# Patient Record
Sex: MALE | Race: WHITE | NOT HISPANIC OR LATINO | Employment: FULL TIME | URBAN - METROPOLITAN AREA
[De-identification: names, ages, dates, MRNs, and addresses within clinical notes are randomized per-mention and may not be internally consistent; named-entity substitution may affect disease eponyms.]

---

## 2017-02-02 ENCOUNTER — LAB CONVERSION - ENCOUNTER (OUTPATIENT)
Dept: OTHER | Facility: OTHER | Age: 62
End: 2017-02-02

## 2017-02-02 ENCOUNTER — GENERIC CONVERSION - ENCOUNTER (OUTPATIENT)
Dept: OTHER | Facility: OTHER | Age: 62
End: 2017-02-02

## 2017-02-02 LAB
A/G RATIO (HISTORICAL): 1.6 (CALC) (ref 1–2.5)
ALBUMIN SERPL BCP-MCNC: 4.5 G/DL (ref 3.6–5.1)
ALP SERPL-CCNC: 60 U/L (ref 40–115)
ALT SERPL W P-5'-P-CCNC: 19 U/L (ref 9–46)
AST SERPL W P-5'-P-CCNC: 15 U/L (ref 10–35)
BILIRUB SERPL-MCNC: 0.5 MG/DL (ref 0.2–1.2)
BUN SERPL-MCNC: 21 MG/DL (ref 7–25)
BUN/CREA RATIO (HISTORICAL): NORMAL (CALC) (ref 6–22)
CALCIUM SERPL-MCNC: 9.8 MG/DL (ref 8.6–10.3)
CHLORIDE SERPL-SCNC: 105 MMOL/L (ref 98–110)
CHOLEST SERPL-MCNC: 180 MG/DL (ref 125–200)
CHOLEST/HDLC SERPL: 4.6 (CALC)
CO2 SERPL-SCNC: 23 MMOL/L (ref 20–31)
CREAT SERPL-MCNC: 1.22 MG/DL (ref 0.7–1.25)
EGFR AFRICAN AMERICAN (HISTORICAL): 73 ML/MIN/1.73M2
EGFR-AMERICAN CALC (HISTORICAL): 63 ML/MIN/1.73M2
GAMMA GLOBULIN (HISTORICAL): 2.8 G/DL (CALC) (ref 1.9–3.7)
GLUCOSE (HISTORICAL): 97 MG/DL (ref 65–99)
HDLC SERPL-MCNC: 39 MG/DL
LDL CHOLESTEROL (HISTORICAL): 108 MG/DL (CALC)
NON-HDL-CHOL (CHOL-HDL) (HISTORICAL): 141 MG/DL (CALC)
POTASSIUM SERPL-SCNC: 4.5 MMOL/L (ref 3.5–5.3)
PROSTATE SPECIFIC ANTIGEN TOTAL (HISTORICAL): 1 NG/ML
SODIUM SERPL-SCNC: 139 MMOL/L (ref 135–146)
TOTAL PROTEIN (HISTORICAL): 7.3 G/DL (ref 6.1–8.1)
TRIGL SERPL-MCNC: 167 MG/DL
TSH SERPL DL<=0.05 MIU/L-ACNC: 1.87 MIU/L (ref 0.4–4.5)

## 2017-05-16 ENCOUNTER — ALLSCRIPTS OFFICE VISIT (OUTPATIENT)
Dept: OTHER | Facility: OTHER | Age: 62
End: 2017-05-16

## 2017-06-17 ENCOUNTER — GENERIC CONVERSION - ENCOUNTER (OUTPATIENT)
Dept: OTHER | Facility: OTHER | Age: 62
End: 2017-06-17

## 2017-07-24 ENCOUNTER — HOSPITAL ENCOUNTER (EMERGENCY)
Facility: HOSPITAL | Age: 62
Discharge: HOME | End: 2017-07-24
Attending: EMERGENCY MEDICINE | Admitting: EMERGENCY MEDICINE
Payer: COMMERCIAL

## 2017-07-24 VITALS
WEIGHT: 207 LBS | SYSTOLIC BLOOD PRESSURE: 143 MMHG | TEMPERATURE: 98 F | OXYGEN SATURATION: 96 % | HEART RATE: 64 BPM | RESPIRATION RATE: 18 BRPM | DIASTOLIC BLOOD PRESSURE: 92 MMHG | HEIGHT: 69 IN | BODY MASS INDEX: 30.66 KG/M2

## 2017-07-24 DIAGNOSIS — T14.8XXA AVULSION OF SKIN: ICD-10-CM

## 2017-07-24 DIAGNOSIS — S00.81XA FOREHEAD ABRASION, INITIAL ENCOUNTER: ICD-10-CM
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
## 2017-07-24 PROCEDURE — 99283 EMERGENCY DEPT VISIT LOW MDM: CPT

## 2017-07-24 RX ORDER — BACITRACIN, NEOMYCIN, POLYMYXIN B 400; 3.5; 5 [USP'U]/G; MG/G; [USP'U]/G
1 OINTMENT TOPICAL ONCE
Status: COMPLETED | OUTPATIENT
Start: 2017-07-24 | End: 2017-07-24

## 2017-07-24 RX ADMIN — BACITRACIN, NEOMYCIN, POLYMYXIN B 1 SMALL APPLICATION: 400; 3.5; 5 OINTMENT TOPICAL at 02:49

## 2017-08-01 DIAGNOSIS — E78.5 HYPERLIPIDEMIA: ICD-10-CM

## 2017-08-24 ENCOUNTER — LAB CONVERSION - ENCOUNTER (OUTPATIENT)
Dept: OTHER | Facility: OTHER | Age: 62
End: 2017-08-24

## 2017-08-24 ENCOUNTER — GENERIC CONVERSION - ENCOUNTER (OUTPATIENT)
Dept: OTHER | Facility: OTHER | Age: 62
End: 2017-08-24

## 2017-08-24 LAB
A/G RATIO (HISTORICAL): 1.6 (CALC) (ref 1–2.5)
ALBUMIN SERPL BCP-MCNC: 4.2 G/DL (ref 3.6–5.1)
ALP SERPL-CCNC: 58 U/L (ref 40–115)
ALT SERPL W P-5'-P-CCNC: 15 U/L (ref 9–46)
AST SERPL W P-5'-P-CCNC: 13 U/L (ref 10–35)
BILIRUB SERPL-MCNC: 0.5 MG/DL (ref 0.2–1.2)
BUN SERPL-MCNC: 18 MG/DL (ref 7–25)
BUN/CREA RATIO (HISTORICAL): NORMAL (CALC) (ref 6–22)
CALCIUM SERPL-MCNC: 9.4 MG/DL (ref 8.6–10.3)
CHLORIDE SERPL-SCNC: 108 MMOL/L (ref 98–110)
CHOLEST SERPL-MCNC: 149 MG/DL
CHOLEST/HDLC SERPL: 3.7 (CALC)
CO2 SERPL-SCNC: 27 MMOL/L (ref 20–31)
CREAT SERPL-MCNC: 1.15 MG/DL (ref 0.7–1.25)
EGFR AFRICAN AMERICAN (HISTORICAL): 78 ML/MIN/1.73M2
EGFR-AMERICAN CALC (HISTORICAL): 67 ML/MIN/1.73M2
GAMMA GLOBULIN (HISTORICAL): 2.7 G/DL (CALC) (ref 1.9–3.7)
GLUCOSE (HISTORICAL): 91 MG/DL (ref 65–99)
HDLC SERPL-MCNC: 40 MG/DL
LDL CHOLESTEROL (HISTORICAL): 86 MG/DL (CALC)
NON-HDL-CHOL (CHOL-HDL) (HISTORICAL): 109 MG/DL (CALC)
POTASSIUM SERPL-SCNC: 4.5 MMOL/L (ref 3.5–5.3)
SODIUM SERPL-SCNC: 142 MMOL/L (ref 135–146)
T4 FREE SERPL-MCNC: 1.3 NG/DL (ref 0.8–1.8)
TOTAL PROTEIN (HISTORICAL): 6.9 G/DL (ref 6.1–8.1)
TRIGL SERPL-MCNC: 136 MG/DL
TSH SERPL DL<=0.05 MIU/L-ACNC: 1.84 MIU/L (ref 0.4–4.5)

## 2017-11-14 ENCOUNTER — ALLSCRIPTS OFFICE VISIT (OUTPATIENT)
Dept: OTHER | Facility: OTHER | Age: 62
End: 2017-11-14

## 2017-11-15 NOTE — PROGRESS NOTES
Assessment    1  Hyperlipidemia (272 4) (E78 5)   2  GERD without esophagitis (530 81) (K21 9)   3  Obesity (BMI 30-39 9) (278 00) (E66 9)   4  Erectile dysfunction of non-organic origin (302 72) (F52 21)   5  Need for vaccination (V05 9) (Z23)    Plan  Encounter for prostate cancer screening, Erectile dysfunction of non-organic origin    · (1) PSA (SCREEN) (Dx V76 44 Screen for Prostate Cancer); Status:Active; Requestedfor:15Fyv7832;   Erectile dysfunction of non-organic origin    · Viagra 50 MG Oral Tablet; TAKE 1 TABLET DAILY 1 HOUR BEFORE NEEDED  Erectile dysfunction of non-organic origin, GERD without esophagitis, Hyperlipidemia,Obesity (BMI 30-39  9)    · Follow-up visit in 6 months Evaluation and Treatment  Follow-up  Status: Hold For -Scheduling  Requested for: 83Khh5751  GERD without esophagitis    · Avoid foods and beverages that contain caffeine ; Status:Complete;   Done: 13XHL6892   · Do not eat anything for at least 2 hours before going to bed ; Status:Complete;   Done:14Nov2017   · Eat small frequent meals ; Status:Complete;   Done: 01DLS9898   · Raise the head of your bed to keep stomach acid from coming back up  ;Status:Complete;   Done: 72AKD4603   · Regular aerobic exercise can help reduce stress ; Status:Complete;   Done: 45QUL0111   · Several things can be done to help treat and prevent your gastric reflux  ;Status:Complete;   Done: 29RKL5515   · Call [phone] if: You have difficulty swallowing ; Status:Complete;   Done:14Nov2017   · Call [phone] if: You lose weight without trying to ; Status:Complete;   Done:14Nov2017   · Call [phone] if: Your indigestion is worse ; Status:Complete;   Done: 13EFL9305   · Call 911 if: You experience a new kind of chest pain (angina) or pressure  ;Status:Complete;   Done: 35GSD9803   · Seek Immediate Medical Attention if: You are vomiting any blood or material that looksblack, or like coffee grounds ; Status:Complete;   Done: 55IGS0553   · Seek Immediate Medical Attention if: You see any blood in the stool ; Status:Complete;  Done: 79ICV9623  GERD without esophagitis, Hyperlipidemia    · Call [phone] if: You have pain in the stomach area ; Status:Complete;   Done:16Fda5264   · Call [phone] if: You start vomiting ; Status:Complete;   Done: 86RFH3131  GERD without esophagitis, Hyperlipidemia, Obesity (BMI 30-39  9)    · Call 911 if: You experience a new kind of chest pain (angina) or pressure  ;Status:Complete;   Done: 67WCO6340  Health Maintenance, Hyperlipidemia    · *VB-Depression Screening; Status:Resulted - Requires Verification;   Done: 14Nov201710:18AM  Hyperlipidemia    · (1) LIPID PANEL, FASTING; Status:Active; Requested for:52Drv3663;    · (1) T4, FREE; Status:Active; Requested for:34Suw7152;    · (1) TSH; Status:Active; Requested for:81Fvv3973;    · Eat no more than 30 grams of fat per day ; Status:Complete;   Done: 42ZGQ5129   · Call [phone] if: You have muscle cramps ; Status:Complete;   Done: 19QQF8245  Hyperlipidemia, Obesity (BMI 30-39  9)    · A diet low in sugar may help your condition ; Status:Complete;   Done: 45BIW4347   · Avoid alcoholic beverages ; Status:Complete;   Done: 07PXP4086   · Begin a limited exercise program ; Status:Complete;   Done: 65CGJ5218   · Call 911 if: You have any symptoms of a stroke ; Status:Complete;   Done: 65NGS3147   · (1) COMPREHENSIVE METABOLIC PANEL; Status:Active; Requested for:11Qrh6057;   Need for vaccination    · Fluzone Quadrivalent Intramuscular Suspension; INJECT 0 5  MLIntramuscular; To Be Done: 94PAH1055   · Pneumo (Pneumovax); INJECT 0 5  ML Intramuscular; To Be Done:14Nov2017  Obesity (BMI 30-39  9)    · Call [phone] if: You are considering suicide ; Status:Complete;   Done:14Nov2017   · Call [phone] if: You are having difficulty sleeping (insomnia) ; Status:Complete;  Done: 81PNO2174   · Call [phone] if:  You are urinating too frequently ; Status:Complete; Done:14Nov2017   · Call [phone] if: You feel thirsty most of the time ; Status:Complete;   Done:14Nov2017   · Call [phone] if: You feel your heart is beating very fast or skipping beats  ;Status:Complete;   Done: 06GUM0918   · Call [phone] if: You have feelings of extreme sadness and feelings ofhopelessness ; Status:Complete;   Done: 11HNP6050   · Call [phone] if: You have pain in your abdomen ; Status:Complete;   Done:14Nov2017   · Call [phone] if: You have symptoms of sleep apnea ; Status:Complete;   Done:14Nov2017   · Call [phone] if: You have sudden or severe chest pain with shortness of breath, rapidbreathing, or cough ; Status:Complete;   Done: 61YZS9902   · Diets that are low in carbohydrates and high in protein are very popular for weight loss  ;Status:Complete;   Done: 52WUG1097    Discussion/Summary    Patient labs from August show LDL 84 with HDL of 40 Patient will continue with the same cholesterol medications His GERD has resolved and so he only takes the TUMS as needed Patient will have repeat labs in 2/2018 which will include PSA His last colonoscopy was stable  Chief Complaint  follow up gerd,lipid      History of Present Illness  Patient is here for followp of GERD, lipids, obesity and also his erectile dysfunction Patient is doing really well with reflux and in fact stopped the omeprazole He is taking simvastatin as directed and last labs in 8/2017 were stable His last PSA was 2/2017 He is stable with the ED as the viagra works fine He has had his colon cancer screening and follows with dermatology as he has history of skin cancer on his nose   The patient is being seen for follow-up of gastroesophageal reflux disease  The patient reports doing well  He has had no significant interval events  The patient is currently asymptomatic  No associated symptoms are reported    Medications:  the patient is adherent to his medication regimen, but-- he denies medication side effects  Disease management:  the patient is doing well with his goals  His hyperlipidemia has been stable  His LDL goal is <100 mg/dL-- and-- last LDL was 86 mg/dL  the patient is adherent with his medication regimen  -- He denies medication side effects  Symptoms: The patient denies any symptoms currently  no vomiting Associated symptoms include recent 4 pounds weight loss, but-- no orthopnea,-- no polyuria,-- no focal neurologic deficits,-- no palpitations,-- no syncope,-- no headache,-- no PND,-- no memory loss,-- no polydipsia-- and-- no heartburn  Lifestyle and Disease Management: Diet: He consumes a diverse and healthy diet  Weight Issues: He has weight concerns  Exercise: He exercises regularly  Smoking: He does not use tobacco  Alcohol: He denies alcohol use  Drug Use: He denies drug use  Goals: the patient is doing well with his goals  Review of Systems   Constitutional: recent 4 lb weight loss, but-- no fever,-- not feeling poorly,-- no chills-- and-- not feeling tired  Eyes: no eye pain-- and-- no eyesight problems  ENT: no complaints of earache, no hearing loss, no nosebleeds, no nasal discharge, no sore throat, no hoarseness  Cardiovascular: as noted in HPI  Respiratory: as noted in HPI  Gastrointestinal: as noted in HPI  Genitourinary: no dysuria-- and-- no incontinence  Musculoskeletal: no arthralgias-- and-- no myalgias  Integumentary: no rashes  Neurological: No compliants of headache, no confusion, no convulsions, no numbness or tingling, no dizziness or fainting, no limb weakness, no difficulty walking  Psychiatric: no anxiety,-- no sleep disturbances-- and-- no depression  Active Problems  1  Basal cell carcinoma of nose (173 31) (C44 311)   2  Colon polyps (211 3) (K63 5)   3  Erectile dysfunction of non-organic origin (302 72) (F52 21)   4  Generalized osteoarthritis (715 00) (M15 9)   5  GERD without esophagitis (530 81) (K21 9)   6   Hyperlipidemia (272 4) (E78 5)   7  Lipoma (214 9) (D17 9)   8  Obesity (BMI 30-39 9) (278 00) (E66 9)    Past Medical History  1  History of Encounter for prostate cancer screening (V76 44) (Z12 5)   2  History of Essential hypertriglyceridemia (272 1) (E78 1)   3  History of hypogonadism (V12 29) (Z86 39)   4  History of Obstructive sleep apnea (327 23) (G47 33)   5  History of Right wrist pain (719 43) (M25 531)   6  History of Snoring (786 09) (R06 83)   7  History of Strain of right wrist, subsequent encounter (V58 89,842 00) (G52 754Q)   8  History of Tinnitus, unspecified laterality (388 30) (H93 19)    The active problems and past medical history were reviewed and updated today  Surgical History  1  History of Colonoscopy (Fiberoptic) Screening   2  History of Diagnostic Esophagogastroduodenoscopy   3  History of Mohs Micrographic Surgery Nose    The surgical history was reviewed and updated today  Family History  Mother    1  Family history of Diabetes Mellitus (V18 0)    The family history was reviewed and updated today  Social History     · Being A Social Drinker   · Daily Coffee Consumption - Eight Or More Cups A Day   · Nonsmoker (V49 89) (Z78 9)  The social history was reviewed and is unchanged  Current Meds   1  Naproxen 500 MG Oral Tablet; TAKE 1 TABLET EVERY 12     HOURS AS NEEDED; Therapy: 61QFR5576 to (Evaluate:81Jda2072)  Requested for: 99WHU7294; Last Rx:29Jun2017 Ordered   2  Simvastatin 20 MG Oral Tablet; Take 1 tablet daily; Therapy: 29PYJ6006 to )  Requested for: 98Kmd4981; Last Rx:31Oct2017 Ordered   3  Viagra 50 MG Oral Tablet; TAKE 1 TABLET DAILY 1 HOUR BEFORE NEEDED  Requested for: 17UHM4088; Last Rx:35Kdi3822 Ordered    The medication list was reviewed and updated today  Allergies  1   No Known Drug Allergies    Vitals  Vital Signs    Recorded: 48AYX8907 03:04HQ   Systolic 196   Diastolic 82   Height 5 ft 9 in   Weight 205 lb 8 oz   BMI Calculated 30 35   BSA Calculated 2 09       Physical Exam   Constitutional  General appearance: No acute distress, well appearing and well nourished  Eyes  Conjunctiva and lids: No swelling, erythema, or discharge  Pupils and irises: Equal, round and reactive to light  Ears, Nose, Mouth, and Throat  External inspection of ears and nose: Normal    Otoscopic examination: Tympanic membrance translucent with normal light reflex  Canals patent without erythema  Nasal mucosa, septum, and turbinates: Normal without edema or erythema  Oropharynx: Normal with no erythema, edema, exudate or lesions  Pulmonary  Respiratory effort: No increased work of breathing or signs of respiratory distress  Auscultation of lungs: Clear to auscultation, equal breath sounds bilaterally, no wheezes, no rales, no rhonci  Cardiovascular  Auscultation of heart: Normal rate and rhythm, normal S1 and S2, without murmurs  Examination of extremities for edema and/or varicosities: Normal    Carotid pulses: Normal    Abdomen  Abdomen: Non-tender, no masses  Liver and spleen: No hepatomegaly or splenomegaly  Lymphatic  Palpation of lymph nodes in neck: No lymphadenopathy  Musculoskeletal  Gait and station: Normal    Skin  Skin and subcutaneous tissue: Normal without rashes or lesions  Neurologic  Cranial nerves: Cranial nerves 2-12 intact     Psychiatric  Orientation to person, place and time: Normal    Mood and affect: Normal          Signatures   Electronically signed by : Bety Downing DO; Nov 14 2017 10:22AM EST                       (Author)

## 2018-01-12 NOTE — RESULT NOTES
Verified Results  (1) LIPID PANEL, FASTING [id number] 07:54AM Jose Raul Mad     Test Name Result Flag Reference   CHOLESTEROL, TOTAL 180 mg/dL  125-200   HDL CHOLESTEROL 39 mg/dL L > OR = 40   TRIGLICERIDES 551 mg/dL H <150   LDL-CHOLESTEROL 108 mg/dL (calc)  <130   Desirable range <100 mg/dL for patients with CHD or  diabetes and <70 mg/dL for diabetic patients with  known heart disease  CHOL/HDLC RATIO 4 6 (calc)  < OR = 5 0   NON HDL CHOLESTEROL 141 mg/dL (calc)     Target for non-HDL cholesterol is 30 mg/dL higher than   LDL cholesterol target  (1) COMPREHENSIVE METABOLIC PANEL [id number] 80:46JJ Jose Raul Mad     Test Name Result Flag Reference   GLUCOSE 97 mg/dL  65-99   Fasting reference interval   UREA NITROGEN (BUN) 21 mg/dL  7-25   CREATININE 1 22 mg/dL  0 70-1 25   For patients >52years of age, the reference limit  for Creatinine is approximately 13% higher for people  identified as -American  eGFR NON-AFR  AMERICAN 63 mL/min/1 73m2  > OR = 60   eGFR AFRICAN AMERICAN 73 mL/min/1 73m2  > OR = 60   BUN/CREATININE RATIO   3-39   NOT APPLICABLE (calc)   SODIUM 139 mmol/L  135-146   POTASSIUM 4 5 mmol/L  3 5-5 3   CHLORIDE 105 mmol/L     CARBON DIOXIDE 23 mmol/L  20-31   CALCIUM 9 8 mg/dL  8 6-10 3   PROTEIN, TOTAL 7 3 g/dL  6 1-8 1   ALBUMIN 4 5 g/dL  3 6-5 1   GLOBULIN 2 8 g/dL (calc)  1 9-3 7   ALBUMIN/GLOBULIN RATIO 1 6 (calc)  1 0-2 5   BILIRUBIN, TOTAL 0 5 mg/dL  0 2-1 2   ALKALINE PHOSPHATASE 60 U/L     AST 15 U/L  10-35   ALT 19 U/L  9-46     (Q) TSH, 3RD GENERATION [id number] 07:54AM Jose Raul Mad     Test Name Result Flag Reference   TSH 1 87 mIU/L  0 40-4 50     (1) PSA (SCREEN) (Dx V76 44 Screen for Prostate Cancer) [id number] 07:54AM Jose Raul Mad   REPORT COMMENT:  FASTING:YES     Test Name Result Flag Reference   PSA, TOTAL 1 0 ng/mL  < OR = 4 0   This test was performed using the Siemens  chemiluminescent method   Values obtained from  different assay methods cannot be used  interchangeably  PSA levels, regardless of  value, should not be interpreted as absolute  evidence of the presence or absence of disease

## 2018-01-14 VITALS
HEIGHT: 69 IN | DIASTOLIC BLOOD PRESSURE: 82 MMHG | SYSTOLIC BLOOD PRESSURE: 120 MMHG | BODY MASS INDEX: 30.44 KG/M2 | WEIGHT: 205.5 LBS

## 2018-01-14 VITALS
WEIGHT: 209 LBS | DIASTOLIC BLOOD PRESSURE: 80 MMHG | BODY MASS INDEX: 30.96 KG/M2 | HEIGHT: 69 IN | SYSTOLIC BLOOD PRESSURE: 122 MMHG

## 2018-01-14 NOTE — RESULT NOTES
Verified Results  (1) LIPID PANEL, FASTING [id number] 07:44AM Juan David Adams     Test Name Result Flag Reference   CHOLESTEROL, TOTAL 184 mg/dL  125-200   HDL CHOLESTEROL 37 mg/dL L > OR = 40   TRIGLICERIDES 120 mg/dL H <150   LDL-CHOLESTEROL 104 mg/dL (calc)  <130   Desirable range <100 mg/dL for patients with CHD or  diabetes and <70 mg/dL for diabetic patients with  known heart disease  CHOL/HDLC RATIO 5 0 (calc)  < OR = 5 0   NON HDL CHOLESTEROL 147 mg/dL (calc)     Target for non-HDL cholesterol is 30 mg/dL higher than   LDL cholesterol target  See Below     We received your handwritten test order and  performed the AMA defined lipid panel  If  this is not what you intended to order, please  contact your local   immediately so that we may adjust our billing  appropriately  You may also inquire about  alternative or additional testing      (1) COMPREHENSIVE METABOLIC PANEL [id number] 14:87TQ Juan David Adams     Test Name Result Flag Reference   GLUCOSE 96 mg/dL  65-99   Fasting reference interval   UREA NITROGEN (BUN) 16 mg/dL  7-25   CREATININE 1 18 mg/dL  0 70-1 25   For patients >52years of age, the reference limit  for Creatinine is approximately 13% higher for people  identified as -American  eGFR NON-AFR   AMERICAN 66 mL/min/1 73m2  > OR = 60   eGFR AFRICAN AMERICAN 76 mL/min/1 73m2  > OR = 60   BUN/CREATININE RATIO   2-76   NOT APPLICABLE (calc)   SODIUM 140 mmol/L  135-146   POTASSIUM 4 4 mmol/L  3 5-5 3   CHLORIDE 105 mmol/L     CARBON DIOXIDE 27 mmol/L  19-30   CALCIUM 9 7 mg/dL  8 6-10 3   PROTEIN, TOTAL 7 2 g/dL  6 1-8 1   ALBUMIN 4 3 g/dL  3 6-5 1   GLOBULIN 2 9 g/dL (calc)  1 9-3 7   ALBUMIN/GLOBULIN RATIO 1 5 (calc)  1 0-2 5   BILIRUBIN, TOTAL 0 5 mg/dL  0 2-1 2   ALKALINE PHOSPHATASE 53 U/L     AST 16 U/L  10-35   ALT 25 U/L  9-46     (Q) TSH, 3RD GENERATION [id number] 07:44AM Juan David Adams   REPORT COMMENT:  FASTING:YES     Test Name Result Flag Reference   TSH 2 08 mIU/L  0 40-4 50

## 2018-01-17 NOTE — RESULT NOTES
Verified Results  (1) LIPID PANEL, FASTING [id number] 07:48AM Mendel Montenegro     Test Name Result Flag Reference   CHOLESTEROL, TOTAL 149 mg/dL  <200   HDL CHOLESTEROL 40 mg/dL L >50   TRIGLICERIDES 664 mg/dL  <150   LDL-CHOLESTEROL 86 mg/dL (calc)     Reference range: <100     Desirable range <100 mg/dL for patients with CHD or  diabetes and <70 mg/dL for diabetic patients with  known heart disease  The [place] calculation is a validated novel   method that provides better accuracy than the   Friedewald equation in the estimation of LDL-C,   particularly when TG levels are 150-400 mg/dL and   LDL-C levels are lower than 70 mg/dL  Reference:  Shara Whatley et al  Comparison of a Novel   Method vs the Friedewald Equation for Estimating   Low-Density Lipoprotein Cholesterol Levels From the   Standard Lipid Profile  [facility]  0416;053(12): 3007-2844  For additional information, please refer to  http://[employer]/faq/MPQ126  (This link is being provided for informational/  educational purposes only )   CHOL/HDLC RATIO 3 7 (calc)  <5 0    See Below     We received your handwritten test order and  performed the AMA defined lipid panel  If  this is not what you intended to order, please  contact your local   immediately so that we may adjust our billing  appropriately  You may also inquire about  alternative or additional testing  NON HDL CHOLESTEROL 109 mg/dL (calc)  <130   For patients with diabetes plus 1 major ASCVD risk   factor, treating to a non-HDL-C goal of <100 mg/dL   (LDL-C of <70 mg/dL) is considered a therapeutic   option       (1) COMPREHENSIVE METABOLIC PANEL [id number] 55:22SV Mendel Montenegro     Test Name Result Flag Reference   GLUCOSE 91 mg/dL  65-99   Fasting reference interval   UREA NITROGEN (BUN) 18 mg/dL  7-25   CREATININE 1 15 mg/dL  0 70-1 25   For patients >52years of age, the reference limit  for Creatinine is approximately 13% higher for people  identified as -American  eGFR NON-AFR   AMERICAN 67 mL/min/1 73m2  > OR = 60   eGFR AFRICAN AMERICAN 78 mL/min/1 73m2  > OR = 60   BUN/CREATININE RATIO   8-66   NOT APPLICABLE (calc)   SODIUM 142 mmol/L  135-146   POTASSIUM 4 5 mmol/L  3 5-5 3   CHLORIDE 108 mmol/L     CARBON DIOXIDE 27 mmol/L  20-31   CALCIUM 9 4 mg/dL  8 6-10 3   PROTEIN, TOTAL 6 9 g/dL  6 1-8 1   ALBUMIN 4 2 g/dL  3 6-5 1   GLOBULIN 2 7 g/dL (calc)  1 9-3 7   ALBUMIN/GLOBULIN RATIO 1 6 (calc)  1 0-2 5   BILIRUBIN, TOTAL 0 5 mg/dL  0 2-1 2   ALKALINE PHOSPHATASE 58 U/L     AST 13 U/L  10-35   ALT 15 U/L  9-46     (1) T4, FREE [id number] 07:48AM Betty Levo     Test Name Result Flag Reference   T4, FREE 1 3 ng/dL  0 8-1 8     (Q) TSH, 3RD GENERATION [id number] 07:48AM Betty Levo   REPORT COMMENT:  FASTING:YES     Test Name Result Flag Reference   TSH 1 84 mIU/L  0 40-4 50

## 2018-01-17 NOTE — RESULT NOTES
Verified Results  (1) LIPID PANEL, FASTING [id number] 08:35AM [facility]en Guides     Test Name Result Flag Reference   CHOLESTEROL, TOTAL 170 mg/dL  125-200   HDL CHOLESTEROL 43 mg/dL  > OR = 40   TRIGLICERIDES 909 mg/dL H <150   LDL-CHOLESTEROL 95 mg/dL (calc)  <130   Desirable range <100 mg/dL for patients with CHD or  diabetes and <70 mg/dL for diabetic patients with  known heart disease  CHOL/HDLC RATIO 4 0 (calc)  < OR = 5 0   NON HDL CHOLESTEROL 127 mg/dL (calc)     Target for non-HDL cholesterol is 30 mg/dL higher than   LDL cholesterol target  See Below     We received your handwritten test order and  performed the AMA defined lipid panel  If  this is not what you intended to order, please  contact your local   immediately so that we may adjust our billing  appropriately  You may also inquire about  alternative or additional testing      (1) COMPREHENSIVE METABOLIC PANEL [id number] 44:69UH [facility]en Guides     Test Name Result Flag Reference   GLUCOSE 97 mg/dL  65-99   Fasting reference interval   UREA NITROGEN (BUN) 17 mg/dL  7-25   CREATININE 1 11 mg/dL  0 70-1 25   For patients >52years of age, the reference limit  for Creatinine is approximately 13% higher for people  identified as -American  eGFR NON-AFR   AMERICAN 71 mL/min/1 73m2  > OR = 60   eGFR AFRICAN AMERICAN 83 mL/min/1 73m2  > OR = 60   BUN/CREATININE RATIO   5-53   NOT APPLICABLE (calc)   SODIUM 139 mmol/L  135-146   POTASSIUM 4 7 mmol/L  3 5-5 3   CHLORIDE 106 mmol/L     CARBON DIOXIDE 26 mmol/L  19-30   CALCIUM 9 4 mg/dL  8 6-10 3   PROTEIN, TOTAL 7 4 g/dL  6 1-8 1   ALBUMIN 4 4 g/dL  3 6-5 1   GLOBULIN 3 0 g/dL (calc)  1 9-3 7   ALBUMIN/GLOBULIN RATIO 1 5 (calc)  1 0-2 5   BILIRUBIN, TOTAL 0 2 mg/dL  0 2-1 2   ALKALINE PHOSPHATASE 52 U/L     AST 20 U/L  10-35   ALT 20 U/L  9-46     (Q) TSH, 3RD GENERATION [id number] 08:35AM [facility]     Test Name Result Flag Reference   TSH 2  33 mIU/L  0 40-4 50     (1) PSA, DIAGNOSTIC (FOLLOW-UP) [id number] 08:35AM Jan Davis   REPORT COMMENT:  FASTING:YES     Test Name Result Flag Reference   PSA, TOTAL 0 8 ng/mL  < OR = 4 0   This test was performed using the Siemens  chemiluminescent method  Values obtained from  different assay methods cannot be used  interchangeably  PSA levels, regardless of  value, should not be interpreted as absolute  evidence of the presence or absence of disease

## 2018-02-23 DIAGNOSIS — Z12.5 ENCOUNTER FOR SCREENING FOR MALIGNANT NEOPLASM OF PROSTATE: ICD-10-CM

## 2018-02-23 DIAGNOSIS — E66.9 OBESITY: ICD-10-CM

## 2018-02-23 DIAGNOSIS — F52.21 MALE ERECTILE DISORDER (CODE): ICD-10-CM

## 2018-02-23 DIAGNOSIS — E78.5 HYPERLIPIDEMIA: ICD-10-CM

## 2018-03-02 LAB
ALBUMIN SERPL-MCNC: 4.1 G/DL (ref 3.6–5.1)
ALBUMIN/GLOB SERPL: 1.5 (CALC) (ref 1–2.5)
ALP SERPL-CCNC: 60 U/L (ref 40–115)
ALT SERPL-CCNC: 14 U/L (ref 9–46)
AST SERPL-CCNC: 11 U/L (ref 10–35)
BILIRUB SERPL-MCNC: 0.5 MG/DL (ref 0.2–1.2)
BUN SERPL-MCNC: 17 MG/DL (ref 7–25)
BUN/CREAT SERPL: 13 (CALC) (ref 6–22)
CALCIUM SERPL-MCNC: 9.5 MG/DL (ref 8.6–10.3)
CHLORIDE SERPL-SCNC: 110 MMOL/L (ref 98–110)
CHOLEST SERPL-MCNC: 139 MG/DL
CHOLEST/HDLC SERPL: 3.4 (CALC)
CO2 SERPL-SCNC: 30 MMOL/L (ref 20–31)
CREAT SERPL-MCNC: 1.31 MG/DL (ref 0.7–1.25)
GLOBULIN SER CALC-MCNC: 2.8 G/DL (CALC) (ref 1.9–3.7)
GLUCOSE SERPL-MCNC: 98 MG/DL (ref 65–99)
HDLC SERPL-MCNC: 41 MG/DL
LDLC SERPL CALC-MCNC: 79 MG/DL (CALC)
NONHDLC SERPL-MCNC: 98 MG/DL (CALC)
POTASSIUM SERPL-SCNC: 4.9 MMOL/L (ref 3.5–5.3)
PROT SERPL-MCNC: 6.9 G/DL (ref 6.1–8.1)
PSA SERPL-MCNC: 1 NG/ML
SL AMB EGFR AFRICAN AMERICAN: 67 ML/MIN/1.73M2
SL AMB EGFR NON AFRICAN AMERICAN: 58 ML/MIN/1.73M2
SODIUM SERPL-SCNC: 144 MMOL/L (ref 135–146)
T4 FREE SERPL-MCNC: 1.2 NG/DL (ref 0.8–1.8)
TRIGL SERPL-MCNC: 100 MG/DL
TSH SERPL-ACNC: 1.89 MIU/L (ref 0.4–4.5)

## 2018-04-02 DIAGNOSIS — E78.2 MIXED HYPERLIPIDEMIA: Primary | ICD-10-CM

## 2018-04-03 RX ORDER — SIMVASTATIN 20 MG
TABLET ORAL
Qty: 90 TABLET | Refills: 1 | Status: SHIPPED | OUTPATIENT
Start: 2018-04-03 | End: 2018-11-14 | Stop reason: SDUPTHER

## 2018-05-22 ENCOUNTER — OFFICE VISIT (OUTPATIENT)
Dept: FAMILY MEDICINE CLINIC | Facility: CLINIC | Age: 63
End: 2018-05-22
Payer: COMMERCIAL

## 2018-05-22 VITALS
BODY MASS INDEX: 30.54 KG/M2 | SYSTOLIC BLOOD PRESSURE: 128 MMHG | WEIGHT: 206.2 LBS | HEIGHT: 69 IN | DIASTOLIC BLOOD PRESSURE: 82 MMHG

## 2018-05-22 DIAGNOSIS — C44.311 BASAL CELL CARCINOMA OF NOSE: ICD-10-CM

## 2018-05-22 DIAGNOSIS — E78.2 MIXED HYPERLIPIDEMIA: Primary | ICD-10-CM

## 2018-05-22 DIAGNOSIS — E66.9 OBESITY (BMI 30-39.9): ICD-10-CM

## 2018-05-22 DIAGNOSIS — F52.21 ERECTILE DYSFUNCTION OF NON-ORGANIC ORIGIN: ICD-10-CM

## 2018-05-22 PROCEDURE — 3008F BODY MASS INDEX DOCD: CPT | Performed by: FAMILY MEDICINE

## 2018-05-22 PROCEDURE — 99214 OFFICE O/P EST MOD 30 MIN: CPT | Performed by: FAMILY MEDICINE

## 2018-05-22 RX ORDER — SILDENAFIL 50 MG/1
1 TABLET, FILM COATED ORAL AS NEEDED
COMMUNITY
End: 2019-11-21 | Stop reason: SDUPTHER

## 2018-05-22 NOTE — ASSESSMENT & PLAN NOTE
Patient LDL is at goal Patient to continue with the same medication and will have repeat labs in 9/2018

## 2018-05-22 NOTE — PROGRESS NOTES
Assessment/Plan:    Basal cell carcinoma of nose  Patient to follwoup with dermatology as directed    Mixed hyperlipidemia  Patient LDL is at goal Patient to continue with the same medication and will have repeat labs in 9/2018    Erectile dysfunction of non-organic origin  viagra as needed    Obesity (BMI 30-39  9)  Weight is stable continue same diet       Diagnoses and all orders for this visit:    Mixed hyperlipidemia    Obesity (BMI 30-39  9)    Basal cell carcinoma of nose    Erectile dysfunction of non-organic origin    Other orders  -     sildenafil (VIAGRA) 50 MG tablet; Take 1 tablet by mouth          Subjective:   Chief Complaint   Patient presents with    Hyperlipidemia     no refills needed           Patient ID: Radha Guajardo is a 60 y o  male  Patient is here for follwoup of hyperlipidemia, obesity basal cell cancer of the nose and erectile dysfunction patient is tolerating medications without any issues Patient weight is stable last lipids were in March showed LDL 78, HDL 40 glucose was 98 Patient is walking for exercise His weight is stable however he states he is eating much better due to his wife having had gastric sleeve patient colonsocopy was in 2012 and normal Repeat due in 2022 Patient uses viagra as needed patient ahs no complaints today        The following portions of the patient's history were reviewed and updated as appropriate: allergies, current medications, past social history and problem list     Review of Systems   Constitutional: Negative for fatigue, fever and unexpected weight change  HENT: Negative for congestion, sinus pain and trouble swallowing  Eyes: Negative for discharge and visual disturbance  Respiratory: Negative for cough, chest tightness, shortness of breath and wheezing  Cardiovascular: Negative for chest pain, palpitations and leg swelling  Gastrointestinal: Negative for abdominal pain, blood in stool, constipation, diarrhea, nausea and vomiting  Genitourinary: Negative for difficulty urinating, dysuria, frequency and hematuria  Musculoskeletal: Negative for arthralgias, gait problem and joint swelling  Skin: Negative for rash and wound  Allergic/Immunologic: Negative for environmental allergies and food allergies  Neurological: Negative for dizziness, syncope, weakness, numbness and headaches  Hematological: Negative for adenopathy  Does not bruise/bleed easily  Psychiatric/Behavioral: Negative for confusion, decreased concentration and sleep disturbance  The patient is not nervous/anxious  Objective:      /82   Ht 5' 9" (1 753 m)   Wt 93 5 kg (206 lb 3 2 oz)   BMI 30 45 kg/m²          Physical Exam   Constitutional: He is oriented to person, place, and time  He appears well-developed and well-nourished  HENT:   Head: Normocephalic and atraumatic  Right Ear: Hearing, tympanic membrane and external ear normal    Left Ear: Hearing, tympanic membrane and external ear normal    Eyes: Conjunctivae and EOM are normal  Pupils are equal, round, and reactive to light  Neck: Neck supple  No thyromegaly present  Cardiovascular: Normal rate and normal heart sounds  Pulmonary/Chest: Effort normal and breath sounds normal  He has no wheezes  He has no rales  Abdominal: Soft  Bowel sounds are normal  He exhibits no distension  There is no tenderness  Musculoskeletal: He exhibits no edema or tenderness  Lymphadenopathy:     He has no cervical adenopathy  Neurological: He is alert and oriented to person, place, and time  No cranial nerve deficit  Coordination normal    Skin: Skin is warm and dry  No rash noted  Psychiatric: He has a normal mood and affect   His behavior is normal  Judgment and thought content normal

## 2018-05-22 NOTE — PATIENT INSTRUCTIONS
Calorie Counting Diet   WHAT YOU NEED TO KNOW:   What is a calorie counting diet? It is a meal plan based on counting calories each day to reach a healthy body weight  You will need to eat fewer calories if you are trying to lose weight  Weight loss may decrease your risk for certain health problems or improve your health if you have health problems  Some of these health problems include heart disease, high blood pressure, and diabetes  What foods should I avoid? Your dietitian will tell you if you need to avoid certain foods based on your body weight and health condition  You may need to avoid high-fat foods if you are at risk for or have heart disease  You may need to eat fewer foods from the breads and starches food group if you have diabetes  How many calories are in foods? The following is a list of foods and drinks with the approximate number of calories in each  Check the food label to find the exact number of calories  A dietitian can tell you how many calories you should have from each food group each day    · Carbohydrate:      ¨ ½ of a 3-inch bagel, 1 slice of bread, or ½ of a hamburger bun or hot dog bun (80)    ¨ 1 (8-inch) flour tortilla or ½ cup of cooked rice (100)    ¨ 1 (6-inch) corn tortilla (80)    ¨ 1 (6-inch) pancake or 1 cup of bran flakes cereal (110)    ¨ ½ cup of cooked cereal (80)    ¨ ½ cup of cooked pasta (85)    ¨ 1 ounce of pretzels (100)    ¨ 3 cups of air-popped popcorn without butter or oil (80)    · Dairy:      ¨ 1 cup of skim or 1% milk (90)    ¨ 1 cup of 2% milk (120)    ¨ 1 cup of whole milk (160)    ¨ 1 cup of 2% chocolate milk (220)    ¨ 1 ounce of low-fat cheese with 3 grams of fat per ounce (70)    ¨ 1 ounce of cheddar cheese (114)    ¨ ½ cup of 1% fat cottage cheese (80)    ¨ 1 cup of plain or sugar-free, fat-free yogurt (90)    · Protein foods:      ¨ 3 ounces of fish (not breaded or fried) (95)    ¨ 3 ounces of breaded, fried fish (195)    ¨ ¾ cup of tuna canned in water (105)    ¨ 3 ounces of chicken breast without skin (105)    ¨ 1 fried chicken breast with skin (350)    ¨ ¼ cup of fat free egg substitute (40)    ¨ 1 large egg (75)    ¨ 3 ounces of lean beef or pork (165)    ¨ 3 ounces of fried pork chop or ham (185)    ¨ ½ cup of cooked dried beans, such as kidney, grady, lentils, or navy (115)    ¨ 3 ounces of bologna or lunch meat (225)    ¨ 2 links of breakfast sausage (140)    · Vegetables:      ¨ ½ cup of sliced mushrooms (10)    ¨ 1 cup of salad greens, such as lettuce, spinach, or sudhakar (15)    ¨ ½ cup of steamed asparagus (20)    ¨ ½ cup of cooked summer squash, zucchini squash, or green or wax beans (25)    ¨ 1 cup of broccoli or cauliflower florets, or 1 medium tomato (25)    ¨ 1 large raw carrot or ½ cup of cooked carrots (40)    ¨ ? of a medium cucumber or 1 stalk of celery (5)    ¨ 1 small baked potato (160)    ¨ 1 cup of breaded, fried vegetables (230)    · Fruit:      ¨ 1 (6-inch) banana (55)     ¨ ½ of a 4-inch grapefruit (55)    ¨ 15 grapes (60)    ¨ 1 medium orange or apple (70)    ¨ 1 large peach (65)    ¨ 1 cup of fresh pineapple chunks (75)    ¨ 1 cup of melon cubes (50)    ¨ 1¼ cups of whole strawberries (45)    ¨ ½ cup of fruit canned in juice (55)    ¨ ½ cup of fruit canned in heavy syrup (110)    ¨ ?  cup of raisins (130)    ¨ ½ cup of unsweetened fruit juice (60)    ¨ ½ cup of grape, cranberry, or prune juice (90)    · Fat:      ¨ 10 peanuts or 2 teaspoons of peanut butter (55)    ¨ 2 tablespoons of avocado or 1 tablespoon of regular salad dressing (45)    ¨ 2 slices of abarca (90)    ¨ 1 teaspoon of oil, such as safflower, canola, corn, or olive oil (45)    ¨ 2 teaspoons of low-fat margarine, or 1 tablespoon of low-fat mayonnaise (50)    ¨ 1 teaspoon of regular margarine (40)    ¨ 1 tablespoon of regular mayonnaise (135)    ¨ 1 tablespoon of cream cheese or 2 tablespoons of low-fat cream cheese (45)    ¨ 2 tablespoons of vegetable shortening (215)    · Dessert and sweets:      ¨ 8 animal crackers or 5 vanilla wafers (80)    ¨ 1 frozen fruit juice bar (80)    ¨ ½ cup of ice milk or low-fat frozen yogurt (90)    ¨ ½ cup of sherbet or sorbet (125)    ¨ ½ cup of sugar-free pudding or custard (60)    ¨ ½ cup of ice cream (140)    ¨ ½ cup of pudding or custard (175)    ¨ 1 (2-inch) square chocolate brownie (185)    · Combination foods:      ¨ Bean burrito made with an 8-inch tortilla, without cheese (275)    ¨ Chicken breast sandwich with lettuce and tomato (325)    ¨ 1 cup of chicken noodle soup (60)    ¨ 1 beef taco (175)    ¨ Regular hamburger with lettuce and tomato (310)    ¨ Regular cheeseburger with lettuce and tomato (410)     ¨ ¼ of a 12-inch cheese pizza (280)    ¨ Fried fish sandwich with lettuce and tomato (425)    ¨ Hot dog and bun (275)    ¨ 1½ cups of macaroni and cheese (310)    ¨ Taco salad with a fried tortilla shell (870)    · Low-calorie foods:      ¨ 1 tablespoon of ketchup or 1 tablespoon of fat free sour cream (15)    ¨ 1 teaspoon of mustard (5)    ¨ ¼ cup of salsa (20)    ¨ 1 large dill pickle (15)    ¨ 1 tablespoon of fat free salad dressing (10)    ¨ 2 teaspoons of low-sugar, light jam or jelly, or 1 tablespoon of sugar-free syrup (15)    ¨ 1 sugar-free popsicle (15)    ¨ 1 cup of club soda, seltzer water, or diet soda (0)  CARE AGREEMENT:   You have the right to help plan your care  Discuss treatment options with your caregivers to decide what care you want to receive  You always have the right to refuse treatment  The above information is an  only  It is not intended as medical advice for individual conditions or treatments  Talk to your doctor, nurse or pharmacist before following any medical regimen to see if it is safe and effective for you  © 2017 [employer] Information is for End User's use only and may not be sold, redistributed or otherwise used for commercial purposes   All illustrations and images included in CareNotes® are the copyrighted property of A [employer] M [employer]  or Braden Bhardwaj

## 2018-09-17 LAB
ALBUMIN SERPL-MCNC: 4 G/DL (ref 3.6–5.1)
ALBUMIN/GLOB SERPL: 1.4 (CALC) (ref 1–2.5)
ALP SERPL-CCNC: 65 U/L (ref 40–115)
ALT SERPL-CCNC: 18 U/L (ref 9–46)
AST SERPL-CCNC: 16 U/L (ref 10–35)
BILIRUB SERPL-MCNC: 0.7 MG/DL (ref 0.2–1.2)
BUN SERPL-MCNC: 17 MG/DL (ref 7–25)
BUN/CREAT SERPL: 13 (CALC) (ref 6–22)
CALCIUM SERPL-MCNC: 9.4 MG/DL (ref 8.6–10.3)
CHLORIDE SERPL-SCNC: 109 MMOL/L (ref 98–110)
CHOLEST SERPL-MCNC: 135 MG/DL
CHOLEST/HDLC SERPL: 3.4 (CALC)
CO2 SERPL-SCNC: 27 MMOL/L (ref 20–32)
CREAT SERPL-MCNC: 1.31 MG/DL (ref 0.7–1.25)
GLOBULIN SER CALC-MCNC: 2.8 G/DL (CALC) (ref 1.9–3.7)
GLUCOSE SERPL-MCNC: 98 MG/DL (ref 65–99)
HDLC SERPL-MCNC: 40 MG/DL
LDLC SERPL CALC-MCNC: 73 MG/DL (CALC)
NONHDLC SERPL-MCNC: 95 MG/DL (CALC)
POTASSIUM SERPL-SCNC: 4.7 MMOL/L (ref 3.5–5.3)
PROT SERPL-MCNC: 6.8 G/DL (ref 6.1–8.1)
SL AMB EGFR AFRICAN AMERICAN: 66 ML/MIN/1.73M2
SL AMB EGFR NON AFRICAN AMERICAN: 57 ML/MIN/1.73M2
SODIUM SERPL-SCNC: 142 MMOL/L (ref 135–146)
TRIGL SERPL-MCNC: 140 MG/DL

## 2018-10-11 DIAGNOSIS — M19.90 OSTEOARTHRITIS, UNSPECIFIED OSTEOARTHRITIS TYPE, UNSPECIFIED SITE: Primary | ICD-10-CM

## 2018-10-11 RX ORDER — NAPROXEN 500 MG/1
TABLET ORAL
Qty: 180 TABLET | Refills: 1 | Status: SHIPPED | OUTPATIENT
Start: 2018-10-11 | End: 2018-12-07 | Stop reason: HOSPADM

## 2018-11-14 DIAGNOSIS — E78.2 MIXED HYPERLIPIDEMIA: ICD-10-CM

## 2018-11-14 RX ORDER — SIMVASTATIN 20 MG
TABLET ORAL
Qty: 90 TABLET | Refills: 1 | Status: SHIPPED | OUTPATIENT
Start: 2018-11-14 | End: 2019-05-21 | Stop reason: SDUPTHER

## 2018-11-20 ENCOUNTER — OFFICE VISIT (OUTPATIENT)
Dept: FAMILY MEDICINE CLINIC | Facility: CLINIC | Age: 63
End: 2018-11-20
Payer: COMMERCIAL

## 2018-11-20 VITALS
HEIGHT: 69 IN | SYSTOLIC BLOOD PRESSURE: 118 MMHG | WEIGHT: 203 LBS | BODY MASS INDEX: 30.07 KG/M2 | DIASTOLIC BLOOD PRESSURE: 82 MMHG

## 2018-11-20 DIAGNOSIS — Z01.818 PREOP EXAMINATION: ICD-10-CM

## 2018-11-20 DIAGNOSIS — E78.2 MIXED HYPERLIPIDEMIA: ICD-10-CM

## 2018-11-20 DIAGNOSIS — C44.41 BASAL CELL CARCINOMA (BCC) OF RIGHT SIDE OF NECK: Primary | ICD-10-CM

## 2018-11-20 DIAGNOSIS — Z23 NEED FOR VACCINATION: ICD-10-CM

## 2018-11-20 DIAGNOSIS — E66.3 OVERWEIGHT (BMI 25.0-29.9): ICD-10-CM

## 2018-11-20 PROCEDURE — 90471 IMMUNIZATION ADMIN: CPT

## 2018-11-20 PROCEDURE — 99243 OFF/OP CNSLTJ NEW/EST LOW 30: CPT | Performed by: FAMILY MEDICINE

## 2018-11-20 PROCEDURE — 93000 ELECTROCARDIOGRAM COMPLETE: CPT | Performed by: FAMILY MEDICINE

## 2018-11-20 PROCEDURE — 90682 RIV4 VACC RECOMBINANT DNA IM: CPT

## 2018-11-20 NOTE — PATIENT INSTRUCTIONS

## 2018-11-20 NOTE — ASSESSMENT & PLAN NOTE
Discussed continued weight loss Patient is to continue with the diet and increase his exercise I will see him in 6 months

## 2018-11-20 NOTE — PROGRESS NOTES
Assessment/Plan:    Mixed hyperlipidemia  Last lipid profile was stable continue with current meds    Basal cell carcinoma (BCC) of right side of neck  Patient for removal of the lesion as planned    Preop examination  patien ECG reviewed Patient is clear for surgery    Overweight (BMI 25 0-29  9)  Discussed continued weight loss Patient is to continue with the diet and increase his exercise I will see him in 6 months       Diagnoses and all orders for this visit:    Basal cell carcinoma (BCC) of right side of neck    Preop examination  -     POCT ECG    Mixed hyperlipidemia  -     POCT ECG    Overweight (BMI 25 0-29  9)          Subjective:   Chief Complaint   Patient presents with    Pre-op Exam     12/07/2018 lesion on neck Dr Colin Lentz           Patient ID: Cathy Luu is a 58 y o  male  Patient is having surgery for removal of basal cell carcinoma of the right side of neck Patient hd a biopsy showing skin cancer done by Dr Colin Lentz Patient is having surgery 12/7/2018 to remove this lesion with sedation Patient has not had any complaints of chest pain, shortness of breath or any other cardiac complaints He exerises regularly with out any concerns        The following portions of the patient's history were reviewed and updated as appropriate: allergies, current medications, past social history and problem list     Review of Systems   Constitutional: Negative for activity change, appetite change, chills, fatigue and fever  HENT: Negative for congestion, dental problem, ear pain, hearing loss, postnasal drip, sinus pain, sore throat and voice change  Eyes: Negative for pain, discharge, itching and visual disturbance  Respiratory: Negative for cough, shortness of breath and wheezing  Cardiovascular: Negative for chest pain, palpitations and leg swelling  Gastrointestinal: Negative for abdominal distention, abdominal pain, blood in stool, constipation, diarrhea, nausea and vomiting     Endocrine: Negative for cold intolerance, polydipsia, polyphagia and polyuria  Genitourinary: Negative for decreased urine volume, difficulty urinating, discharge, dysuria, hematuria, penile pain, scrotal swelling and testicular pain  Musculoskeletal: Negative for arthralgias, back pain, gait problem and myalgias  Skin: Negative for rash  Allergic/Immunologic: Negative for environmental allergies, food allergies and immunocompromised state  Neurological: Negative for dizziness, seizures and headaches  Hematological: Negative for adenopathy  Psychiatric/Behavioral: Negative for agitation, confusion and sleep disturbance  The patient is not nervous/anxious  Objective:      /82   Ht 5' 9" (1 753 m)   Wt 92 1 kg (203 lb)   BMI 29 98 kg/m²          Physical Exam   Constitutional: He is oriented to person, place, and time  He appears well-developed and well-nourished  HENT:   Head: Normocephalic and atraumatic  Right Ear: Hearing, tympanic membrane, external ear and ear canal normal    Left Ear: Hearing, tympanic membrane, external ear and ear canal normal    Eyes: Pupils are equal, round, and reactive to light  Conjunctivae and EOM are normal  Right eye exhibits no discharge  Left eye exhibits no discharge  No scleral icterus  Neck: Normal range of motion  Neck supple  No tracheal deviation present  No thyromegaly present  Cardiovascular: Normal rate, regular rhythm, normal heart sounds and intact distal pulses  Pulmonary/Chest: Effort normal and breath sounds normal    Abdominal: Soft  Bowel sounds are normal  He exhibits no distension and no mass  There is no tenderness  There is no rebound and no guarding  Hernia confirmed negative in the right inguinal area and confirmed negative in the left inguinal area  Genitourinary: Testes normal    Musculoskeletal: Normal range of motion  Lymphadenopathy:     He has no cervical adenopathy     Neurological: He is alert and oriented to person, place, and time  He has normal reflexes  No cranial nerve deficit  Skin: Skin is warm  No rash noted  Psychiatric: He has a normal mood and affect  Judgment and thought content normal      BMI Counseling: Body mass index is 29 98 kg/m²  Discussed with patient's BMI with him  The BMI is above average  BMI counseling and education was provided to the patient  Nutrition recommendations include reducing portion sizes, 3-5 servings of fruits/vegetables daily and reducing intake of saturated fat and trans fat  Exercise recommendations include exercising 3-5 times per week

## 2018-12-05 RX ORDER — OMEPRAZOLE 40 MG/1
40 CAPSULE, DELAYED RELEASE ORAL DAILY
COMMUNITY
End: 2018-12-05

## 2018-12-05 NOTE — PRE-PROCEDURE INSTRUCTIONS
Pre-Surgery Instructions:   Medication Instructions    simvastatin (ZOCOR) 20 mg tablet Instructed patient per Anesthesia Guidelines   [DISCONTINUED] omeprazole (PriLOSEC) 40 MG capsule Instructed patient per Anesthesia Guidelines

## 2018-12-06 ENCOUNTER — ANESTHESIA EVENT (OUTPATIENT)
Dept: PERIOP | Facility: HOSPITAL | Age: 63
End: 2018-12-06
Payer: COMMERCIAL

## 2018-12-06 NOTE — ANESTHESIA PREPROCEDURE EVALUATION
Review of Systems/Medical History          Cardiovascular  Negative cardio ROS Exercise tolerance (METS): >4,  Hyperlipidemia,    Pulmonary  Negative pulmonary ROS Sleep apnea ,        GI/Hepatic  Negative GI/hepatic ROS   GERD well controlled,        Negative  ROS Kidney stones,        Endo/Other     GYN       Hematology  Negative hematology ROS      Musculoskeletal  Negative musculoskeletal ROS        Neurology  Negative neurology ROS      Psychology           Physical Exam    Airway    Mallampati score: II  TM Distance: >3 FB  Neck ROM: full     Dental       Cardiovascular  Comment: Negative ROS, Cardiovascular exam normal    Pulmonary  Pulmonary exam normal     Other Findings        Anesthesia Plan  ASA Score- 2     Anesthesia Type- IV sedation with anesthesia with ASA Monitors  Additional Monitors:   Airway Plan:         Plan Factors-Patient not instructed to abstain from smoking on day of procedure  Patient did not smoke on day of surgery  Induction- intravenous  Postoperative Plan- Plan for postoperative opioid use  Informed Consent- Anesthetic plan and risks discussed with patient  I personally reviewed this patient with the CRNA  Discussed and agreed on the Anesthesia Plan with the CRNA  Sunita Davies           No results found for: HGBA1C    Lab Results   Component Value Date     08/23/2017    K 4 7 09/17/2018     09/17/2018    CO2 27 09/17/2018    BUN 17 09/17/2018    CREATININE 1 15 08/23/2017    CALCIUM 9 4 09/17/2018    AST 13 08/23/2017    ALT 15 08/23/2017    ALKPHOS 65 09/17/2018    PROT 6 9 08/23/2017    BILITOT 0 5 08/23/2017    EGFR >60 0 05/06/2016       Lab Results   Component Value Date    WBC 6 73 05/06/2016    HGB 14 6 05/06/2016    HCT 41 7 05/06/2016    MCV 86 05/06/2016     05/06/2016       No results found for: HGBA1C    Lab Results   Component Value Date     08/23/2017    K 4 7 09/17/2018     09/17/2018    CO2 27 09/17/2018    BUN 17 09/17/2018 CREATININE 1 15 08/23/2017    CALCIUM 9 4 09/17/2018    AST 13 08/23/2017    ALT 15 08/23/2017    ALKPHOS 65 09/17/2018    PROT 6 9 08/23/2017    BILITOT 0 5 08/23/2017    EGFR >60 0 05/06/2016       Lab Results   Component Value Date    WBC 6 73 05/06/2016    HGB 14 6 05/06/2016    HCT 41 7 05/06/2016    MCV 86 05/06/2016     05/06/2016   ekg NSR

## 2018-12-07 ENCOUNTER — ANESTHESIA (OUTPATIENT)
Dept: PERIOP | Facility: HOSPITAL | Age: 63
End: 2018-12-07
Payer: COMMERCIAL

## 2018-12-07 ENCOUNTER — TELEPHONE (OUTPATIENT)
Dept: FAMILY MEDICINE CLINIC | Facility: CLINIC | Age: 63
End: 2018-12-07

## 2018-12-07 ENCOUNTER — HOSPITAL ENCOUNTER (OUTPATIENT)
Facility: HOSPITAL | Age: 63
Setting detail: OUTPATIENT SURGERY
Discharge: HOME | End: 2018-12-07
Attending: PLASTIC SURGERY | Admitting: PLASTIC SURGERY
Payer: COMMERCIAL

## 2018-12-07 VITALS
RESPIRATION RATE: 18 BRPM | OXYGEN SATURATION: 99 % | WEIGHT: 205 LBS | TEMPERATURE: 97.8 F | HEART RATE: 56 BPM | SYSTOLIC BLOOD PRESSURE: 122 MMHG | HEIGHT: 69 IN | DIASTOLIC BLOOD PRESSURE: 89 MMHG | BODY MASS INDEX: 30.36 KG/M2

## 2018-12-07 DIAGNOSIS — C44.41 BASAL CELL CARCINOMA OF SKIN OF SCALP AND NECK: ICD-10-CM

## 2018-12-07 PROCEDURE — 88305 TISSUE EXAM BY PATHOLOGIST: CPT | Performed by: PATHOLOGY

## 2018-12-07 PROCEDURE — 88331 PATH CONSLTJ SURG 1 BLK 1SPC: CPT | Performed by: PATHOLOGY

## 2018-12-07 RX ORDER — PROPOFOL 10 MG/ML
INJECTION, EMULSION INTRAVENOUS CONTINUOUS PRN
Status: DISCONTINUED | OUTPATIENT
Start: 2018-12-07 | End: 2018-12-07 | Stop reason: SURG

## 2018-12-07 RX ORDER — CEFAZOLIN SODIUM 1 G/50ML
1000 SOLUTION INTRAVENOUS ONCE
Status: DISCONTINUED | OUTPATIENT
Start: 2018-12-07 | End: 2018-12-07 | Stop reason: HOSPADM

## 2018-12-07 RX ORDER — MAGNESIUM HYDROXIDE 1200 MG/15ML
LIQUID ORAL AS NEEDED
Status: DISCONTINUED | OUTPATIENT
Start: 2018-12-07 | End: 2018-12-07 | Stop reason: HOSPADM

## 2018-12-07 RX ORDER — FENTANYL CITRATE 50 UG/ML
INJECTION, SOLUTION INTRAMUSCULAR; INTRAVENOUS AS NEEDED
Status: DISCONTINUED | OUTPATIENT
Start: 2018-12-07 | End: 2018-12-07 | Stop reason: SURG

## 2018-12-07 RX ORDER — HYDROCODONE BITARTRATE AND ACETAMINOPHEN 5; 325 MG/1; MG/1
2 TABLET ORAL EVERY 4 HOURS PRN
Status: DISCONTINUED | OUTPATIENT
Start: 2018-12-07 | End: 2018-12-07 | Stop reason: HOSPADM

## 2018-12-07 RX ORDER — CEFAZOLIN SODIUM 2 G/50ML
SOLUTION INTRAVENOUS AS NEEDED
Status: DISCONTINUED | OUTPATIENT
Start: 2018-12-07 | End: 2018-12-07 | Stop reason: SURG

## 2018-12-07 RX ORDER — LIDOCAINE HYDROCHLORIDE AND EPINEPHRINE 10; 10 MG/ML; UG/ML
INJECTION, SOLUTION INFILTRATION; PERINEURAL AS NEEDED
Status: DISCONTINUED | OUTPATIENT
Start: 2018-12-07 | End: 2018-12-07 | Stop reason: HOSPADM

## 2018-12-07 RX ORDER — MIDAZOLAM HYDROCHLORIDE 1 MG/ML
INJECTION INTRAMUSCULAR; INTRAVENOUS AS NEEDED
Status: DISCONTINUED | OUTPATIENT
Start: 2018-12-07 | End: 2018-12-07 | Stop reason: SURG

## 2018-12-07 RX ORDER — LIDOCAINE HYDROCHLORIDE 10 MG/ML
INJECTION, SOLUTION INFILTRATION; PERINEURAL AS NEEDED
Status: DISCONTINUED | OUTPATIENT
Start: 2018-12-07 | End: 2018-12-07 | Stop reason: SURG

## 2018-12-07 RX ORDER — SODIUM CHLORIDE, SODIUM LACTATE, POTASSIUM CHLORIDE, CALCIUM CHLORIDE 600; 310; 30; 20 MG/100ML; MG/100ML; MG/100ML; MG/100ML
50 INJECTION, SOLUTION INTRAVENOUS CONTINUOUS
Status: DISCONTINUED | OUTPATIENT
Start: 2018-12-08 | End: 2018-12-07 | Stop reason: HOSPADM

## 2018-12-07 RX ADMIN — LIDOCAINE HYDROCHLORIDE 50 MG: 10 INJECTION, SOLUTION INFILTRATION; PERINEURAL at 08:20

## 2018-12-07 RX ADMIN — PROPOFOL 100 MCG/KG/MIN: 10 INJECTION, EMULSION INTRAVENOUS at 08:20

## 2018-12-07 RX ADMIN — MIDAZOLAM HYDROCHLORIDE 2 MG: 1 INJECTION, SOLUTION INTRAMUSCULAR; INTRAVENOUS at 08:20

## 2018-12-07 RX ADMIN — CEFAZOLIN SODIUM 2000 MG: 2 SOLUTION INTRAVENOUS at 08:16

## 2018-12-07 RX ADMIN — FENTANYL CITRATE 50 MCG: 50 INJECTION INTRAMUSCULAR; INTRAVENOUS at 08:20

## 2018-12-07 RX ADMIN — SODIUM CHLORIDE, POTASSIUM CHLORIDE, SODIUM LACTATE AND CALCIUM CHLORIDE 50 ML/HR: 600; 310; 30; 20 INJECTION, SOLUTION INTRAVENOUS at 08:09

## 2018-12-07 NOTE — NURSING NOTE
Received patient from PACU awake and alert  VSS  Denies pain  Neck incision is clean, dry and intact

## 2018-12-07 NOTE — NURSING NOTE
No distress  Tolerated toast and drink  HOB elevated  Incision clean and dry with surgical glue  No drainage  IV removed  Discharge instructions given to patient and wife

## 2018-12-07 NOTE — DISCHARGE INSTRUCTIONS
[address], [address] [facility], [address] [facility], [facility], [address] [facility] /N / [employer]       Avoid direct sunlight    No ice or heating pack    Ok to shower, avoid direct water pressure on incision    Keep your head elevated as much as possible    No strenuous activity    Skin glue was applied     Call [phone] for an appointment in 7-10 days

## 2018-12-07 NOTE — OP NOTE
OPERATIVE REPORT  PATIENT NAME: Blake Will    :  1955  MRN: [id number]  Pt Location:  OR ROOM 11    SURGERY DATE: 2018    Surgeon(s) and Role:     Jose Manuel Villegas MD - Primary    Preop Diagnosis:  Basal cell carcinoma of skin of scalp and neck [C44 41]    Post-Op Diagnosis Codes: * Basal cell carcinoma of skin of scalp and neck [C44 41]    Procedure(s) (LRB):  EXCISION BCC OF NECK W/LOCAL FLAP, FROZEN SECTION (N/A)    Specimen(s):  ID Type Source Tests Collected by Time Destination   1 : Right Neck Lesion  Short Superior, Long Lateral, Double Inferior Tissue Lesion TISSUE Pan Corrales MD 2018 [id number]        Estimated Blood Loss:   Minimal    Drains:       Anesthesia Type:   IV Sedation with Anesthesia    Operative Indications:  Basal cell carcinoma of skin of scalp and neck [C44 41]      Operative Findings:      Complications:   None    Procedure and Technique:  The patient brought to the operating room and placed supine on the operating room table  Time-out procedure was performed SCDs were applied and IV antibiotics were given  After adequate anesthesia was obtained the neck and face were prepped and draped using standard surgical technique  Attention was turned to the right neck  Local field block for% lidocaine with epinephrine was injected  4 mm margins were designed around the basal cell skin cancer  This pattern was amputated subcutaneous plane marked for rotation sent for frozen section  Frozen section revealed clean margins  The lesion plus margins was 2 3 cm  A local flap was designed in order to avoid distortion of the earlobe  Incision was carried laterally and inferiorly  The inferior skin was elevated subcutaneous plane was advanced superiorly and medially for flap plus defect of 7 centimeters squared  The flap was inset using 5 0 PDS suture in interrupted deep dermal technique followed by 5 0 Monocryl suture running subcuticular technique  Skin glue was applied  I was present for the entire procedure and A qualified resident physician was not available    Patient Disposition:  hemodynamically stable    SIGNATURE: Itz Perry MD  DATE: December 7, 2018  TIME: 9:11 AM

## 2018-12-07 NOTE — DISCHARGE SUMMARY
Discharge Summary - Medical Oden Nicolette 58 y o  male MRN: [id number]    [facility] PACU Room / Bed: OR POOL/OR POOL Encounter: [id number]    BRIEF OVERVIEW  Admitting Provider: Itzel Nelson MD  Discharge Provider: Itzel Nelson MD  Primary Care Physician at Discharge: Melita Taylor [address] [place] [address]    Discharge To: Home      Admission Date: 12/7/2018     Discharge Date: No discharge date for patient encounter  Code Status: No Order  Advance Directive and Living Will: <no information>  Power of :        Primary Discharge Diagnosis  Active Problems:    * No active hospital problems  *  Resolved Problems:    * No resolved hospital problems   *        Discharge Disposition: [address] [place] [address]    Presenting Problem/History of Present Illness  <principal problem not specified>      Discharge Condition: stable    Patient tolerated the procedure well, recovered in PACU and was discharged home in stable condition    Itzel Nelson MD  12/7/2018  9:16 AM

## 2018-12-07 NOTE — ANESTHESIA POSTPROCEDURE EVALUATION
Post-Op Assessment Note      CV Status:  Stable    Mental Status:  Alert    Hydration Status:  Stable    PONV Controlled:  Controlled    Airway Patency:  Patent        Staff: CRNA           BP   105/63   Temp 97 4   Pulse 70   Resp 20   SpO2 94

## 2019-03-29 LAB
ALBUMIN SERPL-MCNC: 4.4 G/DL (ref 3.6–5.1)
ALBUMIN/GLOB SERPL: 1.6 (CALC) (ref 1–2.5)
ALP SERPL-CCNC: 63 U/L (ref 40–115)
ALT SERPL-CCNC: 24 U/L (ref 9–46)
AST SERPL-CCNC: 15 U/L (ref 10–35)
BILIRUB SERPL-MCNC: 0.6 MG/DL (ref 0.2–1.2)
BUN SERPL-MCNC: 24 MG/DL (ref 7–25)
BUN/CREAT SERPL: 16 (CALC) (ref 6–22)
CALCIUM SERPL-MCNC: 9.8 MG/DL (ref 8.6–10.3)
CHLORIDE SERPL-SCNC: 107 MMOL/L (ref 98–110)
CHOLEST SERPL-MCNC: 163 MG/DL
CHOLEST/HDLC SERPL: 4.2 (CALC)
CO2 SERPL-SCNC: 27 MMOL/L (ref 20–32)
CREAT SERPL-MCNC: 1.46 MG/DL (ref 0.7–1.25)
GLOBULIN SER CALC-MCNC: 2.7 G/DL (CALC) (ref 1.9–3.7)
GLUCOSE SERPL-MCNC: 103 MG/DL (ref 65–99)
HDLC SERPL-MCNC: 39 MG/DL
LDLC SERPL CALC-MCNC: 97 MG/DL (CALC)
NONHDLC SERPL-MCNC: 124 MG/DL (CALC)
POTASSIUM SERPL-SCNC: 5 MMOL/L (ref 3.5–5.3)
PROT SERPL-MCNC: 7.1 G/DL (ref 6.1–8.1)
SL AMB EGFR AFRICAN AMERICAN: 58 ML/MIN/1.73M2
SL AMB EGFR NON AFRICAN AMERICAN: 50 ML/MIN/1.73M2
SODIUM SERPL-SCNC: 141 MMOL/L (ref 135–146)
TRIGL SERPL-MCNC: 177 MG/DL

## 2019-05-21 ENCOUNTER — OFFICE VISIT (OUTPATIENT)
Dept: FAMILY MEDICINE CLINIC | Facility: CLINIC | Age: 64
End: 2019-05-21
Payer: COMMERCIAL

## 2019-05-21 VITALS
BODY MASS INDEX: 30.27 KG/M2 | HEIGHT: 69 IN | DIASTOLIC BLOOD PRESSURE: 80 MMHG | WEIGHT: 204.38 LBS | SYSTOLIC BLOOD PRESSURE: 118 MMHG

## 2019-05-21 DIAGNOSIS — Z12.5 SCREENING FOR PROSTATE CANCER: ICD-10-CM

## 2019-05-21 DIAGNOSIS — Z11.59 ENCOUNTER FOR HEPATITIS C SCREENING TEST FOR LOW RISK PATIENT: ICD-10-CM

## 2019-05-21 DIAGNOSIS — E66.09 CLASS 1 OBESITY DUE TO EXCESS CALORIES WITHOUT SERIOUS COMORBIDITY WITH BODY MASS INDEX (BMI) OF 30.0 TO 30.9 IN ADULT: ICD-10-CM

## 2019-05-21 DIAGNOSIS — F52.21 ERECTILE DYSFUNCTION OF NON-ORGANIC ORIGIN: ICD-10-CM

## 2019-05-21 DIAGNOSIS — E78.2 MIXED HYPERLIPIDEMIA: Primary | ICD-10-CM

## 2019-05-21 PROBLEM — E66.811 CLASS 1 OBESITY DUE TO EXCESS CALORIES WITHOUT SERIOUS COMORBIDITY WITH BODY MASS INDEX (BMI) OF 30.0 TO 30.9 IN ADULT: Status: ACTIVE | Noted: 2018-11-20

## 2019-05-21 PROBLEM — Z01.818 PREOP EXAMINATION: Status: RESOLVED | Noted: 2018-11-20 | Resolved: 2019-05-21

## 2019-05-21 PROCEDURE — 99214 OFFICE O/P EST MOD 30 MIN: CPT | Performed by: FAMILY MEDICINE

## 2019-05-21 RX ORDER — SIMVASTATIN 20 MG
20 TABLET ORAL DAILY
Qty: 90 TABLET | Refills: 1 | Status: SHIPPED | OUTPATIENT
Start: 2019-05-21 | End: 2019-10-30 | Stop reason: SDUPTHER

## 2019-05-21 RX ORDER — NAPROXEN 500 MG/1
TABLET ORAL
COMMUNITY
Start: 2019-03-17 | End: 2019-08-23 | Stop reason: ALTCHOICE

## 2019-06-04 DIAGNOSIS — M19.90 OSTEOARTHRITIS, UNSPECIFIED OSTEOARTHRITIS TYPE, UNSPECIFIED SITE: ICD-10-CM

## 2019-06-04 RX ORDER — NAPROXEN 500 MG/1
TABLET ORAL
Qty: 180 TABLET | Refills: 1 | Status: SHIPPED | OUTPATIENT
Start: 2019-06-04 | End: 2019-12-02 | Stop reason: SDUPTHER

## 2019-06-23 ENCOUNTER — TELEPHONE (OUTPATIENT)
Dept: OTHER | Facility: OTHER | Age: 64
End: 2019-06-23

## 2019-06-25 ENCOUNTER — OFFICE VISIT (OUTPATIENT)
Dept: FAMILY MEDICINE CLINIC | Facility: CLINIC | Age: 64
End: 2019-06-25
Payer: COMMERCIAL

## 2019-06-25 VITALS
WEIGHT: 204 LBS | BODY MASS INDEX: 30.21 KG/M2 | DIASTOLIC BLOOD PRESSURE: 78 MMHG | HEIGHT: 69 IN | SYSTOLIC BLOOD PRESSURE: 124 MMHG

## 2019-06-25 DIAGNOSIS — R39.89 ABNORMAL URINE COLOR: Primary | ICD-10-CM

## 2019-06-25 LAB
SL AMB  POCT GLUCOSE, UA: NEGATIVE
SL AMB LEUKOCYTE ESTERASE,UA: NEGATIVE
SL AMB POCT BILIRUBIN,UA: NEGATIVE
SL AMB POCT BLOOD,UA: NEGATIVE
SL AMB POCT CLARITY,UA: CLEAR
SL AMB POCT COLOR,UA: YELLOW
SL AMB POCT KETONES,UA: NEGATIVE
SL AMB POCT NITRITE,UA: NEGATIVE
SL AMB POCT PH,UA: 5
SL AMB POCT SPECIFIC GRAVITY,UA: 1.01
SL AMB POCT URINE PROTEIN: NEGATIVE
SL AMB POCT UROBILINOGEN: 0.2

## 2019-06-25 PROCEDURE — 3008F BODY MASS INDEX DOCD: CPT | Performed by: FAMILY MEDICINE

## 2019-06-25 PROCEDURE — 1036F TOBACCO NON-USER: CPT | Performed by: FAMILY MEDICINE

## 2019-06-25 PROCEDURE — 81002 URINALYSIS NONAUTO W/O SCOPE: CPT | Performed by: FAMILY MEDICINE

## 2019-06-25 PROCEDURE — 99213 OFFICE O/P EST LOW 20 MIN: CPT | Performed by: FAMILY MEDICINE

## 2019-08-23 ENCOUNTER — OFFICE VISIT (OUTPATIENT)
Dept: URGENT CARE | Facility: CLINIC | Age: 64
End: 2019-08-23
Payer: COMMERCIAL

## 2019-08-23 ENCOUNTER — APPOINTMENT (OUTPATIENT)
Dept: RADIOLOGY | Facility: CLINIC | Age: 64
End: 2019-08-23
Payer: COMMERCIAL

## 2019-08-23 VITALS
SYSTOLIC BLOOD PRESSURE: 134 MMHG | TEMPERATURE: 98.4 F | HEART RATE: 72 BPM | DIASTOLIC BLOOD PRESSURE: 86 MMHG | BODY MASS INDEX: 31.1 KG/M2 | HEIGHT: 69 IN | WEIGHT: 210 LBS | OXYGEN SATURATION: 98 % | RESPIRATION RATE: 18 BRPM

## 2019-08-23 DIAGNOSIS — M25.572 ACUTE LEFT ANKLE PAIN: ICD-10-CM

## 2019-08-23 DIAGNOSIS — S93.402A SPRAIN OF LEFT ANKLE, UNSPECIFIED LIGAMENT, INITIAL ENCOUNTER: Primary | ICD-10-CM

## 2019-08-23 PROCEDURE — 73610 X-RAY EXAM OF ANKLE: CPT

## 2019-08-23 PROCEDURE — G0382 LEV 3 HOSP TYPE B ED VISIT: HCPCS | Performed by: PHYSICIAN ASSISTANT

## 2019-08-23 NOTE — PROGRESS NOTES
[address] [facility]  (office) [phone]  (fax) [phone]        NAME: Baldemar Leventhal is a 71 y o  male  : 1955    MRN: [id number]  DATE: 2019  TIME: 1:52 PM    Assessment and Plan   Sprain of left ankle, unspecified ligament, initial encounter [S93 [id number]]  1  Sprain of left ankle, unspecified ligament, initial encounter     2  Acute left ankle pain  XR ankle 3+ vw left       Patient Instructions   Xray appears negative for any fracture  Will follow up with radiologist report when available  Recommend elevating body part, icing the area every 2 hours for 20-30 minutes, take Ibuprofen every 6-8 hours to reduce inflammation  To use gel ankle brace for compression relief as needed  If not improving over the next week, follow up with PCP or orthopedics  To present to the ER if symptoms worsen  Chief Complaint     Chief Complaint   Patient presents with    Ankle Pain     L ankle and foot pain after kicking a soccer ball 3 days ago         History of Present Illness   Baldemar Leventhal presents to the clinic c/o    Ankle Injury    The incident occurred 3 to 5 days ago  Injury mechanism: after kicking a soccer ball  The pain is present in the left ankle and left foot  The quality of the pain is described as aching  The pain is mild  The pain has been constant since onset  Pertinent negatives include no inability to bear weight, loss of motion, loss of sensation, muscle weakness, numbness or tingling  The symptoms are aggravated by movement and weight bearing  He has tried ice and NSAIDs for the symptoms  The treatment provided mild relief  Review of Systems   Review of Systems   Constitutional: Negative for activity change, appetite change, chills, diaphoresis, fatigue and fever  HENT: Negative for congestion, ear discharge, ear pain, facial swelling, rhinorrhea, sinus pressure, sinus pain, sneezing and sore throat      Eyes: Negative for photophobia, pain, discharge, redness, itching and visual disturbance  Respiratory: Negative for apnea, cough, chest tightness, shortness of breath and wheezing  Cardiovascular: Negative for chest pain  Gastrointestinal: Negative for abdominal distention, abdominal pain, constipation, diarrhea, nausea and vomiting  Genitourinary: Negative for dysuria, flank pain, frequency, hematuria and urgency  Musculoskeletal: Positive for arthralgias  Negative for back pain, gait problem, joint swelling, myalgias, neck pain and neck stiffness  Skin: Negative for color change, rash and wound  Allergic/Immunologic: Negative for immunocompromised state  Neurological: Negative for dizziness, tingling, numbness and headaches  Hematological: Negative for adenopathy  Psychiatric/Behavioral: Negative for confusion           Current Medications     Long-Term Medications   Medication Sig Dispense Refill    naproxen (NAPROSYN) 500 mg tablet TAKE 1 TABLET EVERY 12     HOURS AS NEEDED 180 tablet 1    sildenafil (VIAGRA) 50 MG tablet Take 1 tablet by mouth as needed        simvastatin (ZOCOR) 20 mg tablet Take 1 tablet (20 mg total) by mouth daily 90 tablet 1    [DISCONTINUED] naproxen (NAPROSYN) 500 mg tablet          Current Allergies     Allergies as of 08/23/2019    (No Known Allergies)            The following portions of the patient's history were reviewed and updated as appropriate: allergies, current medications, past family history, past medical history, past social history, past surgical history and problem list   Past Medical History:   Diagnosis Date    Acid reflux     Essential hypertriglyceridemia     last assessed: 5/23/2013    H/O renal calculi     History of hypogonadism     last assessed: 12/15/2014    Hyperlipidemia     Kidney stone     Nasal lesion     left side    Neck mass     Obstructive sleep apnea     last assessed: 11/16/2015    Wears contact lenses      Past Surgical History: Procedure Laterality Date    COLONOSCOPY      polyp repeat in 5 yrs    ESOPHAGOGASTRODUODENOSCOPY      gastritis and hiatal hernia    MASS EXCISION Left 5/9/2016    Procedure: EXCISION BASAL CELL SKIN CANCER LEFT NOSE, FROZEN SECTION, LOCAL FLAP;  Surgeon: Enoc Harris MD;  Location: [facility];  Service:     MOHS SURGERY      mohs mirographic surgery nose    CA ADJ TISS XFER HEAD,FAC,HAND <10 SQCM N/A 12/7/2018    Procedure: EXCISION BCC OF NECK W/LOCAL FLAP, FROZEN SECTION;  Surgeon: Enoc Harris MD;  Location: [address] [place] [address] MAIN OR;  Service: Plastics    CA FULL THICK 2011 [facility] NOS,EAR,LID <20 SQCM N/A 5/9/2016    Procedure:  LOCAL FLAP SKIN GRAFT ;  Surgeon: Enoc Harris MD;  Location: [facility];  Service: Plastics    VASECTOMY      WISDOM TOOTH EXTRACTION       Social History     Socioeconomic History    Marital status: /Civil Union     Spouse name: Not on file    Number of children: Not on file    Years of education: Not on file    Highest education level: Not on file   Occupational History    Not on file   Social Needs    Financial resource strain: Not on file    Food insecurity:     Worry: Not on file     Inability: Not on file    Transportation needs:     Medical: Not on file     Non-medical: Not on file   Tobacco Use    Smoking status: Never Smoker    Smokeless tobacco: Never Used    Tobacco comment: nonsmoker   Substance and Sexual Activity    Alcohol use: Yes     Comment: few x yea    Drug use: No    Sexual activity: Yes     Partners: Female   Lifestyle    Physical activity:     Days per week: Not on file     Minutes per session: Not on file    Stress: Not on file   Relationships    Social connections:     Talks on phone: Not on file     Gets together: Not on file     Attends [employer] service: Not on file     Active member of club or organization: Not on file     Attends meetings of clubs or organizations: Not on file     Relationship status: Not on file    Intimate partner violence: Fear of current or ex partner: Not on file     Emotionally abused: Not on file     Physically abused: Not on file     Forced sexual activity: Not on file   Other Topics Concern    Not on file   Social History Narrative    Daily coffee consumption-eight or more cups a day       Objective   /86 (BP Location: Right arm, Patient Position: Sitting, Cuff Size: Large)   Pulse 72   Temp 98 4 °F (36 9 °C) (Tympanic)   Resp 18   Ht 5' 9" (1 753 m)   Wt 95 3 kg (210 lb)   SpO2 98%   BMI 31 01 kg/m²      Physical Exam     Physical Exam   Constitutional: He is oriented to person, place, and time  He appears well-developed and well-nourished  No distress  HENT:   Head: Normocephalic and atraumatic  Right Ear: Tympanic membrane normal    Left Ear: Tympanic membrane normal    Eyes: Conjunctivae are normal  Right eye exhibits no discharge  Left eye exhibits no discharge  No scleral icterus  Neck: Normal range of motion  Neck supple  No JVD present  No tracheal deviation present  No thyromegaly present  Cardiovascular: Normal rate, regular rhythm and normal heart sounds  Exam reveals no gallop and no friction rub  No murmur heard  Pulmonary/Chest: Effort normal and breath sounds normal  No stridor  No respiratory distress  He has no wheezes  He has no rales  He exhibits no tenderness  Musculoskeletal: He exhibits tenderness  He exhibits no deformity  Left ankle: He exhibits decreased range of motion and swelling  He exhibits no ecchymosis, no laceration and normal pulse  No tenderness  Achilles tendon exhibits no pain, no defect and normal Arce's test results  Lymphadenopathy:     He has no cervical adenopathy  Neurological: He is alert and oriented to person, place, and time  Coordination normal    Skin: Skin is warm and dry  No rash noted  He is not diaphoretic  No erythema  No pallor  Psychiatric: He has a normal mood and affect   His behavior is normal  Judgment and thought content normal    Nursing note and vitals reviewed        Justa Crain PA-C

## 2019-10-04 LAB
ALBUMIN SERPL-MCNC: 4.1 G/DL (ref 3.6–5.1)
ALBUMIN/GLOB SERPL: 1.4 (CALC) (ref 1–2.5)
ALP SERPL-CCNC: 54 U/L (ref 40–115)
ALT SERPL-CCNC: 20 U/L (ref 9–46)
AST SERPL-CCNC: 15 U/L (ref 10–35)
BILIRUB SERPL-MCNC: 0.4 MG/DL (ref 0.2–1.2)
BUN SERPL-MCNC: 26 MG/DL (ref 7–25)
BUN/CREAT SERPL: 17 (CALC) (ref 6–22)
CALCIUM SERPL-MCNC: 9.7 MG/DL (ref 8.6–10.3)
CHLORIDE SERPL-SCNC: 106 MMOL/L (ref 98–110)
CHOLEST SERPL-MCNC: 145 MG/DL
CHOLEST/HDLC SERPL: 3.7 (CALC)
CO2 SERPL-SCNC: 26 MMOL/L (ref 20–32)
CREAT SERPL-MCNC: 1.55 MG/DL (ref 0.7–1.25)
GLOBULIN SER CALC-MCNC: 2.9 G/DL (CALC) (ref 1.9–3.7)
GLUCOSE SERPL-MCNC: 95 MG/DL (ref 65–99)
HCV AB S/CO SERPL IA: 0.01
HCV AB SERPL QL IA: NORMAL
HDLC SERPL-MCNC: 39 MG/DL
LDLC SERPL CALC-MCNC: 83 MG/DL (CALC)
NONHDLC SERPL-MCNC: 106 MG/DL (CALC)
POTASSIUM SERPL-SCNC: 5 MMOL/L (ref 3.5–5.3)
PROT SERPL-MCNC: 7 G/DL (ref 6.1–8.1)
PSA SERPL-MCNC: 1.1 NG/ML
SL AMB EGFR AFRICAN AMERICAN: 54 ML/MIN/1.73M2
SL AMB EGFR NON AFRICAN AMERICAN: 46 ML/MIN/1.73M2
SODIUM SERPL-SCNC: 140 MMOL/L (ref 135–146)
TRIGL SERPL-MCNC: 124 MG/DL

## 2019-10-30 DIAGNOSIS — E78.2 MIXED HYPERLIPIDEMIA: ICD-10-CM

## 2019-10-30 RX ORDER — SIMVASTATIN 20 MG
TABLET ORAL
Qty: 90 TABLET | Refills: 1 | Status: SHIPPED | OUTPATIENT
Start: 2019-10-30 | End: 2020-03-24 | Stop reason: SDUPTHER

## 2019-11-21 ENCOUNTER — OFFICE VISIT (OUTPATIENT)
Dept: FAMILY MEDICINE CLINIC | Facility: CLINIC | Age: 64
End: 2019-11-21
Payer: COMMERCIAL

## 2019-11-21 VITALS
SYSTOLIC BLOOD PRESSURE: 124 MMHG | HEIGHT: 69 IN | BODY MASS INDEX: 30.78 KG/M2 | WEIGHT: 207.8 LBS | DIASTOLIC BLOOD PRESSURE: 82 MMHG

## 2019-11-21 DIAGNOSIS — C44.41 BASAL CELL CARCINOMA (BCC) OF RIGHT SIDE OF NECK: ICD-10-CM

## 2019-11-21 DIAGNOSIS — E66.09 CLASS 1 OBESITY DUE TO EXCESS CALORIES WITHOUT SERIOUS COMORBIDITY WITH BODY MASS INDEX (BMI) OF 30.0 TO 30.9 IN ADULT: ICD-10-CM

## 2019-11-21 DIAGNOSIS — E78.2 MIXED HYPERLIPIDEMIA: Primary | ICD-10-CM

## 2019-11-21 DIAGNOSIS — Z23 NEED FOR VACCINATION: ICD-10-CM

## 2019-11-21 DIAGNOSIS — C44.311 BASAL CELL CARCINOMA OF NOSE: ICD-10-CM

## 2019-11-21 DIAGNOSIS — F52.21 ERECTILE DYSFUNCTION OF NON-ORGANIC ORIGIN: Primary | ICD-10-CM

## 2019-11-21 PROBLEM — R39.89 ABNORMAL URINE COLOR: Status: RESOLVED | Noted: 2019-06-25 | Resolved: 2019-11-21

## 2019-11-21 PROCEDURE — 90471 IMMUNIZATION ADMIN: CPT | Performed by: FAMILY MEDICINE

## 2019-11-21 PROCEDURE — 99214 OFFICE O/P EST MOD 30 MIN: CPT | Performed by: FAMILY MEDICINE

## 2019-11-21 PROCEDURE — 90662 IIV NO PRSV INCREASED AG IM: CPT | Performed by: FAMILY MEDICINE

## 2019-11-21 PROCEDURE — 1101F PT FALLS ASSESS-DOCD LE1/YR: CPT | Performed by: FAMILY MEDICINE

## 2019-11-21 PROCEDURE — 1036F TOBACCO NON-USER: CPT | Performed by: FAMILY MEDICINE

## 2019-11-21 RX ORDER — SILDENAFIL 50 MG/1
50 TABLET, FILM COATED ORAL AS NEEDED
Qty: 10 TABLET | Refills: 1 | Status: SHIPPED | OUTPATIENT
Start: 2019-11-21 | End: 2020-03-24 | Stop reason: SDUPTHER

## 2019-11-21 NOTE — PROGRESS NOTES
Assessment/Plan:    Class 1 obesity due to excess calories without serious comorbidity with body mass index (BMI) of 30 0 to 30 9 in adult  Discussed weight and the need for diet and exercise Patient will work on that and will see me in 6 months    Basal cell carcinoma of nose  Discussed need for sunscreeen and continued surveillance for skin cancer    Mixed hyperlipidemia  Lipids are at goal continue currene medication       Diagnoses and all orders for this visit:    Mixed hyperlipidemia  -     Comprehensive metabolic panel; Future  -     Lipid panel; Future    Class 1 obesity due to excess calories without serious comorbidity with body mass index (BMI) of 30 0 to 30 9 in adult  -     Comprehensive metabolic panel; Future    Basal cell carcinoma of nose    Basal cell carcinoma (BCC) of right side of neck    Need for vaccination  -     influenza vaccine, [id number], high-dose, PF 0 5 mL (FLUZONE HIGH-DOSE)          Subjective:   Chief Complaint   Patient presents with    Hyperlipidemia     no refills needed           Patient ID: Marcela Gaucher is a 71 y o  male  Patient is here for followup of his hyperlipidemia obesity and also his skin cancer history patient is doing well with his lipids His last labs show the LDL is at goal He is trying to be active and does walk 30 minutes daily patient has seen the urologist He is seeing the dermatologist every 6 months for surveillance patient weight is unchanged Patient is due for flu shot He has no complaitns todayt      The following portions of the patient's history were reviewed and updated as appropriate: allergies, current medications, past medical history, past social history and problem list     Review of Systems   Constitutional: Negative for fatigue, fever and unexpected weight change  HENT: Negative for congestion, sinus pain and trouble swallowing  Eyes: Negative for discharge and visual disturbance     Respiratory: Negative for cough, chest tightness, shortness of breath and wheezing  Cardiovascular: Negative for chest pain, palpitations and leg swelling  Gastrointestinal: Negative for abdominal pain, blood in stool, constipation, diarrhea, nausea and vomiting  Genitourinary: Negative for difficulty urinating, dysuria, frequency and hematuria  Musculoskeletal: Negative for arthralgias, gait problem and joint swelling  Skin: Negative for rash and wound  Allergic/Immunologic: Negative for environmental allergies and food allergies  Neurological: Negative for dizziness, syncope, weakness, numbness and headaches  Hematological: Negative for adenopathy  Does not bruise/bleed easily  Psychiatric/Behavioral: Negative for confusion, decreased concentration and sleep disturbance  The patient is not nervous/anxious  Objective:      /82   Ht 5' 9" (1 753 m)   Wt 94 3 kg (207 lb 12 8 oz)   BMI 30 69 kg/m²          Physical Exam   Constitutional: He is oriented to person, place, and time  He appears well-developed and well-nourished  HENT:   Head: Normocephalic and atraumatic  Right Ear: Hearing, tympanic membrane and external ear normal    Left Ear: Hearing, tympanic membrane and external ear normal    Eyes: Pupils are equal, round, and reactive to light  Conjunctivae and EOM are normal    Neck: Neck supple  No thyromegaly present  Cardiovascular: Normal rate and normal heart sounds  Pulmonary/Chest: Effort normal and breath sounds normal  He has no wheezes  He has no rales  Abdominal: Soft  Bowel sounds are normal  He exhibits no distension  There is no tenderness  Musculoskeletal: He exhibits no edema or tenderness  Lymphadenopathy:     He has no cervical adenopathy  Neurological: He is alert and oriented to person, place, and time  No cranial nerve deficit  Coordination normal    Skin: Skin is warm and dry  No rash noted  Psychiatric: He has a normal mood and affect   His behavior is normal  Judgment and thought content normal    Nursing note and vitals reviewed  BMI Counseling: Body mass index is 30 69 kg/m²  The BMI is above normal  Nutrition recommendations include reducing portion sizes, 3-5 servings of fruits/vegetables daily and increasing intake of lean protein

## 2019-11-21 NOTE — ASSESSMENT & PLAN NOTE
Discussed weight and the need for diet and exercise Patient will work on that and will see me in 6 months

## 2019-11-21 NOTE — PATIENT INSTRUCTIONS
Cholesterol and Your Health   WHAT YOU NEED TO KNOW:   What is cholesterol? Cholesterol is a waxy, fat-like substance  Cholesterol is made by your body, but also comes from certain foods you eat  Your body uses cholesterol to make hormones and new cells  Your body also uses cholesterol to protect nerves  Cholesterol comes from foods such as meat and dairy products  Your total cholesterol level is made up by LDL cholesterol, HDL cholesterol, and triglycerides:  · LDL cholesterol  is called bad cholesterol  because it forms plaque in your arteries  As plaque builds up, your arteries become narrow, and less blood flows through  When plaque decreases blood flow to your heart, you may have chest pain  If plaque completely blocks an artery that bring blood to your heart, you may have a heart attack  Plaque can break off and form blood clots  Blood clots may block arteries in your brain and cause a stroke  · HDL cholesterol  is called good cholesterol  because it helps remove LDL cholesterol from your arteries  It does this by attaching to LDL cholesterol and carrying it to your liver  Your liver breaks down LDL cholesterol so your body can get rid of it  High levels of HDL cholesterol can help prevent a heart attack and stroke  Low levels of HDL cholesterol can increase your risk for heart disease, heart attack, and stroke  · Triglycerides  are a type of fat that store energy from foods you eat  High levels of triglycerides also cause plaque buildup  This can increase your risk for a heart attack or stroke  If your triglyceride level is high, your LDL cholesterol level may also be high  How does food affect my cholesterol levels? · Unhealthy fats  increase LDL cholesterol and triglyceride levels in your blood  They are found in foods high in cholesterol, saturated fat, and trans fat:     ¨ Cholesterol  is found in eggs, dairy, and meat       ¨ Saturated fat  is found in butter, cheese, ice cream, whole milk, and coconut oil  Saturated fat is also found in meat, such as sausage, hot dogs, and bologna  ¨ Trans fat  is found in liquid oils and is used in fried and baked foods  Foods that contain trans fats include chips, crackers, muffins, sweet rolls, microwave popcorn, and cookies  · Healthy fats,  also called unsaturated fats, help lower LDL cholesterol and triglyceride levels  Healthy fats include the following:     ¨ Monounsaturated fats  are found in foods such as olive oil, canola oil, avocado, nuts, and olives  ¨ Polyunsaturated fats,  such as omega 3 fats, are found in fish, such as salmon, trout, and tuna  They can also be found in plant foods such as flaxseed, walnuts, and soybeans  What other things affect my cholesterol levels? · Smoking cigarettes    · Being overweight or obese     · Drinking large amounts of alcohol    · Not enough exercise or no exercise    · Certain genes passed from your parents to you  What do I need to know about having my cholesterol checked? Adults 21to 39years of age should have their cholesterol levels checked every 4 to 6 years  Adults 45 years and older should have their cholesterol checked every 1 to 2 years  You may need your cholesterol checked more often, or at a younger age, if you have risk factors for heart disease  You may also need to have your cholesterol checked more often if you have other health conditions, such as diabetes  Blood tests are used to check cholesterol levels  Blood tests measure your levels of triglycerides, LDL cholesterol, and HDL cholesterol  What should my cholesterol level be? Your cholesterol level goal may depend on your risk for heart disease  It may also depend on your age and other health conditions  Ask your healthcare provider if the following goals are right for you:  · Your total cholesterol level  should be less than 200 mg/dL  This number may also depend on your HDL and LDL cholesterol goals       · Your LDL cholesterol level  should be less than 130 mg/dL  · Your HDL cholesterol level  should be 60 mg/dL or higher  · Your triglyceride level  should be less than 150 mg/dL  How is high cholesterol treated? Treatment for high cholesterol will also decrease your risk of heart disease, heart attack, and stroke  Treatment may include any of the following:  · Medicines  may be given to lower your LDL cholesterol, triglyceride levels, or total cholesterol level  You may need medicines to lower your cholesterol if any of the following is true:     ¨ You have a history of stroke, TIA, unstable angina, or a heart attack    ¨ Your LDL cholesterol level is 190 mg/dL or higher    ¨ You are age 36to 76years of age, have diabetes, and your LDL cholesterol is 70 mg/dL or higher    ¨ You are age 36to 76years of age, have risk factors for heart disease, and your LDL cholesterol is 70 mg/dL or higher    · Lifestyle changes  include changes to your diet, exercise, weight loss, and quitting smoking  It also includes decreasing the amount of alcohol you drink  · Supplements  include fish oil, red yeast rice, and garlic  Fish oil may help lower your triglyceride and LDL cholesterol levels  It may also increase your HDL cholesterol level  Red yeast rice may help decrease your total cholesterol level and LDL cholesterol level  Garlic may help lower your total cholesterol level  Do not take these supplements without talking to your healthcare provider  What changes can I make to the foods I eat to lower my cholesterol levels? A registered dietitian can help you create a healthy eating plan  Read food labels and choose foods low in saturated fat, trans fats, and cholesterol  · Decrease the total amount of fat you eat  Choose lean meats, fat-free or 1% fat milk, and low-fat dairy products, such as yogurt and cheese  Try to limit or avoid red meats  Limit or do not eat fried foods or baked goods such as cookies       · Replace unhealthy fats with healthy fats  Cook foods in olive oil or canola oil  Choose soft margarines that are low in saturated fat and trans fat  Seeds, nuts, and avocados are other examples of healthy fats  · Eat foods with omega-3 fats  Examples include salmon, tuna, mackerel, walnuts, and flaxseed  Eat fish 2 times per week  Children and pregnant women should not eat fish that have high levels of mercury, such as shark, swordfish, and breezy mackerel  · Increase the amount of plant-based foods you eat  Plant-based foods are low in cholesterol and fat  Eating more of these foods may help lower your cholesterol and help you lose weight  Examples of plant-based foods includes fruits, vegetables, legumes, and whole grains  Replace milk that contains dairy with almond, soy, or coconut milk  Eat beans and foods with soy for protein instead of meat  Ask your healthcare provider or dietitian for more information on plant-based foods  · Increase the amount of fiber you eat  High-fiber foods can help lower your LDL cholesterol  You should eat between 20 and 30 grams of fiber each day  Eat at least 5 servings of fruits and vegetables each day  Other examples of high-fiber foods include whole-grain or whole-wheat breads, pastas, or cereals, and brown rice  Eat 3 ounces of whole-grain foods each day  Increase fiber slowly  You may have abdominal discomfort, bloating, and gas if you add fiber to your diet too quickly  What lifestyle changes can I make to lower my cholesterol levels? · Maintain a healthy weight  Ask your healthcare provider how much you should weigh  Ask him or her to help you create a weight loss plan if you are overweight  Weight loss can decrease your total cholesterol and triglyceride levels  · Exercise regularly  Exercise can help lower your total cholesterol level and maintain a healthy weight  Exercise can also help increase your HDL cholesterol level   Work with your healthcare provider to create an exercise program that is right for you  Get at least 30 minutes of moderate exercise most days of the week  Examples of exercise include brisk walking, swimming, or biking  · Do not smoke  Nicotine and other chemicals in cigarettes and cigars can damage your lungs, heart, and blood vessels  They can also raise your triglyceride levels  Ask your healthcare provider for information if you currently smoke and need help to quit  E-cigarettes or smokeless tobacco still contain nicotine  Talk to your healthcare provider before you use these products  · Limit or do not drink alcohol  Alcohol can increase your triglyceride levels  Ask your healthcare provider if it is safe for you to drink alcohol  Also ask how much is safe for you to drink each day  CARE AGREEMENT:   You have the right to help plan your care  Discuss treatment options with your caregivers to decide what care you want to receive  You always have the right to refuse treatment  The above information is an  only  It is not intended as medical advice for individual conditions or treatments  Talk to your doctor, nurse or pharmacist before following any medical regimen to see if it is safe and effective for you  © 2017 [address] [facility] Information is for End User's use only and may not be sold, redistributed or otherwise used for commercial purposes  All illustrations and images included in CareNotes® are the copyrighted property of [employer]  or Braden Bhardwaj  Obesity   AMBULATORY CARE:   Obesity  is when your body mass index (BMI) is greater than 30  Your healthcare provider will use your height and weight to measure your BMI  The risks of obesity include  many health problems, such as injuries or physical disability   You may need tests to check for the following:  · Diabetes     · High blood pressure or high cholesterol     · Heart disease     · Gallbladder or liver disease     · Cancer of the colon, breast, prostate, liver, or kidney     · Sleep apnea     · Arthritis or gout  Seek care immediately if:   · You have a severe headache, confusion, or difficulty speaking  · You have weakness on one side of your body  · You have chest pain, sweating, or shortness of breath  Contact your healthcare provider if:   · You have symptoms of gallbladder or liver disease, such as pain in your upper abdomen  · You have knee or hip pain and discomfort while walking  · You have symptoms of diabetes, such as intense hunger and thirst, and frequent urination  · You have symptoms of sleep apnea, such as snoring or daytime sleepiness  · You have questions or concerns about your condition or care  Treatment for obesity  focuses on helping you lose weight to improve your health  Even a small decrease in BMI can reduce the risk for many health problems  Your healthcare provider will help you set a weight-loss goal   · Lifestyle changes  are the first step in treating obesity  These include making healthy food choices and getting regular physical activity  Your healthcare provider may suggest a weight-loss program that involves coaching, education, and therapy  · Medicine  may help you lose weight when it is used with a healthy diet and physical activity  · Surgery  can help you lose weight if you are very obese and have other health problems  There are several types of weight-loss surgery  Ask your healthcare provider for more information  Be successful losing weight:   · Set small, realistic goals  An example of a small goal is to walk for 20 minutes 5 days a week  Isauro goal is to lose 5% of your body weight  · Tell friends, family members, and coworkers about your goals  and ask for their support  Ask a friend to lose weight with you, or join a weight-loss support group  · Identify foods or triggers that may cause you to overeat , and find ways to avoid them   Remove tempting high-calorie foods from your home and workplace  Place a bowl of fresh fruit on your kitchen counter  If stress causes you to eat, then find other ways to cope with stress  · Keep a diary to track what you eat and drink  Also write down how many minutes of physical activity you do each day  Weigh yourself once a week and record it in your diary  Eating changes: You will need to eat 500 to 1,000 fewer calories each day than you currently eat to lose 1 to 2 pounds a week  The following changes will help you cut calories:  · Eat smaller portions  Use small plates, no larger than 9 inches in diameter  Fill your plate half full of fruits and vegetables  Measure your food using measuring cups until you know what a serving size looks like  · Eat 3 meals and 1 or 2 snacks each day  Plan your meals in advance  [place] and eat at home most of the time  Eat slowly  · Eat fruits and vegetables at every meal   They are low in calories and high in fiber, which makes you feel full  Do not add butter, margarine, or cream sauce to vegetables  Use herbs to season steamed vegetables  · Eat less fat and fewer fried foods  Eat more baked or grilled chicken and fish  These protein sources are lower in calories and fat than red meat  Limit fast food  Dress your salads with olive oil and vinegar instead of bottled dressing  · Limit the amount of sugar you eat  Do not drink sugary beverages  Limit alcohol  Activity changes:  Physical activity is good for your body in many ways  It helps you burn calories and build strong muscles  It decreases stress and depression, and improves your mood  It can also help you sleep better  Talk to your healthcare provider before you begin an exercise program   · Exercise for at least 30 minutes 5 days a week  Start slowly  Set aside time each day for physical activity that you enjoy and that is convenient for you   It is best to do both weight training and an activity that increases your heart rate, such as walking, bicycling, or swimming  · Find ways to be more active  Do yard work and housecleaning  Walk up the stairs instead of using elevators  Spend your leisure time going to events that require walking, such as outdoor festivals or fairs  This extra physical activity can help you lose weight and keep it off  Follow up with your healthcare provider as directed: You may need to meet with a dietitian  Write down your questions so you remember to ask them during your visits  © 2017 [facility] [facility] Information is for End User's use only and may not be sold, redistributed or otherwise used for commercial purposes  All illustrations and images included in CareNotes® are the copyrighted property of [employer]  or Braden Bhardwaj  The above information is an  only  It is not intended as medical advice for individual conditions or treatments  Talk to your doctor, nurse or pharmacist before following any medical regimen to see if it is safe and effective for you

## 2019-12-02 DIAGNOSIS — M19.90 OSTEOARTHRITIS, UNSPECIFIED OSTEOARTHRITIS TYPE, UNSPECIFIED SITE: ICD-10-CM

## 2019-12-02 RX ORDER — NAPROXEN 500 MG/1
TABLET ORAL
Qty: 180 TABLET | Refills: 1 | Status: SHIPPED | OUTPATIENT
Start: 2019-12-02 | End: 2020-03-24 | Stop reason: SDUPTHER

## 2020-03-24 DIAGNOSIS — M19.90 OSTEOARTHRITIS, UNSPECIFIED OSTEOARTHRITIS TYPE, UNSPECIFIED SITE: ICD-10-CM

## 2020-03-24 DIAGNOSIS — F52.21 ERECTILE DYSFUNCTION OF NON-ORGANIC ORIGIN: ICD-10-CM

## 2020-03-24 DIAGNOSIS — E78.2 MIXED HYPERLIPIDEMIA: ICD-10-CM

## 2020-03-24 RX ORDER — NAPROXEN 500 MG/1
500 TABLET ORAL 2 TIMES DAILY WITH MEALS
Qty: 180 TABLET | Refills: 1 | Status: SHIPPED | OUTPATIENT
Start: 2020-03-24 | End: 2020-08-07 | Stop reason: HOSPADM

## 2020-03-24 RX ORDER — SIMVASTATIN 20 MG
20 TABLET ORAL DAILY
Qty: 90 TABLET | Refills: 1 | Status: SHIPPED | OUTPATIENT
Start: 2020-03-24 | End: 2020-06-23 | Stop reason: SDUPTHER

## 2020-03-24 RX ORDER — SILDENAFIL 50 MG/1
50 TABLET, FILM COATED ORAL AS NEEDED
Qty: 10 TABLET | Refills: 1 | Status: SHIPPED | OUTPATIENT
Start: 2020-03-24 | End: 2020-10-28 | Stop reason: SDUPTHER

## 2020-05-07 ENCOUNTER — APPOINTMENT (OUTPATIENT)
Dept: LAB | Facility: MEDICAL CENTER | Age: 65
End: 2020-05-07
Payer: COMMERCIAL

## 2020-05-07 DIAGNOSIS — Z11.59 ENCOUNTER FOR HEPATITIS C SCREENING TEST FOR LOW RISK PATIENT: ICD-10-CM

## 2020-05-07 DIAGNOSIS — Z12.5 SCREENING FOR PROSTATE CANCER: ICD-10-CM

## 2020-05-07 DIAGNOSIS — E78.2 MIXED HYPERLIPIDEMIA: ICD-10-CM

## 2020-05-07 DIAGNOSIS — E66.09 CLASS 1 OBESITY DUE TO EXCESS CALORIES WITHOUT SERIOUS COMORBIDITY WITH BODY MASS INDEX (BMI) OF 30.0 TO 30.9 IN ADULT: ICD-10-CM

## 2020-05-07 LAB
ALBUMIN SERPL BCP-MCNC: 3.4 G/DL (ref 3.5–5)
ALP SERPL-CCNC: 66 U/L (ref 46–116)
ALT SERPL W P-5'-P-CCNC: 29 U/L (ref 12–78)
ANION GAP SERPL CALCULATED.3IONS-SCNC: 7 MMOL/L (ref 4–13)
AST SERPL W P-5'-P-CCNC: 15 U/L (ref 5–45)
BILIRUB SERPL-MCNC: 0.36 MG/DL (ref 0.2–1)
BUN SERPL-MCNC: 23 MG/DL (ref 5–25)
CALCIUM SERPL-MCNC: 9.4 MG/DL (ref 8.3–10.1)
CHLORIDE SERPL-SCNC: 112 MMOL/L (ref 100–108)
CHOLEST SERPL-MCNC: 150 MG/DL (ref 50–200)
CO2 SERPL-SCNC: 22 MMOL/L (ref 21–32)
CREAT SERPL-MCNC: 1.37 MG/DL (ref 0.6–1.3)
GFR SERPL CREATININE-BSD FRML MDRD: 54 ML/MIN/1.73SQ M
GLUCOSE P FAST SERPL-MCNC: 107 MG/DL (ref 65–99)
HCV AB SER QL: NORMAL
HDLC SERPL-MCNC: 41 MG/DL
LDLC SERPL CALC-MCNC: 86 MG/DL (ref 0–100)
NONHDLC SERPL-MCNC: 109 MG/DL
POTASSIUM SERPL-SCNC: 4.8 MMOL/L (ref 3.5–5.3)
PROT SERPL-MCNC: 7.7 G/DL (ref 6.4–8.2)
PSA SERPL-MCNC: 1.3 NG/ML (ref 0–4)
SODIUM SERPL-SCNC: 141 MMOL/L (ref 136–145)
TRIGL SERPL-MCNC: 116 MG/DL

## 2020-05-07 PROCEDURE — 80061 LIPID PANEL: CPT

## 2020-05-07 PROCEDURE — G0103 PSA SCREENING: HCPCS

## 2020-05-07 PROCEDURE — 86803 HEPATITIS C AB TEST: CPT

## 2020-05-07 PROCEDURE — 36415 COLL VENOUS BLD VENIPUNCTURE: CPT

## 2020-05-07 PROCEDURE — 80053 COMPREHEN METABOLIC PANEL: CPT

## 2020-05-22 ENCOUNTER — OFFICE VISIT (OUTPATIENT)
Dept: FAMILY MEDICINE CLINIC | Facility: CLINIC | Age: 65
End: 2020-05-22
Payer: COMMERCIAL

## 2020-05-22 VITALS
WEIGHT: 206.6 LBS | BODY MASS INDEX: 30.6 KG/M2 | HEIGHT: 69 IN | DIASTOLIC BLOOD PRESSURE: 82 MMHG | SYSTOLIC BLOOD PRESSURE: 124 MMHG | TEMPERATURE: 98 F

## 2020-05-22 DIAGNOSIS — E66.09 CLASS 1 OBESITY DUE TO EXCESS CALORIES WITHOUT SERIOUS COMORBIDITY WITH BODY MASS INDEX (BMI) OF 30.0 TO 30.9 IN ADULT: ICD-10-CM

## 2020-05-22 DIAGNOSIS — E78.2 MIXED HYPERLIPIDEMIA: Primary | ICD-10-CM

## 2020-05-22 DIAGNOSIS — F52.21 ERECTILE DYSFUNCTION OF NON-ORGANIC ORIGIN: ICD-10-CM

## 2020-05-22 DIAGNOSIS — Z12.5 SCREENING FOR PROSTATE CANCER: ICD-10-CM

## 2020-05-22 DIAGNOSIS — C44.41 BASAL CELL CARCINOMA (BCC) OF RIGHT SIDE OF NECK: ICD-10-CM

## 2020-05-22 PROCEDURE — 4040F PNEUMOC VAC/ADMIN/RCVD: CPT | Performed by: FAMILY MEDICINE

## 2020-05-22 PROCEDURE — 3008F BODY MASS INDEX DOCD: CPT | Performed by: FAMILY MEDICINE

## 2020-05-22 PROCEDURE — 99213 OFFICE O/P EST LOW 20 MIN: CPT | Performed by: FAMILY MEDICINE

## 2020-05-22 PROCEDURE — 1036F TOBACCO NON-USER: CPT | Performed by: FAMILY MEDICINE

## 2020-06-01 ENCOUNTER — OFFICE VISIT (OUTPATIENT)
Dept: UROLOGY | Facility: MEDICAL CENTER | Age: 65
End: 2020-06-01
Payer: COMMERCIAL

## 2020-06-01 VITALS
HEIGHT: 69 IN | WEIGHT: 213 LBS | DIASTOLIC BLOOD PRESSURE: 84 MMHG | BODY MASS INDEX: 31.55 KG/M2 | SYSTOLIC BLOOD PRESSURE: 140 MMHG | TEMPERATURE: 97.1 F | HEART RATE: 64 BPM

## 2020-06-01 DIAGNOSIS — Z12.5 SCREENING FOR PROSTATE CANCER: ICD-10-CM

## 2020-06-01 DIAGNOSIS — R31.29 OTHER MICROSCOPIC HEMATURIA: Primary | ICD-10-CM

## 2020-06-01 DIAGNOSIS — Z87.442 PERSONAL HISTORY OF KIDNEY STONES: ICD-10-CM

## 2020-06-01 LAB
BACTERIA UR QL AUTO: NORMAL /HPF
BILIRUB UR QL STRIP: NEGATIVE
CLARITY UR: CLEAR
COLOR UR: YELLOW
GLUCOSE UR STRIP-MCNC: NEGATIVE MG/DL
HGB UR QL STRIP.AUTO: NEGATIVE
HYALINE CASTS #/AREA URNS LPF: NORMAL /LPF
KETONES UR STRIP-MCNC: NEGATIVE MG/DL
LEUKOCYTE ESTERASE UR QL STRIP: NEGATIVE
NITRITE UR QL STRIP: NEGATIVE
NON-SQ EPI CELLS URNS QL MICRO: NORMAL /HPF
PH UR STRIP.AUTO: 6 [PH]
PROT UR STRIP-MCNC: NEGATIVE MG/DL
RBC #/AREA URNS AUTO: NORMAL /HPF
SL AMB  POCT GLUCOSE, UA: ABNORMAL
SL AMB LEUKOCYTE ESTERASE,UA: ABNORMAL
SL AMB POCT BILIRUBIN,UA: ABNORMAL
SL AMB POCT BLOOD,UA: ABNORMAL
SL AMB POCT CLARITY,UA: CLEAR
SL AMB POCT COLOR,UA: YELLOW
SL AMB POCT KETONES,UA: ABNORMAL
SL AMB POCT NITRITE,UA: ABNORMAL
SL AMB POCT PH,UA: 6
SL AMB POCT SPECIFIC GRAVITY,UA: 1.02
SL AMB POCT URINE PROTEIN: ABNORMAL
SL AMB POCT UROBILINOGEN: 0.2
SP GR UR STRIP.AUTO: 1.02 (ref 1–1.03)
UROBILINOGEN UR QL STRIP.AUTO: 0.2 E.U./DL
WBC #/AREA URNS AUTO: NORMAL /HPF

## 2020-06-01 PROCEDURE — 99203 OFFICE O/P NEW LOW 30 MIN: CPT | Performed by: UROLOGY

## 2020-06-01 PROCEDURE — 81001 URINALYSIS AUTO W/SCOPE: CPT | Performed by: UROLOGY

## 2020-06-01 PROCEDURE — 1160F RVW MEDS BY RX/DR IN RCRD: CPT | Performed by: UROLOGY

## 2020-06-01 PROCEDURE — 3008F BODY MASS INDEX DOCD: CPT | Performed by: UROLOGY

## 2020-06-01 PROCEDURE — 81003 URINALYSIS AUTO W/O SCOPE: CPT | Performed by: UROLOGY

## 2020-06-02 ENCOUNTER — TELEPHONE (OUTPATIENT)
Dept: UROLOGY | Facility: MEDICAL CENTER | Age: 65
End: 2020-06-02

## 2020-06-17 ENCOUNTER — TELEPHONE (OUTPATIENT)
Dept: FAMILY MEDICINE CLINIC | Facility: CLINIC | Age: 65
End: 2020-06-17

## 2020-06-23 DIAGNOSIS — E78.2 MIXED HYPERLIPIDEMIA: ICD-10-CM

## 2020-06-23 RX ORDER — SIMVASTATIN 20 MG
20 TABLET ORAL DAILY
Qty: 90 TABLET | Refills: 1 | Status: SHIPPED | OUTPATIENT
Start: 2020-06-23 | End: 2020-11-25

## 2020-07-27 ENCOUNTER — HOSPITAL ENCOUNTER (OUTPATIENT)
Dept: NON INVASIVE DIAGNOSTICS | Facility: HOSPITAL | Age: 65
Discharge: HOME | End: 2020-07-27
Payer: COMMERCIAL

## 2020-07-27 ENCOUNTER — APPOINTMENT (OUTPATIENT)
Dept: LAB | Facility: HOSPITAL | Age: 65
End: 2020-07-27
Payer: COMMERCIAL

## 2020-07-27 ENCOUNTER — TRANSCRIBE ORDERS (OUTPATIENT)
Dept: ADMINISTRATIVE | Facility: HOSPITAL | Age: 65
End: 2020-07-27

## 2020-07-27 DIAGNOSIS — Z01.818 PREOP EXAMINATION: ICD-10-CM

## 2020-07-27 DIAGNOSIS — Z01.818 PREOP EXAMINATION: Primary | ICD-10-CM

## 2020-07-27 LAB
ANION GAP SERPL CALCULATED.3IONS-SCNC: 9 MMOL/L (ref 4–13)
ATRIAL RATE: 68 BPM
BUN SERPL-MCNC: 25 MG/DL (ref 5–25)
CALCIUM SERPL-MCNC: 9.6 MG/DL (ref 8.3–10.1)
CHLORIDE SERPL-SCNC: 105 MMOL/L (ref 100–108)
CO2 SERPL-SCNC: 25 MMOL/L (ref 21–32)
CREAT SERPL-MCNC: 1.79 MG/DL (ref 0.6–1.3)
ERYTHROCYTE [DISTWIDTH] IN BLOOD BY AUTOMATED COUNT: 13.3 % (ref 11.6–15.1)
GFR SERPL CREATININE-BSD FRML MDRD: 39 ML/MIN/1.73SQ M
GLUCOSE P FAST SERPL-MCNC: 113 MG/DL (ref 65–99)
HCT VFR BLD AUTO: 43.7 % (ref 36.5–49.3)
HGB BLD-MCNC: 14.5 G/DL (ref 12–17)
MCH RBC QN AUTO: 30.1 PG (ref 26.8–34.3)
MCHC RBC AUTO-ENTMCNC: 33.2 G/DL (ref 31.4–37.4)
MCV RBC AUTO: 91 FL (ref 82–98)
P AXIS: 24 DEGREES
PLATELET # BLD AUTO: 229 THOUSANDS/UL (ref 149–390)
PMV BLD AUTO: 9.2 FL (ref 8.9–12.7)
POTASSIUM SERPL-SCNC: 4.3 MMOL/L (ref 3.5–5.3)
PR INTERVAL: 160 MS
QRS AXIS: 10 DEGREES
QRSD INTERVAL: 82 MS
QT INTERVAL: 388 MS
QTC INTERVAL: 412 MS
RBC # BLD AUTO: 4.81 MILLION/UL (ref 3.88–5.62)
SODIUM SERPL-SCNC: 139 MMOL/L (ref 136–145)
T WAVE AXIS: 49 DEGREES
VENTRICULAR RATE: 68 BPM
WBC # BLD AUTO: 6.45 THOUSAND/UL (ref 4.31–10.16)

## 2020-07-27 PROCEDURE — 93010 ELECTROCARDIOGRAM REPORT: CPT | Performed by: INTERNAL MEDICINE

## 2020-07-27 PROCEDURE — 80048 BASIC METABOLIC PNL TOTAL CA: CPT

## 2020-07-27 PROCEDURE — 85027 COMPLETE CBC AUTOMATED: CPT

## 2020-07-27 PROCEDURE — 93005 ELECTROCARDIOGRAM TRACING: CPT

## 2020-07-27 PROCEDURE — 36415 COLL VENOUS BLD VENIPUNCTURE: CPT

## 2020-08-03 NOTE — PRE-PROCEDURE INSTRUCTIONS
Pre-Surgery Instructions:   Medication Instructions    simvastatin (ZOCOR) 20 mg tablet Instructed patient per Anesthesia Guidelines  Pre-op Showering Instructions for Surgery Patients    Before your operation, you play an important role in decreasing your risk for infection by washing with special antiseptic soap  This is an effective way to reduce bacteria on the skin which may help to prevent infections at the surgical site  Please read the following directions in advance  1  In the week before your operation, purchase a 4 ounce bottle of antiseptic soap containing chlorhexidine gluconate (CHG)  4%  Some brand names include: Aplicare®, Endure, and Hibiclens®  The cost is usually less than $5 00   For your convenience, the [place] carries the soap   It may also be available at your doctors office or pre-admission testing center, and at most retail pharmacies   If you are allergic or sensitive to soaps containing CHG, please let your doctor know so another antiseptic can be suggested   CHG antiseptic soap is for external use only  2  The day before your operation, follow these instructions carefully to get ready   Please clean linens (sheets) on your bed; you should sleep on clean sheets after your evening shower   Get clean towels and washcloth ready - you need enough for 2 showers   Set aside clean underwear, pajamas, and clothing to wear after the showers     Reminders:   DO NOT use any other soap or body rinse on your skin during or after the antiseptic showers   DO NOT use lotion, powder, deodorant, or perfume/aftershave of any kind on your skin after your antiseptic shower   DO NOT shave any body parts in the 24 hours/day before your operation   DO NOT get the antiseptic soap in your eyes, ears, nose, mouth, or vaginal area    3   You will need to shower the night before AND the morning of your surgery  Shower 1:   The first evening before the operation, take the first shower   First, shampoo your hair with regular shampoo and rinse it completely before you use the antiseptic soap  After washing and rinsing your hair, rinse your body   Next, use a clean washcloth to apply the antiseptic soap and wash your body from the neck down to your toes using ½ bottle of the antiseptic soap   You will use the other ½ bottle for the second shower   Clean the area where your incision will be; lather this area well for about 2 minutes   If you are having head or neck surgery, wash areas with the antiseptic soap   Rinse yourself completely with running water   Use a clean towel to dry off   Wear clean underwear and clothing/pajamas  Shower 2   The morning of your operation, take the second shower following the same steps as Shower 1 using the second ½ of the bottle of antiseptic soap   Use clean cloths and towels to wash and dry yourself   Wear clean underwear and clothing

## 2020-08-06 ENCOUNTER — ANESTHESIA EVENT (OUTPATIENT)
Dept: PERIOP | Facility: HOSPITAL | Age: 65
End: 2020-08-06
Payer: COMMERCIAL

## 2020-08-06 PROBLEM — K21.9 GASTROESOPHAGEAL REFLUX DISEASE: Chronic | Status: ACTIVE | Noted: 2020-08-06

## 2020-08-06 NOTE — ANESTHESIA PREPROCEDURE EVALUATION
Procedure:  EXCISION OF NECK SCC W/LOCAL FLAP (Left Neck)    Relevant Problems   ANESTHESIA (within normal limits)      CARDIO   (+) Mixed hyperlipidemia   (-) Angina at rest [facility])   (-) Chest pain   (-) Dysrhythmias   (-) HTN (hypertension)   (-) History of PTCA   (-) MI (myocardial infarction) (HCC)      GI/HEPATIC   (+) Gastroesophageal reflux disease      MUSCULOSKELETAL  basal cell CQa of nose and neck       NEURO/PSYCH   (+) Personal history of kidney stones      PULMONARY  snoring positive  denies sleep apnea         Physical Exam    Airway    Mallampati score: II  TM Distance: >3 FB  Neck ROM: full     Dental       Cardiovascular  Cardiovascular exam normal    Pulmonary  Pulmonary exam normal     Other Findings        Anesthesia Plan  ASA Score- 2     Anesthesia Type- IV sedation with anesthesia with ASA Monitors  Additional Monitors:   Airway Plan:           Plan Factors-Exercise tolerance (METS): >4 METS  EKG reviewed  Existing labs reviewed  Patient is not a current smoker  Patient not instructed to abstain from smoking on day of procedure  Patient did not smoke on day of surgery  Induction-     Postoperative Plan- Plan for postoperative opioid use  Informed Consent- Anesthetic plan and risks discussed with patient and spouse  I personally reviewed this patient with the CRNA  Discussed and agreed on the Anesthesia Plan with the CRNA  Tyrone Carty           No results found for: HGBA1C    Lab Results   Component Value Date     08/23/2017    K 4 3 07/27/2020     07/27/2020    CO2 25 07/27/2020    BUN 25 07/27/2020    CREATININE 1 79 (H) 07/27/2020    GLUF 113 (H) 07/27/2020    CALCIUM 9 6 07/27/2020    AST 15 05/07/2020    ALT 29 05/07/2020    ALKPHOS 66 05/07/2020    PROT 6 9 08/23/2017    BILITOT 0 5 08/23/2017    EGFR 39 07/27/2020       Lab Results   Component Value Date    WBC 6 45 07/27/2020    HGB 14 5 07/27/2020    HCT 43 7 07/27/2020    MCV 91 07/27/2020     07/27/2020    Normal sinus rhythm  When compared with ECG of 06-MAY-2016 10:06,  No significant change was found  Confirmed by Charle Hamman ([id number]) on 7/27/2020 11:25:07 AM

## 2020-08-07 ENCOUNTER — HOSPITAL ENCOUNTER (OUTPATIENT)
Facility: HOSPITAL | Age: 65
Setting detail: OUTPATIENT SURGERY
Discharge: HOME | End: 2020-08-07
Attending: PLASTIC SURGERY | Admitting: PLASTIC SURGERY
Payer: COMMERCIAL

## 2020-08-07 ENCOUNTER — ANESTHESIA (OUTPATIENT)
Dept: PERIOP | Facility: HOSPITAL | Age: 65
End: 2020-08-07
Payer: COMMERCIAL

## 2020-08-07 VITALS
HEIGHT: 69 IN | TEMPERATURE: 96 F | WEIGHT: 213 LBS | DIASTOLIC BLOOD PRESSURE: 81 MMHG | RESPIRATION RATE: 16 BRPM | HEART RATE: 60 BPM | BODY MASS INDEX: 31.55 KG/M2 | OXYGEN SATURATION: 98 % | SYSTOLIC BLOOD PRESSURE: 122 MMHG

## 2020-08-07 DIAGNOSIS — C44.42 SQUAMOUS CELL CARCINOMA OF SKIN OF SCALP AND NECK: ICD-10-CM

## 2020-08-07 PROCEDURE — 88305 TISSUE EXAM BY PATHOLOGIST: CPT | Performed by: PATHOLOGY

## 2020-08-07 RX ORDER — LIDOCAINE HYDROCHLORIDE AND EPINEPHRINE 10; 10 MG/ML; UG/ML
INJECTION, SOLUTION INFILTRATION; PERINEURAL AS NEEDED
Status: DISCONTINUED | OUTPATIENT
Start: 2020-08-07 | End: 2020-08-07 | Stop reason: HOSPADM

## 2020-08-07 RX ORDER — HYDROCODONE BITARTRATE AND ACETAMINOPHEN 5; 325 MG/1; MG/1
2 TABLET ORAL EVERY 4 HOURS PRN
Status: DISCONTINUED | OUTPATIENT
Start: 2020-08-07 | End: 2020-08-07 | Stop reason: HOSPADM

## 2020-08-07 RX ORDER — SODIUM CHLORIDE, SODIUM LACTATE, POTASSIUM CHLORIDE, CALCIUM CHLORIDE 600; 310; 30; 20 MG/100ML; MG/100ML; MG/100ML; MG/100ML
50 INJECTION, SOLUTION INTRAVENOUS CONTINUOUS
Status: DISCONTINUED | OUTPATIENT
Start: 2020-08-07 | End: 2020-08-07 | Stop reason: HOSPADM

## 2020-08-07 RX ORDER — MIDAZOLAM HYDROCHLORIDE 2 MG/2ML
INJECTION, SOLUTION INTRAMUSCULAR; INTRAVENOUS AS NEEDED
Status: DISCONTINUED | OUTPATIENT
Start: 2020-08-07 | End: 2020-08-07

## 2020-08-07 RX ORDER — CEFAZOLIN SODIUM 2 G/50ML
SOLUTION INTRAVENOUS AS NEEDED
Status: DISCONTINUED | OUTPATIENT
Start: 2020-08-07 | End: 2020-08-07

## 2020-08-07 RX ORDER — FENTANYL CITRATE 50 UG/ML
INJECTION, SOLUTION INTRAMUSCULAR; INTRAVENOUS AS NEEDED
Status: DISCONTINUED | OUTPATIENT
Start: 2020-08-07 | End: 2020-08-07

## 2020-08-07 RX ORDER — CEFAZOLIN SODIUM 2 G/50ML
2000 SOLUTION INTRAVENOUS ONCE
Status: DISCONTINUED | OUTPATIENT
Start: 2020-08-07 | End: 2020-08-07 | Stop reason: HOSPADM

## 2020-08-07 RX ORDER — MAGNESIUM HYDROXIDE 1200 MG/15ML
LIQUID ORAL AS NEEDED
Status: DISCONTINUED | OUTPATIENT
Start: 2020-08-07 | End: 2020-08-07 | Stop reason: HOSPADM

## 2020-08-07 RX ADMIN — MIDAZOLAM HYDROCHLORIDE 2 MG: 1 INJECTION, SOLUTION INTRAMUSCULAR; INTRAVENOUS at 10:04

## 2020-08-07 RX ADMIN — FENTANYL CITRATE 100 MCG: 50 INJECTION INTRAMUSCULAR; INTRAVENOUS at 10:05

## 2020-08-07 RX ADMIN — CEFAZOLIN SODIUM 2000 MG: 2 SOLUTION INTRAVENOUS at 10:00

## 2020-08-07 RX ADMIN — SODIUM CHLORIDE, SODIUM LACTATE, POTASSIUM CHLORIDE, AND CALCIUM CHLORIDE: .6; .31; .03; .02 INJECTION, SOLUTION INTRAVENOUS at 09:58

## 2020-08-07 NOTE — DISCHARGE SUMMARY
Discharge Summary - Medical Joycelyn Jensen 76 y o  male MRN: [id number]    [facility] Room / Bed: OR POOL/OR POOL Encounter: [id number]    BRIEF OVERVIEW  Admitting Provider: Jocy Sol MD  Discharge Provider: Jocy Sol MD  Primary Care Physician at Discharge: Ignacio Hawley [address] [place] [address]    Discharge To: Home      Admission Date: 8/7/2020     Discharge Date: No discharge date for patient encounter  Code Status: No Order  Advance Directive and Living Will: <no information>  Power of :        Primary Discharge Diagnosis  Active Problems:    Gastroesophageal reflux disease  Resolved Problems:    * No resolved hospital problems   *        Discharge Disposition: [address] [place] [address]    Presenting Problem/History of Present Illness  <principal problem not specified>      Discharge Condition: stable    Patient tolerated the procedure well, recovered and was discharged home in stable condition    Jocy Sol MD  8/7/2020  10:00 AM

## 2020-08-07 NOTE — NURSING NOTE
Patient tolerating po food and fluids  Patient and family verbalized understanding of discharge instructions  Incision with skin glue remains clean/dry/intact

## 2020-08-07 NOTE — NURSING NOTE
Patient returned to [facility] via litter  Awake/alert  Skin warm/dry  Respirations easy/unlabored  Denies pain  Incision to left side of neck clean/dry/intact

## 2020-08-07 NOTE — OP NOTE
OPERATIVE REPORT  PATIENT NAME: Pratik Hernadez    :  1955  MRN: [id number]  Pt Location:  OR ROOM 10    SURGERY DATE: 2020    Surgeon(s) and Role:     Roberto Coughlin MD - Primary    Preop Diagnosis:  Squamous cell carcinoma of skin of scalp and neck [C44 42]    Post-Op Diagnosis Codes:     * Squamous cell carcinoma of skin of scalp and neck [C44 42]    Procedure(s) (LRB):  EXCISION OF NECK SCC W/LOCAL FLAP (Left)    Specimen(s):  ID Type Source Tests Collected by Time Destination   1 : LEFT NECK SQUAMOUS CELL CARCINOMA LESION SEE COMMENTS Tissue Lesion TISSUE [facility] MD Yung 2020 1019        Estimated Blood Loss:   Minimal    Drains:  * No LDAs found *    Anesthesia Type:   IV Sedation with Anesthesia    Operative Indications:  Squamous cell carcinoma of skin of scalp and neck [C44 42]      Operative Findings:      Complications:   None    Procedure and Technique:  The patient was brought to the operating room and placed supine on the operating table  Time-out procedure performed SCDs were applied and IV antibiotics were given  After adequate anesthesia was obtained the left infra-auricular area was prepped and draped using standard surgical technique  Attention was turned to the lesion  5 mm margins were designed around the lesion  This pattern was amputated in a subcutaneous plane marked for orientation sent for final pathology  The lesion plus margins was 1 8 cm  A local flap was designed in order to avoid distortion of the ear  An incision was carried superiorly and posteriorly  The posterior skin was elevated subcutaneous plane and rotated advanced superiorly and anteriorly for flap plus defect of 6 centimeters squared  The flap was inset using 4-0 PDS suture in interrupted deep dermal technique followed by 3-0 strata fix suture in running subcuticular technique  Skin glue was applied       I was present for the entire procedure and A qualified resident physician was not available    Patient Disposition:  hemodynamically stable    SIGNATURE: May Khan MD  DATE: August 7, 2020  TIME: 10:42 AM

## 2020-08-07 NOTE — DISCHARGE INSTRUCTIONS
[address], [facility], [address] [facility], [facility], [address] [facility] Sep /S / Simplist       Avoid direct sunlight    No ice or heating pack    Ok to shower, avoid direct water pressure on incision    No exercise or outdoor activities    No strenuous activity    Skin glue was applied     Call [phone] for an appointment in 7-14 days

## 2020-08-07 NOTE — ANESTHESIA POSTPROCEDURE EVALUATION
Post-Op Assessment Note    CV Status:  Stable  Pain Score: 0    Pain management: adequate     Mental Status:  Alert and awake   Hydration Status:  Euvolemic   PONV Controlled:  Controlled   Airway Patency:  Patent      Post Op Vitals Reviewed: Yes      Staff: CRNA         No complications documented      BP      Temp      Pulse     Resp      SpO2

## 2020-08-07 NOTE — INTERVAL H&P NOTE
H&P reviewed  After examining the patient I find no changes in the patients condition since the H&P had been written      Vitals:    08/07/20 0827   BP: 128/82   Pulse: 60   Resp: 16   Temp: 97 8 °F (36 6 °C)   SpO2: 96%

## 2020-10-28 DIAGNOSIS — F52.21 ERECTILE DYSFUNCTION OF NON-ORGANIC ORIGIN: ICD-10-CM

## 2020-10-28 RX ORDER — SILDENAFIL 50 MG/1
50 TABLET, FILM COATED ORAL AS NEEDED
Qty: 10 TABLET | Refills: 1 | Status: SHIPPED | OUTPATIENT
Start: 2020-10-28

## 2020-11-24 ENCOUNTER — OFFICE VISIT (OUTPATIENT)
Dept: FAMILY MEDICINE CLINIC | Facility: CLINIC | Age: 65
End: 2020-11-24
Payer: COMMERCIAL

## 2020-11-24 VITALS
SYSTOLIC BLOOD PRESSURE: 122 MMHG | WEIGHT: 203.2 LBS | DIASTOLIC BLOOD PRESSURE: 80 MMHG | TEMPERATURE: 97.6 F | BODY MASS INDEX: 30.1 KG/M2 | HEIGHT: 69 IN

## 2020-11-24 DIAGNOSIS — S30.861A TICK BITE OF ABDOMINAL WALL, INITIAL ENCOUNTER: ICD-10-CM

## 2020-11-24 DIAGNOSIS — C44.311 BASAL CELL CARCINOMA OF NOSE: ICD-10-CM

## 2020-11-24 DIAGNOSIS — E78.2 MIXED HYPERLIPIDEMIA: Primary | ICD-10-CM
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
## 2020-11-24 DIAGNOSIS — C44.42 SQUAMOUS CELL CARCINOMA OF NECK: ICD-10-CM

## 2020-11-24 DIAGNOSIS — E66.09 CLASS 1 OBESITY DUE TO EXCESS CALORIES WITHOUT SERIOUS COMORBIDITY WITH BODY MASS INDEX (BMI) OF 30.0 TO 30.9 IN ADULT: ICD-10-CM

## 2020-11-24 PROBLEM — Z87.442 PERSONAL HISTORY OF KIDNEY STONES: Status: RESOLVED | Noted: 2020-06-01 | Resolved: 2020-11-24

## 2020-11-24 PROBLEM — K21.9 GASTROESOPHAGEAL REFLUX DISEASE: Chronic | Status: RESOLVED | Noted: 2020-08-06 | Resolved: 2020-11-24

## 2020-11-24 PROCEDURE — 36415 COLL VENOUS BLD VENIPUNCTURE: CPT | Performed by: FAMILY MEDICINE

## 2020-11-24 PROCEDURE — 1101F PT FALLS ASSESS-DOCD LE1/YR: CPT | Performed by: FAMILY MEDICINE

## 2020-11-24 PROCEDURE — 3725F SCREEN DEPRESSION PERFORMED: CPT | Performed by: FAMILY MEDICINE

## 2020-11-24 PROCEDURE — 3008F BODY MASS INDEX DOCD: CPT | Performed by: FAMILY MEDICINE

## 2020-11-24 PROCEDURE — 1160F RVW MEDS BY RX/DR IN RCRD: CPT | Performed by: FAMILY MEDICINE

## 2020-11-24 PROCEDURE — 99214 OFFICE O/P EST MOD 30 MIN: CPT | Performed by: FAMILY MEDICINE

## 2020-11-24 PROCEDURE — 1036F TOBACCO NON-USER: CPT | Performed by: FAMILY MEDICINE

## 2020-11-24 PROCEDURE — 3288F FALL RISK ASSESSMENT DOCD: CPT | Performed by: FAMILY MEDICINE

## 2020-11-25 DIAGNOSIS — E78.2 MIXED HYPERLIPIDEMIA: ICD-10-CM

## 2020-11-25 LAB
ALBUMIN SERPL-MCNC: 4.4 G/DL (ref 3.6–5.1)
ALBUMIN/GLOB SERPL: 1.4 (CALC) (ref 1–2.5)
ALP SERPL-CCNC: 66 U/L (ref 35–144)
ALT SERPL-CCNC: 15 U/L (ref 9–46)
AST SERPL-CCNC: 12 U/L (ref 10–35)
BILIRUB SERPL-MCNC: 0.5 MG/DL (ref 0.2–1.2)
BUN SERPL-MCNC: 21 MG/DL (ref 7–25)
BUN/CREAT SERPL: 16 (CALC) (ref 6–22)
CALCIUM SERPL-MCNC: 9.9 MG/DL (ref 8.6–10.3)
CHLORIDE SERPL-SCNC: 106 MMOL/L (ref 98–110)
CHOLEST SERPL-MCNC: 144 MG/DL
CHOLEST/HDLC SERPL: 3.9 (CALC)
CO2 SERPL-SCNC: 25 MMOL/L (ref 20–32)
CREAT SERPL-MCNC: 1.32 MG/DL (ref 0.7–1.25)
GLOBULIN SER CALC-MCNC: 3.2 G/DL (CALC) (ref 1.9–3.7)
GLUCOSE SERPL-MCNC: 97 MG/DL (ref 65–99)
HDLC SERPL-MCNC: 37 MG/DL
LDLC SERPL CALC-MCNC: 87 MG/DL (CALC)
NONHDLC SERPL-MCNC: 107 MG/DL (CALC)
POTASSIUM SERPL-SCNC: 4.7 MMOL/L (ref 3.5–5.3)
PROT SERPL-MCNC: 7.6 G/DL (ref 6.1–8.1)
SL AMB EGFR AFRICAN AMERICAN: 65 ML/MIN/1.73M2
SL AMB EGFR NON AFRICAN AMERICAN: 56 ML/MIN/1.73M2
SODIUM SERPL-SCNC: 139 MMOL/L (ref 135–146)
TRIGL SERPL-MCNC: 107 MG/DL

## 2020-11-25 RX ORDER — SIMVASTATIN 20 MG
TABLET ORAL
Qty: 90 TABLET | Refills: 1 | Status: SHIPPED | OUTPATIENT
Start: 2020-11-25 | End: 2021-04-28

## 2021-01-08 ENCOUNTER — TRANSCRIBE ORDERS (OUTPATIENT)
Dept: FAMILY MEDICINE CLINIC | Facility: CLINIC | Age: 66
End: 2021-01-08

## 2021-01-08 ENCOUNTER — TELEPHONE (OUTPATIENT)
Dept: FAMILY MEDICINE CLINIC | Facility: CLINIC | Age: 66
End: 2021-01-08

## 2021-01-08 DIAGNOSIS — Z20.1 EXPOSURE TO TB: Primary | ICD-10-CM

## 2021-01-11 ENCOUNTER — CLINICAL SUPPORT (OUTPATIENT)
Dept: FAMILY MEDICINE CLINIC | Facility: CLINIC | Age: 66
End: 2021-01-11
Payer: COMMERCIAL

## 2021-01-11 DIAGNOSIS — Z23 NEED FOR VACCINATION: Primary | ICD-10-CM

## 2021-01-11 PROCEDURE — 86580 TB INTRADERMAL TEST: CPT | Performed by: FAMILY MEDICINE

## 2021-03-30 DIAGNOSIS — Z23 ENCOUNTER FOR IMMUNIZATION: ICD-10-CM

## 2021-04-27 DIAGNOSIS — E78.2 MIXED HYPERLIPIDEMIA: ICD-10-CM

## 2021-04-28 RX ORDER — SIMVASTATIN 20 MG
TABLET ORAL
Qty: 90 TABLET | Refills: 1 | Status: SHIPPED | OUTPATIENT
Start: 2021-04-28 | End: 2022-03-15

## 2021-05-03 DIAGNOSIS — M19.91 PRIMARY OSTEOARTHRITIS, UNSPECIFIED SITE: ICD-10-CM

## 2021-05-03 DIAGNOSIS — M19.91 PRIMARY OSTEOARTHRITIS, UNSPECIFIED SITE: Primary | ICD-10-CM

## 2021-05-03 RX ORDER — NAPROXEN 500 MG/1
TABLET ORAL
Qty: 90 TABLET | Refills: 0 | Status: SHIPPED | OUTPATIENT
Start: 2021-05-03 | End: 2021-05-03 | Stop reason: SDUPTHER

## 2021-05-03 RX ORDER — NAPROXEN 500 MG/1
TABLET ORAL
Qty: 90 TABLET | Refills: 0 | Status: ON HOLD | OUTPATIENT
Start: 2021-05-03 | End: 2021-05-25

## 2021-05-18 ENCOUNTER — OFFICE VISIT (OUTPATIENT)
Dept: FAMILY MEDICINE CLINIC | Facility: CLINIC | Age: 66
End: 2021-05-18
Payer: COMMERCIAL

## 2021-05-18 VITALS
WEIGHT: 203.2 LBS | SYSTOLIC BLOOD PRESSURE: 118 MMHG | TEMPERATURE: 97.8 F | DIASTOLIC BLOOD PRESSURE: 80 MMHG | HEIGHT: 69 IN | BODY MASS INDEX: 30.1 KG/M2

## 2021-05-18 DIAGNOSIS — M15.9 PRIMARY OSTEOARTHRITIS INVOLVING MULTIPLE JOINTS: ICD-10-CM

## 2021-05-18 DIAGNOSIS — E78.2 MIXED HYPERLIPIDEMIA: Primary | ICD-10-CM

## 2021-05-18 DIAGNOSIS — E66.09 CLASS 1 OBESITY DUE TO EXCESS CALORIES WITHOUT SERIOUS COMORBIDITY WITH BODY MASS INDEX (BMI) OF 30.0 TO 30.9 IN ADULT: ICD-10-CM

## 2021-05-18 DIAGNOSIS — Z85.828 HISTORY OF BASAL CELL CARCINOMA (BCC): ICD-10-CM

## 2021-05-18 DIAGNOSIS — Z85.89 HISTORY OF SQUAMOUS CELL CARCINOMA: ICD-10-CM

## 2021-05-18 DIAGNOSIS — R31.29 OTHER MICROSCOPIC HEMATURIA: ICD-10-CM

## 2021-05-18 PROBLEM — C44.41 BASAL CELL CARCINOMA (BCC) OF RIGHT SIDE OF NECK: Status: RESOLVED | Noted: 2018-11-20 | Resolved: 2021-05-18

## 2021-05-18 PROBLEM — C44.42 SQUAMOUS CELL CARCINOMA OF NECK: Status: RESOLVED | Noted: 2020-11-24 | Resolved: 2021-05-18
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
## 2021-05-18 PROBLEM — M15.0 PRIMARY OSTEOARTHRITIS INVOLVING MULTIPLE JOINTS: Status: ACTIVE | Noted: 2021-05-18

## 2021-05-18 PROBLEM — S30.861A TICK BITE OF ABDOMINAL WALL: Status: RESOLVED | Noted: 2020-11-24 | Resolved: 2021-05-18

## 2021-05-18 PROCEDURE — 99214 OFFICE O/P EST MOD 30 MIN: CPT | Performed by: FAMILY MEDICINE

## 2021-05-18 NOTE — ASSESSMENT & PLAN NOTE
Continue naprosyn and daily exercise and stretching Patient is asking about PT for flexibility I have suggested he contact his insurance company and or consider working with a sports medicine trainer

## 2021-05-18 NOTE — ASSESSMENT & PLAN NOTE
Last lipis panel was in 11/2020 with HDL a bit low at 37 Rest of labs were stable continue current care and followup labs this month Tolerating the simvastatin with no isseus

## 2021-05-18 NOTE — PROGRESS NOTES
Assessment/Plan:    Primary osteoarthritis involving multiple joints  Continue naprosyn and daily exercise and stretching Patient is asking about PT for flexibility I have suggested he contact his insurance company and or consider working with a sports medicine trainer    Other microscopic hematuria  Has his yearly followup with urology scheduled     Mixed hyperlipidemia  Last lipis panel was in 11/2020 with HDL a bit low at 37 Rest of labs were stable continue current care and followup labs this month Tolerating the simvastatin with no isseus     History of squamous cell carcinoma  Had surgery in 8//2020 has followup with dermatology first week of July    History of basal cell carcinoma ([address] [place]  [employer] [facility])  Has followup with dermatology in July discussed need for sunscreen    Class 1 obesity due to excess calories without serious comorbidity with body mass index (BMI) of 30 0 to 30 9 in adult  Weight is stable discussed diet and exercise with patient followup in 6 months       Diagnoses and all orders for this visit:    Mixed hyperlipidemia  -     Comprehensive metabolic panel; Future  -     Lipid panel; Future    Class 1 obesity due to excess calories without serious comorbidity with body mass index (BMI) of 30 0 to 30 9 in adult  -     Comprehensive metabolic panel; Future  -     Lipid panel; Future    Other microscopic hematuria    History of basal cell carcinoma (BCC)    Primary osteoarthritis involving multiple joints    History of squamous cell carcinoma          Subjective:   Chief Complaint   Patient presents with    Hyperlipidemia          Patient ID: Nehemias Reason is a 76 y o  male      Patient is here for followup of hyperlipidemia obesity primary osteoarthritis microscopic hematuria and his history of skin cancer Patient is overall doing well He walks 1 mile daily He is doing intermittent fasting as his meal plan Patient has some ache and pains in joints mainly hands and also knees Patient tried yoga but had some difficulty with getting up from certain positions Alexus is asking about PT for flexibilty Patient has appt with urology for  Screening prostate ca microscopic hematuria and also ED Patient has no bladder complaints Patient is using sunscreen He has appt with dermatology for followup of skin cancer       The following portions of the patient's history were reviewed and updated as appropriate: allergies, current medications, past family history, past medical history, past social history, past surgical history and problem list     Review of Systems   Constitutional: Negative for fatigue, fever and unexpected weight change  HENT: Negative for congestion, sinus pain and trouble swallowing  Eyes: Negative for discharge and visual disturbance  Respiratory: Negative for cough, chest tightness, shortness of breath and wheezing  Cardiovascular: Negative for chest pain, palpitations and leg swelling  Gastrointestinal: Negative for abdominal pain, blood in stool, constipation, diarrhea, nausea and vomiting  Genitourinary: Negative for difficulty urinating, dysuria, frequency and hematuria  Musculoskeletal: Positive for arthralgias  Negative for gait problem and joint swelling  Migratory joint pains fingers, hands and knees    Skin: Negative for rash and wound  Allergic/Immunologic: Negative for environmental allergies and food allergies  Neurological: Negative for dizziness, syncope, weakness, numbness and headaches  Hematological: Negative for adenopathy  Does not bruise/bleed easily  Psychiatric/Behavioral: Negative for confusion, decreased concentration and sleep disturbance  The patient is not nervous/anxious  Objective:      /80   Temp 97 8 °F (36 6 °C)   Ht 5' 9" (1 753 m)   Wt 92 2 kg (203 lb 3 2 oz)   BMI 30 01 kg/m²          Physical Exam  Vitals signs and nursing note reviewed  Constitutional:       Appearance: He is well-developed  He is obese     HENT:      Head: Normocephalic and atraumatic  Right Ear: Hearing, tympanic membrane and external ear normal       Left Ear: Hearing, tympanic membrane and external ear normal    Eyes:      Extraocular Movements: Extraocular movements intact  Conjunctiva/sclera: Conjunctivae normal       Pupils: Pupils are equal, round, and reactive to light  Neck:      Musculoskeletal: Neck supple  Thyroid: No thyromegaly  Cardiovascular:      Rate and Rhythm: Normal rate  Pulses: Normal pulses  Heart sounds: Normal heart sounds  Pulmonary:      Effort: Pulmonary effort is normal       Breath sounds: Normal breath sounds  No wheezing or rales  Abdominal:      General: Bowel sounds are normal  There is no distension  Palpations: Abdomen is soft  Tenderness: There is no abdominal tenderness  Musculoskeletal:         General: No tenderness  Comments: Arthritic changes in hands DIP and PIP joints   Lymphadenopathy:      Cervical: No cervical adenopathy  Skin:     General: Skin is warm and dry  Findings: No rash  Neurological:      General: No focal deficit present  Mental Status: He is alert and oriented to person, place, and time  Cranial Nerves: No cranial nerve deficit  Coordination: Coordination normal    Psychiatric:         Mood and Affect: Mood normal          Behavior: Behavior normal          Thought Content:  Thought content normal          Judgment: Judgment normal

## 2021-05-18 NOTE — PATIENT INSTRUCTIONS
Cholesterol and Your Health   WHAT YOU NEED TO KNOW:   What is cholesterol? Cholesterol is a waxy, fat-like substance  Your body uses cholesterol to make hormones and new cells, and to protect nerves  Cholesterol is made by your body  It also comes from certain foods you eat, such as meat and dairy products  Your healthcare provider can help you set goals for your cholesterol levels  He or she can help you create a plan to meet your goals  What are cholesterol level goals? Your cholesterol level goals depend on your risk for heart disease, your age, and your other health conditions  The following are general guidelines:  · Total cholesterol  includes low-density lipoprotein (LDL), high-density lipoprotein (HDL), and triglyceride levels  The total cholesterol level should be lower than 200 mg/dL and is best at about 150 mg/dL  · LDL cholesterol  is called bad cholesterol  because it forms plaque in your arteries  As plaque builds up, your arteries become narrow, and less blood flows through  When plaque decreases blood flow to your heart, you may have chest pain  If plaque completely blocks an artery that brings blood to your heart, you may have a heart attack  Plaque can break off and form blood clots  Blood clots may block arteries in your brain and cause a stroke  The level should be less than 130 mg/dL and is best at about 100 mg/dL  · HDL cholesterol  is called good cholesterol  because it helps remove LDL cholesterol from your arteries  It does this by attaching to LDL cholesterol and carrying it to your liver  Your liver breaks down LDL cholesterol so your body can get rid of it  High levels of HDL cholesterol can help prevent a heart attack and stroke  Low levels of HDL cholesterol can increase your risk for heart disease, heart attack, and stroke  The level should be 60 mg/dL or higher  · Triglycerides  are a type of fat that store energy from foods you eat   High levels of triglycerides also cause plaque buildup  This can increase your risk for a heart attack or stroke  If your triglyceride level is high, your LDL cholesterol level may also be high  The level should be less than 150 mg/dL  What increases my risk for high cholesterol? · Smoking cigarettes    · Being overweight or obese, or not getting enough exercise    · Drinking large amounts of alcohol    · A medical condition such as hypertension (high blood pressure) or diabetes    · Certain genes passed from your parents to you    · Age older than 72 years    What do I need to know about having my cholesterol levels checked? Adults 21to 39years of age should have their cholesterol levels checked every 4 to 6 years  Adults 45 years or older should have their cholesterol checked every 1 to 2 years  You may need your cholesterol checked more often, or at a younger age, if you have risk factors for heart disease  You may also need to have your cholesterol checked more often if you have other health conditions, such as diabetes  Blood tests are used to check cholesterol levels  Blood tests measure your levels of triglycerides, LDL cholesterol, and HDL cholesterol  How do healthy fats affect my cholesterol levels? Healthy fats, also called unsaturated fats, help lower LDL cholesterol and triglyceride levels  Healthy fats include the following:  · Monounsaturated fats  are found in foods such as olive oil, canola oil, avocado, nuts, and olives  · Polyunsaturated fats,  such as omega 3 fats, are found in fish, such as salmon, trout, and tuna  They can also be found in plant foods such as flaxseed, walnuts, and soybeans  How do unhealthy fats affect my cholesterol levels? Unhealthy fats increase LDL cholesterol and triglyceride levels  They are found in foods high in cholesterol, saturated fat, and trans fat:  · Cholesterol  is found in eggs, dairy, and meat      · Saturated fat  is found in butter, cheese, ice cream, whole milk, and coconut oil  Saturated fat is also found in meat, such as sausage, hot dogs, and bologna  · Trans fat  is found in liquid oils and is used in fried and baked foods  Foods that contain trans fats include chips, crackers, muffins, sweet rolls, microwave popcorn, and cookies  How is high cholesterol treated? Treatment for high cholesterol will also decrease your risk of heart disease, heart attack, and stroke  Treatment may include any of the following:  · Lifestyle changes  may include food, exercise, weight loss, and quitting smoking  You may also need to decrease the amount of alcohol you drink  Your healthcare provider will want you to start with lifestyle changes  Other treatment may be added if lifestyle changes are not enough  · Medicines  may be given to lower your LDL cholesterol, triglyceride levels, or total cholesterol level  You may need medicines to lower your cholesterol if any of the following is true:     ? You have a history of stroke, TIA, unstable angina, or a heart attack  ? Your LDL cholesterol level is 190 mg/dL or higher  ? You are age 36 to 76 years, have diabetes or heart disease risk factors, and your LDL cholesterol is 70 mg/dL or higher  · Supplements  include fish oil, red yeast rice, and garlic  Fish oil may help lower your triglyceride and LDL cholesterol levels  It may also increase your HDL cholesterol level  Red yeast rice may help decrease your total cholesterol level and LDL cholesterol level  Garlic may help lower your total cholesterol level  Do not take these supplements without talking to your healthcare provider  What food changes can I make to lower my cholesterol levels? A dietitian can help you create a healthy eating plan  He or she can show you how to read food labels and choose foods low in saturated fat, trans fats, and cholesterol  · Decrease the total amount of fat you eat    Choose lean meats, fat-free or 1% fat milk, and low-fat dairy products, such as yogurt and cheese  Try to limit or avoid red meats  Limit or do not eat fried foods or baked goods, such as cookies  · Replace unhealthy fats with healthy fats  Cook foods in olive oil or canola oil  Choose soft margarines that are low in saturated fat and trans fat  Seeds, nuts, and avocados are other examples of healthy fats  · Eat foods with omega-3 fats  Examples include salmon, tuna, mackerel, walnuts, and flaxseed  Eat fish 2 times per week  Pregnant women should not eat fish that have high levels of mercury, such as shark, swordfish, and breezy mackerel  · Increase the amount of high-fiber foods you eat  High-fiber foods can help lower your LDL cholesterol  Aim to get between 20 and 30 grams of fiber each day  Fruits and vegetables are high in fiber  Eat at least 5 servings each day  Other high-fiber foods are whole-grain or whole-wheat breads, pastas, or cereals, and brown rice  Eat 3 ounces of whole-grain foods each day  Increase fiber slowly  You may have abdominal discomfort, bloating, and gas if you add fiber to your diet too quickly  · Eat healthy protein foods  Examples include low-fat dairy products, skinless chicken and turkey, fish, and nuts  · Limit foods and drinks that are high in sugar  Your dietitian or healthcare provider can help you create daily limits for high-sugar foods and drinks  The limit may be lower if you have diabetes or another health condition  Limits can also help you lose weight if needed  What lifestyle changes can I make to lower my cholesterol levels? · Maintain a healthy weight  Ask your healthcare provider what a healthy weight is for you  Ask him or her to help you create a weight loss plan if needed  Weight loss can decrease your total cholesterol and triglyceride levels  Weight loss may also help keep your blood pressure at a healthy level  · Exercise regularly    Exercise can help lower your total cholesterol level and maintain a healthy weight  Exercise can also help increase your HDL cholesterol level  Work with your healthcare provider to create an exercise program that is right for you  Get at least 30 to 40 minutes of moderate exercise most days of the week  Examples of exercise include brisk walking, swimming, or biking  · Do not smoke  Nicotine and other chemicals in cigarettes and cigars can raise your cholesterol levels  Ask your healthcare provider for information if you currently smoke and need help to quit  E-cigarettes or smokeless tobacco still contain nicotine  Talk to your healthcare provider before you use these products  · Limit or do not drink alcohol  Alcohol can increase your triglyceride levels  Ask your healthcare provider before you drink alcohol  Ask how much is okay for you to drink in 1 day or 1 week  CARE AGREEMENT:   You have the right to help plan your care  Discuss treatment options with your healthcare provider to decide what care you want to receive  You always have the right to refuse treatment  The above information is an  only  It is not intended as medical advice for individual conditions or treatments  Talk to your doctor, nurse or pharmacist before following any medical regimen to see if it is safe and effective for you  © Copyright [address] Information is for End User's use only and may not be sold, redistributed or otherwise used for commercial purposes   All illustrations and images included in CareNotes® are the copyrighted property of [employer]  or [address] [place] [address]

## 2021-05-24 ENCOUNTER — APPOINTMENT (OUTPATIENT)
Dept: LAB | Facility: MEDICAL CENTER | Age: 66
End: 2021-05-24
Payer: COMMERCIAL

## 2021-05-24 DIAGNOSIS — Z12.5 SCREENING FOR PROSTATE CANCER: ICD-10-CM

## 2021-05-24 DIAGNOSIS — E78.2 MIXED HYPERLIPIDEMIA: ICD-10-CM

## 2021-05-24 DIAGNOSIS — E66.09 CLASS 1 OBESITY DUE TO EXCESS CALORIES WITHOUT SERIOUS COMORBIDITY WITH BODY MASS INDEX (BMI) OF 30.0 TO 30.9 IN ADULT: ICD-10-CM

## 2021-05-24 DIAGNOSIS — R31.29 OTHER MICROSCOPIC HEMATURIA: ICD-10-CM

## 2021-05-24 LAB
ALBUMIN SERPL BCP-MCNC: 3.4 G/DL (ref 3.5–5)
ALP SERPL-CCNC: 74 U/L (ref 46–116)
ALT SERPL W P-5'-P-CCNC: 29 U/L (ref 12–78)
ANION GAP SERPL CALCULATED.3IONS-SCNC: 6 MMOL/L (ref 4–13)
AST SERPL W P-5'-P-CCNC: 13 U/L (ref 5–45)
BACTERIA UR QL AUTO: ABNORMAL /HPF
BILIRUB SERPL-MCNC: 0.45 MG/DL (ref 0.2–1)
BILIRUB UR QL STRIP: NEGATIVE
BUN SERPL-MCNC: 48 MG/DL (ref 5–25)
CALCIUM ALBUM COR SERPL-MCNC: 10.1 MG/DL (ref 8.3–10.1)
CALCIUM SERPL-MCNC: 9.6 MG/DL (ref 8.3–10.1)
CHLORIDE SERPL-SCNC: 114 MMOL/L (ref 100–108)
CHOLEST SERPL-MCNC: 122 MG/DL (ref 50–200)
CLARITY UR: CLEAR
CO2 SERPL-SCNC: 21 MMOL/L (ref 21–32)
COLOR UR: YELLOW
CREAT SERPL-MCNC: 2.79 MG/DL (ref 0.6–1.3)
GFR SERPL CREATININE-BSD FRML MDRD: 23 ML/MIN/1.73SQ M
GLUCOSE P FAST SERPL-MCNC: 96 MG/DL (ref 65–99)
GLUCOSE UR STRIP-MCNC: NEGATIVE MG/DL
HDLC SERPL-MCNC: 34 MG/DL
HGB UR QL STRIP.AUTO: ABNORMAL
HYALINE CASTS #/AREA URNS LPF: ABNORMAL /LPF
KETONES UR STRIP-MCNC: NEGATIVE MG/DL
LDLC SERPL CALC-MCNC: 74 MG/DL (ref 0–100)
LEUKOCYTE ESTERASE UR QL STRIP: NEGATIVE
NITRITE UR QL STRIP: NEGATIVE
NON-SQ EPI CELLS URNS QL MICRO: ABNORMAL /HPF
NONHDLC SERPL-MCNC: 88 MG/DL
PH UR STRIP.AUTO: 5.5 [PH]
POTASSIUM SERPL-SCNC: 4.9 MMOL/L (ref 3.5–5.3)
PROT SERPL-MCNC: 7.7 G/DL (ref 6.4–8.2)
PROT UR STRIP-MCNC: NEGATIVE MG/DL
PSA SERPL-MCNC: 1.1 NG/ML (ref 0–4)
RBC #/AREA URNS AUTO: ABNORMAL /HPF
SODIUM SERPL-SCNC: 141 MMOL/L (ref 136–145)
SP GR UR STRIP.AUTO: 1.02 (ref 1–1.03)
TRIGL SERPL-MCNC: 70 MG/DL
UROBILINOGEN UR QL STRIP.AUTO: 0.2 E.U./DL
WBC #/AREA URNS AUTO: ABNORMAL /HPF

## 2021-05-24 PROCEDURE — 36415 COLL VENOUS BLD VENIPUNCTURE: CPT

## 2021-05-24 PROCEDURE — 80061 LIPID PANEL: CPT

## 2021-05-24 PROCEDURE — 81001 URINALYSIS AUTO W/SCOPE: CPT

## 2021-05-24 PROCEDURE — 80053 COMPREHEN METABOLIC PANEL: CPT

## 2021-05-24 PROCEDURE — 84153 ASSAY OF PSA TOTAL: CPT

## 2021-05-25 ENCOUNTER — APPOINTMENT (EMERGENCY)
Dept: CT IMAGING | Facility: HOSPITAL | Age: 66
End: 2021-05-25
Payer: COMMERCIAL

## 2021-05-25 ENCOUNTER — HOSPITAL ENCOUNTER (EMERGENCY)
Facility: HOSPITAL | Age: 66
End: 2021-05-25
Attending: EMERGENCY MEDICINE | Admitting: EMERGENCY MEDICINE
Payer: COMMERCIAL

## 2021-05-25 ENCOUNTER — HOSPITAL ENCOUNTER (OUTPATIENT)
Facility: HOSPITAL | Age: 66
Setting detail: OBSERVATION
LOS: 1 days | Discharge: HOME | End: 2021-05-27
Attending: INTERNAL MEDICINE | Admitting: FAMILY MEDICINE
Payer: COMMERCIAL

## 2021-05-25 VITALS
RESPIRATION RATE: 16 BRPM | HEART RATE: 67 BPM | DIASTOLIC BLOOD PRESSURE: 97 MMHG | WEIGHT: 203.26 LBS | OXYGEN SATURATION: 99 % | SYSTOLIC BLOOD PRESSURE: 153 MMHG | BODY MASS INDEX: 30.11 KG/M2 | HEIGHT: 69 IN | TEMPERATURE: 97.6 F

## 2021-05-25 DIAGNOSIS — N13.2 HYDRONEPHROSIS WITH RENAL AND URETERAL CALCULUS OBSTRUCTION: Primary | ICD-10-CM

## 2021-05-25 DIAGNOSIS — N20.1 URETEROLITHIASIS: ICD-10-CM

## 2021-05-25 DIAGNOSIS — N13.9 OBSTRUCTIVE UROPATHY: ICD-10-CM

## 2021-05-25 DIAGNOSIS — N20.1 URETEROLITHIASIS: Primary | ICD-10-CM

## 2021-05-25 DIAGNOSIS — N17.9 AKI (ACUTE KIDNEY INJURY) (HCC): ICD-10-CM

## 2021-05-25 DIAGNOSIS — N20.1 RIGHT URETERAL CALCULUS: ICD-10-CM

## 2021-05-25 LAB
ANION GAP SERPL CALCULATED.3IONS-SCNC: 14 MMOL/L (ref 4–13)
BACTERIA UR QL AUTO: ABNORMAL /HPF
BASOPHILS # BLD AUTO: 0.05 THOUSANDS/ΜL (ref 0–0.1)
BASOPHILS NFR BLD AUTO: 1 % (ref 0–1)
BILIRUB UR QL STRIP: NEGATIVE
BUN SERPL-MCNC: 42 MG/DL (ref 5–25)
CALCIUM SERPL-MCNC: 10 MG/DL (ref 8.3–10.1)
CHLORIDE SERPL-SCNC: 104 MMOL/L (ref 100–108)
CLARITY UR: CLEAR
CO2 SERPL-SCNC: 22 MMOL/L (ref 21–32)
COLOR UR: YELLOW
CREAT SERPL-MCNC: 2.77 MG/DL (ref 0.6–1.3)
EOSINOPHIL # BLD AUTO: 0.11 THOUSAND/ΜL (ref 0–0.61)
EOSINOPHIL NFR BLD AUTO: 1 % (ref 0–6)
ERYTHROCYTE [DISTWIDTH] IN BLOOD BY AUTOMATED COUNT: 12.8 % (ref 11.6–15.1)
GFR SERPL CREATININE-BSD FRML MDRD: 23 ML/MIN/1.73SQ M
GLUCOSE SERPL-MCNC: 106 MG/DL (ref 65–140)
GLUCOSE UR STRIP-MCNC: NEGATIVE MG/DL
HCT VFR BLD AUTO: 43.6 % (ref 36.5–49.3)
HGB BLD-MCNC: 14.3 G/DL (ref 12–17)
HGB UR QL STRIP.AUTO: NEGATIVE
IMM GRANULOCYTES # BLD AUTO: 0.05 THOUSAND/UL (ref 0–0.2)
IMM GRANULOCYTES NFR BLD AUTO: 1 % (ref 0–2)
KETONES UR STRIP-MCNC: NEGATIVE MG/DL
LEUKOCYTE ESTERASE UR QL STRIP: NEGATIVE
LYMPHOCYTES # BLD AUTO: 1.18 THOUSANDS/ΜL (ref 0.6–4.47)
LYMPHOCYTES NFR BLD AUTO: 12 % (ref 14–44)
MCH RBC QN AUTO: 29.2 PG (ref 26.8–34.3)
MCHC RBC AUTO-ENTMCNC: 32.8 G/DL (ref 31.4–37.4)
MCV RBC AUTO: 89 FL (ref 82–98)
MONOCYTES # BLD AUTO: 0.43 THOUSAND/ΜL (ref 0.17–1.22)
MONOCYTES NFR BLD AUTO: 5 % (ref 4–12)
MUCOUS THREADS UR QL AUTO: ABNORMAL
NEUTROPHILS # BLD AUTO: 7.76 THOUSANDS/ΜL (ref 1.85–7.62)
NEUTS SEG NFR BLD AUTO: 80 % (ref 43–75)
NITRITE UR QL STRIP: NEGATIVE
NON-SQ EPI CELLS URNS QL MICRO: ABNORMAL /HPF
NRBC BLD AUTO-RTO: 0 /100 WBCS
PH UR STRIP.AUTO: 5.5 [PH]
PLATELET # BLD AUTO: 315 THOUSANDS/UL (ref 149–390)
PMV BLD AUTO: 8.8 FL (ref 8.9–12.7)
POTASSIUM SERPL-SCNC: 4.4 MMOL/L (ref 3.5–5.3)
PROT UR STRIP-MCNC: ABNORMAL MG/DL
RBC # BLD AUTO: 4.9 MILLION/UL (ref 3.88–5.62)
RBC #/AREA URNS AUTO: ABNORMAL /HPF
SODIUM SERPL-SCNC: 140 MMOL/L (ref 136–145)
SP GR UR STRIP.AUTO: 1.02 (ref 1–1.03)
UROBILINOGEN UR QL STRIP.AUTO: 0.2 E.U./DL
WBC # BLD AUTO: 9.58 THOUSAND/UL (ref 4.31–10.16)
WBC #/AREA URNS AUTO: ABNORMAL /HPF

## 2021-05-25 PROCEDURE — 99220 PR INITIAL OBSERVATION CARE/DAY 70 MINUTES: CPT | Performed by: FAMILY MEDICINE

## 2021-05-25 PROCEDURE — 99285 EMERGENCY DEPT VISIT HI MDM: CPT

## 2021-05-25 PROCEDURE — 81001 URINALYSIS AUTO W/SCOPE: CPT | Performed by: PHYSICIAN ASSISTANT

## 2021-05-25 PROCEDURE — 99291 CRITICAL CARE FIRST HOUR: CPT | Performed by: PHYSICIAN ASSISTANT

## 2021-05-25 PROCEDURE — 74176 CT ABD & PELVIS W/O CONTRAST: CPT

## 2021-05-25 PROCEDURE — 36415 COLL VENOUS BLD VENIPUNCTURE: CPT | Performed by: PHYSICIAN ASSISTANT

## 2021-05-25 PROCEDURE — 80048 BASIC METABOLIC PNL TOTAL CA: CPT | Performed by: PHYSICIAN ASSISTANT

## 2021-05-25 PROCEDURE — 85025 COMPLETE CBC W/AUTO DIFF WBC: CPT | Performed by: PHYSICIAN ASSISTANT

## 2021-05-25 PROCEDURE — 96360 HYDRATION IV INFUSION INIT: CPT

## 2021-05-25 PROCEDURE — G0379 DIRECT REFER HOSPITAL OBSERV: HCPCS

## 2021-05-25 PROCEDURE — G1004 CDSM NDSC: HCPCS

## 2021-05-25 RX ORDER — HYDROMORPHONE HCL/PF 1 MG/ML
0.5 SYRINGE (ML) INJECTION EVERY 6 HOURS PRN
Status: DISCONTINUED | OUTPATIENT
Start: 2021-05-25 | End: 2021-05-26

## 2021-05-25 RX ORDER — SODIUM CHLORIDE 9 MG/ML
75 INJECTION, SOLUTION INTRAVENOUS CONTINUOUS
Status: DISCONTINUED | OUTPATIENT
Start: 2021-05-26 | End: 2021-05-27 | Stop reason: HOSPADM

## 2021-05-25 RX ORDER — ONDANSETRON 2 MG/ML
4 INJECTION INTRAMUSCULAR; INTRAVENOUS EVERY 6 HOURS PRN
Status: DISCONTINUED | OUTPATIENT
Start: 2021-05-25 | End: 2021-05-27 | Stop reason: HOSPADM

## 2021-05-25 RX ORDER — ACETAMINOPHEN 325 MG/1
650 TABLET ORAL EVERY 6 HOURS PRN
Status: DISCONTINUED | OUTPATIENT
Start: 2021-05-25 | End: 2021-05-27 | Stop reason: HOSPADM

## 2021-05-25 RX ADMIN — SODIUM CHLORIDE 1000 ML: 0.9 INJECTION, SOLUTION INTRAVENOUS at 11:46

## 2021-05-25 RX ADMIN — SODIUM CHLORIDE 75 ML/HR: 0.9 INJECTION, SOLUTION INTRAVENOUS at 23:40

## 2021-05-25 RX ADMIN — ACETAMINOPHEN 650 MG: 325 TABLET, FILM COATED ORAL at 23:38

## 2021-05-25 NOTE — PLAN OF CARE
Problem: Potential for Falls  Goal: Patient will remain free of falls  Description: INTERVENTIONS:  - Assess patient frequently for physical needs  -  Identify cognitive and physical deficits and behaviors that affect risk of falls  -  [place] fall precautions as indicated by assessment   - Educate patient/family on patient safety including physical limitations  - Instruct patient to call for assistance with activity based on assessment  - Modify environment to reduce risk of injury  - Consider OT/PT consult to assist with strengthening/mobility  Outcome: Progressing     Problem: PAIN - ADULT  Goal: Verbalizes/displays adequate comfort level or baseline comfort level  Description: Interventions:  - Encourage patient to monitor pain and request assistance  - Assess pain using appropriate pain scale  - Administer analgesics based on type and severity of pain and evaluate response  - Implement non-pharmacological measures as appropriate and evaluate response  - Consider cultural and social influences on pain and pain management  - Notify physician/advanced practitioner if interventions unsuccessful or patient reports new pain  Outcome: Progressing     Problem: INFECTION - ADULT  Goal: Absence or prevention of progression during hospitalization  Description: INTERVENTIONS:  - Assess and monitor for signs and symptoms of infection  - Monitor lab/diagnostic results  - Monitor all insertion sites, i e  indwelling lines  Problem: SAFETY ADULT  Goal: Patient will remain free of falls  Description: INTERVENTIONS:  - Assess patient frequently for physical needs  -  Identify cognitive and physical deficits and behaviors that affect risk of falls    -  [place] fall precautions as indicated by assessment   - Educate patient/family on patient safety including physical limitations  - Instruct patient to call for assistance with activity based on assessment  - Modify environment to reduce risk of injury  - Consider OT/PT consult to assist with strengthening/mobility  Outcome: Progressing  Goal: Maintain or return to baseline ADL function  Description: INTERVENTIONS:  -  Assess patient's ability to carry out ADLs; assess patient's baseline for ADL function and identify physical deficits which impact ability to perform ADLs (bathing, care of mouth/teeth, toileting, grooming, dressing, etc )  - Assess/evaluate cause of self-care deficits   - Assess range of motion  - Assess patient's mobility; develop plan if impaired  - Assess patient's need for assistive devices and provide as appropriate  - Encourage maximum independence but intervene and supervise when necessary  - Involve family in performance of ADLs  - Assess for home care needs following discharge   - Consider OT consult to assist with ADL evaluation and planning for discharge  - Provide patient education as appropriate  Outcome: Progressing  Goal: Maintain or return mobility status to optimal level  Description: INTERVENTIONS:  - Assess patient's baseline mobility status (ambulation, transfers, stairs, etc )    - Identify cognitive and physical deficits and behaviors that affect mobility  - Identify mobility aids required to assist with transfers and/or ambulation (gait belt, sit-to-stand, lift, walker, cane, etc )  - [place] fall precautions as indicated by assessment  - Record patient progress and toleration of activity level on Mobility SBAR; progress patient to next Phase/Stage  - Instruct patient to call for assistance with activity based on assessment  - Consider rehabilitation consult to assist with strengthening/weightbearing, etc   Outcome: Progressing     Problem: DISCHARGE PLANNING  Goal: Discharge to home or other facility with appropriate resources  Description: INTERVENTIONS:  - Identify barriers to discharge w/patient and caregiver  - Arrange for needed discharge resources and transportation as appropriate  - Identify discharge learning needs (meds, wound care, etc )  Problem: Knowledge Deficit  Goal: Patient/family/caregiver demonstrates understanding of disease process, treatment plan, medications, and discharge instructions  Description: Complete learning assessment and assess knowledge base    Interventions:  - Provide teaching at level of understanding  - Provide teaching via preferred learning methods  Outcome: Progressing     - Refer to Case Management Department for coordinating discharge planning if the patient needs post-hospital services based on physician/advanced practitioner order or complex needs related to functional status, cognitive ability, or social support system  Outcome: Progressing     - Monitor endotracheal if appropriate and nasal secretions for changes in amount and color  - [place] appropriate cooling/warming therapies per order  - Administer medications as ordered  - Instruct and encourage patient and family to use good hand hygiene technique  - Identify and instruct in appropriate isolation precautions for identified infection/condition  Outcome: Progressing  Goal: Absence of fever/infection during neutropenic period  Description: INTERVENTIONS:  - Monitor WBC    Outcome: Progressing

## 2021-05-25 NOTE — EMTALA/ACUTE CARE TRANSFER
[facility] EMERGENCY DEPARTMENT  [address] [facility] [address]  Dept: [phone]      EMTALA TRANSFER CONSENT    NAME Daniel Orellana                                         1955                              MRN [id number]    I have been informed of my rights regarding examination, treatment, and transfer   by Dr Ashley Santiago DO    Benefits: Specialized equipment and/or services available at the receiving facility (Include comment)________________________(urology)    Risks: Potential for delay in receiving treatment, Potential deterioration of medical condition, Loss of IV, Increased discomfort during transfer, Possible worsening of condition or death during transfer      Consent for Transfer:  I acknowledge that my medical condition has been evaluated and explained to me by the emergency department physician or other qualified medical person and/or my attending physician, who has recommended that I be transferred to the service of  Accepting Physician: Dr Poly Lackey at [address] Name, [address] : [facility]  The above potential benefits of such transfer, the potential risks associated with such transfer, and the probable risks of not being transferred have been explained to me, and I fully understand them  The doctor has explained that, in my case, the benefits of transfer outweigh the risks  I agree to be transferred  I authorize the performance of emergency medical procedures and treatments upon me in both transit and upon arrival at the receiving facility  Additionally, I authorize the release of any and all medical records to the receiving facility and request they be transported with me, if possible  I understand that the safest mode of transportation during a medical emergency is an ambulance and that the Hospital advocates the use of this mode of transport   Risks of traveling to the receiving facility by car, including absence of medical control, life sustaining equipment, such as oxygen, and medical personnel has been explained to me and I fully understand them  (KATHERINE CORRECT BOX BELOW)  [ x ]  I consent to the stated transfer and to be transported by ambulance/helicopter  [  ]  I consent to the stated transfer, but refuse transportation by ambulance and accept full responsibility for my transportation by car  I understand the risks of non-ambulance transfers and I exonerate the Hospital and its staff from any deterioration in my condition that results from this refusal     X___________________________________________    DATE  21  TIME________  Signature of patient or legally responsible individual signing on patient behalf           RELATIONSHIP TO PATIENT_________________________          Provider Certification    NAME Flower MUNGUIA 1955                              MRN [id number]    A medical screening exam was performed on the above named patient  Based on the examination:    Condition Necessitating Transfer The primary encounter diagnosis was Ureterolithiasis  Diagnoses of Right ureteral calculus, Obstructive uropathy, and MAKENZIE (acute kidney injury) ([facility] [address] ) were also pertinent to this visit      Patient Condition: The patient has been stabilized such that within reasonable medical probability, no material deterioration of the patient condition or the condition of the unborn child(lucy) is likely to result from the transfer    Reason for Transfer: Level of Care needed not available at this facility    Transfer Requirements: [facility]   · Space available and qualified personnel available for treatment as acknowledged by PACS  · Agreed to accept transfer and to provide appropriate medical treatment as acknowledged by       Dr Khurram Ellsworth  · Appropriate medical records of the examination and treatment of the patient are provided at the time of transfer   [address] [facility] COMPLETED _______  · Transfer will be performed by qualified personnel from    and appropriate transfer equipment as required, including the use of necessary and appropriate life support measures  Provider Certification: I have examined the patient and explained the following risks and benefits of being transferred/refusing transfer to the patient/family:  General risk, such as traffic hazards, adverse weather conditions, rough terrain or turbulence, possible failure of equipment (including vehicle or aircraft), or consequences of actions of persons outside the control of the transport personnel, Unanticipated needs of medical equipment and personnel during transport, Risk of worsening condition, The possibility of a transport vehicle being unavailable      Based on these reasonable risks and benefits to the patient and/or the unborn child(lucy), and based upon the information available at the time of the patients examination, I certify that the medical benefits reasonably to be expected from the provision of appropriate medical treatments at another medical facility outweigh the increasing risks, if any, to the individuals medical condition, and in the case of labor to the unborn child, from effecting the transfer      X____________________________________________ DATE 05/25/21        TIME_______      ORIGINAL - SEND TO MEDICAL RECORDS   COPY - SEND WITH PATIENT DURING TRANSFER

## 2021-05-25 NOTE — ED PROVIDER NOTES
History  Chief Complaint   Patient presents with    Abnormal Lab     patient reports that his urinalysis from last week came back yesterday that he had elevated kidney function  patient has no complaints     76year old male with PMH GERD, arthritis, kidney stones, HLD presents at recommends of PCP for evaluation due to abnormal outpatient labs  He reports he was told his kidney function numbers were up  He indicates that he really hasn't any complaints and hasn't felt bad  He reports last week he had some bilateral flank pain which resolved  He also notes one morning he had dark urine but that has also improved  Denies any pain at present  Denies N/V/D/C, abdominal pain  Denies fever, chills, cough, congestion or recent illness  Denies chest pain, SOB  He reports labs were performed as fasting outpatient follow up labs  He denies prior kidney problems but does not h/o kidney stones  He feels he has been urinating normally  He is up at night to pee more frequently  He does not feel urine has been reduced  Denies dysuria, hematuria, urgency  Current meds include zocor and has been taking naproxen for joint pain  History provided by:  Patient and medical records   used: No        Prior to Admission Medications   Prescriptions Last Dose Informant Patient Reported?  Taking?   naproxen (NAPROSYN) 500 mg tablet   No No   Si twice daily as needed for pain   sildenafil (Viagra) 50 MG tablet   No No   Sig: Take 1 tablet (50 mg total) by mouth as needed for erectile dysfunction   simvastatin (ZOCOR) 20 mg tablet   No No   Sig: TAKE 1 TABLET DAILY      Facility-Administered Medications: None       Past Medical History:   Diagnosis Date    Acid reflux     Arthritis     Cancer ([facility] [address] )     skin    Colon polyp     Essential hypertriglyceridemia     last assessed: 2013    H/O renal calculi     History of hypogonadism     last assessed: 12/15/2014    Hyperlipidemia     Kidney stone     Nasal lesion     left side    Neck mass     Tinnitus     Wears contact lenses        Past Surgical History:   Procedure Laterality Date    COLONOSCOPY      polyp repeat in 5 yrs    ESOPHAGOGASTRODUODENOSCOPY      gastritis and hiatal hernia    FLAP LOCAL HEAD / NECK Left 8/7/2020    Procedure: EXCISION OF NECK SCC W/LOCAL FLAP;  Surgeon: Emmy Yip MD;  Location: [facility] MAIN OR;  Service: Plastics    MASS EXCISION Left 5/9/2016    Procedure: EXCISION BASAL CELL SKIN CANCER LEFT NOSE, FROZEN SECTION, LOCAL FLAP;  Surgeon: Emmy Yip MD;  Location: [facility] Main OR;  Service:     MOHS SURGERY      mohs mirographic surgery nose    SD ADJ TISS XFER HEAD,FAC,HAND <10 SQCM N/A 12/7/2018    Procedure: EXCISION BCC OF NECK W/LOCAL FLAP, FROZEN SECTION;  Surgeon: Emmy Yip MD;  Location: [facility] MAIN OR;  Service: Plastics    SD FULL THICK 2011 [facility] NOS,EAR,LID <20 SQCM N/A 5/9/2016    Procedure:  LOCAL FLAP SKIN GRAFT ;  Surgeon: Emmy Yip MD;  Location: [facility];  Service: Plastics    VASECTOMY      WISDOM TOOTH EXTRACTION         Family History   Problem Relation Age of Onset    Diabetes Mother         mellitus    Dementia Mother     Other Father     No Known Problems Half-Sister      I have reviewed and agree with the history as documented  E-Cigarette/Vaping    E-Cigarette Use Never User      E-Cigarette/Vaping Substances    Nicotine No     THC No     CBD No     Flavoring No      Social History     Tobacco Use    Smoking status: Never Smoker    Smokeless tobacco: Never Used    Tobacco comment: nonsmoker   Substance Use Topics    Alcohol use: Yes     Comment: few x yea    Drug use: No       Review of Systems   Constitutional: Negative  Negative for activity change, appetite change, chills, fatigue and fever  HENT: Negative  Negative for congestion, rhinorrhea and sore throat  Eyes: Negative  Negative for visual disturbance  Respiratory: Negative    Negative for cough, shortness of breath and wheezing  Cardiovascular: Negative  Negative for chest pain, palpitations and leg swelling  Gastrointestinal: Negative  Negative for abdominal pain, constipation, diarrhea, nausea and vomiting  Genitourinary: Positive for flank pain and frequency (at night)  Negative for dysuria and hematuria  +nocturia   Musculoskeletal: Positive for arthralgias  Negative for back pain and neck pain  Skin: Negative  Negative for rash  Neurological: Negative  Negative for dizziness, light-headedness, numbness and headaches  Psychiatric/Behavioral: Negative  Negative for confusion  All other systems reviewed and are negative  Physical Exam  Physical Exam  Vitals signs and nursing note reviewed  Constitutional:       General: He is not in acute distress  Appearance: Normal appearance  He is well-developed  He is not toxic-appearing  HENT:      Head: Normocephalic and atraumatic  Right Ear: Hearing and external ear normal       Left Ear: Hearing and external ear normal       Mouth/Throat:      Mouth: Mucous membranes are moist       Pharynx: Oropharynx is clear  Uvula midline  No oropharyngeal exudate  Eyes:      General: No scleral icterus  Conjunctiva/sclera: Conjunctivae normal       Pupils: Pupils are equal, round, and reactive to light  Neck:      Musculoskeletal: Normal range of motion and neck supple  Trachea: Trachea and phonation normal  No tracheal deviation  Cardiovascular:      Rate and Rhythm: Normal rate and regular rhythm  Pulses: Normal pulses  Heart sounds: Normal heart sounds, S1 normal and S2 normal  No murmur  Pulmonary:      Effort: Pulmonary effort is normal  No tachypnea or respiratory distress  Breath sounds: Normal breath sounds  No wheezing, rhonchi or rales  Abdominal:      General: Bowel sounds are normal  There is no distension  Palpations: Abdomen is soft  Tenderness: There is no abdominal tenderness   There is no right CVA tenderness, left CVA tenderness, guarding or rebound  Musculoskeletal: Normal range of motion  General: No tenderness  Right lower leg: No edema  Left lower leg: No edema  Skin:     General: Skin is warm and dry  Capillary Refill: Capillary refill takes less than 2 seconds  Findings: No rash  Neurological:      General: No focal deficit present  Mental Status: He is alert and oriented to person, place, and time  GCS: GCS eye subscore is 4  GCS verbal subscore is 5  GCS motor subscore is 6  Cranial Nerves: No cranial nerve deficit  Sensory: No sensory deficit  Motor: No abnormal muscle tone        Gait: Gait normal    Psychiatric:         Mood and Affect: Mood normal          Speech: Speech normal          Behavior: Behavior normal          Vital Signs  ED Triage Vitals [05/25/21 1130]   Temperature Pulse Respirations Blood Pressure SpO2   97 6 °F (36 4 °C) 76 16 150/90 99 %      Temp Source Heart Rate Source Patient Position - Orthostatic VS BP Location FiO2 (%)   Temporal Monitor Sitting Right arm --      Pain Score       --           Vitals:    05/25/21 1130 05/25/21 1430 05/25/21 1500 05/25/21 1601   BP: 150/90 142/91 142/93 153/97   Pulse: 76 67 66 67   Patient Position - Orthostatic VS: Sitting Lying Lying Sitting         Visual Acuity      ED Medications  Medications   sodium chloride 0 9 % bolus 1,000 mL (0 mL Intravenous Stopped 5/25/21 1246)       Diagnostic Studies  Results Reviewed     Procedure Component Value Units Date/Time    Urine Microscopic [[id number]]  (Abnormal) Collected: 05/25/21 1144    Lab Status: Final result Specimen: Urine, Clean Catch Updated: 05/25/21 1221     RBC, UA None Seen /hpf      WBC, UA 2-4 /hpf      Epithelial Cells Occasional /hpf      Bacteria, UA Occasional /hpf      MUCUS THREADS Occasional    Basic metabolic panel [[id number]]  (Abnormal) Collected: 05/25/21 1144    Lab Status: Final result Specimen: Blood from Arm, Right Updated: 05/25/21 1212     Sodium 140 mmol/L      Potassium 4 4 mmol/L      Chloride 104 mmol/L      CO2 22 mmol/L      ANION GAP 14 mmol/L      BUN 42 mg/dL      Creatinine 2 77 mg/dL      Glucose 106 mg/dL      Calcium 10 0 mg/dL      eGFR 23 ml/min/1 73sq m     Narrative:      [place] guidelines for Chronic Kidney Disease (CKD):     Stage 1 with normal or high GFR (GFR > 90 mL/min/1 73 square meters)    Stage 2 Mild CKD (GFR = 60-89 mL/min/1 73 square meters)    Stage 3A Moderate CKD (GFR = 45-59 mL/min/1 73 square meters)    Stage 3B Moderate CKD (GFR = 30-44 mL/min/1 73 square meters)    Stage 4 Severe CKD (GFR = 15-29 mL/min/1 73 square meters)    Stage 5 End Stage CKD (GFR <15 mL/min/1 73 square meters)  Note: GFR calculation is accurate only with a steady state creatinine    UA (URINE) with reflex to Scope [[id number]]  (Abnormal) Collected: 05/25/21 1144    Lab Status: Final result Specimen: Urine, Clean Catch Updated: 05/25/21 1205     Color, UA Yellow     Clarity, UA Clear     Specific [place], UA 1 020     pH, UA 5 5     Leukocytes, UA Negative     Nitrite, UA Negative     Protein, UA Trace mg/dl      Glucose, UA Negative mg/dl      Ketones, UA Negative mg/dl      Urobilinogen, UA 0 2 E U /dl      Bilirubin, UA Negative     Blood, UA Negative    CBC and differential [[id number]]  (Abnormal) Collected: 05/25/21 1144    Lab Status: Final result Specimen: Blood from Arm, Right Updated: 05/25/21 1203     WBC 9 58 Thousand/uL      RBC 4 90 Million/uL      Hemoglobin 14 3 g/dL      Hematocrit 43 6 %      MCV 89 fL      MCH 29 2 pg      MCHC 32 8 g/dL      RDW 12 8 %      MPV 8 8 fL      Platelets 096 Thousands/uL      nRBC 0 /100 WBCs      Neutrophils Relative 80 %      Immat GRANS % 1 %      Lymphocytes Relative 12 %      Monocytes Relative 5 %      Eosinophils Relative 1 %      Basophils Relative 1 %      Neutrophils Absolute 7 76 Thousands/µL      Immature Grans Absolute 0 05 Thousand/uL      Lymphocytes Absolute 1 18 Thousands/µL      Monocytes Absolute 0 43 Thousand/µL      Eosinophils Absolute 0 11 Thousand/µL      Basophils Absolute 0 05 Thousands/µL                  CT renal stone study abdomen pelvis without contrast   Final Result by Radha Ward DO (05/25 1223)      7 mm calculus within the mid ureter with moderately severe resulting hydronephrosis and proximal hydroureter  Additional nonobstructing calculi are seen within the kidneys bilaterally  Workstation performed: [id number]                    Procedures  CriticalCare Time  Performed by: Dannial Dandy, PA-C  Authorized by: Dannial Dandy, PA-C     Critical care provider statement:     Critical care time (minutes):  30    Critical care time was exclusive of:  Separately billable procedures and treating other patients    Critical care was necessary to treat or prevent imminent or life-threatening deterioration of the following conditions:  Renal failure    Critical care was time spent personally by me on the following activities:  Obtaining history from patient or surrogate, development of treatment plan with patient or surrogate, discussions with consultants, evaluation of patient's response to treatment, examination of patient, ordering and performing treatments and interventions, ordering and review of laboratory studies, ordering and review of radiographic studies, re-evaluation of patient's condition and review of old charts    I assumed direction of critical care for this patient from another provider in my specialty: no               ED Course  ED Course as of May 25 1612   Tue May 25, 2021   1127 Reviewed outpatient labs from yesterday  BUN 48, Cr 2 79 yesterday  Cr has increased from 1 32 on 11/24/20       1200 CT performed and pending interpretation        1203 Bladder scan = 0 cc       1204 WBC: 9 58   1204 Hemoglobin: 14 3   1204 Platelet Count: 773   1205 UA otherwise negative   POCT URINE PROTEIN(!): Trace   1213 Glucose, Random: 106   1213 Similar to yesterday's values; will initiate IV hydration  Creatinine(!): 2 77   1213 BUN(!): 42   1213 Sodium: 140   1213 Potassium: 4 4   1213 Chloride: 104   1213 CO2: 22   1213 Anion Gap(!): 14   1213 Calcium: 10 0   1213 eGFR: 23   1221 RBC, UA: None Seen   1222 WBC, UA: 2-4   1222 Epithelial Cells: Occasional   1233 IMPRESSION:     7 mm calculus within the mid ureter with moderately severe resulting hydronephrosis and proximal hydroureter      Additional nonobstructing calculi are seen within the kidneys bilaterally  CT renal stone study abdomen pelvis without contrast   1235 Pt updated on results  He is aware that kidney stone needs to be addressed and causing his elevated renal function  In addition he has been taking NSAIDs (naproxen) which he was also told can worsen his kidney function  We did discuss that since we don't have urology in house to address his kidney stone, this would likely require transfer to tertiary center  He would be agreeable to admission/transfer as indicated  [address] text sent to on call urology MILLICENT Farias  1251 Per on call urology, recommends transfer to [facility] and admit to [facility]  Plans for OR tomorrow with Dr Geo Hopkins for cystoscopy, ureteroscopy with lithotripsy holmium laser and stent insertion  65 Pt is agreeable to admission/treatment plan  PACS request placed to speak to [facility] with plans to transfer to [facility] for urology evaluation and management  [address] to Dr Seamus Fowler of [facility] at [facility] and accepts in transfer  1336 Pt updated and has no further questions at this time  He is resting comfortably and declines anything for pain  He is going to notify his wife regarding transfer and admission  1446 Notified that  time scheduled for 3:30 pm       1602 Pt left via EMS in stable condition for transport to [facility]          Discussed with pt that NSAIDs would be held due to his kidney function  He declined anything for pain while here  Pt left in stable, condition  Pt was transferred for urological intervention to address obstructing kidney stone causing acute renal failure  Plan for OR tomorrow per urology  MDM  Number of Diagnoses or Management Options  MAKENZIE (acute kidney injury) [facility]): new and requires workup  Obstructive uropathy: new and requires workup  Right ureteral calculus: new and requires workup     Amount and/or Complexity of Data Reviewed  Clinical lab tests: ordered and reviewed  Tests in the radiology section of CPT®: ordered and reviewed  Decide to obtain previous medical records or to obtain history from someone other than the patient: yes  Obtain history from someone other than the patient: yes  Review and summarize past medical records: yes  Discuss the patient with other providers: yes (Attending, urology, [facility])  Independent visualization of images, tracings, or specimens: yes    Patient Progress  Patient progress: stable      Disposition  Final diagnoses:   Right ureteral calculus   Obstructive uropathy   MAKENZIE (acute kidney injury) ([facility] [address] )     Time reflects when diagnosis was documented in both MDM as applicable and the Disposition within this note     Time User Action Codes Description Comment    5/25/2021 12:52 PM Edith Upton Add [N20 1] Ureterolithiasis     5/25/2021  1:00 PM Judeen Cohen Add [N20 1] Right ureteral calculus     5/25/2021  1:01 PM Judeen Cohen Add [N13 9] Obstructive uropathy     5/25/2021  1:01 PM Judeen Cohen Add [N17 9] MAKENZIE (acute kidney injury) [facility])       ED Disposition     ED Disposition Condition Date/Time Comment    Transfer to Another [address] May 25, 2021  1:00 PM Heydi Heart should be transferred out to [facility]          MD Documentation      Most Recent Value   Patient Condition  The patient has been stabilized such that within reasonable medical probability, no material deterioration of the patient condition or the condition of the unborn child(lucy) is likely to result from the transfer   Reason for Transfer  Level of Care needed not available at this facility   Benefits of Transfer  Specialized equipment and/or services available at the receiving facility (Include comment)________________________ [urology]   Risks of Transfer  Potential for delay in receiving treatment, Potential deterioration of medical condition, Loss of IV, Increased discomfort during transfer, Possible worsening of condition or death during transfer   Accepting Physician  Dr Malissa Franco Name, [address]   [facility]    (Name & Tel number)  PACS   Sending MD Dr Matthew Barry   Provider Certification  General risk, such as traffic hazards, adverse weather conditions, rough terrain or turbulence, possible failure of equipment (including vehicle or aircraft), or consequences of actions of persons outside the control of the transport personnel, Unanticipated needs of medical equipment and personnel during transport, Risk of worsening condition, The possibility of a transport vehicle being unavailable      RN Documentation      Most [address] [place] [address] Name, Sameer Spencer 38 Assignment  207    (Name & Tel number)  PACS   Report Given to  [address], RN      Follow-up Information    None         Discharge Medication List as of 5/25/2021  4:02 PM      CONTINUE these medications which have NOT CHANGED    Details   naproxen (NAPROSYN) 500 mg tablet 1 twice daily as needed for pain, Normal      sildenafil (Viagra) 50 MG tablet Take 1 tablet (50 mg total) by mouth as needed for erectile dysfunction, Starting Wed 10/28/2020, Normal      simvastatin (ZOCOR) 20 mg tablet TAKE 1 TABLET DAILY, Normal           No discharge procedures on file      PDMP Review     None          ED Provider  Electronically Signed by           Alexis Gu Ulysses Moore PA-C  05/25/21 5200

## 2021-05-25 NOTE — ASSESSMENT & PLAN NOTE
[facility] to abnormal outpatient lab  Creatinine was elevated from baseline  In ER, CT scan shows: 7 mm calculus within the mid ureter with moderately severe resulting hydronephrosis and proximal hydroureter    Transfer to [place] for Urology  Place patient NPO midnight  Gentle hydration while NPO  Patient scheduled for cystoscopy, lithotripsy and ureteral stent placement in the morning  Urinalysis is negative for infection, therefore hold antibiotic  Pain control with acetaminophen and dilaudid

## 2021-05-25 NOTE — H&P
[address] [facility] 1955, 76 y o  male MRN: [id number]  Unit/Bed#: -01 Encounter: [id number]  Primary Care Provider: Hoda Roy DO   Date and time admitted to hospital: 5/25/2021  5:24 PM    * Hydronephrosis with renal and ureteral calculus obstruction (Right)  Assessment & Plan  Patient presented to [facility] ED due to abnormal outpatient lab  Creatinine was elevated from baseline  In ER, CT scan shows: 7 mm calculus within the mid ureter with moderately severe resulting hydronephrosis and proximal hydroureter  Transfer to [facility]  Place patient NPO midnight  Gentle hydration while NPO  Patient scheduled for cystoscopy, lithotripsy and ureteral stent placement in the morning  Urinalysis is negative for infection, therefore hold antibiotic  Pain control with acetaminophen and dilaudid    MAKENZIE (acute kidney injury) ([facility] [address] )  Assessment & Plan  Creatinine at baseline seems to be 1 32  Creatinine today is 2 77 due to obstructive hydronephrosis secondary to stone  Plan for cystoscopy for tomorrow  Avoid NSAIDs, avoid hypotension  Trend renal indices    Class 1 obesity due to excess calories without serious comorbidity with body mass index (BMI) of 30 0 to 30 9 in adult  Assessment & Plan  Counseling provided for last style modification    Mixed hyperlipidemia  Assessment & Plan  Hold statin while NPO    VTE Prophylaxis: Pharmacologic VTE Prophylaxis contraindicated due to Urological procedure planned for the morning  / sequential compression device   Code Status:  Full code    Discussion with family:  None    Anticipated Length of Stay:  Patient will be admitted on an Observation basis with an anticipated length of stay of  < 2 midnights  Justification for Hospital Stay:  Hydronephrosis secondary to renal stone    Total Time for Visit, including Counseling / Coordination of Care: 60 minutes    Greater than 50% of this total time spent on direct patient counseling and coordination of care  Chief Complaint:   Abnormal outpatient lab    History of Present Illness:    Annette Hernanedz is a 76 y o  male with history of GERD, hyperlipidemia presented to my nurse ER due to abnormal creatinine level in outpatient lab  Today patient denies any flank pain, nausea, vomiting, dysuria or any other complaints  He experienced bilateral flank pain last week that has resolved  He also noted dark urine this morning which also resolved  Denies nausea, vomiting, fever, abdominal pain, or any other complaints  In ER, CT scan revealed right-sided hydronephrosis due to renal and ureteral obstructive stone  Urology has been consulted who recommended transfer to [facility] for cystoscopy  Review of Systems:    Review of Systems   Constitutional: Negative for activity change and appetite change  HENT: Negative for congestion and sore throat  Eyes: Negative for pain and visual disturbance  Respiratory: Negative for cough and shortness of breath  Cardiovascular: Negative for chest pain and leg swelling  Gastrointestinal: Negative for abdominal distention and abdominal pain  Endocrine: Negative for polyuria  Genitourinary: Negative for difficulty urinating  Musculoskeletal: Negative for arthralgias and joint swelling  Skin: Negative for wound  Allergic/Immunologic: Negative for food allergies  Neurological: Negative for light-headedness and headaches  Hematological: Negative for adenopathy  Psychiatric/Behavioral: Negative for sleep disturbance         Past Medical and Surgical History:     Past Medical History:   Diagnosis Date    Acid reflux     Arthritis     Cancer ([address] )     skin    Colon polyp     Essential hypertriglyceridemia     last assessed: 5/23/2013    H/O renal calculi     History of hypogonadism     last assessed: 12/15/2014    Hyperlipidemia     Kidney stone     Nasal lesion     left side    Neck mass     Tinnitus     Wears contact lenses        Past Surgical History:   Procedure Laterality Date    COLONOSCOPY      polyp repeat in 5 yrs    ESOPHAGOGASTRODUODENOSCOPY      gastritis and hiatal hernia    FLAP LOCAL HEAD / NECK Left 8/7/2020    Procedure: EXCISION OF NECK SCC W/LOCAL FLAP;  Surgeon: Maritza Chavis MD;  Location: [facility] MAIN OR;  Service: Plastics    MASS EXCISION Left 5/9/2016    Procedure: EXCISION BASAL CELL SKIN CANCER LEFT NOSE, FROZEN SECTION, LOCAL FLAP;  Surgeon: Maritza Chavis MD;  Location: [facility] Main OR;  Service:     MOHS SURGERY      mohs mirographic surgery nose    NH ADJ TISS XFER HEAD,FAC,HAND <10 SQCM N/A 12/7/2018    Procedure: EXCISION BCC OF NECK W/LOCAL FLAP, FROZEN SECTION;  Surgeon: Maritza Chavis MD;  Location: [facility] MAIN OR;  Service: Plastics    [address] NOS,EAR,LID <20 SQCM N/A 5/9/2016    Procedure:  LOCAL FLAP SKIN GRAFT ;  Surgeon: Maritza Chavis MD;  Location: [facility];  Service: Plastics    VASECTOMY      WISDOM TOOTH EXTRACTION         Meds/Allergies:    Prior to Admission medications    Medication Sig Start Date End Date Taking? Authorizing Provider   naproxen (NAPROSYN) 500 mg tablet 1 twice daily as needed for pain 5/3/21  Yes Marlane Noss, DO   simvastatin (ZOCOR) 20 mg tablet TAKE 1 TABLET DAILY 4/28/21  Yes Marlane Noss, DO   sildenafil (Viagra) 50 MG tablet Take 1 tablet (50 mg total) by mouth as needed for erectile dysfunction 10/28/20   Marlane Noss, DO     I have reviewed home medications using allscripts      Allergies: No Known Allergies    Social History:     Marital Status: /Civil Union     Substance Use History:   Social History     Substance and Sexual Activity   Alcohol Use Yes    Comment: few x yea     Social History     Tobacco Use   Smoking Status Never Smoker   Smokeless Tobacco Never Used   Tobacco Comment    nonsmoker     Social History     Substance and Sexual Activity   Drug Use No       Family History:    Family History   Problem Relation Age of Onset    Diabetes Mother         mellitus    Dementia Mother     Other Father     No Known Problems Half-Sister        Physical Exam:     Vitals:   Blood Pressure: 158/98 (05/25/21 1731)  Temperature: (!) 97 4 °F (36 3 °C) (05/25/21 1731)  Temp Source: Oral (05/25/21 1731)  Respirations: 16 (05/25/21 1731)  Height: 5' 9" (175 3 cm) (05/25/21 1731)  Weight - Scale: 90 7 kg (200 lb) (05/25/21 1731)    Physical Exam  Vitals signs and nursing note reviewed  Constitutional:       General: He is not in acute distress  Appearance: Normal appearance  He is obese  HENT:      Head: Normocephalic and atraumatic  Right Ear: External ear normal       Left Ear: External ear normal       Nose: Nose normal    Eyes:      Extraocular Movements: Extraocular movements intact  Neck:      Musculoskeletal: Normal range of motion  Cardiovascular:      Rate and Rhythm: Normal rate and regular rhythm  Pulses: Normal pulses  Pulmonary:      Effort: Pulmonary effort is normal       Breath sounds: Normal breath sounds  Abdominal:      General: Bowel sounds are normal    Skin:     General: Skin is warm and dry  Neurological:      Mental Status: He is alert  Mental status is at baseline  Psychiatric:         Mood and Affect: Mood normal          Behavior: Behavior normal            Additional Data:     Lab Results: I have personally reviewed pertinent reports        Results from last 7 days   Lab Units 05/25/21  1144   WBC Thousand/uL 9 58   HEMOGLOBIN g/dL 14 3   HEMATOCRIT % 43 6   PLATELETS Thousands/uL 315   NEUTROS PCT % 80*   LYMPHS PCT % 12*   MONOS PCT % 5   EOS PCT % 1     Results from last 7 days   Lab Units 05/25/21  1144 05/24/21  0727   SODIUM mmol/L 140 141   POTASSIUM mmol/L 4 4 4 9   CHLORIDE mmol/L 104 114*   CO2 mmol/L 22 21   BUN mg/dL 42* 48*   CREATININE mg/dL 2 77* 2 79*   ANION GAP mmol/L 14* 6   CALCIUM mg/dL 10 0 9 6   ALBUMIN g/dL  --  3 4*   TOTAL BILIRUBIN mg/dL  -- 0  45   ALK PHOS U/L  --  74   ALT U/L  --  29   AST U/L  --  13   GLUCOSE RANDOM mg/dL 106  --                        Imaging: I have personally reviewed pertinent reports  No orders to display       EKG, Pathology, and Other Studies Reviewed on Admission:   · EKG:  None available from this    [facility] Records Reviewed: Yes     ** Please Note: This note has been constructed using a voice recognition system   **

## 2021-05-25 NOTE — ASSESSMENT & PLAN NOTE
Creatinine at baseline seems to be 1 32    Creatinine today is 2 77 due to obstructive hydronephrosis secondary to stone  Plan for cystoscopy for tomorrow  Avoid NSAIDs, avoid hypotension  Trend renal indices

## 2021-05-26 ENCOUNTER — ANESTHESIA (OUTPATIENT)
Dept: PERIOP | Facility: HOSPITAL | Age: 66
End: 2021-05-26
Payer: COMMERCIAL

## 2021-05-26 ENCOUNTER — ANESTHESIA EVENT (OUTPATIENT)
Dept: PERIOP | Facility: HOSPITAL | Age: 66
End: 2021-05-26
Payer: COMMERCIAL

## 2021-05-26 ENCOUNTER — APPOINTMENT (OUTPATIENT)
Dept: RADIOLOGY | Facility: HOSPITAL | Age: 66
End: 2021-05-26
Payer: COMMERCIAL

## 2021-05-26 LAB
ANION GAP SERPL CALCULATED.3IONS-SCNC: 5 MMOL/L (ref 4–13)
ATRIAL RATE: 73 BPM
BUN SERPL-MCNC: 36 MG/DL (ref 5–25)
CALCIUM SERPL-MCNC: 9.4 MG/DL (ref 8.3–10.1)
CHLORIDE SERPL-SCNC: 114 MMOL/L (ref 100–108)
CO2 SERPL-SCNC: 21 MMOL/L (ref 21–32)
CREAT SERPL-MCNC: 2.44 MG/DL (ref 0.6–1.3)
ERYTHROCYTE [DISTWIDTH] IN BLOOD BY AUTOMATED COUNT: 12.9 % (ref 11.6–15.1)
GFR SERPL CREATININE-BSD FRML MDRD: 27 ML/MIN/1.73SQ M
GLUCOSE P FAST SERPL-MCNC: 97 MG/DL (ref 65–99)
GLUCOSE SERPL-MCNC: 97 MG/DL (ref 65–140)
HCT VFR BLD AUTO: 40.7 % (ref 36.5–49.3)
HGB BLD-MCNC: 13.2 G/DL (ref 12–17)
MCH RBC QN AUTO: 29.2 PG (ref 26.8–34.3)
MCHC RBC AUTO-ENTMCNC: 32.4 G/DL (ref 31.4–37.4)
MCV RBC AUTO: 90 FL (ref 82–98)
P AXIS: 7 DEGREES
PLATELET # BLD AUTO: 313 THOUSANDS/UL (ref 149–390)
PMV BLD AUTO: 9.3 FL (ref 8.9–12.7)
POTASSIUM SERPL-SCNC: 5.1 MMOL/L (ref 3.5–5.3)
PR INTERVAL: 180 MS
QRS AXIS: -2 DEGREES
QRSD INTERVAL: 80 MS
QT INTERVAL: 376 MS
QTC INTERVAL: 414 MS
RBC # BLD AUTO: 4.52 MILLION/UL (ref 3.88–5.62)
SODIUM SERPL-SCNC: 140 MMOL/L (ref 136–145)
T WAVE AXIS: 2 DEGREES
VENTRICULAR RATE: 73 BPM
WBC # BLD AUTO: 10.43 THOUSAND/UL (ref 4.31–10.16)

## 2021-05-26 PROCEDURE — 82360 CALCULUS ASSAY QUANT: CPT | Performed by: UROLOGY

## 2021-05-26 PROCEDURE — 99225 PR SBSQ OBSERVATION CARE/DAY 25 MINUTES: CPT | Performed by: STUDENT IN AN ORGANIZED HEALTH CARE EDUCATION/TRAINING PROGRAM

## 2021-05-26 PROCEDURE — 52352 CYSTOURETERO W/STONE REMOVE: CPT | Performed by: UROLOGY

## 2021-05-26 PROCEDURE — 93005 ELECTROCARDIOGRAM TRACING: CPT

## 2021-05-26 PROCEDURE — 93010 ELECTROCARDIOGRAM REPORT: CPT | Performed by: INTERNAL MEDICINE

## 2021-05-26 PROCEDURE — C1758 CATHETER, URETERAL: HCPCS | Performed by: UROLOGY

## 2021-05-26 PROCEDURE — C2617 STENT, NON-COR, TEM W/O DEL: HCPCS | Performed by: UROLOGY

## 2021-05-26 PROCEDURE — 74420 UROGRAPHY RTRGR +-KUB: CPT

## 2021-05-26 PROCEDURE — 80048 BASIC METABOLIC PNL TOTAL CA: CPT | Performed by: FAMILY MEDICINE

## 2021-05-26 PROCEDURE — 52332 CYSTOSCOPY AND TREATMENT: CPT | Performed by: UROLOGY

## 2021-05-26 PROCEDURE — 99205 OFFICE O/P NEW HI 60 MIN: CPT | Performed by: UROLOGY

## 2021-05-26 PROCEDURE — 85027 COMPLETE CBC AUTOMATED: CPT | Performed by: FAMILY MEDICINE

## 2021-05-26 PROCEDURE — C1769 GUIDE WIRE: HCPCS | Performed by: UROLOGY

## 2021-05-26 DEVICE — INLAY OPTIMA URETERAL STENT W/O GUIDEWIRE
Type: IMPLANTABLE DEVICE | Site: URETER | Status: FUNCTIONAL
Brand: BARD® INLAY OPTIMA® URETERAL STENT

## 2021-05-26 RX ORDER — CEPHALEXIN 500 MG/1
500 CAPSULE ORAL EVERY 6 HOURS SCHEDULED
Qty: 12 CAPSULE | Refills: 0 | Status: SHIPPED | OUTPATIENT
Start: 2021-05-26 | End: 2021-05-29

## 2021-05-26 RX ORDER — MIDAZOLAM HYDROCHLORIDE 2 MG/2ML
INJECTION, SOLUTION INTRAMUSCULAR; INTRAVENOUS AS NEEDED
Status: DISCONTINUED | OUTPATIENT
Start: 2021-05-26 | End: 2021-05-26

## 2021-05-26 RX ORDER — LIDOCAINE HYDROCHLORIDE 10 MG/ML
INJECTION, SOLUTION EPIDURAL; INFILTRATION; INTRACAUDAL; PERINEURAL AS NEEDED
Status: DISCONTINUED | OUTPATIENT
Start: 2021-05-26 | End: 2021-05-26

## 2021-05-26 RX ORDER — FENTANYL CITRATE/PF 50 MCG/ML
25 SYRINGE (ML) INJECTION
Status: DISCONTINUED | OUTPATIENT
Start: 2021-05-26 | End: 2021-05-26 | Stop reason: HOSPADM

## 2021-05-26 RX ORDER — HYDROCODONE BITARTRATE AND ACETAMINOPHEN 5; 325 MG/1; MG/1
1-2 TABLET ORAL EVERY 4 HOURS PRN
Qty: 20 TABLET | Refills: 0 | Status: SHIPPED | OUTPATIENT
Start: 2021-05-26 | End: 2021-06-05

## 2021-05-26 RX ORDER — HYDROMORPHONE HCL IN WATER/PF 6 MG/30 ML
0.2 PATIENT CONTROLLED ANALGESIA SYRINGE INTRAVENOUS
Status: DISCONTINUED | OUTPATIENT
Start: 2021-05-26 | End: 2021-05-26 | Stop reason: HOSPADM

## 2021-05-26 RX ORDER — CEFAZOLIN SODIUM 2 G/50ML
SOLUTION INTRAVENOUS AS NEEDED
Status: DISCONTINUED | OUTPATIENT
Start: 2021-05-26 | End: 2021-05-26

## 2021-05-26 RX ORDER — ONDANSETRON 2 MG/ML
4 INJECTION INTRAMUSCULAR; INTRAVENOUS ONCE AS NEEDED
Status: DISCONTINUED | OUTPATIENT
Start: 2021-05-26 | End: 2021-05-26 | Stop reason: HOSPADM

## 2021-05-26 RX ORDER — DEXAMETHASONE SODIUM PHOSPHATE 10 MG/ML
INJECTION, SOLUTION INTRAMUSCULAR; INTRAVENOUS AS NEEDED
Status: DISCONTINUED | OUTPATIENT
Start: 2021-05-26 | End: 2021-05-26

## 2021-05-26 RX ORDER — HYDROCODONE BITARTRATE AND ACETAMINOPHEN 5; 325 MG/1; MG/1
1 TABLET ORAL EVERY 6 HOURS PRN
Status: DISCONTINUED | OUTPATIENT
Start: 2021-05-26 | End: 2021-05-27 | Stop reason: HOSPADM

## 2021-05-26 RX ORDER — FENTANYL CITRATE 50 UG/ML
INJECTION, SOLUTION INTRAMUSCULAR; INTRAVENOUS AS NEEDED
Status: DISCONTINUED | OUTPATIENT
Start: 2021-05-26 | End: 2021-05-26

## 2021-05-26 RX ORDER — MAGNESIUM HYDROXIDE 1200 MG/15ML
LIQUID ORAL AS NEEDED
Status: DISCONTINUED | OUTPATIENT
Start: 2021-05-26 | End: 2021-05-26 | Stop reason: HOSPADM

## 2021-05-26 RX ORDER — HEPARIN SODIUM 5000 [USP'U]/ML
5000 INJECTION, SOLUTION INTRAVENOUS; SUBCUTANEOUS EVERY 8 HOURS SCHEDULED
Status: DISCONTINUED | OUTPATIENT
Start: 2021-05-26 | End: 2021-05-27 | Stop reason: HOSPADM

## 2021-05-26 RX ORDER — PHENAZOPYRIDINE HYDROCHLORIDE 100 MG/1
200 TABLET, FILM COATED ORAL ONCE
Status: COMPLETED | OUTPATIENT
Start: 2021-05-26 | End: 2021-05-26

## 2021-05-26 RX ORDER — PROPOFOL 10 MG/ML
INJECTION, EMULSION INTRAVENOUS AS NEEDED
Status: DISCONTINUED | OUTPATIENT
Start: 2021-05-26 | End: 2021-05-26

## 2021-05-26 RX ORDER — ONDANSETRON 2 MG/ML
INJECTION INTRAMUSCULAR; INTRAVENOUS AS NEEDED
Status: DISCONTINUED | OUTPATIENT
Start: 2021-05-26 | End: 2021-05-26

## 2021-05-26 RX ORDER — SODIUM CHLORIDE, SODIUM LACTATE, POTASSIUM CHLORIDE, CALCIUM CHLORIDE 600; 310; 30; 20 MG/100ML; MG/100ML; MG/100ML; MG/100ML
INJECTION, SOLUTION INTRAVENOUS CONTINUOUS PRN
Status: DISCONTINUED | OUTPATIENT
Start: 2021-05-26 | End: 2021-05-26

## 2021-05-26 RX ORDER — CEFAZOLIN SODIUM 2 G/50ML
2000 SOLUTION INTRAVENOUS ONCE
Status: CANCELLED | OUTPATIENT
Start: 2021-05-26 | End: 2021-05-26

## 2021-05-26 RX ORDER — SODIUM CHLORIDE, SODIUM LACTATE, POTASSIUM CHLORIDE, CALCIUM CHLORIDE 600; 310; 30; 20 MG/100ML; MG/100ML; MG/100ML; MG/100ML
125 INJECTION, SOLUTION INTRAVENOUS CONTINUOUS
Status: DISCONTINUED | OUTPATIENT
Start: 2021-05-26 | End: 2021-05-27 | Stop reason: HOSPADM

## 2021-05-26 RX ORDER — PHENAZOPYRIDINE HYDROCHLORIDE 200 MG/1
200 TABLET, FILM COATED ORAL 3 TIMES DAILY PRN
Qty: 30 TABLET | Refills: 0 | Status: SHIPPED | OUTPATIENT
Start: 2021-05-26 | End: 2021-07-23 | Stop reason: ALTCHOICE

## 2021-05-26 RX ADMIN — PHENYLEPHRINE HYDROCHLORIDE 20 MCG/MIN: 10 INJECTION INTRAVENOUS at 12:07

## 2021-05-26 RX ADMIN — FENTANYL CITRATE 50 MCG: 50 INJECTION INTRAMUSCULAR; INTRAVENOUS at 12:12

## 2021-05-26 RX ADMIN — SODIUM CHLORIDE 75 ML/HR: 0.9 INJECTION, SOLUTION INTRAVENOUS at 13:37

## 2021-05-26 RX ADMIN — MIDAZOLAM 2 MG: 1 INJECTION INTRAMUSCULAR; INTRAVENOUS at 11:55

## 2021-05-26 RX ADMIN — PROPOFOL 200 MG: 10 INJECTION, EMULSION INTRAVENOUS at 12:07

## 2021-05-26 RX ADMIN — HYDROCODONE BITARTRATE AND ACETAMINOPHEN 1 TABLET: 5; 325 TABLET ORAL at 22:45

## 2021-05-26 RX ADMIN — SODIUM CHLORIDE, SODIUM LACTATE, POTASSIUM CHLORIDE, AND CALCIUM CHLORIDE 125 ML/HR: .6; .31; .03; .02 INJECTION, SOLUTION INTRAVENOUS at 12:55

## 2021-05-26 RX ADMIN — HEPARIN SODIUM 5000 UNITS: 5000 INJECTION INTRAVENOUS; SUBCUTANEOUS at 22:46

## 2021-05-26 RX ADMIN — LIDOCAINE HYDROCHLORIDE 50 MG: 10 INJECTION, SOLUTION EPIDURAL; INFILTRATION; INTRACAUDAL; PERINEURAL at 12:07

## 2021-05-26 RX ADMIN — CEFAZOLIN SODIUM 2000 MG: 2 SOLUTION INTRAVENOUS at 12:00

## 2021-05-26 RX ADMIN — DEXAMETHASONE SODIUM PHOSPHATE 10 MG: 10 INJECTION, SOLUTION INTRAMUSCULAR; INTRAVENOUS at 12:20

## 2021-05-26 RX ADMIN — FENTANYL CITRATE 50 MCG: 50 INJECTION INTRAMUSCULAR; INTRAVENOUS at 12:19

## 2021-05-26 RX ADMIN — SODIUM CHLORIDE, SODIUM LACTATE, POTASSIUM CHLORIDE, AND CALCIUM CHLORIDE: .6; .31; .03; .02 INJECTION, SOLUTION INTRAVENOUS at 11:44

## 2021-05-26 RX ADMIN — ONDANSETRON 4 MG: 2 INJECTION INTRAMUSCULAR; INTRAVENOUS at 11:55

## 2021-05-26 RX ADMIN — PHENAZOPYRIDINE 200 MG: 100 TABLET ORAL at 13:35

## 2021-05-26 RX ADMIN — ACETAMINOPHEN 650 MG: 325 TABLET, FILM COATED ORAL at 16:16

## 2021-05-26 RX ADMIN — CEFAZOLIN 500 MG: 1 INJECTION, POWDER, FOR SOLUTION INTRAMUSCULAR; INTRAVENOUS at 20:30

## 2021-05-26 NOTE — PLAN OF CARE
Problem: Potential for Falls  Goal: Patient will remain free of falls  Description: INTERVENTIONS:  - Assess patient frequently for physical needs  -  Identify cognitive and physical deficits and behaviors that affect risk of falls    -  [place] fall precautions as indicated by assessment   - Educate patient/family on patient safety including physical limitations  - Instruct patient to call for assistance with activity based on assessment  - Modify environment to reduce risk of injury  - Consider OT/PT consult to assist with strengthening/mobility  Outcome: Progressing     Problem: PAIN - ADULT  Goal: Verbalizes/displays adequate comfort level or baseline comfort level  Description: Interventions:  - Encourage patient to monitor pain and request assistance  - Assess pain using appropriate pain scale  - Administer analgesics based on type and severity of pain and evaluate response  - Implement non-pharmacological measures as appropriate and evaluate response  - Consider cultural and social influences on pain and pain management  - Notify physician/advanced practitioner if interventions unsuccessful or patient reports new pain  Outcome: Progressing     Problem: INFECTION - ADULT  Goal: Absence or prevention of progression during hospitalization  Description: INTERVENTIONS:  - Assess and monitor for signs and symptoms of infection  - Monitor lab/diagnostic results  - Monitor all insertion sites, i e  indwelling lines, tubes, and drains  - [place] appropriate cooling/warming therapies per order  - Administer medications as ordered  - Instruct and encourage patient and family to use good hand hygiene technique  - Identify and instruct in appropriate isolation precautions for identified infection/condition  Outcome: Progressing  Goal: Absence of fever/infection during neutropenic period  Description: INTERVENTIONS:  - Monitor WBC    Outcome: Progressing     Problem: SAFETY ADULT  Goal: Patient will remain free of falls  Description: INTERVENTIONS:  - Assess patient frequently for physical needs  -  Identify cognitive and physical deficits and behaviors that affect risk of falls    -  [place] fall precautions as indicated by assessment   - Educate patient/family on patient safety including physical limitations  - Instruct patient to call for assistance with activity based on assessment  - Modify environment to reduce risk of injury  - Consider OT/PT consult to assist with strengthening/mobility  Outcome: Progressing  Goal: Maintain or return to baseline ADL function  Description: INTERVENTIONS:  -  Assess patient's ability to carry out ADLs; assess patient's baseline for ADL function and identify physical deficits which impact ability to perform ADLs (bathing, care of mouth/teeth, toileting, grooming, dressing, etc )  - Assess/evaluate cause of self-care deficits   - Assess range of motion  - Assess patient's mobility; develop plan if impaired  - Assess patient's need for assistive devices and provide as appropriate  - Encourage maximum independence but intervene and supervise when necessary  - Involve family in performance of ADLs  - Assess for home care needs following discharge   - Consider OT consult to assist with ADL evaluation and planning for discharge  - Provide patient education as appropriate  Outcome: Progressing  Goal: Maintain or return mobility status to optimal level  Description: INTERVENTIONS:  - Assess patient's baseline mobility status (ambulation, transfers, stairs, etc )    - Identify cognitive and physical deficits and behaviors that affect mobility  - Identify mobility aids required to assist with transfers and/or ambulation (gait belt, sit-to-stand, lift, walker, cane, etc )  - [place] fall precautions as indicated by assessment  - Record patient progress and toleration of activity level on Mobility SBAR; progress patient to next Phase/Stage  - Instruct patient to call for assistance with activity based on assessment  - Consider rehabilitation consult to assist with strengthening/weightbearing, etc   Outcome: Progressing     Problem: DISCHARGE PLANNING  Goal: Discharge to home or other facility with appropriate resources  Description: INTERVENTIONS:  - Identify barriers to discharge w/patient and caregiver  - Arrange for needed discharge resources and transportation as appropriate  - Identify discharge learning needs (meds, wound care, etc )  - Arrange for interpretive services to assist at discharge as needed  - Refer to Case Management Department for coordinating discharge planning if the patient needs post-hospital services based on physician/advanced practitioner order or complex needs related to functional status, cognitive ability, or social support system  Outcome: Progressing     Problem: Knowledge Deficit  Goal: Patient/family/caregiver demonstrates understanding of disease process, treatment plan, medications, and discharge instructions  Description: Complete learning assessment and assess knowledge base    Interventions:  - Provide teaching at level of understanding  - Provide teaching via preferred learning methods  Outcome: Progressing

## 2021-05-26 NOTE — CASE MANAGEMENT
Cm spoke to pt's spouse Pilisven Feliz, introduced self, role and discharged process  Pili Feliz reported, pt lives together in 3 story house, 1 step to enter a flight to bed/bathroom  Pili Feliz reported pt is indep with ADLS PTA  Pt has no hx of inpt rehab, MH, or D/A or Leonid  services  Pt works full time and drives independently  Pt has no POA or LW  Pt PCP is Carie and preferred pharmacy is [facility]  Pili Feliz to transport upon discharged  CM reviewed d/c planning process including the following: identifying help at home, patient preference for d/c planning needs, Discharge Lounge, [facility] Meds to Bed program, availability of treatment team to discuss questions or concerns patient and/or family may have regarding understanding medications and recognizing signs and symptoms once discharged  CM also encouraged patient to follow up with all recommended appointments after discharge  Patient advised of importance for patient and family to participate in managing patients medical well being

## 2021-05-26 NOTE — QUICK NOTE
Srinath Trujillo was extracted successfully  Stent left in place  All arrangements have been made for follow-up after discharge including prescriptions called into the pharmacy  Our office will contact him with an appointment for the nurses remove the stent by pulling on the string next week and then see 1 of us in 6 months with a renal ultrasound  From my standpoint he can be discharged when clinically stable

## 2021-05-26 NOTE — CONSULTS
Consults: UROLOGY  Jeana Cuadra 76 y o  male [id number]   Unit/Bed #: -10  Encounter: [id number]        Assessment  & Plan  :    Nephrolithiasis:  -CT scan revealing moderate to severe right-sided hydronephrosis  There intrarenal calculi identified within the mid to lower pole measuring up to 4 mm in size  There is a mid ureteral stone measuring 7 mm  -MAKENZIE of 2 77  Currently improved to 2 44 baseline 1 3  -IV fluids  -Flomax  -antiemetics, pain control  -NPO, plan for cystoscopy with ureteroscopy holmium laser and stent insertion on the right today   -UA negative for nitrates, leukocytes, microscopic occasional bacteria, white blood cells 2-4   -discussed with patient ureteroscopy, discussed risks and benefits of procedure including infection, bleeding, damage to nearby structures and need for additional stone procedures  -patient is currently agreeable to plan  Subjective :    Jeana Cuadra  is a 76 y o  male who was admitted for Kidney stone [N20 0]  Patient reports that he presented to the emergency room after routine testing with his primary care physician revealed an MAKENZIE of 2 77  Patient presented to the emergency room and had a CT scan performed which revealed moderate to severe right-sided hydronephrosis with a mid ureteral stone measuring 7 mm  Patient reports that he is asymptomatic  He denies any nausea, vomiting, fevers or chills, abdominal pain or flank pain  Patient reports that he has passed kidney stones in the past on his own without any type of surgical intervention  Last seen urologist approximately 10-15 years ago  He reports he never needed surgical intervention and almost 1 under intervention but was able to passed stone under medical expulsive therapy  He denies any prior abdominal surgeries         No Known Allergies   Current Outpatient Medications   Medication Instructions    sildenafil (VIAGRA) 50 mg, Oral, As needed    simvastatin (ZOCOR) 20 mg tablet TAKE 1 TABLET DAILY      Past Medical History:   Diagnosis Date    Acid reflux     Arthritis     Cancer ([address] )     skin    Colon polyp     Essential hypertriglyceridemia     last assessed: 5/23/2013    H/O renal calculi     History of hypogonadism     last assessed: 12/15/2014    Hyperlipidemia     Kidney stone     Nasal lesion     left side    Neck mass     Tinnitus     Wears contact lenses      Past Surgical History:   Procedure Laterality Date    COLONOSCOPY      polyp repeat in 5 yrs    ESOPHAGOGASTRODUODENOSCOPY      gastritis and hiatal hernia    FLAP LOCAL HEAD / NECK Left 8/7/2020    Procedure: EXCISION OF NECK SCC W/LOCAL FLAP;  Surgeon: Yadira Greer MD;  Location: [facility] MAIN OR;  Service: Plastics    MASS EXCISION Left 5/9/2016    Procedure: EXCISION BASAL CELL SKIN CANCER LEFT NOSE, FROZEN SECTION, LOCAL FLAP;  Surgeon: Yadira Greer MD;  Location: [facility] Main OR;  Service:     MOHS SURGERY      mohs mirographic surgery nose    MO ADJ TISS XFER HEAD,FAC,HAND <10 SQCM N/A 12/7/2018    Procedure: EXCISION BCC OF NECK W/LOCAL FLAP, FROZEN SECTION;  Surgeon: Yadira Greer MD;  Location: [facility] MAIN OR;  Service: Plastics    MO FULL THICK 2011 [facility] NOS,EAR,LID <20 SQCM N/A 5/9/2016    Procedure:  LOCAL FLAP SKIN GRAFT ;  Surgeon: Yadira Greer MD;  Location: [facility];  Service: Plastics    VASECTOMY      WISDOM TOOTH EXTRACTION       Family History   Problem Relation Age of Onset    Diabetes Mother         mellitus    Dementia Mother     Other Father     No Known Problems Half-Sister      Social History     Socioeconomic History    Marital status: /Civil Union     Spouse name: Not on file    Number of children: Not on file    Years of education: Not on file    Highest education level: Not on file   Occupational History    Not on file   Social Needs    Financial resource strain: Not on file    Food insecurity     Worry: Not on file     Inability: Not on file    Transportation needs     Medical: Not on file     Non-medical: Not on file   Tobacco Use    Smoking status: Never Smoker    Smokeless tobacco: Never Used    Tobacco comment: nonsmoker   Substance and Sexual Activity    Alcohol use: Yes     Comment: few x yea    Drug use: No    Sexual activity: Yes     Partners: Female   Lifestyle    Physical activity     Days per week: Not on file     Minutes per session: Not on file    Stress: Not on file   Relationships    Social connections     Talks on phone: Not on file     Gets together: Not on file     Attends Hindu service: Not on file     Active member of club or organization: Not on file     Attends meetings of clubs or organizations: Not on file     Relationship status: Not on file    Intimate partner violence     Fear of current or ex partner: Not on file     Emotionally abused: Not on file     Physically abused: Not on file     Forced sexual activity: Not on file   Other Topics Concern    Not on file   Social History Narrative    Daily coffee consumption-eight or more cups a day        Review of Systems   Constitutional: Negative  Negative for chills and fever  HENT: Negative  Eyes: Negative  Respiratory: Negative  Cardiovascular: Negative  Gastrointestinal: Negative  Negative for abdominal pain, nausea and vomiting  Endocrine: Negative  Genitourinary: Negative  Negative for difficulty urinating, dysuria, flank pain, frequency, hematuria and urgency  Musculoskeletal: Negative  Skin: Negative  Allergic/Immunologic: Negative  Neurological: Negative  Hematological: Negative  Psychiatric/Behavioral: Negative  Objective     Physical Exam  Constitutional:       General: He is not in acute distress  Appearance: Normal appearance  He is normal weight  He is not ill-appearing, toxic-appearing or diaphoretic  HENT:      Head: Normocephalic and atraumatic        Right Ear: External ear normal       Left Ear: External ear normal       Nose: Nose normal  Eyes:      General: No scleral icterus  Conjunctiva/sclera: Conjunctivae normal    Neck:      Musculoskeletal: Normal range of motion  Cardiovascular:      Rate and Rhythm: Normal rate and regular rhythm  Pulses: Normal pulses  Heart sounds: Normal heart sounds  No murmur  No friction rub  No gallop  Pulmonary:      Effort: Pulmonary effort is normal  No respiratory distress  Breath sounds: No wheezing, rhonchi or rales  Abdominal:      General: Bowel sounds are normal  There is no distension  Tenderness: There is no abdominal tenderness  There is no right CVA tenderness or left CVA tenderness  Musculoskeletal: Normal range of motion  Skin:     General: Skin is warm and dry  Neurological:      General: No focal deficit present  Mental Status: He is alert and oriented to person, place, and time  Psychiatric:         Mood and Affect: Mood normal          Behavior: Behavior normal          Thought Content: Thought content normal          Judgment: Judgment normal                 Imaging:  CT ABDOMEN AND PELVIS WITHOUT IV CONTRAST - LOW DOSE RENAL STONE      INDICATION:   Flank pain, kidney stone suspected  flank pain, elevated BUN/Cr      COMPARISON:  None      TECHNIQUE:  Low dose thin section CT examination of the abdomen and pelvis was performed without intravenous or oral contrast according to a protocol specifically designed to evaluate for urinary tract calculus  Axial, sagittal, and coronal 2D   reformatted images were created from the source data and submitted for interpretation  Evaluation for pathology in the abdomen and pelvis that is unrelated to urinary tract calculi is limited       Radiation dose length product (DLP) for this visit:  360 63 mGy-cm     This examination, like all CT scans performed in the [facility], was performed utilizing techniques to minimize radiation dose exposure, including the use of iterative   reconstruction and automated exposure control       FINDINGS:     RIGHT KIDNEY AND URETER:  Moderately severe right-sided hydronephrosis  There are intrarenal calculi identified within the mid to lower pole measuring up to 4 mm in size  There is a mid ureteral calculus measuring 7 mm in craniocaudad dimension  Distal to this the ureter is   unremarkable      LEFT KIDNEY AND URETER:  Small nonobstructing calculi are seen within the kidneys, primarily involving the mid to lower pole measuring up to 5 mm in thickness  Normal ureter  No hydronephrosis      URINARY BLADDER:   Bladder is mostly decompressed with no calcified stones      No significant abnormality in the visualized lung bases      Limited low radiation dose noncontrast CT evaluation demonstrates no clinically significant abnormality of liver, spleen, pancreas, or adrenal glands  No calcified gallstones or gallbladder wall thickening noted  No ascites or bulky lymphadenopathy on this limited noncontrast study  Bowel loops appear unremarkable  Limited evaluation demonstrates no evidence to suggest acute appendicitis    Mild thoracolumbar degenerative change most prominent at the L4-5 and L5-S1 levels with mild to moderate canal stenosis and foraminal narrowing         IMPRESSION:     7 mm calculus within the mid ureter with moderately severe resulting hydronephrosis and proximal hydroureter      Additional nonobstructing calculi are seen within the kidneys bilaterally             Workstation performed: [id number]    Labs:  Lab Results   Component Value Date    SODIUM 140 05/26/2021    K 5 1 05/26/2021     (H) 05/26/2021    CO2 21 05/26/2021    BUN 36 (H) 05/26/2021    CREATININE 2 44 (H) 05/26/2021    GLUC 97 05/26/2021    CALCIUM 9 4 05/26/2021         Lab Results   Component Value Date    WBC 10 43 (H) 05/26/2021    HGB 13 2 05/26/2021    HCT 40 7 05/26/2021    MCV 90 05/26/2021     05/26/2021         VTE Pharmacologic Prophylaxis: Reason for no pharmacologic prophylaxis Low risk  VTE Mechanical Prophylaxis: sequential compression device     Tae Palmer PA-C

## 2021-05-26 NOTE — DISCHARGE INSTRUCTIONS
Expect to see blood in the urine, and to experience urgency/frequency/burning with urination and dribbling  This is normal after urological procedures  Is also normal to experience some nausea after these procedures  Go back your regular diet carefully  It is normal to feel pain in the kidney when urinating and when the bladder is filling due to urine refluxing up to the kidney because of the open stent  The stent is necessary to keep the ureter open to allow clots and swelling to resolve and to allow the kidney to drain properly after instrumentation  Some stones may pass around the stent, but most stones pass after the stent is removed  The presence of the stent makes the ureter wider while it is in and after has been removed, allowing passage of larger fragments  Call for fever greater that 101 5, inability to urinate, prolonged nausea and vomiting, or severe pain not relieved by pain medications  Alissa Nair Keep stent string taped- if it becomes dislodged or gets pulled out early, that is OK- simply remove it completely by pulling on string until it is completely out  No driving/operating machinery for 24 hours, and while taking narcotics  Take over the counter remedy of choice to avoid constipation  Drink plenty of fluids

## 2021-05-26 NOTE — ASSESSMENT & PLAN NOTE
Creatinine at baseline seems to be 1 32    Creatinine today is 2 77 due to obstructive hydronephrosis secondary to stone  Avoid NSAIDs, avoid hypotension  Trend renal indices   likely discharge tomorrow renal function improves -  Will continue IV fluids until then

## 2021-05-26 NOTE — OP NOTE
OPERATIVE REPORT  PATIENT NAME: Siobhan Mckeon    :  1955  MRN: [id number]  Pt Location: [facility] CYSTO ROOM 01    SURGERY DATE: 2021    Surgeon(s) and Role:     * Mitchel Mejía MD - Primary    Preop Diagnosis:  Ureterolithiasis [N20 1] right side with AK I    Post-Op Diagnosis Codes:     * Ureterolithiasis [N20 1] right side with MAKENZIE    Procedure(s) (LRB):  CYSTOSCOPY URETEROSCOPY WITH BASKET STONE EXTRACTION, RETROGRADE PYELOGRAM AND INSERTION STENT URETERAL (Right)    Specimen(s):  ID Type Source Tests Collected by Time Destination   A :  Calculus Ureter, Right STONE ANALYSIS Mitchel Mejía MD 2021 1227        Estimated Blood Loss:   Minimal    Drains:  * No LDAs found *    Anesthesia Type:   General    Operative Indications:  Ureterolithiasis [N20 1]  Right, with creatinine 2 44    Operative Findings:  7-8 mm stone distal ureter above the UVJ    Complications:   None    Procedure and Technique:  58-year-old man found have mid right ureteral stone with hydronephrosis when he is being worked up for a creatinine of 2 44  He denies pain  He has been offered cystoscopy right ureteroscopy laser lithotripsy stone basket extraction right ureteral stent placement  The risks of bleeding infection damage to the ureter and need for additional procedures have been explained he gives informed consent  Patient was brought to the operating room identified properly  LMA was induced, the patient was prepped and draped in dorsal lithotomy position usual fashion  Time-out was performed  Cystoscopy was carried out with a 22 Croatian cystoscopy sheath and 30 degree lens  The urethra was normal without stricture  The prostate was moderately obstructed with a small median lobe and bilateral bilobar occlusion  Bladder itself was smooth not trabeculated there was no stones tumors or lesions  The orifices were orthotopic and intact    I decided to perform retrograde pyelography first with a tiger tail catheter and 50% Corbin  This was injected and I saw filling defect just above the UVJ  Therefore the stone had moved  I advanced the tiger tail catheter into the ureter and then put a wire through tiger tail catheter to use as a safety wire  I then took the short rigid ureteral scope and performed rigid ureteroscopy the distal ureter and saw the stone and grasped it with a tipless basket and removed intact  According to the CT scan there are no other stones of any series size  He only has some punctate stones that are probably Julian's plaques  I then placed a 6 [place] Radha by 26 cm stent over the wire and coils were established in renal pelvis and bladder  Bladder was drained with the cystoscopy sheath and the stent string was taped to the dorsal penis     I was present for the entire procedure and A qualified resident physician was not available    Patient Disposition:  PACU  and extubated and stable    SIGNATURE: Fransisca Walton MD  DATE: May 26, 2021  TIME: 12:35 PM

## 2021-05-26 NOTE — ASSESSMENT & PLAN NOTE
Patient presented to [facility] ED due to abnormal outpatient lab  Creatinine was elevated from baseline  In ER, CT scan shows: 7 mm calculus within the mid ureter with moderately severe resulting hydronephrosis and proximal hydroureter    Transfer to [facility] for Urology  Gentle hydration while NPO  S/p cystoscopy, lithotripsy and ureteral stent placement  Urinalysis is negative for infection, therefore hold antibiotic  Pain control with Acetaminophen and Norco

## 2021-05-26 NOTE — PROGRESS NOTES
[facility]  Progress Note - Harriett Shah 1955, 76 y o  male MRN: [id number]  Unit/Bed#: -01 Encounter: [id number]  Primary Care Provider: Isma Katz DO   Date and time admitted to hospital: 5/25/2021  5:24 PM    * Hydronephrosis with renal and ureteral calculus obstruction (Right)  Assessment & Plan  Patient presented to [facility] ED due to abnormal outpatient lab  Creatinine was elevated from baseline  In ER, CT scan shows: 7 mm calculus within the mid ureter with moderately severe resulting hydronephrosis and proximal hydroureter  Transfer to [place] for Urology  Gentle hydration while NPO  S/p cystoscopy, lithotripsy and ureteral stent placement  Urinalysis is negative for infection, therefore hold antibiotic  Pain control with Acetaminophen and Norco    MAKENZIE (acute kidney injury) ([address] )  Assessment & Plan  Creatinine at baseline seems to be 1 32  Creatinine today is 2 77 due to obstructive hydronephrosis secondary to stone  Avoid NSAIDs, avoid hypotension  Trend renal indices   likely discharge tomorrow renal function improves -  Will continue IV fluids until then    Class 1 obesity due to excess calories without serious comorbidity with body mass index (BMI) of 30 0 to 30 9 in adult  Assessment & Plan  Counseling provided for last style modification    Mixed hyperlipidemia  Assessment & Plan   On simvastatin home, can restart on discharge      VTE Pharmacologic Prophylaxis:   Pharmacologic: Heparin  Mechanical VTE Prophylaxis in Place: Yes    Patient Centered Rounds: I have performed bedside rounds with nursing staff today  Education and Discussions with Family / Patient: spoke with wife Malia Woody over the phone to update for approx 5 minutes    Time Spent for Care: 30 minutes  More than 50% of total time spent on counseling and coordination of care as described above      Current Length of Stay: 1 day(s)    Current Patient Status: Observation Certification Statement: The patient will continue to require additional inpatient hospital stay due to Further IV fluids and monitoring of creatinine, if improved tomorrow likely discharge    Discharge Plan: Likely d/c in the AM with improvement in renal fxn    Code Status: Level 1 - Full Code      Subjective:    patient examined bedside  He offers no acute complaint  Patient states his pain is well controlled  He denies any dysuria though he does have small amount of pain at the tip of his urethral meatus  All other review of systems negative this time  Objective:     Vitals:   Temp (24hrs), Av 9 °F (36 6 °C), Min:97 4 °F (36 3 °C), Max:98 9 °F (37 2 °C)    Temp:  [97 4 °F (36 3 °C)-98 9 °F (37 2 °C)] 97 8 °F (36 6 °C)  HR:  [66-92] 88  Resp:  [12-22] 12  BP: (125-158)/() 141/93  SpO2:  [98 %-100 %] 100 %  Body mass index is 29 53 kg/m²  Input and Output Summary (last 24 hours): Intake/Output Summary (Last 24 hours) at 2021 1349  Last data filed at 2021 1231  Gross per 24 hour   Intake 500 ml   Output --   Net 500 ml       Physical Exam:     Physical Exam  Vitals signs and nursing note reviewed  Constitutional:       General: He is not in acute distress  Appearance: He is well-developed  He is not diaphoretic  Comments: BMI 30   HENT:      Head: Normocephalic and atraumatic  Mouth/Throat:      Pharynx: No oropharyngeal exudate  Eyes:      General: No scleral icterus  Conjunctiva/sclera: Conjunctivae normal       Pupils: Pupils are equal, round, and reactive to light  Neck:      Musculoskeletal: Normal range of motion and neck supple  Cardiovascular:      Rate and Rhythm: Normal rate and regular rhythm  Heart sounds: No murmur  Pulmonary:      Effort: Pulmonary effort is normal       Breath sounds: Normal breath sounds  No wheezing  Abdominal:      General: Bowel sounds are normal       Palpations: Abdomen is soft  Tenderness:  There is no right CVA tenderness or left CVA tenderness  Musculoskeletal: Normal range of motion  General: No deformity  Skin:     General: Skin is warm and dry  Findings: No erythema or rash  Neurological:      Mental Status: He is alert and oriented to person, place, and time  Cranial Nerves: No cranial nerve deficit  Psychiatric:         Behavior: Behavior normal          Additional Data:     Labs:    Results from last 7 days   Lab Units 05/26/21  0534 05/25/21  1144   WBC Thousand/uL 10 43* 9 58   HEMOGLOBIN g/dL 13 2 14 3   HEMATOCRIT % 40 7 43 6   PLATELETS Thousands/uL 313 315   NEUTROS PCT %  --  80*   LYMPHS PCT %  --  12*   MONOS PCT %  --  5   EOS PCT %  --  1     Results from last 7 days   Lab Units 05/26/21  0534  05/24/21  0727   POTASSIUM mmol/L 5 1   < > 4 9   CHLORIDE mmol/L 114*   < > 114*   CO2 mmol/L 21   < > 21   BUN mg/dL 36*   < > 48*   CREATININE mg/dL 2 44*   < > 2 79*   CALCIUM mg/dL 9 4   < > 9 6   ALK PHOS U/L  --   --  74   ALT U/L  --   --  29   AST U/L  --   --  13    < > = values in this interval not displayed  * I Have Reviewed All Lab Data Listed Above  * Additional Pertinent Lab Tests Reviewed:  All Labs Within Last 24 Hours Reviewed    Imaging:    Imaging Reports Reviewed Today Include: CT renal stone  Imaging Personally Reviewed by Myself Includes:  none    Recent Cultures (last 7 days):           Last 24 Hours Medication List:   Current Facility-Administered Medications   Medication Dose Route Frequency Provider Last Rate    acetaminophen  650 mg Oral Q6H PRN Mary Ann Moreira MD      cefazolin  500 mg Intravenous Once Mary Ann Moreira MD      HYDROcodone-acetaminophen  1 tablet Oral Q6H PRN Mary Ann Moreira MD      lactated ringers  125 mL/hr Intravenous Continuous Stefan Rodríguez CRNA Stopped (05/26/21 1334)    ondansetron  4 mg Intravenous Q6H PRN Mary Ann Moreria MD      sodium chloride  75 mL/hr Intravenous Continuous Mary Ann Moreira MD 75 mL/hr (05/26/21 [phone])        Today, Patient Was Seen By: Aquilino Coleman MD    ** Please Note: Dictation voice to text software may have been used in the creation of this document   **

## 2021-05-26 NOTE — ANESTHESIA PREPROCEDURE EVALUATION
Procedure:  CYSTOSCOPY URETEROSCOPY WITH LITHOTRIPSY HOLMIUM LASER, RETROGRADE PYELOGRAM AND INSERTION STENT URETERAL (Right Bladder)    Relevant Problems   CARDIO   (+) Mixed hyperlipidemia      /RENAL   (+) MAKENZIE (acute kidney injury) ([facility] [address] )   (+) Hydronephrosis with renal and ureteral calculus obstruction (Right)      MUSCULOSKELETAL   (+) Primary osteoarthritis involving multiple joints      NEURO/PSYCH   (+) History of basal cell carcinoma (BCC)   (+) History of squamous cell carcinoma        Physical Exam    Airway    Mallampati score: III         Dental       Cardiovascular  Rhythm: regular, Rate: normal,     Pulmonary  Breath sounds clear to auscultation,     Other Findings        Anesthesia Plan  ASA Score- 3     Anesthesia Type- general with ASA Monitors  Additional Monitors:   Airway Plan: LMA  Plan Factors-Exercise tolerance (METS): >4 METS  Chart reviewed  EKG reviewed  Patient is not a current smoker  Induction- intravenous  Postoperative Plan-     Informed Consent- Anesthetic plan and risks discussed with patient  I personally reviewed this patient with the CRNA  Discussed and agreed on the Anesthesia Plan with the CRNA  Lilia Martin

## 2021-05-26 NOTE — ANESTHESIA POSTPROCEDURE EVALUATION
Post-Op Assessment Note    CV Status:  Stable  Pain Score: 0    Pain management: adequate     Mental Status:  Sleepy and arousable   Hydration Status:  Euvolemic   PONV Controlled:  Controlled   Airway Patency:  Patent      Post Op Vitals Reviewed: Yes      Staff: Anesthesiologist, CRNA         No complications documented      /89 (05/26/21 1241)    Temp 97 8 °F (36 6 °C) (05/26/21 1241)    Pulse 88 (05/26/21 1241)   Resp 20 (05/26/21 1241)    SpO2 100 % (05/26/21 1241)

## 2021-05-26 NOTE — UTILIZATION REVIEW
Initial Clinical Review    Admission: Date/Time/Statement:   Admission Orders (From admission, onward)     Ordered        05/25/21 1727  Place in Observation  Once                   Orders Placed This Encounter   Procedures    Place in Observation     Standing Status:   Standing     Number of Occurrences:   1     Order Specific Question:   Level of Care     Answer:   Med Surg [16]     5/25/2021 (4 hours)  [facility] Emergency Department  Right ureteral calculus, obstructive uropathy, acute kidney injury  Transfer to [facility] for higher level of care  Prior to arrival iv ns bolus      Initial Presentation:       76year old male received from [facility] ed for observation admission to evaluate and treat a righ ureteral stone, hydronephrosis and kidney kidury 2 77  ( baseline 1 32)   plkan includes analgesia, NPO, iv fluid hydration, cystoscopy 5-26    Date: 5-26   Day 2: observation    Anesthesia Start Date/Time: 05/26/21 1159         Procedure: CYSTOSCOPY URETEROSCOPY WITH BASKET STONE EXTRACTION, RETROGRADE PYELOGRAM AND INSERTION STENT URETERAL (Right Bladder)   Anesthesia type: general   Diagnosis: Ureterolithiasis [N20 1]   Pre-op diagnosis: Ureterolithiasis [N20 1]     Procedure(s) (LRB):  CYSTOSCOPY URETEROSCOPY WITH BASKET STONE EXTRACTION, RETROGRADE PYELOGRAM AND INSERTION STENT URETERAL (Right    Estimated Blood Loss:   Minimal    Operative Findings:  7-8 mm stone distal ureter above the UVJ     Complications:   None    arrival    Temperature Pulse Respirations Blood Pressure SpO2   05/25/21 1731 05/25/21 1731 05/25/21 1731 05/25/21 1731 05/25/21 2211   (!) 97 4 °F (36 3 °C) 67 16 158/98 98 %      Temp Source Heart Rate Source Patient Position - Orthostatic VS BP Location FiO2 (%)   05/25/21 1731 05/25/21 2211 05/25/21 1731 05/25/21 1731 --   Oral Monitor Lying Left arm       Pain Score       05/25/21 1731       No Pain          Wt Readings from Last 1 Encounters: 05/25/21 90 7 kg (200 lb)     Additional Vital Signs:     Vitals:    05/26/21 1245 05/26/21 1300 05/26/21 1319 05/26/21 1538   BP: 149/90 145/94 141/93 140/93   Pulse: 92 88     Resp: 22 12  17   Temp: 97 6 °F (36 4 °C) 97 8 °F (36 6 °C)     TempSrc:       SpO2: 98% 100%     Weight:       Height:         Pertinent Labs/Diagnostic Test Results:       CT ABDOMEN AND PELVIS WITHOUT IV CONTRAST - LOW DOSE RENAL STONE    5-25-21 11:46 [place]  Ed   INDICATION:   Flank pain, kidney stone suspected  flank pain, elevated BUN/Cr      7 mm calculus within the mid ureter with moderately severe resulting hydronephrosis and proximal hydroureter      Additional nonobstructing calculi are seen within the kidneys bilaterally                  FL retrograde pyelogram    (Results Pending)   US kidney and bladder    (Results Pending)     Results from last 7 days   Lab Units 05/26/21  0534 05/25/21  1144   WBC Thousand/uL 10 43* 9 58   HEMOGLOBIN g/dL 13 2 14 3   HEMATOCRIT % 40 7 43 6   PLATELETS Thousands/uL 313 315   NEUTROS ABS Thousands/µL  --  7 76*         Results from last 7 days   Lab Units 05/26/21  0534 05/25/21  1144 05/24/21  0727   SODIUM mmol/L 140 140 141   POTASSIUM mmol/L 5 1 4 4 4 9   CHLORIDE mmol/L 114* 104 114*   CO2 mmol/L 21 22 21   ANION GAP mmol/L 5 14* 6   BUN mg/dL 36* 42* 48*   CREATININE mg/dL 2 44* 2 77* 2 79*   EGFR ml/min/1 73sq m 27 23 23   CALCIUM mg/dL 9 4 10 0 9 6     Results from last 7 days   Lab Units 05/24/21  0727   AST U/L 13   ALT U/L 29   ALK PHOS U/L 74   TOTAL PROTEIN g/dL 7 7   ALBUMIN g/dL 3 4*   TOTAL BILIRUBIN mg/dL 0 45         Results from last 7 days   Lab Units 05/26/21  0534 05/25/21  1144   GLUCOSE RANDOM mg/dL 97 106        Results from last 7 days   Lab Units 05/25/21  1144 05/24/21  0727   CLARITY UA  Clear Clear   COLOR UA  Yellow Yellow   SPEC GRAV UA  1 020 1 017   PH UA  5 5 5 5   GLUCOSE UA mg/dl Negative Negative   KETONES UA mg/dl Negative Negative   BLOOD UA  Negative Small*   PROTEIN UA mg/dl Trace* Negative   NITRITE UA  Negative Negative   BILIRUBIN UA  Negative Negative   UROBILINOGEN UA E U /dl 0 2 0 2   LEUKOCYTES UA  Negative Negative   WBC UA /hpf 2-4 2-4   RBC UA /hpf None Seen 2-4   BACTERIA UA /hpf Occasional None Seen   EPITHELIAL CELLS WET PREP /hpf Occasional None Seen   MUCUS THREADS  Occasional*  --        ED Treatment:   Medication Administration - No Administrations Displayed (No Start Event Found)     None        Past Medical History:   Diagnosis Date    Acid reflux     Arthritis     Cancer (HCC)     skin    Colon polyp     Essential hypertriglyceridemia     last assessed: 5/23/2013    H/O renal calculi     History of hypogonadism     last assessed: 12/15/2014    Hyperlipidemia     Kidney stone     Nasal lesion     left side    Neck mass     Tinnitus     Wears contact lenses      Present on Admission:   Mixed hyperlipidemia      Admitting Diagnosis: Kidney stone [N20 0]  Age/Sex: 76 y o  male  Admission Orders:  Scheduled Medications:  cefazolin, 500 mg, Intravenous, Once  heparin (porcine), 5,000 Units, Subcutaneous, Q8H [address]      Continuous IV Infusions:  lactated ringers, 125 mL/hr, Intravenous, Continuous  sodium chloride, 75 mL/hr, Intravenous, Continuous      PRN Meds:  acetaminophen, 650 mg, Oral, Q6H PRN  HYDROcodone-acetaminophen, 1 tablet, Oral, Q6H PRN  ondansetron, 4 mg, Intravenous, Q6H PRN        IP CONSULT TO UROLOGY    Network Utilization Review Department  ATTENTION: Please call with any questions or concerns to [phone] and carefully listen to the prompts so that you are directed to the right person  All voicemails are confidential   [facility] all requests for admission clinical reviews, approved or denied determinations and any other requests to dedicated fax number below belonging to the campus where the patient is receiving treatment   List of dedicated fax numbers for the Facilities:  [address] DENIALS (Administrative/Medical Necessity) [phone]   [address] (Maternity/NICU/Pediatrics) 261 [facility] [address] [facility] [facility] [address] [place] [phone] [id number] [place] [address] [address] [facility] [address] [facility] [phone]

## 2021-05-27 ENCOUNTER — TELEPHONE (OUTPATIENT)
Dept: UROLOGY | Facility: CLINIC | Age: 66
End: 2021-05-27

## 2021-05-27 VITALS
WEIGHT: 200 LBS | RESPIRATION RATE: 16 BRPM | HEIGHT: 69 IN | BODY MASS INDEX: 29.62 KG/M2 | DIASTOLIC BLOOD PRESSURE: 90 MMHG | HEART RATE: 89 BPM | SYSTOLIC BLOOD PRESSURE: 136 MMHG | OXYGEN SATURATION: 98 % | TEMPERATURE: 98.8 F

## 2021-05-27 DIAGNOSIS — N13.2 HYDRONEPHROSIS WITH RENAL AND URETERAL CALCULUS OBSTRUCTION: Primary | ICD-10-CM

## 2021-05-27 DIAGNOSIS — D72.829 LEUKOCYTOSIS, UNSPECIFIED TYPE: ICD-10-CM

## 2021-05-27 LAB
ANION GAP SERPL CALCULATED.3IONS-SCNC: 6 MMOL/L (ref 4–13)
BUN SERPL-MCNC: 33 MG/DL (ref 5–25)
CALCIUM SERPL-MCNC: 9.3 MG/DL (ref 8.3–10.1)
CHLORIDE SERPL-SCNC: 109 MMOL/L (ref 100–108)
CO2 SERPL-SCNC: 22 MMOL/L (ref 21–32)
CREAT SERPL-MCNC: 1.94 MG/DL (ref 0.6–1.3)
ERYTHROCYTE [DISTWIDTH] IN BLOOD BY AUTOMATED COUNT: 12.9 % (ref 11.6–15.1)
GFR SERPL CREATININE-BSD FRML MDRD: 35 ML/MIN/1.73SQ M
GLUCOSE SERPL-MCNC: 142 MG/DL (ref 65–140)
HCT VFR BLD AUTO: 37.4 % (ref 36.5–49.3)
HGB BLD-MCNC: 12.2 G/DL (ref 12–17)
MCH RBC QN AUTO: 29 PG (ref 26.8–34.3)
MCHC RBC AUTO-ENTMCNC: 32.6 G/DL (ref 31.4–37.4)
MCV RBC AUTO: 89 FL (ref 82–98)
PLATELET # BLD AUTO: 292 THOUSANDS/UL (ref 149–390)
PMV BLD AUTO: 9.2 FL (ref 8.9–12.7)
POTASSIUM SERPL-SCNC: 5.1 MMOL/L (ref 3.5–5.3)
RBC # BLD AUTO: 4.2 MILLION/UL (ref 3.88–5.62)
SODIUM SERPL-SCNC: 137 MMOL/L (ref 136–145)
WBC # BLD AUTO: 13.08 THOUSAND/UL (ref 4.31–10.16)

## 2021-05-27 PROCEDURE — 99217 PR OBSERVATION CARE DISCHARGE MANAGEMENT: CPT | Performed by: INTERNAL MEDICINE

## 2021-05-27 PROCEDURE — 85027 COMPLETE CBC AUTOMATED: CPT | Performed by: STUDENT IN AN ORGANIZED HEALTH CARE EDUCATION/TRAINING PROGRAM

## 2021-05-27 PROCEDURE — 80048 BASIC METABOLIC PNL TOTAL CA: CPT | Performed by: STUDENT IN AN ORGANIZED HEALTH CARE EDUCATION/TRAINING PROGRAM

## 2021-05-27 RX ORDER — HYDROCODONE BITARTRATE AND ACETAMINOPHEN 5; 325 MG/1; MG/1
1 TABLET ORAL EVERY 6 HOURS PRN
Qty: 15 TABLET | Refills: 0 | Status: SHIPPED | OUTPATIENT
Start: 2021-05-27 | End: 2021-06-06

## 2021-05-27 RX ADMIN — SODIUM CHLORIDE 75 ML/HR: 0.9 INJECTION, SOLUTION INTRAVENOUS at 04:56

## 2021-05-27 RX ADMIN — HEPARIN SODIUM 5000 UNITS: 5000 INJECTION INTRAVENOUS; SUBCUTANEOUS at 04:56

## 2021-05-27 NOTE — ASSESSMENT & PLAN NOTE
Patient presented to [facility] ED due to abnormal outpatient lab  Creatinine was elevated from baseline  In ER, CT scan shows: 7 mm calculus within the mid ureter with moderately severe resulting hydronephrosis and proximal hydroureter    Transfer to [facility]rology  Gentle hydration while NPO  S/p cystoscopy, lithotripsy and ureteral stent placement  Urinalysis is negative for infection, therefore hold antibiotic  Pain control with Acetaminophen and [place]   cleared by Urology for discharge; creatinine trending down and almost at baseline levels   ureteral stent in place; follow-up in 1 week with Urology

## 2021-05-27 NOTE — DISCHARGE SUMMARY
[facility] [facility]  Discharge- Boneta Nine 1955, 76 y o  male MRN: [id number]  Unit/Bed#: -60 Encounter: [id number]  Primary Care Provider: Todd Aldrich DO   Date and time admitted to hospital: 5/25/2021  5:24 PM    MAKENZIE (acute kidney injury) [facility])  Assessment & Plan  Creatinine at baseline seems to be 1 3-1 6  Creatinine today is 1 94 due to obstructive hydronephrosis secondary to stone  Avoid NSAIDs, avoid hypotension  Trend renal indices   creatinine trending down; reinforced importance of good p o  fluid intake  Stable for discharge    Class 1 obesity due to excess calories without serious comorbidity with body mass index (BMI) of 30 0 to 30 9 in adult  Assessment & Plan  Counseling provided for last style modification    Mixed hyperlipidemia  Assessment & Plan   On simvastatin home, can restart on discharge    * Hydronephrosis with renal and ureteral calculus obstruction (Right)  Assessment & Plan  Patient presented to [facility] ED due to abnormal outpatient lab  Creatinine was elevated from baseline  In ER, CT scan shows: 7 mm calculus within the mid ureter with moderately severe resulting hydronephrosis and proximal hydroureter    Transfer to [place] for Urology  Gentle hydration while NPO  S/p cystoscopy, lithotripsy and ureteral stent placement  Urinalysis is negative for infection, therefore hold antibiotic  Pain control with Acetaminophen and Nursery   cleared by Urology for discharge; creatinine trending down and almost at baseline levels   ureteral stent in place; follow-up in 1 week with Urology        Discharging Physician / Practitioner: Getachew Crowe MD  PCP: Todd Aldrich DO  Admission Date:   Admission Orders (From admission, onward)     Ordered        05/25/21 1727  Place in Observation  Once                   Discharge Date: 05/27/21    Resolved Problems  Date Reviewed: 5/27/2021    None          Consultations During Hospital Stay:  · Urology    Procedures Performed:   ·  cystoscopy ureteroscopy with basket stone extraction    Significant Findings / Test Results:    CT  Stone studyabdomen pelvis:   "7 mm calculus within the mid ureter with moderately severe resulting hydronephrosis and proximal hydroureter      Additional nonobstructing calculi are seen within the kidneys bilaterally "    Incidental Findings:   · A KI    Test Results Pending at Discharge (will require follow up):   ·    none     Outpatient Tests Requested:  ·  CBC/CMP/UA    Complications:  none    Reason for Admission:  Elevated creatinine on routine labs    Hospital Course:    as per HPI:     "Nury Cabello is a 76 y o  male with history of GERD, hyperlipidemia presented to my nurse ER due to abnormal creatinine level in outpatient lab  Today patient denies any flank pain, nausea, vomiting, dysuria or any other complaints  He experienced bilateral flank pain last week that has resolved  He also noted dark urine this morning which also resolved  Denies nausea, vomiting, fever, abdominal pain, or any other complaints  In ER, CT scan revealed right-sided hydronephrosis due to renal and ureteral obstructive stone  Urology has been consulted who recommended transfer to [facility] for cystoscopy  "    Patient underwent stone extraction with Urology; reports no pain at this time period stent in place and be removed on 06/02 in urology office  Will have repeat follow-up evaluation at 6 months by Urology  Patient told to avoid NSAIDs at this time; return to ED if he becomes febrile or has unusual appearance or smell of urine  Will order outpatient labs  Condition at Discharge: stable     Discharge Day Visit / Exam:     Subjective:    Patient seen examined bedside, no acute distress or discomfort noted  Patient stable for discharge today; follow-up on 06/02 with Urology    Vitals: Blood Pressure: 136/90 (05/27/21 0754)  Pulse: 89 (05/26/21 2152)  Temperature: 98 8 °F (37 1 °C) (05/26/21 2152)  Temp Source: Oral (05/26/21 2152)  Respirations: 16 (05/27/21 0754)  Height: 5' 9" (175 3 cm) (05/25/21 1731)  Weight - Scale: 90 7 kg (200 lb) (05/25/21 1731)  SpO2: 98 % (05/26/21 2152)     Exam:   Physical Exam  Vitals signs and nursing note reviewed  Constitutional:       Appearance: He is well-developed  HENT:      Head: Normocephalic and atraumatic  Eyes:      Conjunctiva/sclera: Conjunctivae normal    Neck:      Musculoskeletal: Neck supple  Cardiovascular:      Rate and Rhythm: Normal rate and regular rhythm  Heart sounds: No murmur  Pulmonary:      Effort: Pulmonary effort is normal  No respiratory distress  Breath sounds: Normal breath sounds  Abdominal:      Palpations: Abdomen is soft  Tenderness: There is no abdominal tenderness  Skin:     General: Skin is warm and dry  Neurological:      Mental Status: He is alert and oriented to person, place, and time  Psychiatric:         Mood and Affect: Mood normal          Behavior: Behavior normal          Discussion with Family: Discussed treatment plan with family and patient who agree with current plan; encouraged to ask questions and participate  Discharge instructions/Information to patient and family:   See after visit summary for information provided to patient and family  Provisions for Follow-Up Care:  See after visit summary for information related to follow-up care and any pertinent home health orders  Disposition:     Home    For Discharges to [facility] SNF:   · Not Applicable to this Patient - Not Applicable to this Patient    Planned Readmission: none     Discharge Statement:  I spent 40 minutes discharging the patient  This time was spent on the day of discharge  I had direct contact with the patient on the day of discharge   Greater than 50% of the total time was spent examining patient, answering all patient questions, arranging and discussing plan of care with patient as well as directly providing post-discharge instructions  Additional time then spent on discharge activities  Discharge Medications:  See after visit summary for reconciled discharge medications provided to patient and family        ** Please Note: This note has been constructed using a voice recognition system **

## 2021-05-27 NOTE — TELEPHONE ENCOUNTER
----- Message from Cathy Palmer MD sent at 5/26/2021 12:42 PM EDT -----  Please call patient with appointment for nurses remove stent by pulling on the string next week and see 1 of us in 6 months with a renal ultrasound

## 2021-05-27 NOTE — PLAN OF CARE
Problem: Potential for Falls  Goal: Patient will remain free of falls  Description: INTERVENTIONS:  - Assess patient frequently for physical needs  -  Identify cognitive and physical deficits and behaviors that affect risk of falls    -  [place] fall precautions as indicated by assessment   - Educate patient/family on patient safety including physical limitations  - Instruct patient to call for assistance with activity based on assessment  - Modify environment to reduce risk of injury  - Consider OT/PT consult to assist with strengthening/mobility  5/27/2021 1506 by Uma Aleman RN  Outcome: Adequate for Discharge  5/27/2021 0705 by Uma Aleman RN  Outcome: Progressing     Problem: PAIN - ADULT  Goal: Verbalizes/displays adequate comfort level or baseline comfort level  Description: Interventions:  - Encourage patient to monitor pain and request assistance  - Assess pain using appropriate pain scale  - Administer analgesics based on type and severity of pain and evaluate response  - Implement non-pharmacological measures as appropriate and evaluate response  - Consider cultural and social influences on pain and pain management  - Notify physician/advanced practitioner if interventions unsuccessful or patient reports new pain  5/27/2021 1506 by Uma Aleman RN  Outcome: Adequate for Discharge  5/27/2021 0705 by Uma Aleman RN  Outcome: Progressing     Problem: INFECTION - ADULT  Goal: Absence or prevention of progression during hospitalization  Description: INTERVENTIONS:  - Assess and monitor for signs and symptoms of infection  - Monitor lab/diagnostic results  - Monitor all insertion sites, i e  indwelling lines, tubes, and drains  - [place] appropriate cooling/warming therapies per order  - Administer medications as ordered  - Instruct and encourage patient and family to use good hand hygiene technique  - Identify and instruct in appropriate isolation precautions for identified infection/condition  5/27/2021 1506 by [place] Rutledge RN  Outcome: Adequate for Discharge  5/27/2021 3806 by [place] Rutledge RN  Outcome: Progressing  Goal: Absence of fever/infection during neutropenic period  Description: INTERVENTIONS:  - Monitor WBC    5/27/2021 1506 by [place] Rutledge RN  Outcome: Adequate for Discharge  5/27/2021 0705 by [place] Rutledge RN  Outcome: Progressing     Problem: SAFETY ADULT  Goal: Patient will remain free of falls  Description: INTERVENTIONS:  - Assess patient frequently for physical needs  -  Identify cognitive and physical deficits and behaviors that affect risk of falls    -  [place] fall precautions as indicated by assessment   - Educate patient/family on patient safety including physical limitations  - Instruct patient to call for assistance with activity based on assessment  - Modify environment to reduce risk of injury  - Consider OT/PT consult to assist with strengthening/mobility  5/27/2021 1506 by [place] Rutledge RN  Outcome: Adequate for Discharge  5/27/2021 0705 by [place] Rutledge RN  Outcome: Progressing  Goal: Maintain or return to baseline ADL function  Description: INTERVENTIONS:  -  Assess patient's ability to carry out ADLs; assess patient's baseline for ADL function and identify physical deficits which impact ability to perform ADLs (bathing, care of mouth/teeth, toileting, grooming, dressing, etc )  - Assess/evaluate cause of self-care deficits   - Assess range of motion  - Assess patient's mobility; develop plan if impaired  - Assess patient's need for assistive devices and provide as appropriate  - Encourage maximum independence but intervene and supervise when necessary  - Involve family in performance of ADLs  - Assess for home care needs following discharge   - Consider OT consult to assist with ADL evaluation and planning for discharge  - Provide patient education as appropriate  5/27/2021 1506 by [place] Rutledge RN  Outcome: Adequate for Discharge  5/27/2021 0705 by Jaylon Desai RN  Outcome: Progressing  Goal: Maintain or return mobility status to optimal level  Description: INTERVENTIONS:  - Assess patient's baseline mobility status (ambulation, transfers, stairs, etc )    - Identify cognitive and physical deficits and behaviors that affect mobility  - Identify mobility aids required to assist with transfers and/or ambulation (gait belt, sit-to-stand, lift, walker, cane, etc )  - [place] fall precautions as indicated by assessment  - Record patient progress and toleration of activity level on Mobility SBAR; progress patient to next Phase/Stage  - Instruct patient to call for assistance with activity based on assessment  - Consider rehabilitation consult to assist with strengthening/weightbearing, etc   5/27/2021 1506 by Jaylon Desai RN  Outcome: Adequate for Discharge  5/27/2021 0705 by Jaylon Desai RN  Outcome: Progressing     Problem: DISCHARGE PLANNING  Goal: Discharge to home or other facility with appropriate resources  Description: INTERVENTIONS:  - Identify barriers to discharge w/patient and caregiver  - Arrange for needed discharge resources and transportation as appropriate  - Identify discharge learning needs (meds, wound care, etc )  - Arrange for interpretive services to assist at discharge as needed  - Refer to Case Management Department for coordinating discharge planning if the patient needs post-hospital services based on physician/advanced practitioner order or complex needs related to functional status, cognitive ability, or social support system  5/27/2021 1506 by Jaylon Desai RN  Outcome: Adequate for Discharge  5/27/2021 0705 by Jaylon Desai RN  Outcome: Progressing     Problem: Knowledge Deficit  Goal: Patient/family/caregiver demonstrates understanding of disease process, treatment plan, medications, and discharge instructions  Description: Complete learning assessment and assess knowledge base    Interventions:  - Provide teaching at level of understanding  - Provide teaching via preferred learning methods  5/27/2021 1506 by Guadalupe Herrera RN  Outcome: Adequate for Discharge  5/27/2021 0705 by Guadalupe Herrera RN  Outcome: Progressing

## 2021-05-27 NOTE — PLAN OF CARE
Problem: Potential for Falls  Goal: Patient will remain free of falls  Description: INTERVENTIONS:  - Assess patient frequently for physical needs  -  Identify cognitive and physical deficits and behaviors that affect risk of falls    -  [place] fall precautions as indicated by assessment   - Educate patient/family on patient safety including physical limitations  - Instruct patient to call for assistance with activity based on assessment  - Modify environment to reduce risk of injury  - Consider OT/PT consult to assist with strengthening/mobility  Outcome: Progressing     Problem: PAIN - ADULT  Goal: Verbalizes/displays adequate comfort level or baseline comfort level  Description: Interventions:  - Encourage patient to monitor pain and request assistance  - Assess pain using appropriate pain scale  - Administer analgesics based on type and severity of pain and evaluate response  - Implement non-pharmacological measures as appropriate and evaluate response  - Consider cultural and social influences on pain and pain management  - Notify physician/advanced practitioner if interventions unsuccessful or patient reports new pain  Outcome: Progressing     Problem: INFECTION - ADULT  Goal: Absence or prevention of progression during hospitalization  Description: INTERVENTIONS:  - Assess and monitor for signs and symptoms of infection  - Monitor lab/diagnostic results  - Monitor all insertion sites, i e  indwelling lines, tubes, and drains  - [place] appropriate cooling/warming therapies per order  - Administer medications as ordered  - Instruct and encourage patient and family to use good hand hygiene technique  - Identify and instruct in appropriate isolation precautions for identified infection/condition  Outcome: Progressing  Goal: Absence of fever/infection during neutropenic period  Description: INTERVENTIONS:  - Monitor WBC    Outcome: Progressing     Problem: SAFETY ADULT  Goal: Patient will remain free of falls  Description: INTERVENTIONS:  - Assess patient frequently for physical needs  -  Identify cognitive and physical deficits and behaviors that affect risk of falls    -  [place] fall precautions as indicated by assessment   - Educate patient/family on patient safety including physical limitations  - Instruct patient to call for assistance with activity based on assessment  - Modify environment to reduce risk of injury  - Consider OT/PT consult to assist with strengthening/mobility  Outcome: Progressing  Goal: Maintain or return to baseline ADL function  Description: INTERVENTIONS:  -  Assess patient's ability to carry out ADLs; assess patient's baseline for ADL function and identify physical deficits which impact ability to perform ADLs (bathing, care of mouth/teeth, toileting, grooming, dressing, etc )  - Assess/evaluate cause of self-care deficits   - Assess range of motion  - Assess patient's mobility; develop plan if impaired  - Assess patient's need for assistive devices and provide as appropriate  - Encourage maximum independence but intervene and supervise when necessary  - Involve family in performance of ADLs  - Assess for home care needs following discharge   - Consider OT consult to assist with ADL evaluation and planning for discharge  - Provide patient education as appropriate  Outcome: Progressing  Goal: Maintain or return mobility status to optimal level  Description: INTERVENTIONS:  - Assess patient's baseline mobility status (ambulation, transfers, stairs, etc )    - Identify cognitive and physical deficits and behaviors that affect mobility  - Identify mobility aids required to assist with transfers and/or ambulation (gait belt, sit-to-stand, lift, walker, cane, etc )  - [place] fall precautions as indicated by assessment  - Record patient progress and toleration of activity level on Mobility SBAR; progress patient to next Phase/Stage  - Instruct patient to call for assistance with activity based on assessment  - Consider rehabilitation consult to assist with strengthening/weightbearing, etc   Outcome: Progressing     Problem: DISCHARGE PLANNING  Goal: Discharge to home or other facility with appropriate resources  Description: INTERVENTIONS:  - Identify barriers to discharge w/patient and caregiver  - Arrange for needed discharge resources and transportation as appropriate  - Identify discharge learning needs (meds, wound care, etc )  - Arrange for interpretive services to assist at discharge as needed  - Refer to Case Management Department for coordinating discharge planning if the patient needs post-hospital services based on physician/advanced practitioner order or complex needs related to functional status, cognitive ability, or social support system  Outcome: Progressing     Problem: Knowledge Deficit  Goal: Patient/family/caregiver demonstrates understanding of disease process, treatment plan, medications, and discharge instructions  Description: Complete learning assessment and assess knowledge base    Interventions:  - Provide teaching at level of understanding  - Provide teaching via preferred learning methods  Outcome: Progressing

## 2021-05-27 NOTE — TELEPHONE ENCOUNTER
Contacted and spoke with patient to schedule an appointment for stent removal  Patient scheduled 06/02/2021 at 1000 at the [place] office  Patient aware of office location

## 2021-05-28 ENCOUNTER — TRANSITIONAL CARE MANAGEMENT (OUTPATIENT)
Dept: FAMILY MEDICINE CLINIC | Facility: CLINIC | Age: 66
End: 2021-05-28

## 2021-05-28 NOTE — TELEPHONE ENCOUNTER
Patient is calling to ask if he needs to repeat his labs prior to his nurse visit  Stated his levels where a bit high  Please advise

## 2021-05-28 NOTE — TELEPHONE ENCOUNTER
Returned Patient call  Patient stated he needs repeat labs for WBC counts per discharging Provider  Hospital discharge summary:  1 Follow up with Shante Cantor MD (Urology) in 1 week (6/3/2021); Our office will contact you to remove the stent with the nurses next week, then see 1 of us in 6 months with a renal ultrasound    2 Follow up with Wei Francis DO (Family Medicine) in 1 week (6/3/2021)    Sending to provider to advise

## 2021-06-01 NOTE — TELEPHONE ENCOUNTER
Called and spoke with patient  Patient scheduled for stent removal tomorrow 06/02/21  Patient needs to change time of appt   Appointment time changed to 3:00pm

## 2021-06-02 ENCOUNTER — PROCEDURE VISIT (OUTPATIENT)
Dept: UROLOGY | Facility: CLINIC | Age: 66
End: 2021-06-02

## 2021-06-02 VITALS
HEIGHT: 69 IN | SYSTOLIC BLOOD PRESSURE: 120 MMHG | HEART RATE: 100 BPM | RESPIRATION RATE: 20 BRPM | WEIGHT: 200.62 LBS | DIASTOLIC BLOOD PRESSURE: 80 MMHG | BODY MASS INDEX: 29.71 KG/M2

## 2021-06-02 DIAGNOSIS — N20.0 CALCULUS OF KIDNEY: Primary | ICD-10-CM

## 2021-06-02 LAB
COLOR STONE: NORMAL
COMMENT-STONE3: NORMAL
COMPOSITION: NORMAL
LABORATORY COMMENT REPORT: NORMAL
PHOTO: NORMAL
SIZE STONE: NORMAL MM
SPEC SOURCE SUBJ: NORMAL
STONE ANALYSIS-IMP: NORMAL
URATE MFR STONE: 100 %
WT STONE: 134 MG

## 2021-06-02 PROCEDURE — 99211 OFF/OP EST MAY X REQ PHY/QHP: CPT

## 2021-06-02 PROCEDURE — 1036F TOBACCO NON-USER: CPT

## 2021-06-02 NOTE — PROGRESS NOTES
I supervised the Advanced Practitioner  I reviewed the Advanced Practitioner note and agree      Grace Kelley MD 06/02/21

## 2021-06-02 NOTE — PROGRESS NOTES
6/2/2021  Pratik Hernadez is a 76 y o  male  [id number]        Diagnosis  Chief Complaint     Nephrolithiasis; Right stent removal          Patient is s/p right ureteroscopy with stone extraction on 05/26/2021 with Dr Wing Carver  Patient to return in 6 months with renal ultrasound prior to visit      Procedure Stent with String Removal    Vitals:    06/02/21 1032   BP: 120/80   Pulse: 100   Resp: 20   Weight: 91 kg (200 lb 9 9 oz)   Height: 5' 9" (1 753 m)       Stent with string removed intact without difficulty  Reviewed post stent removal symptoms including flank pain, dysuria, and hematuria  Instructed patient to increase oral fluid intake  Encouraged the use of NSAIDS and other prescribed pain medication as needed for discomfort  Patient instructed to call the office or report to the ER for uncontrolled pain, fever, chills, nausea or vomiting         Madhuri Mercedes RN

## 2021-06-03 ENCOUNTER — OFFICE VISIT (OUTPATIENT)
Dept: FAMILY MEDICINE CLINIC | Facility: CLINIC | Age: 66
End: 2021-06-03
Payer: COMMERCIAL

## 2021-06-03 VITALS
TEMPERATURE: 97.9 F | HEIGHT: 69 IN | SYSTOLIC BLOOD PRESSURE: 110 MMHG | BODY MASS INDEX: 29.77 KG/M2 | DIASTOLIC BLOOD PRESSURE: 72 MMHG | WEIGHT: 201 LBS

## 2021-06-03 DIAGNOSIS — N13.2 HYDRONEPHROSIS WITH RENAL AND URETERAL CALCULUS OBSTRUCTION: ICD-10-CM

## 2021-06-03 DIAGNOSIS — D72.829 LEUKOCYTOSIS, UNSPECIFIED TYPE: ICD-10-CM

## 2021-06-03 DIAGNOSIS — N17.9 AKI (ACUTE KIDNEY INJURY) (HCC): Primary | ICD-10-CM

## 2021-06-03 PROCEDURE — 99495 TRANSJ CARE MGMT MOD F2F 14D: CPT | Performed by: FAMILY MEDICINE

## 2021-06-03 PROCEDURE — 36415 COLL VENOUS BLD VENIPUNCTURE: CPT | Performed by: FAMILY MEDICINE

## 2021-06-03 PROCEDURE — 3008F BODY MASS INDEX DOCD: CPT

## 2021-06-03 PROCEDURE — 1111F DSCHRG MED/CURRENT MED MERGE: CPT | Performed by: FAMILY MEDICINE

## 2021-06-03 NOTE — PATIENT INSTRUCTIONS
Kidney Stones   WHAT YOU NEED TO KNOW:   What is a kidney stone? Kidney stones form in the urinary system when the water and waste in your urine are out of balance  When this happens, certain types of waste crystals separate from the urine  The crystals build up and form kidney stones  Kidney stones can be made of uric acid, calcium, phosphate, or oxalate crystals  You may have more than one kidney stone  What increases my risk for kidney stones? · Not drinking enough liquids (especially water) each day    · Having urinary tract infections often    · Too much of certain foods, such as meat, salt, nuts, and chocolate    · Obesity    · Certain medicines, such as diuretics, steroids, and antacids    · A family history of kidney stones    · Being born with a kidney or bowel disorder    What are the signs and symptoms of kidney stones? · Pain in the middle of your back that moves across to your side or that may spread to your groin    · Nausea and vomiting    · Urge to urinate often, burning feeling when you urinate, or pink or red urine    · Tenderness in your lower back, side, or stomach    How are kidney stones diagnosed? Your healthcare provider will ask about your health and usual foods  He or she may refer you to a urologist  Sherry Keyana may need tests to find out what type of kidney stones you have  Tests can show the size of your kidney stones and where they are in your urinary system  You may need more than one of the following:  · Urine tests  may show if you have blood in your urine  They may also show high amounts of the substances that form kidney stones, such as uric acid  · Blood tests  show how well your kidneys are working  They may also be used to check the levels of calcium or uric acid in your blood  · X-ray or ultrasound pictures  may be taken of your kidneys, bladder, and ureters  You may be given contrast liquid before an x-ray to help these show up better in the pictures   You may need to have more than one x-ray  Tell the healthcare provider if you have ever had an allergic reaction to contrast liquid  How are kidney stones treated? · NSAIDs , such as ibuprofen, help decrease swelling, pain, and fever  This medicine is available with or without a doctor's order  NSAIDs can cause stomach bleeding or kidney problems in certain people  If you take blood thinner medicine, always ask your healthcare provider if NSAIDs are safe for you  Always read the medicine label and follow directions  · Prescription pain medicine  may be given  Ask your healthcare provider how to take this medicine safely  Some prescription pain medicines contain acetaminophen  Do not take other medicines that contain acetaminophen without talking to your healthcare provider  Too much acetaminophen may cause liver damage  Prescription pain medicine may cause constipation  Ask your healthcare provider how to prevent or treat constipation  · Medicines  to balance your electrolytes may be needed  · A procedure or surgery  to remove the kidney stones may be needed if they do not pass on their own  Your treatment will depend on the size and location of your kidney stones  What can I do to manage kidney stones? · Drink more liquids  Your healthcare provider may tell you to drink at least 8 to 12 (eight-ounce) cups of liquids each day  This helps flush out the kidney stones when you urinate  Water is the best liquid to drink  · Strain your urine every time you go to the bathroom  Urinate through a strainer or a piece of thin cloth to catch the stones  Take the stones to your healthcare provider so they can be sent to the lab for tests  This will help your healthcare providers plan the best treatment for you  · Eat a variety of healthy foods  Healthy foods include fruits, vegetables, whole-grain breads, low-fat dairy products, beans, and fish  You may need to limit how much sodium (salt) or protein you eat  Ask for information about the best foods for you  · Stay active  Your stones may pass more easily if you stay active  Exercise can also help you manage your weight  Ask about the best activities for you  When should I seek immediate care? · You have vomiting that is not relieved by medicine  When should I contact my healthcare provider? · You have a fever  · You have trouble passing urine  · You see blood in your urine  · You have severe pain  · You have any questions or concerns about your condition or care  CARE AGREEMENT:   You have the right to help plan your care  Learn about your health condition and how it may be treated  Discuss treatment options with your healthcare providers to decide what care you want to receive  You always have the right to refuse treatment  The above information is an  only  It is not intended as medical advice for individual conditions or treatments  Talk to your doctor, nurse or pharmacist before following any medical regimen to see if it is safe and effective for you  © Copyright [address] Information is for End User's use only and may not be sold, redistributed or otherwise used for commercial purposes   All illustrations and images included in CareNotes® are the copyrighted property of A D A M , Inc  or [address] [place] [address]

## 2021-06-03 NOTE — PROGRESS NOTES
Assessment/Plan:     Hydronephrosis with renal and ureteral calculus obstruction (Right)  Stent removed yesterady For followup with urology including a renal US in 6 months    MAKENZIE (acute kidney injury) ([facility] )  Repeat BMP and followup in 6 months    Leukocytosis  Will repeat labs today        Diagnoses and all orders for this visit:    [facility] ([facility] )  -     CBC and differential  -     Basic metabolic panel    Hydronephrosis with renal and ureteral calculus obstruction (Right)  -     Basic metabolic panel    Leukocytosis, unspecified type  -     CBC and differential         Subjective:     Patient ID: Yossi Yun is a 76 y o  male  HPI    Review of Systems   Constitutional: Negative for activity change, appetite change, fatigue and fever  Respiratory: Negative for cough and shortness of breath  Cardiovascular: Negative for chest pain, palpitations and leg swelling  Gastrointestinal: Negative for abdominal pain, nausea and vomiting  Genitourinary: Negative for decreased urine volume, difficulty urinating, dysuria, flank pain, frequency and urgency  Musculoskeletal: Positive for arthralgias  Skin: Negative for rash  Neurological: Negative for dizziness, light-headedness, numbness and headaches  Psychiatric/Behavioral: Negative for dysphoric mood  The patient is not nervous/anxious  Objective:     Physical Exam  Vitals signs and nursing note reviewed  Constitutional:       Appearance: He is well-developed  He is obese  HENT:      Head: Normocephalic and atraumatic  Right Ear: Hearing, tympanic membrane and external ear normal       Left Ear: Hearing, tympanic membrane and external ear normal    Eyes:      Extraocular Movements: Extraocular movements intact  Conjunctiva/sclera: Conjunctivae normal       Pupils: Pupils are equal, round, and reactive to light  Neck:      Musculoskeletal: Neck supple  Thyroid: No thyromegaly     Cardiovascular:      Rate and Rhythm: Normal rate  Heart sounds: Normal heart sounds  Pulmonary:      Effort: Pulmonary effort is normal       Breath sounds: Normal breath sounds  No wheezing or rales  Abdominal:      General: Bowel sounds are normal  There is no distension  Palpations: Abdomen is soft  Tenderness: There is no abdominal tenderness  Musculoskeletal:         General: No tenderness  Lymphadenopathy:      Cervical: No cervical adenopathy  Skin:     General: Skin is warm and dry  Findings: No rash  Neurological:      General: No focal deficit present  Mental Status: He is alert and oriented to person, place, and time  Cranial Nerves: No cranial nerve deficit  Coordination: Coordination normal    Psychiatric:         Mood and Affect: Mood normal          Behavior: Behavior normal          Thought Content: Thought content normal          Judgment: Judgment normal            Vitals:    06/03/21 1220   BP: 110/72   Temp: 97 9 °F (36 6 °C)   Weight: 91 2 kg (201 lb)   Height: 5' 9" (1 753 m)       Transitional Care Management Review:  Siobhan Mckeon is a 76 y o  male here for TCM follow up  During the TCM phone call patient stated:    TCM Call (since 5/3/2021)     Date and time call was made  5/28/2021 11:34 AM    Hospital care reviewed  Records reviewed    Patient [facility] hospitial[facility] at  [facility]        Date of Admission  05/25/21    Date of discharge  05/27/21    Diagnosis  hydronephrosis with renal and ureteral calculi    Disposition  Home    Current Symptoms  None      TCM Call (since 5/3/2021)     Post hospital issues  None    Scheduled for follow up?   Yes    I have advised the patient to call PCP with any new or worsening symptoms  Em Hopper, [address] [place] [address]

## 2021-06-04 ENCOUNTER — TELEPHONE (OUTPATIENT)
Dept: FAMILY MEDICINE CLINIC | Facility: CLINIC | Age: 66
End: 2021-06-04

## 2021-06-04 DIAGNOSIS — N17.9 AKI (ACUTE KIDNEY INJURY) (HCC): Primary | ICD-10-CM

## 2021-06-04 LAB
BASOPHILS # BLD AUTO: 65 CELLS/UL (ref 0–200)
BASOPHILS NFR BLD AUTO: 0.6 %
BUN SERPL-MCNC: 31 MG/DL (ref 7–25)
BUN/CREAT SERPL: 18 (CALC) (ref 6–22)
CALCIUM SERPL-MCNC: 9.6 MG/DL (ref 8.6–10.3)
CHLORIDE SERPL-SCNC: 106 MMOL/L (ref 98–110)
CO2 SERPL-SCNC: 24 MMOL/L (ref 20–32)
CREAT SERPL-MCNC: 1.7 MG/DL (ref 0.7–1.25)
EOSINOPHIL # BLD AUTO: 205 CELLS/UL (ref 15–500)
EOSINOPHIL NFR BLD AUTO: 1.9 %
ERYTHROCYTE [DISTWIDTH] IN BLOOD BY AUTOMATED COUNT: 13 % (ref 11–15)
GLUCOSE SERPL-MCNC: 94 MG/DL (ref 65–99)
HCT VFR BLD AUTO: 42.3 % (ref 38.5–50)
HGB BLD-MCNC: 13.9 G/DL (ref 13.2–17.1)
LYMPHOCYTES # BLD AUTO: 1566 CELLS/UL (ref 850–3900)
LYMPHOCYTES NFR BLD AUTO: 14.5 %
MCH RBC QN AUTO: 29.6 PG (ref 27–33)
MCHC RBC AUTO-ENTMCNC: 32.9 G/DL (ref 32–36)
MCV RBC AUTO: 90 FL (ref 80–100)
MONOCYTES # BLD AUTO: 1026 CELLS/UL (ref 200–950)
MONOCYTES NFR BLD AUTO: 9.5 %
NEUTROPHILS # BLD AUTO: 7938 CELLS/UL (ref 1500–7800)
NEUTROPHILS NFR BLD AUTO: 73.5 %
PLATELET # BLD AUTO: 339 THOUSAND/UL (ref 140–400)
PMV BLD REES-ECKER: 9.8 FL (ref 7.5–12.5)
POTASSIUM SERPL-SCNC: 4.5 MMOL/L (ref 3.5–5.3)
RBC # BLD AUTO: 4.7 MILLION/UL (ref 4.2–5.8)
SL AMB EGFR AFRICAN AMERICAN: 48 ML/MIN/1.73M2
SL AMB EGFR NON AFRICAN AMERICAN: 41 ML/MIN/1.73M2
SODIUM SERPL-SCNC: 139 MMOL/L (ref 135–146)
WBC # BLD AUTO: 10.8 THOUSAND/UL (ref 3.8–10.8)

## 2021-06-21 ENCOUNTER — APPOINTMENT (OUTPATIENT)
Dept: RADIOLOGY | Facility: CLINIC | Age: 66
End: 2021-06-21
Payer: COMMERCIAL

## 2021-06-21 ENCOUNTER — OFFICE VISIT (OUTPATIENT)
Dept: URGENT CARE | Facility: CLINIC | Age: 66
End: 2021-06-21
Payer: COMMERCIAL

## 2021-06-21 VITALS
TEMPERATURE: 97.7 F | DIASTOLIC BLOOD PRESSURE: 86 MMHG | RESPIRATION RATE: 18 BRPM | OXYGEN SATURATION: 97 % | HEART RATE: 68 BPM | SYSTOLIC BLOOD PRESSURE: 130 MMHG

## 2021-06-21 DIAGNOSIS — M79.89 SWELLING OF RIGHT HAND: ICD-10-CM

## 2021-06-21 DIAGNOSIS — M25.431 SWELLING OF RIGHT WRIST: ICD-10-CM

## 2021-06-21 DIAGNOSIS — M25.431 SWELLING OF RIGHT WRIST: Primary | ICD-10-CM

## 2021-06-21 PROCEDURE — G0382 LEV 3 HOSP TYPE B ED VISIT: HCPCS | Performed by: PHYSICIAN ASSISTANT

## 2021-06-21 PROCEDURE — 73130 X-RAY EXAM OF HAND: CPT

## 2021-06-21 PROCEDURE — 73110 X-RAY EXAM OF WRIST: CPT

## 2021-06-21 NOTE — PROGRESS NOTES
[address]        NAME: Yossi Yun is a 76 y o  male  : 1955    MRN: [id number]  DATE: 2021  TIME: 10:41 AM    Assessment and Plan   Swelling of right wrist [M25 431]  1  Swelling of right wrist  XR wrist 3+ vw right    Ambulatory referral to Rheumatology   2  Swelling of right hand  XR hand 3+ vw right    Ambulatory referral to Rheumatology     Unable to take NSAIDs  Caution recommended with antihistamines with renal dysfunction  XR: degenerative changes  Patient Instructions     Rest, ice, compression and elevation  Tylenol 500-1000mg every 6 hours  Do not exceed this dosage  Follow up with rheumatology  Follow up with PCP in 3-5 days  Proceed to  ER if symptoms worsen  Discussed risk of immobilization with patient  Remove splint/brace and go straight to ER if you experience sudden increase in pain, swelling, change in color/temperature/sensation  Patient verbalized understanding  Chief Complaint     Chief Complaint   Patient presents with    Joint Swelling     RIGHT WRIST X A COUPLE OF DAYS         History of Present Illness       Reports R wrist and hand swelling/pain worse with wrist ROM and gripping over the past few days unimproved with tylenol  Denies any particular injury but suspects it may be due to overuse, attributing it to pulling himself up onto a horse  States this has happened to him a few times in the past, occurring about once every year that typically resolves with NSAIDs  Patient is unable to take NSAIDs secondary to kidney function  No breaks to the skin, insect bites/stings, fever, chills, or erythema  PMH of OA but denies RA, gout, DVT/PE, or clotting disorders  Patient had lithotripsy and stent insertion about 3 weeks ago for renal stone  Denies immobilization over the last few days  Review of Systems   Review of Systems   Constitutional: Negative for chills and fever  Musculoskeletal: Positive for joint swelling     Skin: Negative for color change and wound  Neurological: Positive for weakness  Negative for numbness           Current Medications       Current Outpatient Medications:     sildenafil (Viagra) 50 MG tablet, Take 1 tablet (50 mg total) by mouth as needed for erectile dysfunction, Disp: 10 tablet, Rfl: 1    simvastatin (ZOCOR) 20 mg tablet, TAKE 1 TABLET DAILY, Disp: 90 tablet, Rfl: 1    phenazopyridine (PYRIDIUM) 200 mg tablet, Take 1 tablet (200 mg total) by mouth 3 (three) times a day as needed for bladder spasms (Patient not taking: Reported on 6/3/2021), Disp: 30 tablet, Rfl: 0    Current Allergies     Allergies as of 06/21/2021    (No Known Allergies)            The following portions of the patient's history were reviewed and updated as appropriate: allergies, current medications, past family history, past medical history, past social history, past surgical history and problem list      Past Medical History:   Diagnosis Date    Acid reflux     Arthritis     Cancer ([facility] )     skin    Colon polyp     Essential hypertriglyceridemia     last assessed: 5/23/2013    H/O renal calculi     History of hypogonadism     last assessed: 12/15/2014    Hyperlipidemia     Kidney stone     Nasal lesion     left side    Neck mass     Tinnitus     Wears contact lenses        Past Surgical History:   Procedure Laterality Date    COLONOSCOPY      polyp repeat in 5 yrs    ESOPHAGOGASTRODUODENOSCOPY      gastritis and hiatal hernia    FL RETROGRADE PYELOGRAM  5/26/2021    FLAP LOCAL HEAD / NECK Left 8/7/2020    Procedure: EXCISION OF NECK SCC W/LOCAL FLAP;  Surgeon: Rodrigo Vaughn MD;  Location: [address] [place] [address] OR;  Service: Plastics    MASS EXCISION Left 5/9/2016    Procedure: EXCISION BASAL CELL SKIN CANCER LEFT NOSE, FROZEN SECTION, LOCAL FLAP;  Surgeon: Rodrigo Vaughn MD;  [facility];  Service:     MOHS SURGERY      mohs mirographic surgery nose    NV ADJ TISS XFER HEAD,FAC,HAND <10 SQCM N/A 12/7/2018    Procedure: EXCISION BCC OF NECK W/LOCAL FLAP, FROZEN SECTION;  Surgeon: Hari Londono MD;  Location: [address] [place] [address] MAIN OR;  Service: Plastics    HI CYSTO/URETERO W/LITHOTRIPSY &INDWELL STENT INSRT Right 5/26/2021    Procedure: CYSTOSCOPY URETEROSCOPY WITH BASKET STONE EXTRACTION, RETROGRADE PYELOGRAM AND INSERTION STENT URETERAL;  Surgeon: Shanell Arias MD;  Location:  MAIN OR;  Service: Urology    HI FULL THICK 2011 [facility] NOS,EAR,LID <20 SQCM N/A 5/9/2016    Procedure:  LOCAL FLAP SKIN GRAFT ;  Surgeon: Hari Londono MD;  [facility];  Service: Plastics    VASECTOMY      WISDOM TOOTH EXTRACTION         Family History   Problem Relation Age of Onset    Diabetes Mother         mellitus    Dementia Mother     Other Father     No Known Problems Half-Sister          Medications have been verified  Objective   /86   Pulse 68   Temp 97 7 °F (36 5 °C) (Temporal)   Resp 18   SpO2 97%   No LMP for male patient  Physical Exam     Physical Exam  Vitals reviewed  Constitutional:       General: He is not in acute distress  Appearance: He is well-developed  HENT:      Head: Normocephalic and atraumatic  Cardiovascular:      Rate and Rhythm: Normal rate and regular rhythm  Pulses: Normal pulses  Heart sounds: Normal heart sounds  No murmur heard  No friction rub  No gallop  Pulmonary:      Effort: Pulmonary effort is normal  No respiratory distress  Breath sounds: Normal breath sounds  No wheezing or rales  Chest:      Chest wall: No tenderness  Musculoskeletal:         General: Swelling present  No tenderness or deformity  Comments: Pain with R wrist flexion, extension, supination, pronation and gripping  Flexion and extension limited secondary to pain and swelling  Swelling from distal R wrist to distal 1-5th fingertips  No pitting  No tenderness to palpation  Skin:     General: Skin is warm  Capillary Refill: Capillary refill takes less than 2 seconds        Findings: No bruising or erythema  Comments: No lesions or wounds  Neurological:      Mental Status: He is alert and oriented to person, place, and time  Sensory: No sensory deficit  Comments: Patient is neurovascularly intact  Psychiatric:         Behavior: Behavior normal          Thought Content:  Thought content normal          Judgment: Judgment normal            Orthopedic injury treatment    Date/Time: 6/21/2021 10:42 AM  Performed by: Viry Rossi PA-C  Authorized by: Viry Rossi PA-C     Verbal consent obtained?: Yes    Risks and benefits: Risks, benefits and alternatives were discussed    Consent given by:  Patient  Patient identity confirmed:  Verbally with patient  Injury location: R hand/wrist   Neurovascular status: Neurovascularly intact    Distal perfusion: normal    Neurological function: normal    Range of motion: reduced    Immobilization:  Ace wrap  Neurovascular status: Neurovascularly intact    Distal perfusion: normal    Neurological function: normal    Range of motion: unchanged    Patient tolerance:  Patient tolerated the procedure well with no immediate complications

## 2021-06-21 NOTE — PATIENT INSTRUCTIONS
Rest, ice, compression and elevation  Tylenol 500-1000mg every 6 hours  Do not exceed this dosage  Follow up with rheumatology  Follow up with PCP in 3-5 days  Proceed to  ER if symptoms worsen  Discussed risk of immobilization with patient  Remove splint/brace and go straight to ER if you experience sudden increase in pain, swelling, change in color/temperature/sensation  Patient verbalized understanding  Swollen Joint   AMBULATORY CARE:   Joint swelling  may occur in one or more joints  You may have other symptoms, such as pain, tenderness, or stiffness  A swollen joint may be caused by a variety of conditions such as arthritis, pseudogout, gout, tendinitis, or injury  Seek care immediately if:   · You cannot move your joint at all  · You have severe pain that does not get better with medicine  Contact your healthcare provider if:   · You have a fever  · You have redness or warmth over the joint  · The swelling does not decrease with treatment  · You have questions or concerns about your condition or care  Treatment for a swollen joint  depends on the cause of your swollen joint  Your healthcare provider may recommend any of the following:  · Rest  your swollen joint  Avoid activities that make the swelling or pain worse  You may need to avoid putting weight on your joint while you have pain  Crutches or a walker can be used to avoid putting weight on joints in your lower body  · Apply ice  on your swollen joint for 15 to 20 minutes every hour or as directed  Use an ice pack, or put crushed ice in a plastic bag  Cover it with a towel  Ice helps prevent tissue damage and decreases swelling and pain  · Apply heat  on your swollen joint for 20 to 30 minutes every 2 hours for as many days as directed  Heat helps decrease pain  · Elevate  your swollen joint above the level of your heart as often as you can  This will help decrease swelling and pain   Prop your joint on pillows or blankets to keep it elevated comfortably  · NSAIDs , such as ibuprofen, help decrease swelling, pain, and fever  This medicine is available with or without a doctor's order  NSAIDs can cause stomach bleeding or kidney problems in certain people  If you take blood thinner medicine, always ask your healthcare provider if NSAIDs are safe for you  Always read the medicine label and follow directions  Follow up with your healthcare provider as directed:  Write down your questions so you remember to ask them during your visits  © Copyright [address] Information is for End User's use only and may not be sold, redistributed or otherwise used for commercial purposes  All illustrations and images included in CareNotes® are the copyrighted property of [employer]  or [address] [place], [place] [address]   The above information is an  only  It is not intended as medical advice for individual conditions or treatments  Talk to your doctor, nurse or pharmacist before following any medical regimen to see if it is safe and effective for you

## 2021-06-24 ENCOUNTER — OFFICE VISIT (OUTPATIENT)
Dept: FAMILY MEDICINE CLINIC | Facility: CLINIC | Age: 66
End: 2021-06-24
Payer: COMMERCIAL

## 2021-06-24 ENCOUNTER — APPOINTMENT (OUTPATIENT)
Dept: LAB | Facility: MEDICAL CENTER | Age: 66
End: 2021-06-24
Payer: COMMERCIAL

## 2021-06-24 VITALS
TEMPERATURE: 97.8 F | SYSTOLIC BLOOD PRESSURE: 120 MMHG | HEIGHT: 69 IN | DIASTOLIC BLOOD PRESSURE: 78 MMHG | WEIGHT: 201.4 LBS | BODY MASS INDEX: 29.83 KG/M2

## 2021-06-24 DIAGNOSIS — M25.531 RIGHT WRIST PAIN: ICD-10-CM

## 2021-06-24 DIAGNOSIS — N17.9 AKI (ACUTE KIDNEY INJURY) (HCC): ICD-10-CM

## 2021-06-24 DIAGNOSIS — D72.829 LEUKOCYTOSIS, UNSPECIFIED TYPE: ICD-10-CM

## 2021-06-24 DIAGNOSIS — M79.641 RIGHT HAND PAIN: ICD-10-CM

## 2021-06-24 DIAGNOSIS — N13.2 HYDRONEPHROSIS WITH RENAL AND URETERAL CALCULUS OBSTRUCTION: ICD-10-CM

## 2021-06-24 DIAGNOSIS — M25.441 SWELLING OF JOINT, HAND, RIGHT: Primary | ICD-10-CM

## 2021-06-24 DIAGNOSIS — M25.441 SWELLING OF JOINT OF RIGHT HAND: ICD-10-CM

## 2021-06-24 LAB
ALBUMIN SERPL BCP-MCNC: 3.8 G/DL (ref 3.5–5)
ALP SERPL-CCNC: 70 U/L (ref 46–116)
ALT SERPL W P-5'-P-CCNC: 23 U/L (ref 12–78)
ANION GAP SERPL CALCULATED.3IONS-SCNC: 5 MMOL/L (ref 4–13)
AST SERPL W P-5'-P-CCNC: 14 U/L (ref 5–45)
BILIRUB SERPL-MCNC: 0.41 MG/DL (ref 0.2–1)
BUN SERPL-MCNC: 24 MG/DL (ref 5–25)
CALCIUM SERPL-MCNC: 9.9 MG/DL (ref 8.3–10.1)
CHLORIDE SERPL-SCNC: 107 MMOL/L (ref 100–108)
CO2 SERPL-SCNC: 27 MMOL/L (ref 21–32)
CREAT SERPL-MCNC: 1.56 MG/DL (ref 0.6–1.3)
CRP SERPL QL: 36.6 MG/L
ERYTHROCYTE [DISTWIDTH] IN BLOOD BY AUTOMATED COUNT: 13.8 % (ref 11.6–15.1)
GFR SERPL CREATININE-BSD FRML MDRD: 46 ML/MIN/1.73SQ M
GLUCOSE P FAST SERPL-MCNC: 91 MG/DL (ref 65–99)
HCT VFR BLD AUTO: 42.4 % (ref 36.5–49.3)
HGB BLD-MCNC: 14.1 G/DL (ref 12–17)
MCH RBC QN AUTO: 29.8 PG (ref 26.8–34.3)
MCHC RBC AUTO-ENTMCNC: 33.3 G/DL (ref 31.4–37.4)
MCV RBC AUTO: 90 FL (ref 82–98)
PLATELET # BLD AUTO: 361 THOUSANDS/UL (ref 149–390)
PMV BLD AUTO: 9.7 FL (ref 8.9–12.7)
POTASSIUM SERPL-SCNC: 4.7 MMOL/L (ref 3.5–5.3)
PROT SERPL-MCNC: 8.8 G/DL (ref 6.4–8.2)
RBC # BLD AUTO: 4.73 MILLION/UL (ref 3.88–5.62)
SODIUM SERPL-SCNC: 139 MMOL/L (ref 136–145)
URATE SERPL-MCNC: 8.1 MG/DL (ref 4.2–8)
WBC # BLD AUTO: 8.26 THOUSAND/UL (ref 4.31–10.16)

## 2021-06-24 PROCEDURE — 36415 COLL VENOUS BLD VENIPUNCTURE: CPT

## 2021-06-24 PROCEDURE — 86140 C-REACTIVE PROTEIN: CPT

## 2021-06-24 PROCEDURE — 1036F TOBACCO NON-USER: CPT | Performed by: FAMILY MEDICINE

## 2021-06-24 PROCEDURE — 1160F RVW MEDS BY RX/DR IN RCRD: CPT | Performed by: FAMILY MEDICINE

## 2021-06-24 PROCEDURE — 85027 COMPLETE CBC AUTOMATED: CPT

## 2021-06-24 PROCEDURE — 1111F DSCHRG MED/CURRENT MED MERGE: CPT | Performed by: FAMILY MEDICINE

## 2021-06-24 PROCEDURE — 86430 RHEUMATOID FACTOR TEST QUAL: CPT

## 2021-06-24 PROCEDURE — 99213 OFFICE O/P EST LOW 20 MIN: CPT | Performed by: FAMILY MEDICINE

## 2021-06-24 PROCEDURE — 86431 RHEUMATOID FACTOR QUANT: CPT

## 2021-06-24 PROCEDURE — 86200 CCP ANTIBODY: CPT

## 2021-06-24 PROCEDURE — 86038 ANTINUCLEAR ANTIBODIES: CPT

## 2021-06-24 PROCEDURE — 80053 COMPREHEN METABOLIC PANEL: CPT

## 2021-06-24 PROCEDURE — 84550 ASSAY OF BLOOD/URIC ACID: CPT

## 2021-06-24 PROCEDURE — 3008F BODY MASS INDEX DOCD: CPT | Performed by: FAMILY MEDICINE

## 2021-06-24 RX ORDER — METHYLPREDNISOLONE 4 MG/1
TABLET ORAL
Qty: 21 EACH | Refills: 0 | Status: SHIPPED | OUTPATIENT
Start: 2021-06-24 | End: 2021-10-22

## 2021-06-24 NOTE — PROGRESS NOTES
Assessment/Plan:    Right hand pain  Reviewed xray and note from urgent care Will have labs and see rheumatology I will also try medrol dose pack for symptom relief       Diagnoses and all orders for this visit:    Swelling of joint, hand, right  -     Cancel: Comprehensive metabolic panel  -     Cancel: Uric acid  -     Cancel: SANTOSH Scr, IFA, W/Refl Titer/Pattern/Rheumatoid Arthritis Panel 1  -     Cancel: Rheumatoid Factor  -     Cancel: Cyclic citrul peptide antibody, IgG  -     Cancel: C-reactive protein  -     methylPREDNISolone 4 MG tablet therapy pack; Use as directed on package    Right hand pain  -     Cancel: Comprehensive metabolic panel  -     Cancel: Uric acid  -     Cancel: SANTOSH Scr, IFA, W/Refl Titer/Pattern/Rheumatoid Arthritis Panel 1  -     Cancel: Rheumatoid Factor  -     Cancel: Cyclic citrul peptide antibody, IgG  -     Cancel: C-reactive protein  -     methylPREDNISolone 4 MG tablet therapy pack; Use as directed on package    Right wrist pain  -     Cancel: Comprehensive metabolic panel  -     Cancel: Uric acid  -     Cancel: SANTOSH Scr, IFA, W/Refl Titer/Pattern/Rheumatoid Arthritis Panel 1  -     Cancel: Rheumatoid Factor  -     Cancel: Cyclic citrul peptide antibody, IgG  -     Cancel: C-reactive protein  -     methylPREDNISolone 4 MG tablet therapy pack; Use as directed on package          Subjective:   Chief Complaint   Patient presents with    Follow-up     urgent care  right hand swollen           Patient ID: Heydi Heart is a 76 y o  male      Patient is here for followup from urgent care Patient developed swelling or the right wrist, right thumb and base of right second and third finger for about one week Patient denies injury to the area Patient states the tylenol is not really helping him at all Patient states that he has had issues before with the right wrist and hand but never this bad Patient notes no rednmessor warmth He has appt with rheumatology for July 14, 2021 Patient has no history of any rheumatologic issues He has some primary osteoarthritis of other joints including neck and knees He was taking naprosym fairly often until his renal injury that was due to kidney stones     Hand Pain   The incident occurred more than 1 week ago  The incident occurred at home  There was no injury mechanism  The pain is present in the right fingers and right wrist  The quality of the pain is described as aching  The pain does not radiate  The pain is at a severity of 4/10  The pain is moderate  The pain has been constant since the incident  Pertinent negatives include no chest pain, muscle weakness, numbness or tingling  The symptoms are aggravated by movement  He has tried ice, elevation, acetaminophen and rest for the symptoms  The treatment provided mild relief  The following portions of the patient's history were reviewed and updated as appropriate: allergies, current medications, past family history, past medical history, past social history, past surgical history and problem list     Review of Systems   Cardiovascular: Negative for chest pain  Neurological: Negative for tingling and numbness  Objective:      /78   Temp 97 8 °F (36 6 °C)   Ht 5' 9" (1 753 m)   Wt 91 4 kg (201 lb 6 4 oz)   BMI 29 74 kg/m²          Physical Exam  Vitals and nursing note reviewed  Constitutional:       Appearance: Normal appearance  HENT:      Head: Normocephalic  Pulmonary:      Effort: Pulmonary effort is normal    Musculoskeletal:         General: Swelling, tenderness and deformity present  Comments: Swelling and tenderness at base of the right thumb as well as the first and second digits at both the MCP join and the PIP joints Patient with decrease hand  Pulses and capillary refill intact   Skin:     General: Skin is warm and dry  Neurological:      General: No focal deficit present  Mental Status: He is alert and oriented to person, place, and time        Cranial Nerves: No cranial nerve deficit  Sensory: No sensory deficit  Motor: No weakness        Coordination: Coordination normal

## 2021-06-24 NOTE — ASSESSMENT & PLAN NOTE
Reviewed xray and note from urgent care Will have labs and see rheumatology I will also try medrol dose pack for symptom relief

## 2021-06-25 LAB
CRYOGLOB RF SER-ACNC: ABNORMAL [IU]/ML
RHEUMATOID FACT SER QL LA: POSITIVE
RYE IGE QN: NEGATIVE

## 2021-06-28 LAB — CCP AB SER IA-ACNC: 0.9

## 2021-07-23 ENCOUNTER — OFFICE VISIT (OUTPATIENT)
Dept: UROLOGY | Facility: MEDICAL CENTER | Age: 66
End: 2021-07-23
Payer: COMMERCIAL

## 2021-07-23 VITALS
HEIGHT: 69 IN | WEIGHT: 193 LBS | BODY MASS INDEX: 28.58 KG/M2 | SYSTOLIC BLOOD PRESSURE: 114 MMHG | DIASTOLIC BLOOD PRESSURE: 78 MMHG

## 2021-07-23 DIAGNOSIS — N40.1 BENIGN PROSTATIC HYPERPLASIA WITH URINARY OBSTRUCTION: Primary | ICD-10-CM

## 2021-07-23 DIAGNOSIS — N20.0 CALCULUS OF KIDNEY: ICD-10-CM

## 2021-07-23 DIAGNOSIS — N13.8 BENIGN PROSTATIC HYPERPLASIA WITH URINARY OBSTRUCTION: Primary | ICD-10-CM

## 2021-07-23 LAB
SL AMB  POCT GLUCOSE, UA: NORMAL
SL AMB LEUKOCYTE ESTERASE,UA: NORMAL
SL AMB POCT BILIRUBIN,UA: NORMAL
SL AMB POCT BLOOD,UA: NORMAL
SL AMB POCT CLARITY,UA: CLEAR
SL AMB POCT COLOR,UA: YELLOW
SL AMB POCT KETONES,UA: NORMAL
SL AMB POCT NITRITE,UA: NORMAL
SL AMB POCT PH,UA: 5.5
SL AMB POCT SPECIFIC GRAVITY,UA: 1.02
SL AMB POCT URINE PROTEIN: NORMAL
SL AMB POCT UROBILINOGEN: 0.2

## 2021-07-23 PROCEDURE — 1160F RVW MEDS BY RX/DR IN RCRD: CPT | Performed by: UROLOGY

## 2021-07-23 PROCEDURE — 1036F TOBACCO NON-USER: CPT | Performed by: UROLOGY

## 2021-07-23 PROCEDURE — 99214 OFFICE O/P EST MOD 30 MIN: CPT | Performed by: UROLOGY

## 2021-07-23 PROCEDURE — 81003 URINALYSIS AUTO W/O SCOPE: CPT | Performed by: UROLOGY

## 2021-07-23 PROCEDURE — 3008F BODY MASS INDEX DOCD: CPT | Performed by: UROLOGY

## 2021-07-23 RX ORDER — COLCHICINE 0.6 MG/1
0.6 TABLET ORAL DAILY
COMMUNITY
Start: 2021-07-14 | End: 2022-05-06

## 2021-07-23 RX ORDER — PREDNISONE 10 MG/1
30 TABLET ORAL DAILY
COMMUNITY
Start: 2021-07-14 | End: 2022-05-06

## 2021-07-23 NOTE — PATIENT INSTRUCTIONS
Kidney Stones   WHAT YOU NEED TO KNOW:   What is a kidney stone? Kidney stones form in the urinary system when the water and waste in your urine are out of balance  When this happens, certain types of waste crystals separate from the urine  The crystals build up and form kidney stones  Kidney stones can be made of uric acid, calcium, phosphate, or oxalate crystals  You may have more than one kidney stone  What increases my risk for kidney stones? · Not drinking enough liquids (especially water) each day    · Having urinary tract infections often    · Too much of certain foods, such as meat, salt, nuts, and chocolate    · Obesity    · Certain medicines, such as diuretics, steroids, and antacids    · A family history of kidney stones    · Being born with a kidney or bowel disorder    What are the signs and symptoms of kidney stones? · Pain in the middle of your back that moves across to your side or that may spread to your groin    · Nausea and vomiting    · Urge to urinate often, burning feeling when you urinate, or pink or red urine    · Tenderness in your lower back, side, or stomach    How are kidney stones diagnosed? Your healthcare provider will ask about your health and usual foods  He or she may refer you to a urologist  Wilian Brar may need tests to find out what type of kidney stones you have  Tests can show the size of your kidney stones and where they are in your urinary system  You may need more than one of the following:  · Urine tests  may show if you have blood in your urine  They may also show high amounts of the substances that form kidney stones, such as uric acid  · Blood tests  show how well your kidneys are working  They may also be used to check the levels of calcium or uric acid in your blood  · X-ray or ultrasound pictures  may be taken of your kidneys, bladder, and ureters  You may be given contrast liquid before an x-ray to help these show up better in the pictures   You may need to have more than one x-ray  Tell the healthcare provider if you have ever had an allergic reaction to contrast liquid  How are kidney stones treated? · NSAIDs , such as ibuprofen, help decrease swelling, pain, and fever  This medicine is available with or without a doctor's order  NSAIDs can cause stomach bleeding or kidney problems in certain people  If you take blood thinner medicine, always ask your healthcare provider if NSAIDs are safe for you  Always read the medicine label and follow directions  · Acetaminophen  decreases pain and fever  It is available without a doctor's order  Ask how much to take and how often to take it  Follow directions  Read the labels of all other medicines you are using to see if they also contain acetaminophen, or ask your doctor or pharmacist  Acetaminophen can cause liver damage if not taken correctly  Do not use more than 4 grams (4,000 milligrams) total of acetaminophen in one day  · Prescription pain medicine  may be given  Ask your healthcare provider how to take this medicine safely  Some prescription pain medicines contain acetaminophen  Do not take other medicines that contain acetaminophen without talking to your healthcare provider  Too much acetaminophen may cause liver damage  Prescription pain medicine may cause constipation  Ask your healthcare provider how to prevent or treat constipation  · Medicines  to balance your electrolytes may be needed  · A procedure or surgery  to remove the kidney stones may be needed if they do not pass on their own  Your treatment will depend on the size and location of your kidney stones  What can I do to manage kidney stones? · Drink more liquids  Your healthcare provider may tell you to drink at least 8 to 12 (eight-ounce) cups of liquids each day  This helps flush out the kidney stones when you urinate  Water is the best liquid to drink  · Strain your urine every time you go to the bathroom    Urinate through a strainer or a piece of thin cloth to catch the stones  Take the stones to your healthcare provider so they can be sent to the lab for tests  This will help your healthcare providers plan the best treatment for you  · Eat a variety of healthy foods  Healthy foods include fruits, vegetables, whole-grain breads, low-fat dairy products, beans, and fish  You may need to limit how much sodium (salt) or protein you eat  Ask for information about the best foods for you  · Be physically active as directed  Your stones may pass more easily if you stay active  Physical activity can also help you manage your weight  Ask about the best activities for you  When should I seek immediate care? · You are vomiting and it is not relieved with medicine  When should I call my doctor? · You have a fever  · You have trouble urinating  · You see blood in your urine  · You have severe pain  · You have any questions or concerns about your condition or care  CARE AGREEMENT:   You have the right to help plan your care  Learn about your health condition and how it may be treated  Discuss treatment options with your healthcare providers to decide what care you want to receive  You always have the right to refuse treatment  The above information is an  only  It is not intended as medical advice for individual conditions or treatments  Talk to your doctor, nurse or pharmacist before following any medical regimen to see if it is safe and effective for you  © Copyright [employer] 2021 Information is for End User's use only and may not be sold, redistributed or otherwise used for commercial purposes   All illustrations and images included in CareNotes® are the copyrighted property of [employer]  or [address] [place] [address]

## 2021-07-23 NOTE — ASSESSMENT & PLAN NOTE
AUA symptom score is 3  He is pleased with his voiding pattern  PSA was normal at 1 1 in May 2021  We will continue to follow his voiding pattern watchful waiting  He will return in 1 year

## 2021-07-23 NOTE — ASSESSMENT & PLAN NOTE
He recently had a ureteral stone treated with ureteroscopy  On CT scan there were bilateral renal stones  Stone analysis reveals 100% uric acid stones  The patient was recently diagnosed with gout in his serum uric acid is elevated  He has an appointment with rheumatology on August 3rd  I recommended to the patient that he discuss the use of allopurinol with rheumatology to decrease his uric acid level and help prevent recurrent uric acid lithiasis

## 2021-07-23 NOTE — LETTER
July 23, 2021     MD Lyric [address] [place] [address]    Patient: Rai Grimes   YOB: 1955   Date of Visit: 7/23/2021       Dear Dr Tracy Khan: Thank you for referring Cate Disla to me for evaluation  Below are my notes for this consultation  If you have questions, please do not hesitate to call me  I look forward to following your patient along with you  Sincerely,        Sis Magallanes MD        CC: DO Sis Gr MD  7/23/2021  5:13 PM  Sign when Signing Visit  Assessment/Plan:    Benign prostatic hyperplasia with urinary obstruction  AUA symptom score is 3  He is pleased with his voiding pattern  PSA was normal at 1 1 in May 2021  We will continue to follow his voiding pattern watchful waiting  He will return in 1 year  Calculus of kidney  He recently had a ureteral stone treated with ureteroscopy  On CT scan there were bilateral renal stones  Stone analysis reveals 100% uric acid stones  The patient was recently diagnosed with gout in his serum uric acid is elevated  He has an appointment with rheumatology on August 3rd  I recommended to the patient that he discuss the use of allopurinol with rheumatology to decrease his uric acid level and help prevent recurrent uric acid lithiasis  Diagnoses and all orders for this visit:    Benign prostatic hyperplasia with urinary obstruction    Calculus of kidney  -     POCT urine dip auto non-scope  -     US kidney and bladder; Future    Other orders  -     colchicine (COLCRYS) 0 6 mg tablet; Take 0 6 mg by mouth daily  -     predniSONE 10 mg tablet; Take 30 mg by mouth daily (Patient not taking: Reported on 7/23/2021)          Subjective:      Patient ID: Rai Grimes is a 78 y o  male  Benign Prostatic Hypertrophy  This is a chronic problem  The current episode started more than 1 year ago  The problem is unchanged   Irritative symptoms include nocturia (nocturia x 0-1)  Irritative symptoms do not include frequency or urgency  Obstructive symptoms do not include dribbling, incomplete emptying, an intermittent stream, a slower stream, straining or a weak stream  Pertinent negatives include no chills, dysuria, genital pain, hematuria, hesitancy, nausea or vomiting  AUA score is 0-7  His sexual activity is non-contributory to the current illness  Nothing aggravates the symptoms  Past treatments include nothing  History of kidney stones  Patient has a remote history of kidney stones  At the present time he has no flank pain or symptoms related to kidney stones  The following portions of the patient's history were reviewed and updated as appropriate: allergies, current medications, past family history, past medical history, past social history, past surgical history and problem list     Review of Systems   Constitutional: Negative for chills, diaphoresis, fatigue and fever  HENT: Negative  Eyes: Negative  Respiratory: Negative  Cardiovascular: Negative  Gastrointestinal: Negative for nausea and vomiting  Endocrine: Negative  Genitourinary: Positive for nocturia (nocturia x 0-1)  Negative for dysuria, frequency, hematuria, hesitancy, incomplete emptying and urgency  See HPI   Musculoskeletal: Negative  Skin: Negative  Allergic/Immunologic: Negative  Neurological: Negative  Hematological: Negative  Psychiatric/Behavioral: Negative  AUA SYMPTOM SCORE      Most Recent Value   AUA SYMPTOM SCORE   How often have you had a sensation of not emptying your bladder completely after you finished urinating? 0   How often have you had to urinate again less than two hours after you finished urinating? 2   How often have you found you stopped and started again several times when you urinate?  0   How often have you found it difficult to postpone urination?   0   How often have you had a weak urinary stream?  0   How often have you had to push or strain to begin urination? 0   How many times did you most typically get up to urinate from the time you went to bed at night until the time you got up in the morning? 1   Quality of Life: If you were to spend the rest of your life with your urinary condition just the way it is now, how would you feel about that?  1   AUA SYMPTOM SCORE  3          Objective:      /78   Ht 5' 9" (1 753 m)   Wt 87 5 kg (193 lb)   BMI 28 50 kg/m²          Physical Exam  Vitals reviewed  Constitutional:       Appearance: He is well-developed  HENT:      Head: Normocephalic and atraumatic  Eyes:      Conjunctiva/sclera: Conjunctivae normal    Cardiovascular:      Rate and Rhythm: Normal rate  Pulmonary:      Effort: Pulmonary effort is normal    Abdominal:      General: Bowel sounds are normal  There is no distension  Palpations: Abdomen is soft  There is no mass  Tenderness: There is no abdominal tenderness  There is no guarding or rebound  Hernia: No hernia is present  Genitourinary:     Penis: Normal  No phimosis or hypospadias  Testes: Normal          Right: Mass not present  Left: Mass not present  Rectum: Normal       Comments: Prostate 1+ enlarged and palpably benign  Musculoskeletal:      Cervical back: Neck supple  Skin:     General: Skin is warm and dry  Neurological:      Mental Status: He is alert and oriented to person, place, and time  Psychiatric:         Behavior: Behavior normal          Thought Content:  Thought content normal          Judgment: Judgment normal

## 2021-07-23 NOTE — PROGRESS NOTES
Assessment/Plan:    Benign prostatic hyperplasia with urinary obstruction  AUA symptom score is 3  He is pleased with his voiding pattern  PSA was normal at 1 1 in May 2021  We will continue to follow his voiding pattern watchful waiting  He will return in 1 year  Calculus of kidney  He recently had a ureteral stone treated with ureteroscopy  On CT scan there were bilateral renal stones  Stone analysis reveals 100% uric acid stones  The patient was recently diagnosed with gout in his serum uric acid is elevated  He has an appointment with rheumatology on August 3rd  I recommended to the patient that he discuss the use of allopurinol with rheumatology to decrease his uric acid level and help prevent recurrent uric acid lithiasis  Diagnoses and all orders for this visit:    Benign prostatic hyperplasia with urinary obstruction    Calculus of kidney  -     POCT urine dip auto non-scope  -     US kidney and bladder; Future    Other orders  -     colchicine (COLCRYS) 0 6 mg tablet; Take 0 6 mg by mouth daily  -     predniSONE 10 mg tablet; Take 30 mg by mouth daily (Patient not taking: Reported on 7/23/2021)          Subjective:      Patient ID: Penelope Mendez is a 78 y o  male  Benign Prostatic Hypertrophy  This is a chronic problem  The current episode started more than 1 year ago  The problem is unchanged  Irritative symptoms include nocturia (nocturia x 0-1)  Irritative symptoms do not include frequency or urgency  Obstructive symptoms do not include dribbling, incomplete emptying, an intermittent stream, a slower stream, straining or a weak stream  Pertinent negatives include no chills, dysuria, genital pain, hematuria, hesitancy, nausea or vomiting  AUA score is 0-7  His sexual activity is non-contributory to the current illness  Nothing aggravates the symptoms  Past treatments include nothing  History of kidney stones  Patient has a remote history of kidney stones    At the present time he has no flank pain or symptoms related to kidney stones  The following portions of the patient's history were reviewed and updated as appropriate: allergies, current medications, past family history, past medical history, past social history, past surgical history and problem list     Review of Systems   Constitutional: Negative for chills, diaphoresis, fatigue and fever  HENT: Negative  Eyes: Negative  Respiratory: Negative  Cardiovascular: Negative  Gastrointestinal: Negative for nausea and vomiting  Endocrine: Negative  Genitourinary: Positive for nocturia (nocturia x 0-1)  Negative for dysuria, frequency, hematuria, hesitancy, incomplete emptying and urgency  See HPI   Musculoskeletal: Negative  Skin: Negative  Allergic/Immunologic: Negative  Neurological: Negative  Hematological: Negative  Psychiatric/Behavioral: Negative  AUA SYMPTOM SCORE      Most Recent Value   AUA SYMPTOM SCORE   How often have you had a sensation of not emptying your bladder completely after you finished urinating? 0   How often have you had to urinate again less than two hours after you finished urinating? 2   How often have you found you stopped and started again several times when you urinate?  0   How often have you found it difficult to postpone urination? 0   How often have you had a weak urinary stream?  0   How often have you had to push or strain to begin urination? 0   How many times did you most typically get up to urinate from the time you went to bed at night until the time you got up in the morning? 1   Quality of Life: If you were to spend the rest of your life with your urinary condition just the way it is now, how would you feel about that?  1   AUA SYMPTOM SCORE  3          Objective:      /78   Ht 5' 9" (1 753 m)   Wt 87 5 kg (193 lb)   BMI 28 50 kg/m²          Physical Exam  Vitals reviewed  Constitutional:       Appearance: He is well-developed     HENT: Head: Normocephalic and atraumatic  Eyes:      Conjunctiva/sclera: Conjunctivae normal    Cardiovascular:      Rate and Rhythm: Normal rate  Pulmonary:      Effort: Pulmonary effort is normal    Abdominal:      General: Bowel sounds are normal  There is no distension  Palpations: Abdomen is soft  There is no mass  Tenderness: There is no abdominal tenderness  There is no guarding or rebound  Hernia: No hernia is present  Genitourinary:     Penis: Normal  No phimosis or hypospadias  Testes: Normal          Right: Mass not present  Left: Mass not present  Rectum: Normal       Comments: Prostate 1+ enlarged and palpably benign  Musculoskeletal:      Cervical back: Neck supple  Skin:     General: Skin is warm and dry  Neurological:      Mental Status: He is alert and oriented to person, place, and time  Psychiatric:         Behavior: Behavior normal          Thought Content:  Thought content normal          Judgment: Judgment normal

## 2021-08-02 ENCOUNTER — HOSPITAL ENCOUNTER (OUTPATIENT)
Dept: ULTRASOUND IMAGING | Facility: HOSPITAL | Age: 66
Discharge: HOME | End: 2021-08-02
Attending: UROLOGY
Payer: COMMERCIAL

## 2021-08-02 DIAGNOSIS — N20.0 CALCULUS OF KIDNEY: ICD-10-CM

## 2021-08-02 PROCEDURE — 76770 US EXAM ABDO BACK WALL COMP: CPT

## 2021-08-04 ENCOUNTER — APPOINTMENT (OUTPATIENT)
Dept: LAB | Facility: MEDICAL CENTER | Age: 66
End: 2021-08-04
Payer: COMMERCIAL

## 2021-08-04 DIAGNOSIS — M25.441 SWELLING OF JOINT, HAND, RIGHT: Primary | ICD-10-CM

## 2021-08-04 DIAGNOSIS — M79.641 RIGHT HAND PAIN: ICD-10-CM

## 2021-08-04 DIAGNOSIS — M25.531 RIGHT WRIST PAIN: ICD-10-CM

## 2021-08-04 DIAGNOSIS — Z79.899 ENCOUNTER FOR LONG-TERM (CURRENT) USE OF OTHER MEDICATIONS: ICD-10-CM

## 2021-08-04 LAB
ALBUMIN SERPL BCP-MCNC: 3.2 G/DL (ref 3.5–5)
ALP SERPL-CCNC: 65 U/L (ref 46–116)
ALT SERPL W P-5'-P-CCNC: 24 U/L (ref 12–78)
ANION GAP SERPL CALCULATED.3IONS-SCNC: 5 MMOL/L (ref 4–13)
AST SERPL W P-5'-P-CCNC: 10 U/L (ref 5–45)
BASOPHILS # BLD AUTO: 0.07 THOUSANDS/ΜL (ref 0–0.1)
BASOPHILS NFR BLD AUTO: 1 % (ref 0–1)
BILIRUB SERPL-MCNC: 0.61 MG/DL (ref 0.2–1)
BUN SERPL-MCNC: 20 MG/DL (ref 5–25)
CALCIUM ALBUM COR SERPL-MCNC: 10.4 MG/DL (ref 8.3–10.1)
CALCIUM SERPL-MCNC: 9.8 MG/DL (ref 8.3–10.1)
CHLORIDE SERPL-SCNC: 109 MMOL/L (ref 100–108)
CO2 SERPL-SCNC: 25 MMOL/L (ref 21–32)
CREAT SERPL-MCNC: 1.55 MG/DL (ref 0.6–1.3)
CRP SERPL QL: 12.1 MG/L
EOSINOPHIL # BLD AUTO: 0.19 THOUSAND/ΜL (ref 0–0.61)
EOSINOPHIL NFR BLD AUTO: 2 % (ref 0–6)
ERYTHROCYTE [DISTWIDTH] IN BLOOD BY AUTOMATED COUNT: 14.5 % (ref 11.6–15.1)
GFR SERPL CREATININE-BSD FRML MDRD: 46 ML/MIN/1.73SQ M
GLUCOSE P FAST SERPL-MCNC: 91 MG/DL (ref 65–99)
HCT VFR BLD AUTO: 42 % (ref 36.5–49.3)
HGB BLD-MCNC: 13.6 G/DL (ref 12–17)
IMM GRANULOCYTES # BLD AUTO: 0.03 THOUSAND/UL (ref 0–0.2)
IMM GRANULOCYTES NFR BLD AUTO: 0 % (ref 0–2)
LYMPHOCYTES # BLD AUTO: 1.23 THOUSANDS/ΜL (ref 0.6–4.47)
LYMPHOCYTES NFR BLD AUTO: 14 % (ref 14–44)
MCH RBC QN AUTO: 29.6 PG (ref 26.8–34.3)
MCHC RBC AUTO-ENTMCNC: 32.4 G/DL (ref 31.4–37.4)
MCV RBC AUTO: 92 FL (ref 82–98)
MONOCYTES # BLD AUTO: 0.5 THOUSAND/ΜL (ref 0.17–1.22)
MONOCYTES NFR BLD AUTO: 6 % (ref 4–12)
NEUTROPHILS # BLD AUTO: 6.53 THOUSANDS/ΜL (ref 1.85–7.62)
NEUTS SEG NFR BLD AUTO: 77 % (ref 43–75)
NRBC BLD AUTO-RTO: 0 /100 WBCS
PLATELET # BLD AUTO: 237 THOUSANDS/UL (ref 149–390)
PMV BLD AUTO: 9.7 FL (ref 8.9–12.7)
POTASSIUM SERPL-SCNC: 4.2 MMOL/L (ref 3.5–5.3)
PROT SERPL-MCNC: 7.7 G/DL (ref 6.4–8.2)
RBC # BLD AUTO: 4.59 MILLION/UL (ref 3.88–5.62)
SODIUM SERPL-SCNC: 139 MMOL/L (ref 136–145)
URATE SERPL-MCNC: 9.1 MG/DL (ref 4.2–8)
WBC # BLD AUTO: 8.55 THOUSAND/UL (ref 4.31–10.16)

## 2021-08-04 PROCEDURE — 86038 ANTINUCLEAR ANTIBODIES: CPT

## 2021-08-04 PROCEDURE — 36415 COLL VENOUS BLD VENIPUNCTURE: CPT

## 2021-08-04 PROCEDURE — 86200 CCP ANTIBODY: CPT

## 2021-08-04 PROCEDURE — 86431 RHEUMATOID FACTOR QUANT: CPT

## 2021-08-04 PROCEDURE — 84550 ASSAY OF BLOOD/URIC ACID: CPT

## 2021-08-04 PROCEDURE — 86140 C-REACTIVE PROTEIN: CPT

## 2021-08-04 PROCEDURE — 80053 COMPREHEN METABOLIC PANEL: CPT

## 2021-08-04 PROCEDURE — 86430 RHEUMATOID FACTOR TEST QUAL: CPT

## 2021-08-04 PROCEDURE — 85025 COMPLETE CBC W/AUTO DIFF WBC: CPT

## 2021-08-05 ENCOUNTER — TELEPHONE (OUTPATIENT)
Dept: UROLOGY | Facility: MEDICAL CENTER | Age: 66
End: 2021-08-05

## 2021-08-05 DIAGNOSIS — N13.8 BENIGN PROSTATIC HYPERPLASIA WITH URINARY OBSTRUCTION: Primary | ICD-10-CM

## 2021-08-05 DIAGNOSIS — N40.1 BENIGN PROSTATIC HYPERPLASIA WITH URINARY OBSTRUCTION: Primary | ICD-10-CM

## 2021-08-05 LAB
CRYOGLOB RF SER-ACNC: ABNORMAL [IU]/ML
RHEUMATOID FACT SER QL LA: POSITIVE

## 2021-08-05 NOTE — TELEPHONE ENCOUNTER
Patient was called and made aware of result note per Dr Stiles  ----- Message from Nanci Gutierrez MD sent at 8/5/2021  2:07 PM EDT -----    Please let him know that his renal ultrasound looks okay  He may have some little tiny stones but nothing worth treating  They can barely be seen on the ultrasound

## 2021-08-06 LAB
CCP AB SER IA-ACNC: 1.2
RYE IGE QN: NEGATIVE

## 2021-10-08 ENCOUNTER — TELEPHONE (OUTPATIENT)
Dept: FAMILY MEDICINE CLINIC | Facility: CLINIC | Age: 66
End: 2021-10-08

## 2021-10-08 PROCEDURE — U0005 INFEC AGEN DETEC AMPLI PROBE: HCPCS | Performed by: FAMILY MEDICINE

## 2021-10-08 PROCEDURE — U0003 INFECTIOUS AGENT DETECTION BY NUCLEIC ACID (DNA OR RNA); SEVERE ACUTE RESPIRATORY SYNDROME CORONAVIRUS 2 (SARS-COV-2) (CORONAVIRUS DISEASE [COVID-19]), AMPLIFIED PROBE TECHNIQUE, MAKING USE OF HIGH THROUGHPUT TECHNOLOGIES AS DESCRIBED BY CMS-2020-01-R: HCPCS | Performed by: FAMILY MEDICINE

## 2021-10-22 ENCOUNTER — OFFICE VISIT (OUTPATIENT)
Dept: FAMILY MEDICINE CLINIC | Facility: CLINIC | Age: 66
End: 2021-10-22
Payer: COMMERCIAL

## 2021-10-22 VITALS
BODY MASS INDEX: 29.47 KG/M2 | DIASTOLIC BLOOD PRESSURE: 82 MMHG | SYSTOLIC BLOOD PRESSURE: 118 MMHG | HEIGHT: 69 IN | TEMPERATURE: 97.1 F | WEIGHT: 199 LBS

## 2021-10-22 DIAGNOSIS — Z23 NEED FOR VACCINATION: ICD-10-CM

## 2021-10-22 DIAGNOSIS — E78.2 MIXED HYPERLIPIDEMIA: Primary | ICD-10-CM

## 2021-10-22 DIAGNOSIS — E66.3 OVERWEIGHT (BMI 25.0-29.9): ICD-10-CM

## 2021-10-22 DIAGNOSIS — N13.8 BENIGN PROSTATIC HYPERPLASIA WITH URINARY OBSTRUCTION: ICD-10-CM

## 2021-10-22 DIAGNOSIS — M1A.09X0 IDIOPATHIC CHRONIC GOUT OF MULTIPLE SITES WITHOUT TOPHUS: ICD-10-CM

## 2021-10-22 DIAGNOSIS — D03.30 MELANOMA IN SITU OF FACE EXCLUDING EYELID, NOSE, LIP, AND EAR (HCC): ICD-10-CM

## 2021-10-22 DIAGNOSIS — N40.1 BENIGN PROSTATIC HYPERPLASIA WITH URINARY OBSTRUCTION: ICD-10-CM

## 2021-10-22 DIAGNOSIS — N18.31 STAGE 3A CHRONIC KIDNEY DISEASE (HCC): ICD-10-CM

## 2021-10-22 PROBLEM — N20.0 CALCULUS OF KIDNEY: Status: RESOLVED | Noted: 2021-07-23 | Resolved: 2021-10-22

## 2021-10-22 PROBLEM — N17.9 AKI (ACUTE KIDNEY INJURY) (HCC): Status: RESOLVED | Noted: 2021-05-25 | Resolved: 2021-10-22

## 2021-10-22 PROBLEM — D72.829 LEUKOCYTOSIS: Status: RESOLVED | Noted: 2021-06-03 | Resolved: 2021-10-22

## 2021-10-22 PROBLEM — N13.2 HYDRONEPHROSIS WITH RENAL AND URETERAL CALCULUS OBSTRUCTION: Status: RESOLVED | Noted: 2021-05-25 | Resolved: 2021-10-22

## 2021-10-22 PROCEDURE — 1101F PT FALLS ASSESS-DOCD LE1/YR: CPT | Performed by: FAMILY MEDICINE

## 2021-10-22 PROCEDURE — 3008F BODY MASS INDEX DOCD: CPT | Performed by: FAMILY MEDICINE

## 2021-10-22 PROCEDURE — 3725F SCREEN DEPRESSION PERFORMED: CPT | Performed by: FAMILY MEDICINE

## 2021-10-22 PROCEDURE — 1160F RVW MEDS BY RX/DR IN RCRD: CPT | Performed by: FAMILY MEDICINE

## 2021-10-22 PROCEDURE — 99214 OFFICE O/P EST MOD 30 MIN: CPT | Performed by: FAMILY MEDICINE

## 2021-10-22 PROCEDURE — 90471 IMMUNIZATION ADMIN: CPT | Performed by: FAMILY MEDICINE

## 2021-10-22 PROCEDURE — 90662 IIV NO PRSV INCREASED AG IM: CPT | Performed by: FAMILY MEDICINE

## 2021-10-22 PROCEDURE — 1036F TOBACCO NON-USER: CPT | Performed by: FAMILY MEDICINE

## 2021-10-22 PROCEDURE — 3288F FALL RISK ASSESSMENT DOCD: CPT | Performed by: FAMILY MEDICINE

## 2021-10-22 RX ORDER — ALLOPURINOL 100 MG/1
100 TABLET ORAL DAILY
COMMUNITY
Start: 2021-08-01 | End: 2022-05-06

## 2021-11-24 ENCOUNTER — APPOINTMENT (OUTPATIENT)
Dept: LAB | Facility: MEDICAL CENTER | Age: 66
End: 2021-11-24
Payer: COMMERCIAL

## 2021-11-24 DIAGNOSIS — E78.2 MIXED HYPERLIPIDEMIA: ICD-10-CM

## 2021-11-24 DIAGNOSIS — M10.09 IDIOPATHIC GOUT OF MULTIPLE SITES, UNSPECIFIED CHRONICITY: ICD-10-CM

## 2021-11-24 DIAGNOSIS — N18.31 STAGE 3A CHRONIC KIDNEY DISEASE (HCC): ICD-10-CM

## 2021-11-24 DIAGNOSIS — Z79.899 ENCOUNTER FOR LONG-TERM (CURRENT) USE OF OTHER MEDICATIONS: ICD-10-CM

## 2021-11-24 LAB
ALBUMIN SERPL BCP-MCNC: 3.4 G/DL (ref 3.5–5)
ALP SERPL-CCNC: 59 U/L (ref 46–116)
ALT SERPL W P-5'-P-CCNC: 21 U/L (ref 12–78)
ANION GAP SERPL CALCULATED.3IONS-SCNC: 5 MMOL/L (ref 4–13)
AST SERPL W P-5'-P-CCNC: 9 U/L (ref 5–45)
BASOPHILS # BLD AUTO: 0.06 THOUSANDS/ΜL (ref 0–0.1)
BASOPHILS NFR BLD AUTO: 1 % (ref 0–1)
BILIRUB SERPL-MCNC: 0.43 MG/DL (ref 0.2–1)
BUN SERPL-MCNC: 23 MG/DL (ref 5–25)
CALCIUM ALBUM COR SERPL-MCNC: 11.1 MG/DL (ref 8.3–10.1)
CALCIUM SERPL-MCNC: 10.6 MG/DL (ref 8.3–10.1)
CHLORIDE SERPL-SCNC: 109 MMOL/L (ref 100–108)
CHOLEST SERPL-MCNC: 140 MG/DL
CO2 SERPL-SCNC: 26 MMOL/L (ref 21–32)
CREAT SERPL-MCNC: 1.45 MG/DL (ref 0.6–1.3)
CRP SERPL QL: 26.1 MG/L
EOSINOPHIL # BLD AUTO: 0.17 THOUSAND/ΜL (ref 0–0.61)
EOSINOPHIL NFR BLD AUTO: 2 % (ref 0–6)
ERYTHROCYTE [DISTWIDTH] IN BLOOD BY AUTOMATED COUNT: 13.6 % (ref 11.6–15.1)
ERYTHROCYTE [SEDIMENTATION RATE] IN BLOOD: 46 MM/HOUR (ref 0–19)
GFR SERPL CREATININE-BSD FRML MDRD: 49 ML/MIN/1.73SQ M
GLUCOSE P FAST SERPL-MCNC: 94 MG/DL (ref 65–99)
HCT VFR BLD AUTO: 41 % (ref 36.5–49.3)
HDLC SERPL-MCNC: 48 MG/DL
HGB BLD-MCNC: 13.3 G/DL (ref 12–17)
IMM GRANULOCYTES # BLD AUTO: 0.03 THOUSAND/UL (ref 0–0.2)
IMM GRANULOCYTES NFR BLD AUTO: 0 % (ref 0–2)
LDLC SERPL CALC-MCNC: 76 MG/DL (ref 0–100)
LYMPHOCYTES # BLD AUTO: 1.28 THOUSANDS/ΜL (ref 0.6–4.47)
LYMPHOCYTES NFR BLD AUTO: 16 % (ref 14–44)
MCH RBC QN AUTO: 29.7 PG (ref 26.8–34.3)
MCHC RBC AUTO-ENTMCNC: 32.4 G/DL (ref 31.4–37.4)
MCV RBC AUTO: 92 FL (ref 82–98)
MONOCYTES # BLD AUTO: 0.63 THOUSAND/ΜL (ref 0.17–1.22)
MONOCYTES NFR BLD AUTO: 8 % (ref 4–12)
NEUTROPHILS # BLD AUTO: 5.77 THOUSANDS/ΜL (ref 1.85–7.62)
NEUTS SEG NFR BLD AUTO: 73 % (ref 43–75)
NONHDLC SERPL-MCNC: 92 MG/DL
NRBC BLD AUTO-RTO: 0 /100 WBCS
PLATELET # BLD AUTO: 261 THOUSANDS/UL (ref 149–390)
PMV BLD AUTO: 9.8 FL (ref 8.9–12.7)
POTASSIUM SERPL-SCNC: 4.5 MMOL/L (ref 3.5–5.3)
PROT SERPL-MCNC: 7.8 G/DL (ref 6.4–8.2)
PSA SERPL-MCNC: 1 NG/ML (ref 0–4)
RBC # BLD AUTO: 4.48 MILLION/UL (ref 3.88–5.62)
SODIUM SERPL-SCNC: 140 MMOL/L (ref 136–145)
TRIGL SERPL-MCNC: 79 MG/DL
URATE SERPL-MCNC: 8.5 MG/DL (ref 4.2–8)
WBC # BLD AUTO: 7.94 THOUSAND/UL (ref 4.31–10.16)

## 2021-11-24 PROCEDURE — 85652 RBC SED RATE AUTOMATED: CPT

## 2021-11-24 PROCEDURE — 85025 COMPLETE CBC W/AUTO DIFF WBC: CPT

## 2021-11-24 PROCEDURE — 36415 COLL VENOUS BLD VENIPUNCTURE: CPT

## 2021-11-24 PROCEDURE — 86140 C-REACTIVE PROTEIN: CPT

## 2021-11-24 PROCEDURE — 84153 ASSAY OF PSA TOTAL: CPT

## 2021-11-24 PROCEDURE — 80061 LIPID PANEL: CPT

## 2021-11-24 PROCEDURE — 80053 COMPREHEN METABOLIC PANEL: CPT

## 2021-11-24 PROCEDURE — 84550 ASSAY OF BLOOD/URIC ACID: CPT

## 2022-02-02 ENCOUNTER — APPOINTMENT (OUTPATIENT)
Dept: LAB | Facility: MEDICAL CENTER | Age: 67
End: 2022-02-02
Payer: COMMERCIAL

## 2022-02-02 DIAGNOSIS — M10.09 IDIOPATHIC GOUT OF MULTIPLE SITES, UNSPECIFIED CHRONICITY: ICD-10-CM

## 2022-02-02 DIAGNOSIS — Z79.899 ENCOUNTER FOR LONG-TERM (CURRENT) USE OF OTHER MEDICATIONS: ICD-10-CM

## 2022-02-02 DIAGNOSIS — M25.541 ARTHRALGIA OF RIGHT HAND: ICD-10-CM

## 2022-02-02 LAB
ALBUMIN SERPL BCP-MCNC: 3.5 G/DL (ref 3.5–5)
ALP SERPL-CCNC: 56 U/L (ref 46–116)
ALT SERPL W P-5'-P-CCNC: 22 U/L (ref 12–78)
ANION GAP SERPL CALCULATED.3IONS-SCNC: 5 MMOL/L (ref 4–13)
AST SERPL W P-5'-P-CCNC: 10 U/L (ref 5–45)
BASOPHILS # BLD AUTO: 0.06 THOUSANDS/ΜL (ref 0–0.1)
BASOPHILS NFR BLD AUTO: 1 % (ref 0–1)
BILIRUB SERPL-MCNC: 0.49 MG/DL (ref 0.2–1)
BUN SERPL-MCNC: 17 MG/DL (ref 5–25)
CALCIUM SERPL-MCNC: 9.6 MG/DL (ref 8.3–10.1)
CHLORIDE SERPL-SCNC: 110 MMOL/L (ref 100–108)
CO2 SERPL-SCNC: 24 MMOL/L (ref 21–32)
CREAT SERPL-MCNC: 1.38 MG/DL (ref 0.6–1.3)
CRP SERPL QL: 55 MG/L
EOSINOPHIL # BLD AUTO: 0.14 THOUSAND/ΜL (ref 0–0.61)
EOSINOPHIL NFR BLD AUTO: 2 % (ref 0–6)
ERYTHROCYTE [DISTWIDTH] IN BLOOD BY AUTOMATED COUNT: 14.5 % (ref 11.6–15.1)
ERYTHROCYTE [SEDIMENTATION RATE] IN BLOOD: 48 MM/HOUR (ref 0–19)
GFR SERPL CREATININE-BSD FRML MDRD: 52 ML/MIN/1.73SQ M
GLUCOSE P FAST SERPL-MCNC: 92 MG/DL (ref 65–99)
HCT VFR BLD AUTO: 41.4 % (ref 36.5–49.3)
HGB BLD-MCNC: 14 G/DL (ref 12–17)
IMM GRANULOCYTES # BLD AUTO: 0.04 THOUSAND/UL (ref 0–0.2)
IMM GRANULOCYTES NFR BLD AUTO: 1 % (ref 0–2)
LYMPHOCYTES # BLD AUTO: 1.55 THOUSANDS/ΜL (ref 0.6–4.47)
LYMPHOCYTES NFR BLD AUTO: 18 % (ref 14–44)
MCH RBC QN AUTO: 30.1 PG (ref 26.8–34.3)
MCHC RBC AUTO-ENTMCNC: 33.8 G/DL (ref 31.4–37.4)
MCV RBC AUTO: 89 FL (ref 82–98)
MONOCYTES # BLD AUTO: 0.71 THOUSAND/ΜL (ref 0.17–1.22)
MONOCYTES NFR BLD AUTO: 8 % (ref 4–12)
NEUTROPHILS # BLD AUTO: 5.93 THOUSANDS/ΜL (ref 1.85–7.62)
NEUTS SEG NFR BLD AUTO: 70 % (ref 43–75)
NRBC BLD AUTO-RTO: 0 /100 WBCS
PLATELET # BLD AUTO: 256 THOUSANDS/UL (ref 149–390)
PMV BLD AUTO: 9.5 FL (ref 8.9–12.7)
POTASSIUM SERPL-SCNC: 4.2 MMOL/L (ref 3.5–5.3)
PROT SERPL-MCNC: 7.7 G/DL (ref 6.4–8.2)
RBC # BLD AUTO: 4.65 MILLION/UL (ref 3.88–5.62)
SODIUM SERPL-SCNC: 139 MMOL/L (ref 136–145)
URATE SERPL-MCNC: 5.5 MG/DL (ref 4.2–8)
WBC # BLD AUTO: 8.43 THOUSAND/UL (ref 4.31–10.16)

## 2022-02-02 PROCEDURE — 85025 COMPLETE CBC W/AUTO DIFF WBC: CPT

## 2022-02-02 PROCEDURE — 86430 RHEUMATOID FACTOR TEST QUAL: CPT

## 2022-02-02 PROCEDURE — 86140 C-REACTIVE PROTEIN: CPT

## 2022-02-02 PROCEDURE — 36415 COLL VENOUS BLD VENIPUNCTURE: CPT

## 2022-02-02 PROCEDURE — 85652 RBC SED RATE AUTOMATED: CPT

## 2022-02-02 PROCEDURE — 84550 ASSAY OF BLOOD/URIC ACID: CPT

## 2022-02-02 PROCEDURE — 86200 CCP ANTIBODY: CPT

## 2022-02-02 PROCEDURE — 80053 COMPREHEN METABOLIC PANEL: CPT

## 2022-02-03 LAB — RHEUMATOID FACT SER QL LA: NEGATIVE

## 2022-02-04 LAB — CCP AB SER IA-ACNC: 1.1

## 2022-03-15 DIAGNOSIS — E78.2 MIXED HYPERLIPIDEMIA: ICD-10-CM

## 2022-03-15 RX ORDER — SIMVASTATIN 20 MG
TABLET ORAL
Qty: 90 TABLET | Refills: 1 | Status: SHIPPED | OUTPATIENT
Start: 2022-03-15

## 2022-04-20 ENCOUNTER — APPOINTMENT (OUTPATIENT)
Dept: LAB | Facility: MEDICAL CENTER | Age: 67
End: 2022-04-20
Payer: COMMERCIAL

## 2022-04-20 DIAGNOSIS — Z79.899 ENCOUNTER FOR LONG-TERM (CURRENT) USE OF OTHER MEDICATIONS: ICD-10-CM

## 2022-04-20 DIAGNOSIS — M10.9 GOUT, UNSPECIFIED CAUSE, UNSPECIFIED CHRONICITY, UNSPECIFIED SITE: ICD-10-CM

## 2022-04-20 LAB
ALBUMIN SERPL BCP-MCNC: 3.6 G/DL (ref 3.5–5)
ALP SERPL-CCNC: 58 U/L (ref 46–116)
ALT SERPL W P-5'-P-CCNC: 24 U/L (ref 12–78)
ANION GAP SERPL CALCULATED.3IONS-SCNC: 5 MMOL/L (ref 4–13)
AST SERPL W P-5'-P-CCNC: 16 U/L (ref 5–45)
BASOPHILS # BLD AUTO: 0.05 THOUSANDS/ΜL (ref 0–0.1)
BASOPHILS NFR BLD AUTO: 1 % (ref 0–1)
BILIRUB SERPL-MCNC: 0.44 MG/DL (ref 0.2–1)
BUN SERPL-MCNC: 20 MG/DL (ref 5–25)
CALCIUM SERPL-MCNC: 9.8 MG/DL (ref 8.3–10.1)
CHLORIDE SERPL-SCNC: 112 MMOL/L (ref 100–108)
CO2 SERPL-SCNC: 24 MMOL/L (ref 21–32)
CREAT SERPL-MCNC: 1.48 MG/DL (ref 0.6–1.3)
CRP SERPL QL: 9.6 MG/L
EOSINOPHIL # BLD AUTO: 0.15 THOUSAND/ΜL (ref 0–0.61)
EOSINOPHIL NFR BLD AUTO: 2 % (ref 0–6)
ERYTHROCYTE [DISTWIDTH] IN BLOOD BY AUTOMATED COUNT: 14.2 % (ref 11.6–15.1)
ERYTHROCYTE [SEDIMENTATION RATE] IN BLOOD: 33 MM/HOUR (ref 0–19)
GFR SERPL CREATININE-BSD FRML MDRD: 48 ML/MIN/1.73SQ M
GLUCOSE P FAST SERPL-MCNC: 100 MG/DL (ref 65–99)
HCT VFR BLD AUTO: 43.2 % (ref 36.5–49.3)
HGB BLD-MCNC: 14 G/DL (ref 12–17)
IMM GRANULOCYTES # BLD AUTO: 0.03 THOUSAND/UL (ref 0–0.2)
IMM GRANULOCYTES NFR BLD AUTO: 0 % (ref 0–2)
LYMPHOCYTES # BLD AUTO: 1.68 THOUSANDS/ΜL (ref 0.6–4.47)
LYMPHOCYTES NFR BLD AUTO: 22 % (ref 14–44)
MCH RBC QN AUTO: 29.9 PG (ref 26.8–34.3)
MCHC RBC AUTO-ENTMCNC: 32.4 G/DL (ref 31.4–37.4)
MCV RBC AUTO: 92 FL (ref 82–98)
MONOCYTES # BLD AUTO: 0.61 THOUSAND/ΜL (ref 0.17–1.22)
MONOCYTES NFR BLD AUTO: 8 % (ref 4–12)
NEUTROPHILS # BLD AUTO: 5.09 THOUSANDS/ΜL (ref 1.85–7.62)
NEUTS SEG NFR BLD AUTO: 67 % (ref 43–75)
NRBC BLD AUTO-RTO: 0 /100 WBCS
PLATELET # BLD AUTO: 232 THOUSANDS/UL (ref 149–390)
PMV BLD AUTO: 9.7 FL (ref 8.9–12.7)
POTASSIUM SERPL-SCNC: 4.5 MMOL/L (ref 3.5–5.3)
PROT SERPL-MCNC: 7.4 G/DL (ref 6.4–8.2)
RBC # BLD AUTO: 4.68 MILLION/UL (ref 3.88–5.62)
SODIUM SERPL-SCNC: 141 MMOL/L (ref 136–145)
URATE SERPL-MCNC: 6 MG/DL (ref 4.2–8)
WBC # BLD AUTO: 7.61 THOUSAND/UL (ref 4.31–10.16)

## 2022-04-20 PROCEDURE — 85025 COMPLETE CBC W/AUTO DIFF WBC: CPT

## 2022-04-20 PROCEDURE — 36415 COLL VENOUS BLD VENIPUNCTURE: CPT

## 2022-04-20 PROCEDURE — 80053 COMPREHEN METABOLIC PANEL: CPT

## 2022-04-20 PROCEDURE — 84550 ASSAY OF BLOOD/URIC ACID: CPT

## 2022-04-20 PROCEDURE — 85652 RBC SED RATE AUTOMATED: CPT

## 2022-04-20 PROCEDURE — 86140 C-REACTIVE PROTEIN: CPT

## 2022-04-29 ENCOUNTER — RA CDI HCC (OUTPATIENT)
Dept: OTHER | Facility: HOSPITAL | Age: 67
End: 2022-04-29

## 2022-04-29 NOTE — PROGRESS NOTES
[facility] 74  coding opportunities       Based on the patient problem list the following diagnoses are shown as active but not on the BPA     N18 31 Chronic kidney disease, stage 3a    If this is correct please assess and document at your next visit  5/6/22    Chart Reviewed number of suggestions sent to Provider: 1     Patients Insurance        Commercial Insurance: [employer]

## 2022-05-06 ENCOUNTER — OFFICE VISIT (OUTPATIENT)
Dept: FAMILY MEDICINE CLINIC | Facility: CLINIC | Age: 67
End: 2022-05-06
Payer: COMMERCIAL

## 2022-05-06 VITALS
SYSTOLIC BLOOD PRESSURE: 120 MMHG | BODY MASS INDEX: 30.42 KG/M2 | TEMPERATURE: 97.8 F | DIASTOLIC BLOOD PRESSURE: 82 MMHG | WEIGHT: 205.4 LBS | HEIGHT: 69 IN

## 2022-05-06 DIAGNOSIS — E78.2 MIXED HYPERLIPIDEMIA: Primary | ICD-10-CM

## 2022-05-06 DIAGNOSIS — Z86.010 HISTORY OF COLON POLYPS: ICD-10-CM

## 2022-05-06 DIAGNOSIS — N18.31 STAGE 3A CHRONIC KIDNEY DISEASE (HCC): ICD-10-CM

## 2022-05-06 DIAGNOSIS — N13.8 BENIGN PROSTATIC HYPERPLASIA WITH URINARY OBSTRUCTION: ICD-10-CM

## 2022-05-06 DIAGNOSIS — M1A.09X0 IDIOPATHIC CHRONIC GOUT OF MULTIPLE SITES WITHOUT TOPHUS: ICD-10-CM

## 2022-05-06 DIAGNOSIS — D03.30 MELANOMA IN SITU OF FACE EXCLUDING EYELID, NOSE, LIP, AND EAR (HCC): ICD-10-CM

## 2022-05-06 DIAGNOSIS — E66.09 CLASS 1 OBESITY DUE TO EXCESS CALORIES WITH SERIOUS COMORBIDITY AND BODY MASS INDEX (BMI) OF 30.0 TO 30.9 IN ADULT: ICD-10-CM

## 2022-05-06 DIAGNOSIS — N40.1 BENIGN PROSTATIC HYPERPLASIA WITH URINARY OBSTRUCTION: ICD-10-CM

## 2022-05-06 PROBLEM — M25.531 RIGHT WRIST PAIN: Status: RESOLVED | Noted: 2021-06-24 | Resolved: 2022-05-06

## 2022-05-06 PROBLEM — M25.441 SWELLING OF JOINT, HAND, RIGHT: Status: RESOLVED | Noted: 2021-06-24 | Resolved: 2022-05-06

## 2022-05-06 PROBLEM — M79.641 RIGHT HAND PAIN: Status: RESOLVED | Noted: 2021-06-24 | Resolved: 2022-05-06

## 2022-05-06 PROCEDURE — 1160F RVW MEDS BY RX/DR IN RCRD: CPT | Performed by: FAMILY MEDICINE

## 2022-05-06 PROCEDURE — 3725F SCREEN DEPRESSION PERFORMED: CPT | Performed by: FAMILY MEDICINE

## 2022-05-06 PROCEDURE — 3008F BODY MASS INDEX DOCD: CPT | Performed by: FAMILY MEDICINE

## 2022-05-06 PROCEDURE — 1036F TOBACCO NON-USER: CPT | Performed by: FAMILY MEDICINE

## 2022-05-06 PROCEDURE — 99214 OFFICE O/P EST MOD 30 MIN: CPT | Performed by: FAMILY MEDICINE

## 2022-05-06 RX ORDER — PREDNISONE 1 MG/1
10 TABLET ORAL DAILY
COMMUNITY
Start: 2022-03-31 | End: 2022-05-06

## 2022-05-06 RX ORDER — PREDNISONE 1 MG/1
TABLET ORAL DAILY
COMMUNITY

## 2022-05-06 RX ORDER — ALLOPURINOL 300 MG/1
445 TABLET ORAL DAILY
COMMUNITY

## 2022-05-06 NOTE — ASSESSMENT & PLAN NOTE
Colonoscopy in 2012 showed polyps Dr Elen Perez repeated it in 2017 and it was normal Per that note no colonsocopy for 10 yrs which will be 2027

## 2022-05-06 NOTE — PATIENT INSTRUCTIONS
Obesity   AMBULATORY CARE:   Obesity  means your body mass index (BMI) is greater than 30  Your healthcare provider will use your height and weight to measure your BMI  The risks of obesity include  many health problems, including injuries or physical disability  · Diabetes (high blood sugar level)    · High blood pressure or high cholesterol    · Heart disease    · Stroke    · Gallbladder or liver disease    · Cancer of the colon, breast, prostate, liver, or kidney    · Sleep apnea    · Arthritis or gout    Screening  is done to check for health conditions before you have signs or symptoms  If you are 28to 79years old, your blood sugar level may be checked every 3 years for signs of prediabetes or diabetes  Your healthcare provider will check your blood pressure at each visit  High blood pressure can lead to a stroke or other problems  Your provider may check for signs of heart disease, cancer, or other health problems  Seek care immediately if:   · You have a severe headache, confusion, or difficulty speaking  · You have weakness on one side of your body  · You have chest pain, sweating, or shortness of breath  Call your doctor if:   · You have symptoms of gallbladder or liver disease, such as pain in your upper abdomen  · You have knee or hip pain and discomfort while walking  · You have symptoms of diabetes, such as intense hunger and thirst, and frequent urination  · You have symptoms of sleep apnea, such as snoring or daytime sleepiness  · You have questions or concerns about your condition or care  Treatment for obesity  focuses on helping you lose weight to improve your health  Even a small decrease in BMI can reduce the risk for many health problems  Your healthcare provider will help you set a weight-loss goal   · Lifestyle changes  are the first step in treating obesity  These include making healthy food choices and getting regular physical activity   Your healthcare provider may suggest a weight-loss program that involves coaching, education, and therapy  · Medicine  may help you lose weight when it is used with a healthy foods and physical activity  · Surgery  can help you lose weight if you are very obese and have other health problems  There are several types of weight-loss surgery  Ask your healthcare provider for more information  Tips for safe weight loss:   · Set small, realistic goals  An example of a small goal is to walk for 20 minutes 5 days a week  Anther goal is to lose 5% of your body weight  · Tell friends, family members, and coworkers about your goals  and ask for their support  Ask a friend to lose weight with you, or join a weight-loss support group  · Identify foods or triggers that may cause you to overeat , and find ways to avoid them  Remove tempting high-calorie foods from your home and workplace  Place a bowl of fresh fruit on your kitchen counter  If stress causes you to eat, then find other ways to cope with stress  A counselor or therapist may be able to help you  · Keep a diary to track what you eat and drink  Also write down how many minutes of physical activity you do each day  Weigh yourself once a week and record it in your diary  Eating changes: You will need to eat 500 to 1,000 fewer calories each day than you currently eat to lose 1 to 2 pounds a week  The following changes will help you cut calories:  · Eat smaller portions  Use small plates, no larger than 9 inches in diameter  Fill your plate half full of fruits and vegetables  Measure your food using measuring cups until you know what a serving size looks like  · Eat 3 meals and 1 or 2 snacks each day  Plan your meals in advance  Loan Garcia and eat at home most of the time  Eat slowly  Do not skip meals  Skipping meals can lead to overeating later in the day  This can make it harder for you to lose weight   Talk with a dietitian to help you make a meal plan and schedule that is right for you  · Eat fruits and vegetables at every meal   They are low in calories and high in fiber, which makes you feel full  Do not add butter, margarine, or cream sauce to vegetables  Use herbs to season steamed vegetables  · Eat less fat and fewer fried foods  Eat more baked or grilled chicken and fish  These protein sources are lower in calories and fat than red meat  Limit fast food  Dress your salads with olive oil and vinegar instead of bottled dressing  · Limit the amount of sugar you eat  Do not drink sugary beverages  Limit alcohol  Activity changes:  Physical activity is good for your body in many ways  It helps you burn calories and build strong muscles  It decreases stress and depression, and improves your mood  It can also help you sleep better  Talk to your healthcare provider before you begin an exercise program   · Exercise for at least 30 minutes 5 days a week  Start slowly  Set aside time each day for physical activity that you enjoy and that is convenient for you  It is best to do both weight training and an activity that increases your heart rate, such as walking, bicycling, or swimming  · Find ways to be more active  Do yard work and housecleaning  Walk up the stairs instead of using elevators  Spend your leisure time going to events that require walking, such as outdoor festivals or fairs  This extra physical activity can help you lose weight and keep it off  Follow up with your doctor as directed: You may need to meet with a dietitian  Write down your questions so you remember to ask them during your visits  © Copyright [employer] 2022 Information is for End User's use only and may not be sold, redistributed or otherwise used for commercial purposes  All illustrations and images included in CareNotes® are the copyrighted property of A D A M [employer]  or Dayna Galdamez   The above information is an  only   It is not intended as medical advice for individual conditions or treatments  Talk to your doctor, nurse or pharmacist before following any medical regimen to see if it is safe and effective for you

## 2022-05-06 NOTE — PROGRESS NOTES
Assessment/Plan:    History of colon polyps  Colonoscopy in 2012 showed polyps Dr Juan Neal repeated it in 2017 and it was normal Per that note no colonsocopy for 10 yrs which will be 2027    Mixed hyperlipidemia  Continue with the zocor and repeat labs end of may    Class 1 obesity due to excess calories with serious comorbidity and body mass index (BMI) of 30 0 to 30 9 in adult  Weight is up about 3-5 pounds discussed diet and [id number] with patient followup in 6 months     Benign prostatic hyperplasia with urinary obstruction  Per urology notes he should followup yearly which will be July 2022 Urination is stable    Idiopathic chronic gout of multiple sites without tophus  Continue prednisone and the allopurinol per rheumatology FOllowup with them in August with labs prior C reactive protein went down to 9 continue current care    Stage 3a chronic kidney disease [facility])  Lab Results   Component Value Date    EGFR 48 04/20/2022    EGFR 52 02/02/2022    EGFR 49 11/24/2021    CREATININE 1 48 (H) 04/20/2022    CREATININE 1 38 (H) 02/02/2022    CREATININE 1 45 (H) 11/24/2021   stable Avoid NSAID's repeat labs and followup every 6 months    Melanoma in situ of face excluding eyelid, nose, lip, and ear ([facility] [address] )  Continue dermatology followup       Diagnoses and all orders for this visit:    Mixed hyperlipidemia  -     Comprehensive metabolic panel; Future  -     Lipid panel; Future    Class 1 obesity due to excess calories with serious comorbidity and body mass index (BMI) of 30 0 to 30 9 in adult    Melanoma in situ of face excluding eyelid, nose, lip, and ear (HCC)    Idiopathic chronic gout of multiple sites without tophus    History of colon polyps    Benign prostatic hyperplasia with urinary obstruction    Stage 3a chronic kidney disease ([facility] [address] )    Other orders  -     Discontinue: predniSONE 5 mg tablet; Take 10 mg by mouth daily  -     allopurinol (ZYLOPRIM) 300 mg tablet;  Take 300 mg by mouth daily  -     predniSONE 5 mg tablet; Take by mouth daily          Subjective:   Chief Complaint   Patient presents with    Hyperlipidemia     no refills needed           Patient ID: Coleman Coles is a 78 y o  male  Pateint is ehre for followup hyperlipidemia gout his weight BPH stage 3 renal disease and melanoma in situ pateitn feels well He is seeing rheumatology and his joints are better He was there 5/4/2022 and [employer] protein is much improved Patient will followup in 3 months Patient weight is up a few pounds He has appt with dermatology for followup of the melanoma in situ Patient renal status stable His urination is normal Patient has urology followup in July PSA was good in 11/2021 pateitn last lipids were in 11/2021 and stable He is taking zocor with no issues He has no concerns today       The following portions of the patient's history were reviewed and updated as appropriate: allergies, current medications, past family history, past medical history, past social history, past surgical history and problem list     Review of Systems   Constitutional: Negative for fatigue, fever and unexpected weight change  HENT: Negative for congestion, sinus pain and trouble swallowing  Eyes: Negative for discharge and visual disturbance  Respiratory: Negative for cough, chest tightness, shortness of breath and wheezing  Cardiovascular: Negative for chest pain, palpitations and leg swelling  Gastrointestinal: Negative for abdominal pain, blood in stool, constipation, diarrhea, nausea and vomiting  Genitourinary: Negative for difficulty urinating, dysuria, frequency and hematuria  Musculoskeletal: Positive for arthralgias  Negative for gait problem and joint swelling  Patient joint concerns have improved on meds    Skin: Negative for rash and wound  Allergic/Immunologic: Negative for environmental allergies and food allergies  Neurological: Negative for dizziness, syncope, weakness, numbness and headaches  Hematological: Negative for adenopathy  Does not bruise/bleed easily  Psychiatric/Behavioral: Negative for confusion, decreased concentration and sleep disturbance  The patient is not nervous/anxious  Objective:      /82   Temp 97 8 °F (36 6 °C)   Ht 5' 9" (1 753 m)   Wt 93 2 kg (205 lb 6 4 oz)   BMI 30 33 kg/m²          Physical Exam  Vitals and nursing note reviewed  Constitutional:       Appearance: He is well-developed  He is obese  HENT:      Head: Normocephalic and atraumatic  Right Ear: Hearing, tympanic membrane and external ear normal       Left Ear: Hearing, tympanic membrane and external ear normal    Eyes:      Extraocular Movements: Extraocular movements intact  Conjunctiva/sclera: Conjunctivae normal       Pupils: Pupils are equal, round, and reactive to light  Neck:      Thyroid: No thyromegaly  Cardiovascular:      Rate and Rhythm: Normal rate and regular rhythm  Heart sounds: Normal heart sounds  Pulmonary:      Effort: Pulmonary effort is normal       Breath sounds: Normal breath sounds  No wheezing or rales  Abdominal:      General: Bowel sounds are normal  There is no distension  Palpations: Abdomen is soft  Tenderness: There is no abdominal tenderness  Musculoskeletal:         General: No tenderness  Cervical back: Neck supple  Lymphadenopathy:      Cervical: No cervical adenopathy  Skin:     General: Skin is warm and dry  Findings: No rash  Neurological:      General: No focal deficit present  Mental Status: He is alert and oriented to person, place, and time  Cranial Nerves: No cranial nerve deficit  Coordination: Coordination normal    Psychiatric:         Mood and Affect: Mood normal          Behavior: Behavior normal          Thought Content: Thought content normal          Judgment: Judgment normal        BMI Counseling: Body mass index is 30 33 kg/m²   The BMI is above normal  Nutrition recommendations include reducing portion sizes, decreasing overall calorie intake, 3-5 servings of fruits/vegetables daily, decreasing soda and/or juice intake, moderation in carbohydrate intake and increasing intake of lean protein  Exercise recommendations include exercising 3-5 times per week

## 2022-05-06 NOTE — ASSESSMENT & PLAN NOTE
Continue prednisone and the allopurinol per rheumatology FOllowup with them in August with labs prior C reactive protein went down to 9 continue current care

## 2022-05-06 NOTE — ASSESSMENT & PLAN NOTE
Lab Results   Component Value Date    EGFR 48 04/20/2022    EGFR 52 02/02/2022    EGFR 49 11/24/2021    CREATININE 1 48 (H) 04/20/2022    CREATININE 1 38 (H) 02/02/2022    CREATININE 1 45 (H) 11/24/2021   stable Avoid NSAID's repeat labs and followup every 6 months

## 2022-06-28 ENCOUNTER — TELEPHONE (OUTPATIENT)
Dept: FAMILY MEDICINE CLINIC | Facility: CLINIC | Age: 67
End: 2022-06-28

## 2022-06-30 ENCOUNTER — CLINICAL SUPPORT (OUTPATIENT)
Dept: FAMILY MEDICINE CLINIC | Facility: CLINIC | Age: 67
End: 2022-06-30
Payer: COMMERCIAL

## 2022-06-30 ENCOUNTER — APPOINTMENT (OUTPATIENT)
Dept: LAB | Facility: MEDICAL CENTER | Age: 67
End: 2022-06-30
Payer: COMMERCIAL

## 2022-06-30 DIAGNOSIS — Z01.812 ENCOUNTER FOR PREPROCEDURAL LABORATORY EXAMINATION: ICD-10-CM

## 2022-06-30 DIAGNOSIS — D48.5 NEOPLASM OF UNCERTAIN BEHAVIOR OF SKIN: ICD-10-CM

## 2022-06-30 DIAGNOSIS — E78.2 MIXED HYPERLIPIDEMIA: ICD-10-CM

## 2022-06-30 DIAGNOSIS — Z01.818 ENCOUNTER FOR OTHER PREPROCEDURAL EXAMINATION: ICD-10-CM

## 2022-06-30 DIAGNOSIS — Z01.818 PRE-OP TESTING: Primary | ICD-10-CM

## 2022-06-30 LAB
ALBUMIN SERPL BCP-MCNC: 3.4 G/DL (ref 3.5–5)
ALP SERPL-CCNC: 62 U/L (ref 46–116)
ALT SERPL W P-5'-P-CCNC: 33 U/L (ref 12–78)
ANION GAP SERPL CALCULATED.3IONS-SCNC: 9 MMOL/L (ref 4–13)
AST SERPL W P-5'-P-CCNC: 15 U/L (ref 5–45)
BASOPHILS # BLD AUTO: 0.05 THOUSANDS/ΜL (ref 0–0.1)
BASOPHILS NFR BLD AUTO: 1 % (ref 0–1)
BILIRUB SERPL-MCNC: 0.46 MG/DL (ref 0.2–1)
BUN SERPL-MCNC: 21 MG/DL (ref 5–25)
CALCIUM ALBUM COR SERPL-MCNC: 10 MG/DL (ref 8.3–10.1)
CALCIUM SERPL-MCNC: 9.5 MG/DL (ref 8.3–10.1)
CHLORIDE SERPL-SCNC: 111 MMOL/L (ref 100–108)
CHOLEST SERPL-MCNC: 158 MG/DL
CO2 SERPL-SCNC: 23 MMOL/L (ref 21–32)
CREAT SERPL-MCNC: 1.51 MG/DL (ref 0.6–1.3)
EOSINOPHIL # BLD AUTO: 0.21 THOUSAND/ΜL (ref 0–0.61)
EOSINOPHIL NFR BLD AUTO: 3 % (ref 0–6)
ERYTHROCYTE [DISTWIDTH] IN BLOOD BY AUTOMATED COUNT: 14.1 % (ref 11.6–15.1)
GFR SERPL CREATININE-BSD FRML MDRD: 46 ML/MIN/1.73SQ M
GLUCOSE P FAST SERPL-MCNC: 86 MG/DL (ref 65–99)
HCT VFR BLD AUTO: 44.9 % (ref 36.5–49.3)
HDLC SERPL-MCNC: 50 MG/DL
HGB BLD-MCNC: 15 G/DL (ref 12–17)
IMM GRANULOCYTES # BLD AUTO: 0.04 THOUSAND/UL (ref 0–0.2)
IMM GRANULOCYTES NFR BLD AUTO: 1 % (ref 0–2)
LDLC SERPL CALC-MCNC: 87 MG/DL (ref 0–100)
LYMPHOCYTES # BLD AUTO: 1.86 THOUSANDS/ΜL (ref 0.6–4.47)
LYMPHOCYTES NFR BLD AUTO: 25 % (ref 14–44)
MCH RBC QN AUTO: 31.4 PG (ref 26.8–34.3)
MCHC RBC AUTO-ENTMCNC: 33.4 G/DL (ref 31.4–37.4)
MCV RBC AUTO: 94 FL (ref 82–98)
MONOCYTES # BLD AUTO: 0.59 THOUSAND/ΜL (ref 0.17–1.22)
MONOCYTES NFR BLD AUTO: 8 % (ref 4–12)
NEUTROPHILS # BLD AUTO: 4.78 THOUSANDS/ΜL (ref 1.85–7.62)
NEUTS SEG NFR BLD AUTO: 62 % (ref 43–75)
NONHDLC SERPL-MCNC: 108 MG/DL
NRBC BLD AUTO-RTO: 0 /100 WBCS
PLATELET # BLD AUTO: 238 THOUSANDS/UL (ref 149–390)
PMV BLD AUTO: 9.7 FL (ref 8.9–12.7)
POTASSIUM SERPL-SCNC: 4.2 MMOL/L (ref 3.5–5.3)
PROT SERPL-MCNC: 7.4 G/DL (ref 6.4–8.2)
RBC # BLD AUTO: 4.78 MILLION/UL (ref 3.88–5.62)
SODIUM SERPL-SCNC: 143 MMOL/L (ref 136–145)
TRIGL SERPL-MCNC: 105 MG/DL
WBC # BLD AUTO: 7.53 THOUSAND/UL (ref 4.31–10.16)

## 2022-06-30 PROCEDURE — 93000 ELECTROCARDIOGRAM COMPLETE: CPT

## 2022-06-30 PROCEDURE — 36415 COLL VENOUS BLD VENIPUNCTURE: CPT

## 2022-06-30 PROCEDURE — 80053 COMPREHEN METABOLIC PANEL: CPT

## 2022-06-30 PROCEDURE — 80061 LIPID PANEL: CPT

## 2022-06-30 PROCEDURE — 85025 COMPLETE CBC W/AUTO DIFF WBC: CPT

## 2022-07-06 ENCOUNTER — ANESTHESIA EVENT (OUTPATIENT)
Dept: PERIOP | Facility: HOSPITAL | Age: 67
End: 2022-07-06
Payer: COMMERCIAL

## 2022-07-08 NOTE — PRE-PROCEDURE INSTRUCTIONS
Pre-Surgery Instructions:   Medication Instructions    allopurinol (ZYLOPRIM) 300 mg tablet Take day of surgery   predniSONE 5 mg tablet Take day of surgery   sildenafil (Viagra) 50 MG tablet Hold day of surgery   simvastatin (ZOCOR) 20 mg tablet Take day of surgery  Spoke with pt via phone, COVID screening negative  Advised hospital location will call with arrival time night before surgery and NPO after midnight night before  Advised one vaccinated visitor is allowed to accompany pt to wait during the procedure  Instructed to leave contacts/jewelery/valuables at home  No smoking 24 hours prior to surgery and definitely not on the morning of  Instructed to avoid all ASA and OTC Vit/Supp 1 week prior to surgery and to avoid NSAIDs 3 days prior to surgery per anesthesia instructions  Tylenol ok to take prn  Reviewed above medication instructions and showering instructions  Patient verbalized understanding of all of the above

## 2022-07-14 ENCOUNTER — HOSPITAL ENCOUNTER (OUTPATIENT)
Facility: HOSPITAL | Age: 67
Setting detail: OUTPATIENT SURGERY
Discharge: HOME | End: 2022-07-14
Attending: STUDENT IN AN ORGANIZED HEALTH CARE EDUCATION/TRAINING PROGRAM | Admitting: STUDENT IN AN ORGANIZED HEALTH CARE EDUCATION/TRAINING PROGRAM
Payer: COMMERCIAL

## 2022-07-14 ENCOUNTER — ANESTHESIA (OUTPATIENT)
Dept: PERIOP | Facility: HOSPITAL | Age: 67
End: 2022-07-14
Payer: COMMERCIAL

## 2022-07-14 VITALS
RESPIRATION RATE: 16 BRPM | WEIGHT: 205 LBS | HEIGHT: 69 IN | BODY MASS INDEX: 30.36 KG/M2 | DIASTOLIC BLOOD PRESSURE: 87 MMHG | SYSTOLIC BLOOD PRESSURE: 120 MMHG | OXYGEN SATURATION: 96 % | HEART RATE: 60 BPM | TEMPERATURE: 97.5 F

## 2022-07-14 DIAGNOSIS — D48.5 NEOPLASM OF UNCERTAIN BEHAVIOR OF SKIN: ICD-10-CM

## 2022-07-14 PROCEDURE — 88331 PATH CONSLTJ SURG 1 BLK 1SPC: CPT | Performed by: SPECIALIST

## 2022-07-14 PROCEDURE — 11623 EXC S/N/H/F/G MAL+MRG 2.1-3: CPT | Performed by: STUDENT IN AN ORGANIZED HEALTH CARE EDUCATION/TRAINING PROGRAM

## 2022-07-14 PROCEDURE — 88305 TISSUE EXAM BY PATHOLOGIST: CPT | Performed by: SPECIALIST

## 2022-07-14 PROCEDURE — 13121 CMPLX RPR S/A/L 2.6-7.5 CM: CPT | Performed by: STUDENT IN AN ORGANIZED HEALTH CARE EDUCATION/TRAINING PROGRAM

## 2022-07-14 RX ORDER — ONDANSETRON 2 MG/ML
INJECTION INTRAMUSCULAR; INTRAVENOUS AS NEEDED
Status: DISCONTINUED | OUTPATIENT
Start: 2022-07-14 | End: 2022-07-14

## 2022-07-14 RX ORDER — FENTANYL CITRATE 50 UG/ML
INJECTION, SOLUTION INTRAMUSCULAR; INTRAVENOUS AS NEEDED
Status: DISCONTINUED | OUTPATIENT
Start: 2022-07-14 | End: 2022-07-14

## 2022-07-14 RX ORDER — LIDOCAINE HYDROCHLORIDE 10 MG/ML
INJECTION, SOLUTION EPIDURAL; INFILTRATION; INTRACAUDAL; PERINEURAL AS NEEDED
Status: DISCONTINUED | OUTPATIENT
Start: 2022-07-14 | End: 2022-07-14

## 2022-07-14 RX ORDER — GINSENG 100 MG
CAPSULE ORAL AS NEEDED
Status: DISCONTINUED | OUTPATIENT
Start: 2022-07-14 | End: 2022-07-14 | Stop reason: HOSPADM

## 2022-07-14 RX ORDER — FENTANYL CITRATE/PF 50 MCG/ML
25 SYRINGE (ML) INJECTION
Status: DISCONTINUED | OUTPATIENT
Start: 2022-07-14 | End: 2022-07-14 | Stop reason: HOSPADM

## 2022-07-14 RX ORDER — LIDOCAINE HYDROCHLORIDE AND EPINEPHRINE 10; 10 MG/ML; UG/ML
INJECTION, SOLUTION INFILTRATION; PERINEURAL AS NEEDED
Status: DISCONTINUED | OUTPATIENT
Start: 2022-07-14 | End: 2022-07-14 | Stop reason: HOSPADM

## 2022-07-14 RX ORDER — ONDANSETRON 2 MG/ML
4 INJECTION INTRAMUSCULAR; INTRAVENOUS ONCE AS NEEDED
Status: DISCONTINUED | OUTPATIENT
Start: 2022-07-14 | End: 2022-07-14 | Stop reason: HOSPADM

## 2022-07-14 RX ORDER — ACETAMINOPHEN 325 MG/1
650 TABLET ORAL ONCE AS NEEDED
Status: DISCONTINUED | OUTPATIENT
Start: 2022-07-14 | End: 2022-07-14 | Stop reason: HOSPADM

## 2022-07-14 RX ORDER — HYDROMORPHONE HCL/PF 1 MG/ML
0.25 SYRINGE (ML) INJECTION
Status: DISCONTINUED | OUTPATIENT
Start: 2022-07-14 | End: 2022-07-14 | Stop reason: HOSPADM

## 2022-07-14 RX ORDER — PROPOFOL 10 MG/ML
INJECTION, EMULSION INTRAVENOUS CONTINUOUS PRN
Status: DISCONTINUED | OUTPATIENT
Start: 2022-07-14 | End: 2022-07-14

## 2022-07-14 RX ORDER — MIDAZOLAM HYDROCHLORIDE 2 MG/2ML
INJECTION, SOLUTION INTRAMUSCULAR; INTRAVENOUS AS NEEDED
Status: DISCONTINUED | OUTPATIENT
Start: 2022-07-14 | End: 2022-07-14

## 2022-07-14 RX ORDER — MAGNESIUM HYDROXIDE 1200 MG/15ML
LIQUID ORAL AS NEEDED
Status: DISCONTINUED | OUTPATIENT
Start: 2022-07-14 | End: 2022-07-14 | Stop reason: HOSPADM

## 2022-07-14 RX ORDER — LIDOCAINE HYDROCHLORIDE 10 MG/ML
0.5 INJECTION, SOLUTION EPIDURAL; INFILTRATION; INTRACAUDAL; PERINEURAL ONCE AS NEEDED
Status: DISCONTINUED | OUTPATIENT
Start: 2022-07-14 | End: 2022-07-14 | Stop reason: HOSPADM

## 2022-07-14 RX ORDER — CEFAZOLIN SODIUM 2 G/50ML
2000 SOLUTION INTRAVENOUS ONCE
Status: COMPLETED | OUTPATIENT
Start: 2022-07-14 | End: 2022-07-14

## 2022-07-14 RX ORDER — SODIUM CHLORIDE, SODIUM LACTATE, POTASSIUM CHLORIDE, CALCIUM CHLORIDE 600; 310; 30; 20 MG/100ML; MG/100ML; MG/100ML; MG/100ML
125 INJECTION, SOLUTION INTRAVENOUS CONTINUOUS
Status: DISCONTINUED | OUTPATIENT
Start: 2022-07-14 | End: 2022-07-14 | Stop reason: HOSPADM

## 2022-07-14 RX ADMIN — FENTANYL CITRATE 25 MCG: 50 INJECTION, SOLUTION INTRAMUSCULAR; INTRAVENOUS at 07:53

## 2022-07-14 RX ADMIN — FENTANYL CITRATE 25 MCG: 50 INJECTION, SOLUTION INTRAMUSCULAR; INTRAVENOUS at 07:37

## 2022-07-14 RX ADMIN — PROPOFOL 120 MCG/KG/MIN: 10 INJECTION, EMULSION INTRAVENOUS at 07:26

## 2022-07-14 RX ADMIN — CEFAZOLIN SODIUM 2000 MG: 2 SOLUTION INTRAVENOUS at 07:25

## 2022-07-14 RX ADMIN — MIDAZOLAM 2 MG: 1 INJECTION INTRAMUSCULAR; INTRAVENOUS at 07:25

## 2022-07-14 RX ADMIN — ONDANSETRON 4 MG: 2 INJECTION INTRAMUSCULAR; INTRAVENOUS at 07:46

## 2022-07-14 RX ADMIN — FENTANYL CITRATE 25 MCG: 50 INJECTION, SOLUTION INTRAMUSCULAR; INTRAVENOUS at 07:26

## 2022-07-14 RX ADMIN — SODIUM CHLORIDE, POTASSIUM CHLORIDE, SODIUM LACTATE AND CALCIUM CHLORIDE 125 ML/HR: 600; 310; 30; 20 INJECTION, SOLUTION INTRAVENOUS at 06:09

## 2022-07-14 RX ADMIN — LIDOCAINE HYDROCHLORIDE 50 MG: 10 INJECTION, SOLUTION EPIDURAL; INFILTRATION; INTRACAUDAL at 07:26

## 2022-07-14 RX ADMIN — SODIUM CHLORIDE, POTASSIUM CHLORIDE, SODIUM LACTATE AND CALCIUM CHLORIDE 125 ML/HR: 600; 310; 30; 20 INJECTION, SOLUTION INTRAVENOUS at 08:38

## 2022-07-14 RX ADMIN — FENTANYL CITRATE 25 MCG: 50 INJECTION, SOLUTION INTRAMUSCULAR; INTRAVENOUS at 07:42

## 2022-07-14 NOTE — ANESTHESIA PREPROCEDURE EVALUATION
Procedure:  EXCISION SCALP LESION POSS SKIN GRAFT, FROZEN SECTION (Right Head)    Relevant Problems   CARDIO   (+) Mixed hyperlipidemia      /RENAL   (+) Benign prostatic hyperplasia with urinary obstruction   (+) Stage 3a chronic kidney disease (HCC)      MUSCULOSKELETAL   (+) Idiopathic chronic gout of multiple sites without tophus   (+) Primary osteoarthritis involving multiple joints      NEURO/PSYCH   (+) History of basal cell carcinoma (BCC)   (+) History of squamous cell carcinoma   (+) Personal history of kidney stones (Resolved)      Musculoskeletal and Integument   (+) Melanoma in situ of face excluding eyelid, nose, lip, and ear (HCC)      Other   (+) Class 1 obesity due to excess calories with serious comorbidity and body mass index (BMI) of 30 0 to 30 9 in adult        Physical Exam    Airway    Mallampati score: III  TM Distance: >3 FB  Neck ROM: full     Dental   No notable dental hx     Cardiovascular      Pulmonary      Other Findings        Anesthesia Plan  ASA Score- 2     Anesthesia Type- IV sedation with anesthesia with ASA Monitors  Additional Monitors:   Airway Plan:           Plan Factors-Exercise tolerance (METS): >4 METS  Chart reviewed  Patient summary reviewed  Patient is not a current smoker  Patient did not smoke on day of surgery  Obstructive sleep apnea risk education given perioperatively  Induction- intravenous  Postoperative Plan- Plan for postoperative opioid use  Informed Consent- Anesthetic plan and risks discussed with patient  I personally reviewed this patient with the CRNA  Discussed and agreed on the Anesthesia Plan with the CRNA  Lilia Martin

## 2022-07-14 NOTE — ANESTHESIA POSTPROCEDURE EVALUATION
Post-Op Assessment Note    CV Status:  Stable  Pain Score: 0    Pain management: adequate     Mental Status:  Alert and awake   Hydration Status:  Euvolemic   PONV Controlled:  Controlled   Airway Patency:  Patent      Post Op Vitals Reviewed: Yes      Staff: CRNA         No complications documented      /70 (07/14/22 0827)    Temp (!) 97 1 °F (36 2 °C) (07/14/22 0827)    Pulse 78 (07/14/22 0827)   Resp 16 (07/14/22 0827)    SpO2 98 % (07/14/22 0827)

## 2022-07-14 NOTE — DISCHARGE INSTR - AVS FIRST PAGE
Surgery Date: 7/14/2022                Patient: Eddie Roca  Surgeon: Isaac Biggs, DO     Postoperative Instructions for Outpatient Surgery  Excision of Lesion/Mass     Dressings:  [] Skin glue was applied to your incision over absorbable sutures  You may feel small pieces of suture at the ends of your incision  [x] Incision is closed with nonabsorbable sutures  [x] Remove dressing the first morning following your surgery and bathe as directed  [] No dressings are required but you may cover the incision with band-aid or gauze for comfort  [x] Apply bacitracin or other antibiotic ointment to incision and cover with band-aid or gauze  [] Leave dressing in place until your follow up appointment  [] Other instructions:      Bathing:  [x] Shower 24 hours after surgery  Allow soap and water to gently wash over the incision  No scrubbing  Gently pat dry and apply dressing as needed/instructed above   [] Keep incision/dressing dry until your follow up appointment  [x] No submerging incision in bathtub, pool, hot tub and/or lake  Activity:  [] No heavy lifting (> 10lbs)  [] No strenuous exercise  [x] Walking is permitted and encouraged  [x] Strict sun avoidance/protection of incision site  [] Other instructions:      Medication:  [x] Resume preoperative medications  [x] Ok to use Tylenol for pain control  You may also use ibuprofen 48 hours after surgery  [] Finish all antibiotics as prescribed   [] You may not drive until off your pain medications  [x] Apply ice to area as needed for pain  Do not place ice directly on skin  [] Other instructions: It is expected to have some bruising, swelling and mild oozing at the incision site and the surrounding area  If there is more than you expect or you suspect an infection, please call the office  Some patients may experience a low-grade fever after surgery  If it is above 100 4, please call the office       If you do not have a postoperative office appointment scheduled, please call the office today and let the staff know Dr Ronny Frederick needs to see you in 10  days  Please call [phone] (office), [phone] (Ivonne's phone) with any questions, concerns or changes

## 2022-07-15 ENCOUNTER — OFFICE VISIT (OUTPATIENT)
Dept: UROLOGY | Facility: MEDICAL CENTER | Age: 67
End: 2022-07-15
Payer: COMMERCIAL

## 2022-07-15 VITALS
DIASTOLIC BLOOD PRESSURE: 84 MMHG | HEIGHT: 69 IN | WEIGHT: 211 LBS | SYSTOLIC BLOOD PRESSURE: 128 MMHG | BODY MASS INDEX: 31.25 KG/M2 | HEART RATE: 62 BPM | OXYGEN SATURATION: 96 %

## 2022-07-15 DIAGNOSIS — N40.1 BENIGN PROSTATIC HYPERPLASIA WITH URINARY OBSTRUCTION: Primary | ICD-10-CM

## 2022-07-15 DIAGNOSIS — N20.0 CALCULUS OF KIDNEY: ICD-10-CM

## 2022-07-15 DIAGNOSIS — N13.8 BENIGN PROSTATIC HYPERPLASIA WITH URINARY OBSTRUCTION: Primary | ICD-10-CM

## 2022-07-15 PROCEDURE — 1160F RVW MEDS BY RX/DR IN RCRD: CPT | Performed by: UROLOGY

## 2022-07-15 PROCEDURE — 99214 OFFICE O/P EST MOD 30 MIN: CPT | Performed by: UROLOGY

## 2022-07-15 NOTE — OP NOTE
OPERATIVE REPORT  PATIENT NAME: Vel Lopez    :  1955  MRN: [id number]  Pt Location:  OR ROOM 12    SURGERY DATE: 2022    Surgeon(s) and Role:     * Ivonne Summers DO - Primary    Preop Diagnosis:  SCC of scalp    Post-Op Diagnosis Codes:  SCC of scalp    Procedure:  Excision of scalp lesion with frozen section  Complex closure of scalp defect    Specimen(s):  ID Type Source Tests Collected by Time Destination   1 : SCALP LESION, SHORT SUPERIOR LONG LATERAL Tissue Lesion TISSUE EXAM Ivonne Tita Anderson DO 2022 [id number]        Estimated Blood Loss:   10 ml    Drains:  * No LDAs found *    Anesthesia Type:   IV Sedation with Anesthesia    Operative Indications:  74 yo male presents with biopsy proven SCC of the scalp    Operative Findings:  Scalp defect 1 6 x 3 cm  Closure length 3 2 cm    Complications:   None    Procedure and Technique:    The patient was seen preoperatively  The procedure, risks, benefits and alternatives were discussed  Informed consent was obtained  The patient was site marked preoperatively  The patient was brought to the operating room where he was positioned supine with all of his pressure points appropriately padded  Anesthesia commenced  A timeout was performed and verified  The patient was prepped and draped in usual sterile fashion  The area was infiltrated with local anesthesia  An incision was made sharply surrounding the lesion with appropriate margins  It was dissected from underlying tissue sharply  The lesion was sent for frozen pathology  The wound was irrigated copiously and hemostasis was obtained  Once confirmed the margins were negative on frozen pathology, closure commenced  The wound edges were widely and circumferentially undermined deep to the dermis around the entire circumference wound utilizing the cautery   This wide undermining was performed for distance greater than width of the wound in order to close the wound without significant tension  The remaining wound was closed in layers, using 3-0 monocryl  for the muscular fascia and deep dermis and 5-0 prolene to approximate the skin  The area was cleansed and bacitracin was applied  The remaining wound was closed in layers, using 3-0 monocryl for the fascia and deep dermis and 5-0 prolene to approximate the skin  The area was cleansed and bacitracin was applied  The instrument, sponge and needle count was correct an verified prior to completion of the case  The patient emerged from anesthesia and was transferred to the recovery room in stable condition        Patient Disposition:  PACU      SIGNATURE: Rock Scott,   DATE: July 15, 2022  TIME: 8:12 AM

## 2022-07-15 NOTE — PROGRESS NOTES
Assessment/Plan:    Benign prostatic hyperplasia with urinary obstruction  AUA symptom score is 1  He is pleased with his voiding pattern  PSA was 1 0 in November 2021  We will plan to continue to follow his voiding pattern watchful waiting  We will check a PSA in November 2022  He will return in 1 year  Calculus of kidney  Renal ultrasound last year revealed bilateral lateral punctate stones without hydronephrosis  He has a history of uric acid lithiasis  He remains on allopurinol  He is presently asymptomatic  We will plan to recheck a renal ultrasound in 1 year  Diagnoses and all orders for this visit:    Benign prostatic hyperplasia with urinary obstruction  -     PSA Total, Diagnostic; Future  -     Urinalysis with microscopic; Future    Calculus of kidney  -     Urinalysis with microscopic; Future  -     Cancel: XR abdomen 1 view kub; Future  -     US kidney and bladder; Future          Subjective:      Patient ID: Donell Cisneros is a 75 y o  male  Benign Prostatic Hypertrophy  This is a chronic problem  The current episode started more than 1 year ago  The problem is unchanged  Irritative symptoms include nocturia (nocturia x 0-1)  Irritative symptoms do not include frequency or urgency  Obstructive symptoms do not include dribbling, incomplete emptying, an intermittent stream, a slower stream, straining or a weak stream  Pertinent negatives include no chills, dysuria, genital pain, hematuria, hesitancy, nausea or vomiting  AUA score is 0-7  His sexual activity is non-contributory to the current illness  Nothing aggravates the symptoms  Past treatments include nothing  History of kidney stones  Patient has a remote history of kidney stones  At the present time he has no flank pain or symptoms related to kidney stones      The following portions of the patient's history were reviewed and updated as appropriate: allergies, current medications, past family history, past medical history, past social history, past surgical history and problem list     Review of Systems   Constitutional: Negative for chills, diaphoresis, fatigue and fever  HENT: Negative  Eyes: Negative  Respiratory: Negative  Cardiovascular: Negative  Gastrointestinal: Negative  Negative for blood in stool, nausea and vomiting  Endocrine: Negative  Genitourinary: Positive for nocturia (nocturia x 0-1)  Negative for dysuria, frequency, hematuria, hesitancy, incomplete emptying and urgency  See HPI   Musculoskeletal: Negative  Skin: Negative  Allergic/Immunologic: Negative  Neurological: Negative  Hematological: Negative  Psychiatric/Behavioral: Negative  AUA SYMPTOM SCORE    Flowsheet Row Most Recent Value   AUA SYMPTOM SCORE    How often have you had a sensation of not emptying your bladder completely after you finished urinating? 0 (P)     How often have you had to urinate again less than two hours after you finished urinating? 0 (P)     How often have you found you stopped and started again several times when you urinate? 0 (P)     How often have you found it difficult to postpone urination? 0 (P)     How often have you had a weak urinary stream? 0 (P)     How often have you had to push or strain to begin urination? 0 (P)     How many times did you most typically get up to urinate from the time you went to bed at night until the time you got up in the morning? 1 (P)     Quality of Life: If you were to spend the rest of your life with your urinary condition just the way it is now, how would you feel about that? 0 (P)     AUA SYMPTOM SCORE 1 (P)             Objective:      /84   Pulse 62   Ht 5' 9" (1 753 m)   Wt 95 7 kg (211 lb)   SpO2 96%   BMI 31 16 kg/m²          Physical Exam  Vitals reviewed  Constitutional:       General: He is not in acute distress  Appearance: Normal appearance  He is well-developed  He is not ill-appearing, toxic-appearing or diaphoretic  HENT:      Head: Normocephalic and atraumatic  Eyes:      General: No scleral icterus  Conjunctiva/sclera: Conjunctivae normal    Cardiovascular:      Rate and Rhythm: Normal rate  Pulmonary:      Effort: Pulmonary effort is normal  No respiratory distress  Breath sounds: No stridor  Abdominal:      General: Bowel sounds are normal  There is no distension  Palpations: Abdomen is soft  There is no mass  Tenderness: There is no abdominal tenderness  There is no right CVA tenderness, left CVA tenderness, guarding or rebound  Hernia: No hernia is present  Genitourinary:     Penis: Normal  No phimosis or hypospadias  Testes: Normal          Right: Mass not present  Left: Mass not present  Rectum: Normal       Comments: Prostate 1 5 X enlarged and palpably benign  Musculoskeletal:         General: Normal range of motion  Cervical back: Neck supple  Skin:     General: Skin is warm and dry  Coloration: Skin is not pale  Neurological:      General: No focal deficit present  Mental Status: He is alert and oriented to person, place, and time  Psychiatric:         Mood and Affect: Mood normal          Behavior: Behavior normal          Thought Content:  Thought content normal          Judgment: Judgment normal

## 2022-07-15 NOTE — ASSESSMENT & PLAN NOTE
AUA symptom score is 1  He is pleased with his voiding pattern  PSA was 1 0 in November 2021  We will plan to continue to follow his voiding pattern watchful waiting  We will check a PSA in November 2022  He will return in 1 year

## 2022-07-15 NOTE — ASSESSMENT & PLAN NOTE
Renal ultrasound last year revealed bilateral lateral punctate stones without hydronephrosis  He has a history of uric acid lithiasis  He remains on allopurinol  He is presently asymptomatic  We will plan to recheck a renal ultrasound in 1 year

## 2022-08-03 ENCOUNTER — OFFICE VISIT (OUTPATIENT)
Dept: PLASTIC SURGERY | Facility: CLINIC | Age: 67
End: 2022-08-03

## 2022-08-03 DIAGNOSIS — Z85.89 HISTORY OF SQUAMOUS CELL CARCINOMA: Primary | ICD-10-CM

## 2022-08-03 PROCEDURE — 99024 POSTOP FOLLOW-UP VISIT: CPT | Performed by: STUDENT IN AN ORGANIZED HEALTH CARE EDUCATION/TRAINING PROGRAM

## 2022-08-10 ENCOUNTER — APPOINTMENT (OUTPATIENT)
Dept: LAB | Facility: MEDICAL CENTER | Age: 67
End: 2022-08-10
Payer: COMMERCIAL

## 2022-08-10 DIAGNOSIS — M10.09 IDIOPATHIC GOUT OF MULTIPLE SITES, UNSPECIFIED CHRONICITY: ICD-10-CM

## 2022-08-10 LAB
CRP SERPL QL: 11.7 MG/L
ERYTHROCYTE [SEDIMENTATION RATE] IN BLOOD: 32 MM/HOUR (ref 0–19)
URATE SERPL-MCNC: 6.6 MG/DL (ref 3.5–8.5)

## 2022-08-10 PROCEDURE — 36415 COLL VENOUS BLD VENIPUNCTURE: CPT

## 2022-08-10 PROCEDURE — 86140 C-REACTIVE PROTEIN: CPT

## 2022-08-10 PROCEDURE — 84550 ASSAY OF BLOOD/URIC ACID: CPT

## 2022-08-10 PROCEDURE — 85652 RBC SED RATE AUTOMATED: CPT

## 2022-08-16 NOTE — PROGRESS NOTES
Assessment and Plan:  Josee Daft 75 y o  male s/p excision of scalp lesion    Healing well  Discussed path  Demonstrated and encouraged scar massage  Advised strict sun protection  Continue derm evals  RTC as needed or should any issues arise  Subjective:  Doing well  Denies issues  Objective:  NAD, AAOx3  Incision C/D/I, sutures removed without difficulty    Ivonne Anderson, DO  Plastic and Reconstructive Surgery

## 2022-08-24 DIAGNOSIS — E78.2 MIXED HYPERLIPIDEMIA: ICD-10-CM

## 2022-08-24 RX ORDER — SIMVASTATIN 20 MG
TABLET ORAL
Qty: 90 TABLET | Refills: 1 | Status: SHIPPED | OUTPATIENT
Start: 2022-08-24

## 2022-11-02 ENCOUNTER — RA CDI HCC (OUTPATIENT)
Dept: OTHER | Facility: HOSPITAL | Age: 67
End: 2022-11-02

## 2022-11-02 NOTE — PROGRESS NOTES
[facility]  coding opportunities       Chart reviewed, no opportunity found: CHART REVIEWED, NO OPPORTUNITY FOUND        Patients Insurance        Commercial Insurance: [employer]

## 2022-11-14 ENCOUNTER — OFFICE VISIT (OUTPATIENT)
Dept: FAMILY MEDICINE CLINIC | Facility: CLINIC | Age: 67
End: 2022-11-14

## 2022-11-14 VITALS
WEIGHT: 218.2 LBS | SYSTOLIC BLOOD PRESSURE: 120 MMHG | DIASTOLIC BLOOD PRESSURE: 80 MMHG | BODY MASS INDEX: 32.32 KG/M2 | TEMPERATURE: 97.7 F | HEIGHT: 69 IN

## 2022-11-14 DIAGNOSIS — M1A.09X0 IDIOPATHIC CHRONIC GOUT OF MULTIPLE SITES WITHOUT TOPHUS: ICD-10-CM

## 2022-11-14 DIAGNOSIS — E66.09 CLASS 1 OBESITY DUE TO EXCESS CALORIES WITH SERIOUS COMORBIDITY AND BODY MASS INDEX (BMI) OF 32.0 TO 32.9 IN ADULT: ICD-10-CM

## 2022-11-14 DIAGNOSIS — E78.2 MIXED HYPERLIPIDEMIA: Primary | ICD-10-CM

## 2022-11-14 DIAGNOSIS — N13.8 BENIGN PROSTATIC HYPERPLASIA WITH URINARY OBSTRUCTION: ICD-10-CM

## 2022-11-14 DIAGNOSIS — Z23 NEED FOR VACCINATION: ICD-10-CM

## 2022-11-14 DIAGNOSIS — N40.1 BENIGN PROSTATIC HYPERPLASIA WITH URINARY OBSTRUCTION: ICD-10-CM

## 2022-11-14 DIAGNOSIS — N18.31 STAGE 3A CHRONIC KIDNEY DISEASE (HCC): ICD-10-CM

## 2022-11-14 NOTE — ASSESSMENT & PLAN NOTE
Lab Results   Component Value Date    EGFR 46 06/30/2022    EGFR 48 04/20/2022    EGFR 52 02/02/2022    CREATININE 1 51 (H) 06/30/2022    CREATININE 1 48 (H) 04/20/2022    CREATININE 1 38 (H) 02/02/2022   renal function is stable continue to monitor labs and blood pressure Avoidance of NSAIDS was stressed

## 2022-11-14 NOTE — PROGRESS NOTES
Chief Complaint   Patient presents with   • Hyperlipidemia     Name: Jeramie Barba      : 1955      MRN: [id number]  Encounter Provider: Katia Rowe DO  Encounter Date: 2022   Encounter department: [facility]    Assessment & Plan     1  Mixed hyperlipidemia  Assessment & Plan:  conitnue meds recheck labs in 2022    Orders:  -     Comprehensive metabolic panel; Future; Expected date: 2022  -     Lipid panel; Future; Expected date: 2022    2  Class 1 obesity due to excess calories with serious comorbidity and body mass index (BMI) of 32 0 to 32 9 in adult  Assessment & Plan:  Discussed diet and exercise with patient       3  Stage 3a chronic kidney disease [facility])  Assessment & Plan:  Lab Results   Component Value Date    EGFR 46 2022    EGFR 48 2022    EGFR 52 2022    CREATININE 1 51 (H) 2022    CREATININE 1 48 (H) 2022    CREATININE 1 38 (H) 2022   renal function is stable continue to monitor labs and blood pressure Avoidance of NSAIDS was stressed     Orders:  -     Comprehensive metabolic panel; Future; Expected date: 2022    4  Idiopathic chronic gout of multiple sites without tophus  Assessment & Plan:  conitnue allopurinol and follow up with the rheumatology team      5  Benign prostatic hyperplasia with urinary obstruction  Assessment & Plan:  Continues to see urology Has PSA ordered Patient has no concerns  His urinary habits are stable      6   Need for vaccination  -     influenza vaccine, high-dose, PF 0 7 mL (FLUZONE HIGH-DOSE)  -     Pneumococcal Conjugate Vaccine 20-valent (Pcv20)         Subjective      Patient is here for followup of hyperlipidemia obesity BPH stage 3 kidney disease gout and history of skin cancer Patient last lipids were in 2022 Patient labs were stable Patient kidney function is stable Patient weight is up He has had some issues with gout flare recent and that then decreases his ability to exercise  Patient saw urology in July and things are stable Melinda has appt with dermatology he has a new skin lesion right side that is scabbing over He needs flu and prevnar shots today     Review of Systems   Constitutional: Negative for fatigue, fever and unexpected weight change  HENT: Negative for congestion, sinus pain and trouble swallowing  Eyes: Negative for discharge and visual disturbance  Respiratory: Negative for cough, chest tightness, shortness of breath and wheezing  Cardiovascular: Negative for chest pain, palpitations and leg swelling  Gastrointestinal: Negative for abdominal pain, blood in stool, constipation, diarrhea, nausea and vomiting  Genitourinary: Negative for difficulty urinating, dysuria, frequency and hematuria  Musculoskeletal: Negative for arthralgias, gait problem and joint swelling  Skin: Negative for rash and wound  Skin lesion   Allergic/Immunologic: Negative for environmental allergies and food allergies  Neurological: Negative for dizziness, syncope, weakness, numbness and headaches  Hematological: Negative for adenopathy  Does not bruise/bleed easily  Psychiatric/Behavioral: Negative for confusion, decreased concentration and sleep disturbance  The patient is not nervous/anxious  Current Outpatient Medications on File Prior to Visit   Medication Sig   • allopurinol (ZYLOPRIM) 300 mg tablet Take 445 mg by mouth daily   • predniSONE 5 mg tablet Take by mouth daily   • sildenafil (Viagra) 50 MG tablet Take 1 tablet (50 mg total) by mouth as needed for erectile dysfunction   • simvastatin (ZOCOR) 20 mg tablet TAKE 1 TABLET DAILY       Objective     /80   Temp 97 7 °F (36 5 °C)   Ht 5' 9" (1 753 m)   Wt 99 kg (218 lb 3 2 oz)   BMI 32 22 kg/m²     Physical Exam  Vitals and nursing note reviewed  Constitutional:       Appearance: He is well-developed  He is obese  HENT:      Head: Normocephalic and atraumatic        Right Ear: Hearing, tympanic membrane and external ear normal       Left Ear: Hearing, tympanic membrane and external ear normal    Eyes:      Extraocular Movements: Extraocular movements intact  Conjunctiva/sclera: Conjunctivae normal       Pupils: Pupils are equal, round, and reactive to light  Neck:      Thyroid: No thyromegaly  Cardiovascular:      Rate and Rhythm: Normal rate  Heart sounds: Normal heart sounds  Pulmonary:      Effort: Pulmonary effort is normal       Breath sounds: Normal breath sounds  No wheezing or rales  Abdominal:      General: Bowel sounds are normal  There is no distension  Palpations: Abdomen is soft  Tenderness: There is no abdominal tenderness  Musculoskeletal:         General: No tenderness  Cervical back: Neck supple  Lymphadenopathy:      Cervical: No cervical adenopathy  Skin:     General: Skin is warm and dry  Findings: Lesion present  No rash  Comments: 7 5 x5 5 oval lesion atht has scabbing and irregular border noted    Neurological:      General: No focal deficit present  Mental Status: He is alert and oriented to person, place, and time  Cranial Nerves: No cranial nerve deficit  Coordination: Coordination normal    Psychiatric:         Mood and Affect: Mood normal          Behavior: Behavior normal          Thought Content:  Thought content normal          Judgment: Judgment normal        Anderson Netters, DO

## 2022-11-14 NOTE — PATIENT INSTRUCTIONS
Cholesterol and Your Health   WHAT YOU NEED TO KNOW:   What is cholesterol? Cholesterol is a waxy, fat-like substance  Your body uses cholesterol to make hormones and new cells, and to protect nerves  Cholesterol is made by your body  It also comes from certain foods you eat, such as meat and dairy products  Your healthcare provider can help you set goals for your cholesterol levels  He or she can help you create a plan to meet your goals  What are cholesterol level goals? Your cholesterol level goals depend on your risk for heart disease, your age, and your other health conditions  The following are general guidelines: Total cholesterol  includes low-density lipoprotein (LDL), high-density lipoprotein (HDL), and triglyceride levels  The total cholesterol level should be lower than 200 mg/dL and is best at about 150 mg/dL  LDL cholesterol  is called bad cholesterol  because it forms plaque in your arteries  As plaque builds up, your arteries become narrow, and less blood flows through  When plaque decreases blood flow to your heart, you may have chest pain  If plaque completely blocks an artery that brings blood to your heart, you may have a heart attack  Plaque can break off and form blood clots  Blood clots may block arteries in your brain and cause a stroke  The level should be less than 130 mg/dL and is best at about 100 mg/dL  HDL cholesterol  is called good cholesterol  because it helps remove LDL cholesterol from your arteries  It does this by attaching to LDL cholesterol and carrying it to your liver  Your liver breaks down LDL cholesterol so your body can get rid of it  High levels of HDL cholesterol can help prevent a heart attack and stroke  Low levels of HDL cholesterol can increase your risk for heart disease, heart attack, and stroke  The level should be 60 mg/dL or higher  Triglycerides  are a type of fat that store energy from foods you eat   High levels of triglycerides also cause plaque buildup  This can increase your risk for a heart attack or stroke  If your triglyceride level is high, your LDL cholesterol level may also be high  The level should be less than 150 mg/dL  What increases my risk for high cholesterol? Smoking cigarettes    Being overweight or obese, or not getting enough exercise    Drinking large amounts of alcohol    A medical condition such as hypertension (high blood pressure) or diabetes    Certain genes passed from your parents to you    Age older than 72 years    What do I need to know about having my cholesterol levels checked? Adults 21to 39years of age should have their cholesterol levels checked every 4 to 6 years  Adults 45 years or older should have their cholesterol checked every 1 to 2 years  You may need your cholesterol checked more often, or at a younger age, if you have risk factors for heart disease  You may also need to have your cholesterol checked more often if you have other health conditions, such as diabetes  Blood tests are used to check cholesterol levels  Blood tests measure your levels of triglycerides, LDL cholesterol, and HDL cholesterol  How do healthy fats affect my cholesterol levels? Healthy fats, also called unsaturated fats, help lower LDL cholesterol and triglyceride levels  Healthy fats include the following:  Monounsaturated fats  are found in foods such as olive oil, canola oil, avocado, nuts, and olives  Polyunsaturated fats,  such as omega 3 fats, are found in fish, such as salmon, trout, and tuna  They can also be found in plant foods such as flaxseed, walnuts, and soybeans  How do unhealthy fats affect my cholesterol levels? Unhealthy fats increase LDL cholesterol and triglyceride levels  They are found in foods high in cholesterol, saturated fat, and trans fat:  Cholesterol  is found in eggs, dairy, and meat  Saturated fat  is found in butter, cheese, ice cream, whole milk, and coconut oil   Saturated fat is also found in meat, such as sausage, hot dogs, and bologna  Trans fat  is found in liquid oils and is used in fried and baked foods  Foods that contain trans fats include chips, crackers, muffins, sweet rolls, microwave popcorn, and cookies  How is high cholesterol treated? Treatment for high cholesterol will also decrease your risk of heart disease, heart attack, and stroke  Treatment may include any of the following:  Lifestyle changes  may include food, exercise, weight loss, and quitting smoking  You may also need to decrease the amount of alcohol you drink  Your healthcare provider will want you to start with lifestyle changes  Other treatment may be added if lifestyle changes are not enough  Your healthcare provider may recommend you work with a team to manage hyperlipidemia  The team may include medical experts such as a dietitian, an exercise or physical therapist, and a behavior therapist  Your family members may be included in helping you create lifestyle changes  Medicines  may be given to lower your LDL cholesterol, triglyceride levels, or total cholesterol level  You may need medicines to lower your cholesterol if any of the following is true:    You have a history of stroke, TIA, unstable angina, or a heart attack  Your LDL cholesterol level is 190 mg/dL or higher  You are age 36 to 76 years, have diabetes or heart disease risk factors, and your LDL cholesterol is 70 mg/dL or higher  Supplements  include fish oil, red yeast rice, and garlic  Fish oil may help lower your triglyceride and LDL cholesterol levels  It may also increase your HDL cholesterol level  Red yeast rice may help decrease your total cholesterol level and LDL cholesterol level  Garlic may help lower your total cholesterol level  Do not take any supplements without talking to your healthcare provider  What food changes can I make to lower my cholesterol levels?   A dietitian can help you create a healthy eating plan  He or she can show you how to read food labels and choose foods low in saturated fat, trans fats, and cholesterol  Decrease the total amount of fat you eat  Choose lean meats, fat-free or 1% fat milk, and low-fat dairy products, such as yogurt and cheese  Try to limit or avoid red meats  Limit or do not eat fried foods or baked goods, such as cookies  Replace unhealthy fats with healthy fats  Cook foods in olive oil or canola oil  Choose soft margarines that are low in saturated fat and trans fat  Seeds, nuts, and avocados are other examples of healthy fats  Eat foods with omega-3 fats  Examples include salmon, tuna, mackerel, walnuts, and flaxseed  Eat fish 2 times per week  Pregnant women should not eat fish that have high levels of mercury, such as shark, swordfish, and breezy mackerel  Increase the amount of high-fiber foods you eat  High-fiber foods can help lower your LDL cholesterol  Aim to get between 20 and 30 grams of fiber each day  Fruits and vegetables are high in fiber  Eat at least 5 servings each day  Other high-fiber foods are whole-grain or whole-wheat breads, pastas, or cereals, and brown rice  Eat 3 ounces of whole-grain foods each day  Increase fiber slowly  You may have abdominal discomfort, bloating, and gas if you add fiber to your diet too quickly  Eat healthy protein foods  Examples include low-fat dairy products, skinless chicken and turkey, fish, and nuts  Limit foods and drinks that are high in sugar  Your dietitian or healthcare provider can help you create daily limits for high-sugar foods and drinks  The limit may be lower if you have diabetes or another health condition  Limits can also help you lose weight if needed  What lifestyle changes can I make to lower my cholesterol levels? Maintain a healthy weight  Ask your healthcare provider what a healthy weight is for you  Ask him or her to help you create a weight loss plan if needed  Weight loss can decrease your total cholesterol and triglyceride levels  Weight loss may also help keep your blood pressure at a healthy level  Be physically active throughout the day  Physical activity, such as exercise, can help lower your total cholesterol level and maintain a healthy weight  Physical activity can also help increase your HDL cholesterol level  Work with your healthcare provider to create an program that is right for you  Get at least 30 to 40 minutes of moderate physical activity most days of the week  Examples of exercise include brisk walking, swimming, or biking  Also include strength training at least 2 times each week  Your healthcare providers can help you create a physical activity plan  Do not smoke  Nicotine and other chemicals in cigarettes and cigars can raise your cholesterol levels  Ask your healthcare provider for information if you currently smoke and need help to quit  E-cigarettes or smokeless tobacco still contain nicotine  Talk to your healthcare provider before you use these products  Limit or do not drink alcohol  Alcohol can increase your triglyceride levels  Ask your healthcare provider before you drink alcohol  Ask how much is okay for you to drink in 24 hours or 1 week  CARE AGREEMENT:   You have the right to help plan your care  Discuss treatment options with your healthcare provider to decide what care you want to receive  You always have the right to refuse treatment  The above information is an  only  It is not intended as medical advice for individual conditions or treatments  Talk to your doctor, nurse or pharmacist before following any medical regimen to see if it is safe and effective for you  © Copyright [employer] 2022 Information is for End User's use only and may not be sold, redistributed or otherwise used for commercial purposes   All illustrations and images included in CareNotes® are the copyrighted property of [employer]  or [address] [place] [address]

## 2022-12-01 ENCOUNTER — APPOINTMENT (OUTPATIENT)
Dept: LAB | Facility: MEDICAL CENTER | Age: 67
End: 2022-12-01

## 2022-12-01 DIAGNOSIS — Z79.899 ENCOUNTER FOR LONG-TERM (CURRENT) USE OF OTHER MEDICATIONS: ICD-10-CM

## 2022-12-01 DIAGNOSIS — M10.09 IDIOPATHIC GOUT OF MULTIPLE SITES, UNSPECIFIED CHRONICITY: ICD-10-CM

## 2022-12-01 LAB
ALBUMIN SERPL BCP-MCNC: 3.2 G/DL (ref 3.5–5)
ALP SERPL-CCNC: 67 U/L (ref 46–116)
ALT SERPL W P-5'-P-CCNC: 35 U/L (ref 12–78)
ANION GAP SERPL CALCULATED.3IONS-SCNC: 2 MMOL/L (ref 4–13)
AST SERPL W P-5'-P-CCNC: 14 U/L (ref 5–45)
BASOPHILS # BLD AUTO: 0.03 THOUSANDS/ÂΜL (ref 0–0.1)
BASOPHILS NFR BLD AUTO: 0 % (ref 0–1)
BILIRUB SERPL-MCNC: 0.53 MG/DL (ref 0.2–1)
BUN SERPL-MCNC: 20 MG/DL (ref 5–25)
CALCIUM ALBUM COR SERPL-MCNC: 10.5 MG/DL (ref 8.3–10.1)
CALCIUM SERPL-MCNC: 9.9 MG/DL (ref 8.3–10.1)
CHLORIDE SERPL-SCNC: 113 MMOL/L (ref 96–108)
CO2 SERPL-SCNC: 24 MMOL/L (ref 21–32)
CREAT SERPL-MCNC: 1.48 MG/DL (ref 0.6–1.3)
CRP SERPL QL: 8.5 MG/L
EOSINOPHIL # BLD AUTO: 0.27 THOUSAND/ÂΜL (ref 0–0.61)
EOSINOPHIL NFR BLD AUTO: 3 % (ref 0–6)
ERYTHROCYTE [DISTWIDTH] IN BLOOD BY AUTOMATED COUNT: 13.6 % (ref 11.6–15.1)
ERYTHROCYTE [SEDIMENTATION RATE] IN BLOOD: 37 MM/HOUR (ref 0–19)
GFR SERPL CREATININE-BSD FRML MDRD: 47 ML/MIN/1.73SQ M
GLUCOSE P FAST SERPL-MCNC: 104 MG/DL (ref 65–99)
HCT VFR BLD AUTO: 43.9 % (ref 36.5–49.3)
HGB BLD-MCNC: 14.2 G/DL (ref 12–17)
IMM GRANULOCYTES # BLD AUTO: 0.03 THOUSAND/UL (ref 0–0.2)
IMM GRANULOCYTES NFR BLD AUTO: 0 % (ref 0–2)
LYMPHOCYTES # BLD AUTO: 1.4 THOUSANDS/ÂΜL (ref 0.6–4.47)
LYMPHOCYTES NFR BLD AUTO: 15 % (ref 14–44)
MCH RBC QN AUTO: 29.9 PG (ref 26.8–34.3)
MCHC RBC AUTO-ENTMCNC: 32.3 G/DL (ref 31.4–37.4)
MCV RBC AUTO: 92 FL (ref 82–98)
MONOCYTES # BLD AUTO: 0.69 THOUSAND/ÂΜL (ref 0.17–1.22)
MONOCYTES NFR BLD AUTO: 8 % (ref 4–12)
NEUTROPHILS # BLD AUTO: 6.84 THOUSANDS/ÂΜL (ref 1.85–7.62)
NEUTS SEG NFR BLD AUTO: 74 % (ref 43–75)
NRBC BLD AUTO-RTO: 0 /100 WBCS
PLATELET # BLD AUTO: 250 THOUSANDS/UL (ref 149–390)
PMV BLD AUTO: 9.8 FL (ref 8.9–12.7)
POTASSIUM SERPL-SCNC: 4.5 MMOL/L (ref 3.5–5.3)
PROT SERPL-MCNC: 7.6 G/DL (ref 6.4–8.4)
RBC # BLD AUTO: 4.75 MILLION/UL (ref 3.88–5.62)
SODIUM SERPL-SCNC: 139 MMOL/L (ref 135–147)
URATE SERPL-MCNC: 6.1 MG/DL (ref 3.5–8.5)
WBC # BLD AUTO: 9.26 THOUSAND/UL (ref 4.31–10.16)

## 2023-01-03 ENCOUNTER — APPOINTMENT (OUTPATIENT)
Dept: LAB | Facility: MEDICAL CENTER | Age: 68
End: 2023-01-03

## 2023-01-03 ENCOUNTER — EVALUATION (OUTPATIENT)
Dept: PHYSICAL THERAPY | Facility: CLINIC | Age: 68
End: 2023-01-03

## 2023-01-03 DIAGNOSIS — N20.0 CALCULUS OF KIDNEY: ICD-10-CM

## 2023-01-03 DIAGNOSIS — N40.1 BENIGN PROSTATIC HYPERPLASIA WITH URINARY OBSTRUCTION: ICD-10-CM

## 2023-01-03 DIAGNOSIS — N18.31 STAGE 3A CHRONIC KIDNEY DISEASE (HCC): ICD-10-CM

## 2023-01-03 DIAGNOSIS — S66.117D STRAIN OF FLEXOR MUSCLE, FASCIA AND TENDON OF LEFT LITTLE FINGER AT WRIST AND HAND LEVEL, SUBSEQUENT ENCOUNTER: Primary | ICD-10-CM

## 2023-01-03 DIAGNOSIS — N13.8 BENIGN PROSTATIC HYPERPLASIA WITH URINARY OBSTRUCTION: ICD-10-CM

## 2023-01-03 DIAGNOSIS — E78.2 MIXED HYPERLIPIDEMIA: ICD-10-CM

## 2023-01-03 LAB
ALBUMIN SERPL BCP-MCNC: 3.4 G/DL (ref 3.5–5)
ALP SERPL-CCNC: 49 U/L (ref 46–116)
ALT SERPL W P-5'-P-CCNC: 26 U/L (ref 12–78)
ANION GAP SERPL CALCULATED.3IONS-SCNC: 6 MMOL/L (ref 4–13)
AST SERPL W P-5'-P-CCNC: 12 U/L (ref 5–45)
BACTERIA UR QL AUTO: ABNORMAL /HPF
BILIRUB SERPL-MCNC: 0.58 MG/DL (ref 0.2–1)
BILIRUB UR QL STRIP: NEGATIVE
BUN SERPL-MCNC: 22 MG/DL (ref 5–25)
CALCIUM ALBUM COR SERPL-MCNC: 9.9 MG/DL (ref 8.3–10.1)
CALCIUM SERPL-MCNC: 9.4 MG/DL (ref 8.3–10.1)
CHLORIDE SERPL-SCNC: 110 MMOL/L (ref 96–108)
CHOLEST SERPL-MCNC: 149 MG/DL
CLARITY UR: CLEAR
CO2 SERPL-SCNC: 23 MMOL/L (ref 21–32)
COLOR UR: ABNORMAL
CREAT SERPL-MCNC: 1.35 MG/DL (ref 0.6–1.3)
GFR SERPL CREATININE-BSD FRML MDRD: 53 ML/MIN/1.73SQ M
GLUCOSE P FAST SERPL-MCNC: 94 MG/DL (ref 65–99)
GLUCOSE UR STRIP-MCNC: NEGATIVE MG/DL
HDLC SERPL-MCNC: 56 MG/DL
HGB UR QL STRIP.AUTO: ABNORMAL
KETONES UR STRIP-MCNC: NEGATIVE MG/DL
LDLC SERPL CALC-MCNC: 74 MG/DL (ref 0–100)
LEUKOCYTE ESTERASE UR QL STRIP: NEGATIVE
NITRITE UR QL STRIP: NEGATIVE
NON-SQ EPI CELLS URNS QL MICRO: ABNORMAL /HPF
NONHDLC SERPL-MCNC: 93 MG/DL
PH UR STRIP.AUTO: 6 [PH]
POTASSIUM SERPL-SCNC: 3.9 MMOL/L (ref 3.5–5.3)
PROT SERPL-MCNC: 7.5 G/DL (ref 6.4–8.4)
PROT UR STRIP-MCNC: ABNORMAL MG/DL
PSA SERPL-MCNC: 0.8 NG/ML (ref 0–4)
RBC #/AREA URNS AUTO: ABNORMAL /HPF
SODIUM SERPL-SCNC: 139 MMOL/L (ref 135–147)
SP GR UR STRIP.AUTO: 1.02 (ref 1–1.03)
TRIGL SERPL-MCNC: 94 MG/DL
UROBILINOGEN UR STRIP-ACNC: <2 MG/DL
WBC #/AREA URNS AUTO: ABNORMAL /HPF

## 2023-01-03 NOTE — PROGRESS NOTES
PT Evaluation     Today's date: 1/3/2023  Patient name: Mariposa Wilson  : 1955  MRN: [id number]  Referring provider: Freeman Hinkle MD  Dx:   Encounter Diagnosis     ICD-10-CM    1  Strain of flexor muscle, fascia and tendon of left little finger at wrist and hand level, subsequent encounter  S66 117D                      Assessment  Assessment details: Pt is a 76 YO male presenting to PT with pain, decreased AROM, strength and tolerance to activity  Pt would benefit from skilled intervention to address these issues and maximize overall function  Occupation- - currently working  Dominant- right; Involved- left SF      Goals  ST  Decrease pain to 0-2/10 in 4 weeks            2  Decrease swelling left hand            3  Increase AROM to [place] in 6-8 weeks            4  Maintain clean wound and promote healing            5   Provide orthotic for protection  LT  Increase functional motion and strength for independence with ADL and self care by DC            2  Ability to RT recreational activity by DC    Plan  Patient would benefit from: skilled physical therapy  Planned modality interventions: cryotherapy, ultrasound and thermotherapy: hydrocollator packs  Planned therapy interventions: activity modification, manual therapy, orthotic management and training, orthotic fitting/training, neuromuscular re-education, strengthening, stretching, therapeutic activities, therapeutic exercise and home exercise program  Frequency: 2x week  Duration in weeks: 4  Treatment plan discussed with: patient        Subjective Evaluation    History of Present Illness  Date of surgery: 2022  Mechanism of injury: Pt noted inability to bend his left SF approximately 1 month ago  He reported no known trauma  MRI confirmed SF FDS and FDP rupture      Pain  Current pain rating: 3  At best pain rating: 3  At worst pain ratin  Location: left hand and SF  Quality: dull ache  Relieving factors: ice    Hand dominance: right    Treatments  Current treatment: physical therapy  Patient Goals  Patient goals for therapy: decreased edema, decreased pain, increased motion, increased strength, independence with ADLs/IADLs and return to sport/leisure activities          Objective     General Comments:      Wrist/Hand Comments  Pr wearing a DBS- sutures clean and dry  AROM left wrist E/F- 0/45  Left SF (Wrsit flexed)- MP- 25/45; PIP- 0/60; DIP- 0/55  Sensation- no tingling noted  Circumference at wrist- 19 5 cm; MPs- 22 0 cm; SF 7 0 cm  Pt instructed in AAROM/partial fisting, isolated AROM with wrist flexed  Pt also instructed in wound care, scar management             Precautions: wear orthosis as directed    No aggressive activity with left hand      Manuals 1/3/23            STM 15                         PROM 2/10                                                                Neuro Re-Ed             HP/pulsed biph 15                                                   Ther Ex             ERLIN wrist 2/5            ERLIN MP  2/5            ERLIN PIP/DIP 2/5                                                                                                                                                           Modalities             US --- ---- ---- ---- ---- ---       CP 12

## 2023-01-03 NOTE — LETTER
January 3, 2023    MD Murphy Jackson [address] [place] [id number]    Patient: Daniel Orellana   YOB: 1955   Date of Visit: 1/3/2023     Encounter Diagnosis     ICD-10-CM    1  Strain of flexor muscle, fascia and tendon of left little finger at wrist and hand level, subsequent encounter  S66 117D           Dear Dr Brianna Quinones:    Thank you for your recent referral of Daniel Orellana  Please review the attached evaluation summary from Matt's recent visit  Please verify that you agree with the plan of care by signing the attached order  If you have any questions or concerns, please do not hesitate to call  I sincerely appreciate the opportunity to share in the care of one of your patients and hope to have another opportunity to work with you in the near future  Sincerely,    Belen Shipman DPT, CHT    Referring Provider:      I certify that I have read the below Plan of Care and certify the need for these services furnished under this plan of treatment while under my care  MD Murphy Jackson [address] [place] [id number]  Via Fax: [phone]          PT Evaluation     Today's date: 1/3/2023  Patient name: Daniel Orellana  : 1955  MRN: [id number]  Referring provider: Jyotsna Owens MD  Dx:   Encounter Diagnosis     ICD-10-CM    1  Strain of flexor muscle, fascia and tendon of left little finger at wrist and hand level, subsequent encounter  S66 117D                      Assessment  Assessment details: Pt is a 74 YO male presenting to PT with pain, decreased AROM, strength and tolerance to activity  Pt would benefit from skilled intervention to address these issues and maximize overall function  Occupation- - currently working  Dominant- right; Involved- left SF      Goals  ST  Decrease pain to 0-2/10 in 4 weeks            2  Decrease swelling left hand            3    Increase AROM to [facility] in 6-8 weeks            4  Maintain clean wound and promote healing            5   Provide orthotic for protection  LT  Increase functional motion and strength for independence with ADL and self care by DC            2  Ability to RT recreational activity by DC    Plan  Patient would benefit from: skilled physical therapy  Planned modality interventions: cryotherapy, ultrasound and thermotherapy: hydrocollator packs  Planned therapy interventions: activity modification, manual therapy, orthotic management and training, orthotic fitting/training, neuromuscular re-education, strengthening, stretching, therapeutic activities, therapeutic exercise and home exercise program  Frequency: 2x week  Duration in weeks: 4  Treatment plan discussed with: patient        Subjective Evaluation    History of Present Illness  Date of surgery: 2022  Mechanism of injury: Pt noted inability to bend his left SF approximately 1 month ago  He reported no known trauma  MRI confirmed SF FDS and FDP rupture  Pain  Current pain rating: 3  At best pain rating: 3  At worst pain ratin  Location: left hand and SF  Quality: dull ache  Relieving factors: ice    Hand dominance: right    Treatments  Current treatment: physical therapy  Patient Goals  Patient goals for therapy: decreased edema, decreased pain, increased motion, increased strength, independence with ADLs/IADLs and return to sport/leisure activities          Objective     General Comments:      Wrist/Hand Comments  Pr wearing a DBS- sutures clean and dry  AROM left wrist E/F- 0/45  Left SF (Wrsit flexed)- MP- 25/45; PIP- 0/60; DIP- 0/55  Sensation- no tingling noted  Circumference at wrist- 19 5 cm; MPs- 22 0 cm; SF 7 0 cm  Pt instructed in AAROM/partial fisting, isolated AROM with wrist flexed  Pt also instructed in wound care, scar management            Precautions: wear orthosis as directed    No aggressive activity with left hand      Manuals 1/3/23            STM 15                         PROM 2/10                                                                Neuro Re-Ed             HP/pulsed biph 15                                                   Ther Ex             ERLIN wrist 2/5            ERLIN MP  2/5            ERLIN PIP/DIP 2/5                                                                                                                                                           Modalities             US --- ---- ---- ---- ---- ---        12

## 2023-01-05 ENCOUNTER — OFFICE VISIT (OUTPATIENT)
Dept: PHYSICAL THERAPY | Facility: CLINIC | Age: 68
End: 2023-01-05

## 2023-01-05 DIAGNOSIS — S66.117D STRAIN OF FLEXOR MUSCLE, FASCIA AND TENDON OF LEFT LITTLE FINGER AT WRIST AND HAND LEVEL, SUBSEQUENT ENCOUNTER: Primary | ICD-10-CM

## 2023-01-05 NOTE — PROGRESS NOTES
Daily Note     Today's date: 2023  Patient name: Jez Godinez  : 1955  MRN: [id number]  Referring provider: Alyse Ramirze MD  Dx:   Encounter Diagnosis     ICD-10-CM    1  Strain of flexor muscle, fascia and tendon of left little finger at wrist and hand level, subsequent encounter  S66 117D                      Subjective: pt doing well, using his splint as instructed    Objective: See treatment diary below:    Pt wearing a DBS- sutures clean and dry  AROM left wrist E/F- 0/45  Left SF (Wrist flexed)- MP- 25/45; PIP- 0/60; DIP- 0/55  Sensation- no tingling noted  Circumference at wrist- 19 5 cm; MPs- 22 0 cm; SF 7 0 cm  Pt instructed in AAROM/partial fisting, isolated AROM with wrist flexed  Pt also instructed in wound care, scar management    Assessment: Tolerated treatment well  Patient would benefit from continued PT      Plan: Continue per plan of care  Precautions: wear orthosis as directed    No aggressive activity with left hand      Manuals 1/3/23 1/5           STM 15 12                        PROM 12 12                                                               Neuro Re-Ed             HP/pulsed biph 15 15                                                  Ther Ex             ERLIN wrist 2/5 2/5           ERLIN MP  2/5 2/5           ERLIN PIP/DIP 2/5 2/5                                                                                                                                                          Modalities             US --- ---- ---- ---- ---- ---       CP 12 12

## 2023-01-09 ENCOUNTER — OFFICE VISIT (OUTPATIENT)
Dept: PHYSICAL THERAPY | Facility: CLINIC | Age: 68
End: 2023-01-09

## 2023-01-09 DIAGNOSIS — S66.117D STRAIN OF FLEXOR MUSCLE, FASCIA AND TENDON OF LEFT LITTLE FINGER AT WRIST AND HAND LEVEL, SUBSEQUENT ENCOUNTER: Primary | ICD-10-CM

## 2023-01-09 NOTE — PROGRESS NOTES
Daily Note     Today's date: 2023  Patient name: Sadie Tay  : 1955  MRN: [id number]  Referring provider: Marcy Lesches, MD  Dx:   Encounter Diagnosis     ICD-10-CM    1  Strain of flexor muscle, fascia and tendon of left little finger at wrist and hand level, subsequent encounter  S66 117D                      Subjective: Pt continues with no complaints, he is compliant with his splint  Objective: See treatment diary below  Pt wearing a DBS- sutures clean and dry  AROM left wrist E/F- 0/45  Left SF (Wrist flexed)- MP- 25/45; PIP- 0/60; DIP- 0/55  Sensation- no tingling noted  Circumference at wrist- 19 5 cm; MPs- 22 0 cm; SF 7 0 cm  Pt instructed in AAROM/partial fisting, isolated AROM with wrist flexed  Pt also instructed in wound care, scar management     Assessment: Pt continues to tolerate treatment well  Exercises performed with consideration of limiting excessive loads to repair site  Pt will benefit from continued skilled PT  Plan: Continue per plan of care  Precautions: wear orthosis as directed    No aggressive activity with left hand      Manuals 1/3/23 1/5 1/9          STM 15 12 12                       PROM 12 12 12                                                              Neuro Re-Ed             HP/pulsed biph 15 15 15                                                 Ther Ex             ERLIN wrist 2/5 25 2/5          ERLIN MP  2/5 2/5 2/5          ERLIN PIP/DIP 2/5 2 25                                                                                                                                                         Modalities             US --- ---- ---- ---- ---- ---       CP 12 12 12

## 2023-01-11 ENCOUNTER — OFFICE VISIT (OUTPATIENT)
Dept: PHYSICAL THERAPY | Facility: CLINIC | Age: 68
End: 2023-01-11

## 2023-01-11 DIAGNOSIS — S66.117D STRAIN OF FLEXOR MUSCLE, FASCIA AND TENDON OF LEFT LITTLE FINGER AT WRIST AND HAND LEVEL, SUBSEQUENT ENCOUNTER: Primary | ICD-10-CM

## 2023-01-11 NOTE — PROGRESS NOTES
Daily Note     Today's date: 2023  Patient name: Edd Chester  : 1955  MRN: [id number]  Referring provider: Carlos Phelan MD  Dx:   Encounter Diagnosis     ICD-10-CM    1  Strain of flexor muscle, fascia and tendon of left little finger at wrist and hand level, subsequent encounter  S66 117D                      Subjective: Pt reports following up with surgeon this morning, stitches were removed and pleased thus far  Pt to follow up with surgeon 23  Objective: See treatment diary below  Pt wearing a DBS- sutures clean and dry  AROM left wrist E/F- 0/45  Left SF (Wrist flexed)- MP- 25/60; PIP- 0/60; DIP- 0/55  Sensation- no tingling noted  Circumference at wrist- 18 5 cm; MPs- 22 0 cm; SF 7 0 cm  Pt instructed in AAROM/partial fisting, isolated AROM with wrist flexed  Pt also instructed in wound care, scar management    Assessment: Pt is continuing to tolerate exercises well without complaints  Gentle ERLIN performed per chart without stressing flexor tendons  Pt will benefit from continued skilled PT  Plan: Continue per plan of care  Precautions: wear orthosis as directed    No aggressive activity with left hand      Manuals 1/3/23 1/5 1/9 1/11         STM 15 12 12 12                      PROM 12 12                                                             Neuro Re-Ed             HP/pulsed biph 15 15 15 15                                                Ther Ex             ERLIN wrist 2/5 2/5 2/5 2/5         ERLIN MP  2/5 2/5 2/5 2/5         ERLIN PIP/DIP 2/5 2/5 2/5 2/5                                                                                                                                                        Modalities             US --- ---- ---- ---- ---- ---       CP 12 12 12 12

## 2023-01-16 ENCOUNTER — OFFICE VISIT (OUTPATIENT)
Dept: PHYSICAL THERAPY | Facility: CLINIC | Age: 68
End: 2023-01-16

## 2023-01-16 DIAGNOSIS — S66.117D STRAIN OF FLEXOR MUSCLE, FASCIA AND TENDON OF LEFT LITTLE FINGER AT WRIST AND HAND LEVEL, SUBSEQUENT ENCOUNTER: Primary | ICD-10-CM

## 2023-01-16 NOTE — PROGRESS NOTES
Daily Note     Today's date: 2023  Patient name: Jocelyn Worthy  : 1955  MRN: [id number]  Referring provider: Sari Joy MD  Dx:   Encounter Diagnosis     ICD-10-CM    1  Strain of flexor muscle, fascia and tendon of left little finger at wrist and hand level, subsequent encounter  S66 117D                      Subjective: Pt feels he is making progress but swelling persists and limits his ERLIN   He states he is compliant with splint wear at home  Objective: See treatment diary below    Pt wearing a DBS- sutures were removed   AROM left wrist E/F- 0/45  Left SF (Wrist flexed)- MP- 25/60; PIP- 0/25; DIP- 0/15    PROM- (isololated)-  MP- 0/70; PIP- 0/65; DIP- 0/55  Sensation- no tingling noted  Circumference at wrist- 18 5 cm; MPs- 22 0 cm; SF 6  cm  Pt instructed in AAROM/partial fisting, isolated AROM with wrist flexed  Pt also instructed in control of swelling, scar management    Assessment: Tolerated treatment well  Patient would benefit from continued PT      Plan: Continue per plan of care  Precautions: wear orthosis as directed    No aggressive activity with left hand      Manuals 1/3/23 1/5 1/9 1/11 1/16        STM 15 12 12 12 12                     PROM 12  12 12 15                                                            Neuro Re-Ed             HP/pulsed biph 15 15 15 15 15                                               Ther Ex             ERLIN wrist 2/5 2/5 2/ 2/ 2/        ERLIN MP  2/5 2/5 2/5 2/ 2        ERLIN PIP/DIP / 2 2//        Place hold (50% fist)    /        Tenodesis    2/10 2/10                                                                                                                             Modalities             US --- ---- ---- ---- ---- ---       CP 12 12 12 12 12

## 2023-01-18 ENCOUNTER — OFFICE VISIT (OUTPATIENT)
Dept: PHYSICAL THERAPY | Facility: CLINIC | Age: 68
End: 2023-01-18

## 2023-01-18 DIAGNOSIS — S66.117D STRAIN OF FLEXOR MUSCLE, FASCIA AND TENDON OF LEFT LITTLE FINGER AT WRIST AND HAND LEVEL, SUBSEQUENT ENCOUNTER: Primary | ICD-10-CM

## 2023-01-18 NOTE — PROGRESS NOTES
Daily Note     Today's date: 2023  Patient name: Irasema Limon  : 1955  MRN: [id number]  Referring provider: Satnam Santo MD  Dx:   Encounter Diagnosis     ICD-10-CM    1  Strain of flexor muscle, fascia and tendon of left little finger at wrist and hand level, subsequent encounter  S66 570D                      Subjective: pt notes he had a flare up of gout  with severe swelling and increased pain with inability to move his wrist and fingers  Objective: See treatment diary below    Pt wearing a DBS- sutures were removed   AROM left wrist E/F- 0/45  Left SF (Wrist flexed)- MP- 25/60; PIP- 0/25; DIP- 0/15    PROM- (isololated)-  MP- 0/70; PIP- 0/65; DIP- 0/55  Sensation- no tingling noted  Circumference at wrist- 18 5 cm; MPs- 22 0 cm; SF 6  cm  Pt instructed in AAROM/partial fisting, isolated AROM with wrist flexed  Pt also instructed in control of swelling, scar management    Assessment: Tolerated treatment well  Patient would benefit from continued PT      Plan: Continue per plan of care  Precautions: wear orthosis as directed    No aggressive activity with left hand      Manuals 1/3/23 1/5 1/9 1/11 1/16 1/18       STM 15 12 12 12 12 12                    PROM 12 12 12 12 15 15                                                           Neuro Re-Ed             HP/pulsed biph 15 15 15 15 15 15                                              Ther Ex             ERLIN wrist        ERLIN MP         ERLIN PIP/DIP        Place hold (50% fist)           Tenodesis    2/10 2/10 2/10                                                                                                                            Modalities             US --- ---- ---- ---- ---- ---       CP 12 12 12 12 12 12

## 2023-01-23 ENCOUNTER — OFFICE VISIT (OUTPATIENT)
Dept: PHYSICAL THERAPY | Facility: CLINIC | Age: 68
End: 2023-01-23

## 2023-01-23 DIAGNOSIS — S66.117D STRAIN OF FLEXOR MUSCLE, FASCIA AND TENDON OF LEFT LITTLE FINGER AT WRIST AND HAND LEVEL, SUBSEQUENT ENCOUNTER: Primary | ICD-10-CM

## 2023-01-23 NOTE — PROGRESS NOTES
Daily Note     Today's date: 2023  Patient name: Jhonatan Freeman  : 1955  MRN: [id number]  Referring provider: Cleatrice Schaumann, MD  Dx:   Encounter Diagnosis     ICD-10-CM    1  Strain of flexor muscle, fascia and tendon of left little finger at wrist and hand level, subsequent encounter  S66 117D                      Subjective: pt notes he has been compliant with wearing of his splint  His swelling is decreasing, minimal motion across PIP/DIP noted      Objective: See treatment diary below    Pt wearing a DBS- sutures were removed   AROM left wrist E/F- 0/45  Left SF (Wrist flexed)- MP- 25/60; PIP- 0/25; DIP- 0/15    PROM- (isolated)-  MP- 0/70; PIP- 0/65; DIP- 0/55  Sensation- no tingling noted  Circumference at wrist- 18 5 cm; MPs- 22 0 cm; SF 6  cm  Pt instructed in AAROM/25% partial fisting, isolated AROM with wrist flexed, protection of SF due to poor blood supply   Pt also instructed in control of swelling, scar management    Assessment: Tolerated treatment well  Patient would benefit from continued PT      Plan: Continue per plan of care  Precautions: wear orthosis as directed    No aggressive activity with left hand      Manuals 1/3/23 1/5 1/9 1/11 1/16 1/18 1/23      STM 15 12 12 12 12 12 12                   PROM 12 12 12 12 15 15 12                                                          Neuro Re-Ed             HP/pulsed biph 15 15 15 15 15 15 15                                             Ther Ex             ERLIN wrist 2/5 2/5 2/5 2/5 2/ 2/ 2/5      ERLIN MP  2/5 2/5 2/5 2/5 2/5 2/ 2/5      ERLIN PIP/DIP 2/ 2/ 2/ 2/ 2/ 2/ 2/      Place hold (50% fist)     2/ 2/      Tenodesis w/20 E    2/10 2/10 2/10 2/10                                                                                                                           Modalities             US --- ---- ---- ---- ---- --- ---      CP 12 12 12 12 12 12 def

## 2023-01-25 ENCOUNTER — OFFICE VISIT (OUTPATIENT)
Dept: PHYSICAL THERAPY | Facility: CLINIC | Age: 68
End: 2023-01-25

## 2023-01-25 DIAGNOSIS — S66.117D STRAIN OF FLEXOR MUSCLE, FASCIA AND TENDON OF LEFT LITTLE FINGER AT WRIST AND HAND LEVEL, SUBSEQUENT ENCOUNTER: Primary | ICD-10-CM

## 2023-01-25 NOTE — PROGRESS NOTES
PT Re-Evaluation     Today's date: 2023  Patient name: Hank Soulier  : 1955  MRN: [id number]  Referring provider: Juliette Parham MD  Dx:   Encounter Diagnosis     ICD-10-CM    1  Strain of flexor muscle, fascia and tendon of left little finger at wrist and hand level, subsequent encounter  S66 117D                      Assessment  Assessment details: Pt is a 74 YO male presenting to PT with pain, decreased AROM, strength and tolerance to activity  Pt would benefit from skilled intervention to address these issues and maximize overall function  Occupation- - currently working  Dominant- right; Involved- left SF  Pt  has had periods of excessive swelling in his hand/digits related to gout  He has been compliant wearing his DBS and has continued with gentle active protected motion at 4 weeks  Goals  ST  Decrease pain to 0-2/10 in 4 weeks            2  Decrease swelling left hand            3  Increase AROM to [facility] in 6-8 weeks            4  Maintain clean wound and promote healing            5   Provide orthotic for protection  LT  Increase functional motion and strength for independence with ADL and self care by DC            2  Ability to RT recreational activity by DC    Plan  Patient would benefit from: skilled physical therapy  Planned modality interventions: cryotherapy, ultrasound and thermotherapy: hydrocollator packs  Planned therapy interventions: activity modification, manual therapy, orthotic management and training, orthotic fitting/training, neuromuscular re-education, strengthening, stretching, therapeutic activities, therapeutic exercise and home exercise program  Frequency: 2x week  Duration in weeks: 4  Treatment plan discussed with: patient        Subjective Evaluation    History of Present Illness  Date of surgery: 2022  Mechanism of injury: Pt noted inability to bend his left SF approximately 1 month ago  He reported no known trauma  MRI confirmed SF FDS and FDP rupture  Pain  Current pain ratin  At best pain ratin  At worst pain ratin  Location: left hand and SF  Quality: dull ache  Relieving factors: ice    Hand dominance: right    Treatments  Current treatment: physical therapy  Patient Goals  Patient goals for therapy: decreased edema, decreased pain, increased motion, increased strength, independence with ADLs/IADLs and return to sport/leisure activities          Objective     General Comments:      Wrist/Hand Comments  Pr wearing a DBS, removing for protected motion only  AROM left wrist E/F- 20/45 (digits flexed)  Left SF (Wrist neutral)- MP- 25/45; PIP- 0/25; DIP- 0/25  Sensation- no tingling noted  Circumference at wrist- 19 5 cm; MPs- 22 0 cm; SF 7 0 cm  Pt instructed in place/hold with wrist neutral; gentle active hook, straight and full fist with wrist neutral             Precautions: wear orthosis as directed    Exercises as prescribed      Manuals 1/3/23 1/5 1/9 1/11 1/16 1/18 1/23  1/25       STM 15 12 12 12 12 12 12  12                               PROM 12 12 12 12 15 15 12  12                                                                                                       Neuro Re-Ed                       HP/pulsed biph 15 15 15 15 15 15 15  15                                                                               Ther Ex                       ERLIN wrist 2/5 2/5 2/5 2/5 2/5 2/5 2/5  2/5       ERLIN MP  2/5 2/5 2/5 2/5 2/5 2/5 2/5  2/5       ERLIN PIP/DIP 2/5 2/5 2/5 2/5 2/5 2/5 2/5  2/5       Place/hold hook, straight, full       2/5 2/5 2/5 2/5  2/5       Tenodesis w/20 E       2/10 2/10 2/10 2/10  2/10                                                                                                                                                                                                                               Modalities                        --- ---- ---- ---- ---- --- ---  12       CP 12 12 12 12 12 12 def  def

## 2023-01-25 NOTE — LETTER
2023    MD Murphy Nielsen [address] [facility]    Patient: Jocelyn Worthy   YOB: 1955   Date of Visit: 2023     Encounter Diagnosis     ICD-10-CM    1  Strain of flexor muscle, fascia and tendon of left little finger at wrist and hand level, subsequent encounter  S66 117D           Dear Dr Gisela Crain:    Thank you for your recent referral of Jocelyn Worthy  Please review the attached evaluation summary from Matt's recent visit  Please verify that you agree with the plan of care by signing the attached order  If you have any questions or concerns, please do not hesitate to call  I sincerely appreciate the opportunity to share in the care of one of your patients and hope to have another opportunity to work with you in the near future  Sincerely,    Corinne Martini, DPT, T    Referring Provider:      I certify that I have read the below Plan of Care and certify the need for these services furnished under this plan of treatment while under my care  MD Murphy Nielsen [address] [place] [id number]  Via Fax: [phone]          PT Re-Evaluation     Today's date: 2023  Patient name: Jocelyn Worthy  : 1955  MRN: [id number]  Referring provider: Sari Joy MD  Dx:   Encounter Diagnosis     ICD-10-CM    1  Strain of flexor muscle, fascia and tendon of left little finger at wrist and hand level, subsequent encounter  S66 117D                      Assessment  Assessment details: Pt is a 74 YO male presenting to PT with pain, decreased AROM, strength and tolerance to activity  Pt would benefit from skilled intervention to address these issues and maximize overall function  Occupation- - currently working  Dominant- right; Involved- left SF  Pt  has had periods of excessive swelling in his hand/digits related to gout    He has been compliant wearing his DBS and has continued with gentle active protected motion at 4 weeks  Goals  ST  Decrease pain to 0-2/10 in 4 weeks            2  Decrease swelling left hand            3  Increase AROM to [facility] in 6-8 weeks            4  Maintain clean wound and promote healing            5   Provide orthotic for protection  LT  Increase functional motion and strength for independence with ADL and self care by DC            2  Ability to RT recreational activity by DC    Plan  Patient would benefit from: skilled physical therapy  Planned modality interventions: cryotherapy, ultrasound and thermotherapy: hydrocollator packs  Planned therapy interventions: activity modification, manual therapy, orthotic management and training, orthotic fitting/training, neuromuscular re-education, strengthening, stretching, therapeutic activities, therapeutic exercise and home exercise program  Frequency: 2x week  Duration in weeks: 4  Treatment plan discussed with: patient        Subjective Evaluation    History of Present Illness  Date of surgery: 2022  Mechanism of injury: Pt noted inability to bend his left SF approximately 1 month ago  He reported no known trauma  MRI confirmed SF FDS and FDP rupture  Pain  Current pain ratin  At best pain ratin  At worst pain ratin  Location: left hand and SF  Quality: dull ache  Relieving factors: ice    Hand dominance: right    Treatments  Current treatment: physical therapy  Patient Goals  Patient goals for therapy: decreased edema, decreased pain, increased motion, increased strength, independence with ADLs/IADLs and return to sport/leisure activities          Objective     General Comments:      Wrist/Hand Comments  Pr wearing a DBS, removing for protected motion only  AROM left wrist E/F- 20/45 (digits flexed)  Left SF (Wrist neutral)- MP- 25/45; PIP- 0/25;  DIP- 0/25  Sensation- no tingling noted  Circumference at wrist- 19 5 cm; MPs- 22 0 cm; SF 7 0 cm  Pt instructed in place/hold with wrist neutral; gentle active hook, straight and full fist with wrist neutral            Precautions: wear orthosis as directed    Exercises as prescribed      Manuals 1/3/23 1/5 1/9 1/11 1/16 1/18 1/23  1/25       STM 15 12 12 12 12 12 12  12                               PROM 12 12 12 12 15 15 12 12                                                                                                       Neuro Re-Ed                       HP/pulsed biph 15 15 15 15 15 15 15  15                                                                               Ther Ex                       [facility] wrist 2/5 2/5 2/5 2/5 2/5 2/5 2/5 2/5       ERLIN MP  2/5 2/5 2/5 2/5 2/5 2/5 2/5 2/5       ERLIN PIP/DIP 2/5 2/5 2/5 2/5 2/5 2/5 2/5 2/5       Place/hold hook, straight, full       2/5 2/5 2/5 2/5 2/5       Tenodesis w/20 E       2/10 2/10 2/10 2/10  2/10                                                                                                                                                                                                                               Modalities                       US --- ---- ---- ---- ---- --- ---  12       CP 12 12 12 12 12 12 chris Shelton

## 2023-01-30 ENCOUNTER — OFFICE VISIT (OUTPATIENT)
Dept: PHYSICAL THERAPY | Facility: CLINIC | Age: 68
End: 2023-01-30

## 2023-01-30 DIAGNOSIS — S66.117D STRAIN OF FLEXOR MUSCLE, FASCIA AND TENDON OF LEFT LITTLE FINGER AT WRIST AND HAND LEVEL, SUBSEQUENT ENCOUNTER: Primary | ICD-10-CM

## 2023-01-30 NOTE — PROGRESS NOTES
Daily Note     Today's date: 2023  Patient name: Kian Arita  : 1955  MRN: [id number]  Referring provider: Antonio Rao MD  Dx:   Encounter Diagnosis     ICD-10-CM    1  Strain of flexor muscle, fascia and tendon of left little finger at wrist and hand level, subsequent encounter  S66 117D                      Subjective: pt notes his swelling cont to fluctuate in his hand and fingers  He noted slight increase over the weekend      Objective: See treatment diary below    Pr wearing a DBS, weaning and removing for motion and STM  AROM left wrist E/F- 20/45 (digits flexed)  Left SF (Wrist neutral)- MP- 25/45; PIP- 0/25; DIP- 0/25  Sensation- no tingling noted  Circumference at wrist- 19 5 cm; MPs- 22 0 cm; SF 7 0 cm  Pt instructed in place/hold with wrist neutral; gentle active hook, straight and full fist with wrist neutral   Pt provided reema tape for protection when out of splint     Assessment: Tolerated treatment well  Patient would benefit from continued PT      Plan: Continue per plan of care  Precautions: wear orthosis as directed    Exercises as prescribed      Manuals 1/3/23 1/5 1/9 1/11 1/16 1/18 1/23  1/25  1/30     STM 15 12 12 12 12 12 12  12  12                             PROM 12 12 12 12 15 15 12  12  12                                                                                                     Neuro Re-Ed                       HP/pulsed biph 15 15 15 15 15 15 15  15  15                                                                             Ther Ex                       ERLIN wrist 2/ 2/ 2/ 2/ 2/ 2/ 2//  2/     ERLIN MP  2/ 2/5 2/5 2/5 2/5 2/5 2/  2/  2/     ERLIN PIP/DIP / 2/ 2/ 2/ 2/ 2/ 2/  2//     Place/hold hook, straight, full       2/ 2/ 2/ 2/  2/  2/     Tenodesis w/20 E       2/10 2/10 2/10 2/10  2/10  2/10                                                                                                                                                                                                                             Modalities                       US --- ---- ---- ---- ---- --- ---  12  12     CP 12 12 12 12 12 12 def  def  12

## 2023-02-01 ENCOUNTER — OFFICE VISIT (OUTPATIENT)
Dept: PHYSICAL THERAPY | Facility: CLINIC | Age: 68
End: 2023-02-01

## 2023-02-01 DIAGNOSIS — S66.117D STRAIN OF FLEXOR MUSCLE, FASCIA AND TENDON OF LEFT LITTLE FINGER AT WRIST AND HAND LEVEL, SUBSEQUENT ENCOUNTER: Primary | ICD-10-CM

## 2023-02-01 NOTE — PROGRESS NOTES
Daily Note     Today's date: 2023  Patient name: Lori Fatima  : 1955  MRN: [id number]  Referring provider: Deyanira Hopper MD  Dx:   Encounter Diagnosis     ICD-10-CM    1  Strain of flexor muscle, fascia and tendon of left little finger at wrist and hand level, subsequent encounter  S66 117D                      Subjective: pt compliant with splint- off for exercise and therapy  He is using the reema tape in controlled exercises  Objective: See treatment diary below    Pt wearing a DBS, weaning and removing for motion and STM  AROM left wrist E/F- 20/45 (digits flexed)  Left SF (Wrist neutral)- MP- 25/45; PIP- 0/25; DIP- 0/25  Sensation- no tingling noted  Circumference at wrist- 19 5 cm; MPs- 22 0 cm; SF 7 0 cm  Pt instructed in place/hold with wrist neutral; gentle active hook, straight and full fist with wrist neutral   Pt provided reema tape for protection when out of splint    Assessment: Tolerated treatment well  Patient would benefit from continued PT      Plan: Continue per plan of care  Precautions: wear orthosis as directed    Exercises as prescribed      Manuals              STM 15 12 12 12 12 12 12  12  12  12                           PROM 12 12 12 12 15 15 12  12  12  15                                                                                                   Neuro Re-Ed                       HP/pulsed biph 15 15 15 15 15 15 15  15  15  15                                                                           Ther Ex                       ERLIN wrist 2/ 2/5 2/5 2/5 2/5 2/5 2/5  2/5  2/5  2/5   ERLIN MP  2/5 2/5 2/5 2/5 2/5 2/5 2/5  2/5  2/5  2/5   ERLIN PIP/DIP 2/ 2/ 2/ 2/5 2/5 2/5 2/5  2/5  2/  2/5   Place/hold hook, straight, full       2 2/ 2/ 2/  2/  2/  2/5   Tenodesis w/20 E       2/10 2/10 2/10 2/10  2/10  2/10  2/10                                                                                                                                                                                                                           Modalities                       US --- ---- ---- ---- ---- --- ---  12  12 12   CP 12 12 12 12 12 12 def  def  12  def

## 2023-02-06 ENCOUNTER — OFFICE VISIT (OUTPATIENT)
Dept: PHYSICAL THERAPY | Facility: CLINIC | Age: 68
End: 2023-02-06

## 2023-02-06 DIAGNOSIS — S66.117D STRAIN OF FLEXOR MUSCLE, FASCIA AND TENDON OF LEFT LITTLE FINGER AT WRIST AND HAND LEVEL, SUBSEQUENT ENCOUNTER: Primary | ICD-10-CM

## 2023-02-06 NOTE — PROGRESS NOTES
Daily Note     Today's date: 2023  Patient name: Venancio Marques  : 1955  MRN: [id number]  Referring provider: Vickei Marcos MD  Dx:   Encounter Diagnosis     ICD-10-CM    1  Strain of flexor muscle, fascia and tendon of left little finger at wrist and hand level, subsequent encounter  S66 117D                      Subjective: pt notes he is using his hand for light ADL- cautious with not straining with       Objective: See treatment diary below    Pt wearing a DBS, weaning and removing for motion and STM      AROM left wrist E/F- 20/45 (digits flexed)  Left SF (Wrist neutral)- MP- 25/45; PIP- 0/25; DIP- 0/25  Sensation- no tingling noted  Circumference at wrist- 19 5 cm; MPs- 22 0 cm; SF 7 0 cm  Pt instructed in place/hold with wrist neutral; gentle active hook, straight and full fist with wrist neutral   Pt provided reema tape for protection when out of splint  PT fabricated ROSALINA to facilitate PIP flexion  Pt instructed in use and care        Assessment: Tolerated treatment well  Patient would benefit from continued PT      Plan: Continue per plan of care  Precautions: wear orthosis as directed    Exercises as prescribed      Manuals    STM 15 12 12 12 12 12 12  12  12  12                           PROM 12 12 12 12 15 15 12  12  12  15                            FO                                                                       Neuro Re-Ed                       HP/pulsed biph 15 15 15 15 15 15 15  15  15  15                                                                           Ther Ex                       ERLIN wrist 2/ 2/ 2/ 2/ 2/ 2/ 2/  2/  2/  2/   ERLIN MP  2/ 2/ 2/ 2/ 2/ 2/ 2/  2//   ERLIN PIP/DIP // 2/ 2/ 2/ 2/ 2/  2/   TGE  ////5  2/5          2/10 2/10 2/10 2/10  2/10  2/10  2/10                                                                                                                                                                                                                           Modalities                       US -12 ---- ---- ---- ---- --- ---  12 12 12   CP 12 12 12 12 12 12 def  def  12  def

## 2023-02-08 ENCOUNTER — OFFICE VISIT (OUTPATIENT)
Dept: PHYSICAL THERAPY | Facility: CLINIC | Age: 68
End: 2023-02-08

## 2023-02-08 DIAGNOSIS — S66.117D STRAIN OF FLEXOR MUSCLE, FASCIA AND TENDON OF LEFT LITTLE FINGER AT WRIST AND HAND LEVEL, SUBSEQUENT ENCOUNTER: Primary | ICD-10-CM

## 2023-02-08 NOTE — PROGRESS NOTES
Daily Note     Today's date: 2023  Patient name: Chitra Washington  : 1955  MRN: [id number]  Referring provider: Bishop Colin MD  Dx:   Encounter Diagnosis     ICD-10-CM    1  Strain of flexor muscle, fascia and tendon of left little finger at wrist and hand level, subsequent encounter  S66 117D                      Subjective: pt feels he is making progress with his ROSALINA  He is cautious with lifting, excessive force across the fingers  Objective: See treatment diary below     AROM left wrist E/F- 20/45 (digits flexed)  Left SF (Wrist neutral)- MP- 0/75; PIP- 0/25; DIP- 0/25          SF with FDS- MP- 0/75; PIP- 0/35; DIP- 0/25  Sensation- no tingling noted  Circumference at wrist- 19 5 cm; MPs- 22 0 cm; SF 7 0 cm  Pt instructed in place/hold with wrist neutral; TGE  Pt provided reema tape for protection when out of splint  PT fabricated ROSALINA to facilitate PIP flexion  Pt instructed in use and care  Assessment: Tolerated treatment well  Patient would benefit from continued PT      Plan: Continue per plan of care  Precautions: wear orthosis as directed    Exercises as prescribed      Manuals    STM 8  8 12 12 12 12 12  12  12  12    IASTM  8  8                   PROM 12  8 12 12 15 15 12  12  12  12                           AROM with ROSALINA                                                                      Neuro Re-Ed                       HP/pulsed biph 15 15 15 15 15 15 15  15  15  15                                                                           Ther Ex                       ERLIN wrist  2/ 2/ 2/ 2/ 2/ 2/  2//   ERLIN MP  // 2/ 2/ 2//////   ERLIN PIP/DIP /// 2/ 2/ 2/ 2/  2/  2/  2/5   TGE  /  2/   2/ 2/ 2/5 2/5  2/5  2/5  2/5                                                                                                                                                                                                                                           Modalities                       US -12 12 ---- ---- ---- --- ---  12 12 12

## 2023-02-13 ENCOUNTER — OFFICE VISIT (OUTPATIENT)
Dept: PHYSICAL THERAPY | Facility: CLINIC | Age: 68
End: 2023-02-13

## 2023-02-13 DIAGNOSIS — S66.117D STRAIN OF FLEXOR MUSCLE, FASCIA AND TENDON OF LEFT LITTLE FINGER AT WRIST AND HAND LEVEL, SUBSEQUENT ENCOUNTER: Primary | ICD-10-CM

## 2023-02-13 NOTE — PROGRESS NOTES
Daily Note     Today's date: 2023  Patient name: Basia Malik  : 1955  MRN: [id number]  Referring provider: Jeffery Gutierrez MD  Dx:   Encounter Diagnosis     ICD-10-CM    1  Strain of flexor muscle, fascia and tendon of left little finger at wrist and hand level, subsequent encounter  S66 117D                      Subjective: pt feels more motion, however, unable to form a fist or use functionally due to lack of       Objective: See treatment diary below    AROM left wrist E/F- 20/45 (digits flexed)  Left SF (Wrist neutral)- MP- 0/75; PIP- 0/35; DIP- 0/25          SF with FDS- MP- 0/75; PIP- 0/45; DIP- 0/25  Sensation- no tingling noted  Circumference at wrist- 19 5 cm; MPs- 22 0 cm; SF 7 0 cm  Pt instructed in place/hold with wrist neutral; TGE  Pt provided reema tape for protection when out of splint  PT fabricated ROSALINA to facilitate PIP flexion   Pt instructed in use and care       Assessment: Tolerated treatment well  Patient would benefit from continued PT      Plan: Continue per plan of care  Precautions: wear orthosis as directed    Exercises as prescribed      Manuals    STM 8  8 12 12 12 12 12  12  12  12    IASTM  8  8  -->                 PROM 12  8 12 12 15 15 12  12  12  12                           AROM with ROSALINA                                            ROSALINA      1                 Neuro Re-Ed                       HP/pulsed biph 15 15 15 15 15 15 15  15  15  15                                                                           Ther Ex                       ERLIN wrist / 2/ 2/ 2// 2/  2/  2/   ERLIN MP  // 2/ 2/ 2/ 2/ 2/  2///   ERLIN PIP/DIP / 2/ 2/ 2/ 2/ 2/  2/  2/  2/   TGE  /  2/  2/ 2/ 2/ 2/ 2/  2/  2/  25                                                                                                                                                                                                                                           Modalities                       US -12 12 12 ---- ---- --- ---  12 12 12

## 2023-02-15 ENCOUNTER — OFFICE VISIT (OUTPATIENT)
Dept: PHYSICAL THERAPY | Facility: CLINIC | Age: 68
End: 2023-02-15

## 2023-02-15 DIAGNOSIS — S66.117D STRAIN OF FLEXOR MUSCLE, FASCIA AND TENDON OF LEFT LITTLE FINGER AT WRIST AND HAND LEVEL, SUBSEQUENT ENCOUNTER: Primary | ICD-10-CM

## 2023-02-15 NOTE — LETTER
February 15, 2023    MD Murphy Constantino [address] [place] [address]    Patient: Irasema Limon   YOB: 1955   Date of Visit: 2/15/2023     Encounter Diagnosis     ICD-10-CM    1  Strain of flexor muscle, fascia and tendon of left little finger at wrist and hand level, subsequent encounter  S66 117D           Dear Dr Maura Amin:    Thank you for your recent referral of Irasema Limon  Please review the attached evaluation summary from Matt's recent visit  Please verify that you agree with the plan of care by signing the attached order  If you have any questions or concerns, please do not hesitate to call  I sincerely appreciate the opportunity to share in the care of one of your patients and hope to have another opportunity to work with you in the near future  Sincerely,    Samra Toscano, DPT, CHT    Referring Provider:      I certify that I have read the below Plan of Care and certify the need for these services furnished under this plan of treatment while under my care  MD Murphy Constantino [address] [place] [address]  Via Fax: [phone]          PT Re-Evaluation     Today's date: 2/15/2023  Patient name: Irasema Limon  : 1955  MRN: [id number]  Referring provider: Satnam Santo MD  Dx:   Encounter Diagnosis     ICD-10-CM    1  Strain of flexor muscle, fascia and tendon of left little finger at wrist and hand level, subsequent encounter  S66 117D                      Assessment  Assessment details: Pt is a 76 YO male presenting to PT with pain, decreased AROM, strength and tolerance to activity  Pt would benefit from skilled intervention to address these issues and maximize overall function  Occupation- - currently working  Dominant- right; Involved- left SF  Pt  has had periods of excessive swelling in his hand/digits related to gout    He has been compliant wearing his DBS for heavy activity and has included his ROSALINA for light daily activity  Goals  ST  Decrease pain to 0-2/10 in 4 weeks            2  Decrease swelling left hand            3  [facility]            4  Maintain clean wound and promote healing            5   Provide orthotic for protection  LT  Increase functional motion and strength for independence with ADL and self care by DC            2  Ability to RT recreational activity by DC    Plan  Patient would benefit from: skilled physical therapy  Planned modality interventions: cryotherapy, ultrasound and thermotherapy: hydrocollator packs  Planned therapy interventions: activity modification, manual therapy, orthotic management and training, orthotic fitting/training, neuromuscular re-education, strengthening, stretching, therapeutic activities, therapeutic exercise and home exercise program  Frequency: 2x week  Duration in weeks: 4  Treatment plan discussed with: patient        Subjective Evaluation    History of Present Illness  Date of surgery: 2022  Mechanism of injury: Pt noted inability to bend his left SF approximately 1 month ago  He reported no known trauma  MRI confirmed SF FDS and FDP rupture  Pain  Current pain ratin  At best pain ratin  At worst pain ratin  Location: left hand and SF  Quality: dull ache  Relieving factors: ice    Hand dominance: right    Treatments  Current treatment: physical therapy  Patient Goals  Patient goals for therapy: decreased edema, decreased pain, increased motion, increased strength, independence with ADLs/IADLs and return to sport/leisure activities          Objective     General Comments:      Wrist/Hand Comments  Pr wearing a DBS, removing for protected motion only  AROM left wrist E/F- 20/45 (digits flexed)  Left SF (Wrist neutral)- MP- 0/90; PIP- 0/45;  DIP- 0/20  Sensation- no tingling noted  Circumference at wrist- 19 5 cm; MPs- 22 0 cm; SF 7 0 cm  Pt instructed in place/hold with wrist neutral; gentle active hook, straight and full fist with wrist neutral wearing ROSALINA            Precautions: wear orthosis as directed    Exercises as prescribed      Manuals 2/6 2/8 2/13  2/15            2/1   STM 8  8 12 12 12 12 12  12  12  12    IASTM  8  8  -->                 PROM 12  8 12 12 15 15 12  12  12  12                           AROM with ROSALINA  2/5 2/5  2/5  2/5                                        ROSALINA      1                 Neuro Re-Ed                       HP/pulsed biph 15 15 15 15 15 15 15  15  15  15                                                                           Ther Ex                       AROM wrist 2/5 2/5 2/5 2/5 2/5 2/5 2/5  2/5  2/5  2/5   ROSALINA hook/full 2/5 2/5 2/5 2/5 2/5 2/5 2/5  2/5  2/5  2/5   Place/hold 2/5 2/5 2/5 2/5 2/5 2/5 2/5  2/5  2/5  2/5   TGE  2/5  2/5  2/5 2/5 2/5 2/5 2/5  2/5  2/5  2/5                                                                                                                                                                                                                                                   Modalities                       US -12 12 12 12 ---- --- ---  12 12 12

## 2023-02-15 NOTE — PROGRESS NOTES
PT Re-Evaluation     Today's date: 2/15/2023  Patient name: Juanita Bella  : 1955  MRN: [id number]  Referring provider: Jewell Ya MD  Dx:   Encounter Diagnosis     ICD-10-CM    1  Strain of flexor muscle, fascia and tendon of left little finger at wrist and hand level, subsequent encounter  S66 117D                      Assessment  Assessment details: Pt is a 74 YO male presenting to PT with pain, decreased AROM, strength and tolerance to activity  Pt would benefit from skilled intervention to address these issues and maximize overall function  Occupation- - currently working  Dominant- right; Involved- left SF  Pt  has had periods of excessive swelling in his hand/digits related to gout  He has been compliant wearing his DBS for heavy activity and has included his ROSALINA for light daily activity  Goals  ST  Decrease pain to 0-2/10 in 4 weeks            2  Decrease swelling left hand            3  Increase AROM to [facility] in 6-8 weeks            4  Maintain clean wound and promote healing            5   Provide orthotic for protection  LT  Increase functional motion and strength for independence with ADL and self care by DC            2  Ability to RT recreational activity by DC    Plan  Patient would benefit from: skilled physical therapy  Planned modality interventions: cryotherapy, ultrasound and thermotherapy: hydrocollator packs  Planned therapy interventions: activity modification, manual therapy, orthotic management and training, orthotic fitting/training, neuromuscular re-education, strengthening, stretching, therapeutic activities, therapeutic exercise and home exercise program  Frequency: 2x week  Duration in weeks: 4  Treatment plan discussed with: patient        Subjective Evaluation    History of Present Illness  Date of surgery: 2022  Mechanism of injury: Pt noted inability to bend his left SF approximately 1 month ago    He reported no known trauma  MRI confirmed SF FDS and FDP rupture  Pain  Current pain ratin  At best pain ratin  At worst pain ratin  Location: left hand and SF  Quality: dull ache  Relieving factors: ice    Hand dominance: right    Treatments  Current treatment: physical therapy  Patient Goals  Patient goals for therapy: decreased edema, decreased pain, increased motion, increased strength, independence with ADLs/IADLs and return to sport/leisure activities          Objective     General Comments:      Wrist/Hand Comments  Pr wearing a DBS, removing for protected motion only  AROM left wrist E/F- 20/45 (digits flexed)  Left SF (Wrist neutral)- MP- 0/90; PIP- 0/45; DIP- 0/20  Sensation- no tingling noted  Circumference at wrist- 19 5 cm; MPs- 22 0 cm; SF 7 0 cm  Pt instructed in place/hold with wrist neutral; gentle active hook, straight and full fist with wrist neutral wearing ROSALINA             Precautions: wear orthosis as directed    Exercises as prescribed      Manuals 2/6 2/8 2/13  2/15            2   STM 8  8 12 12 12 12 12  12  12  12    IASTM  8  8  -->                 PROM 12  8 12 12 15 15 12  12  12  12                           AROM with ROSALINA  2/ 2/5  2/5  2/5                                        ROSALINA      1                 Neuro Re-Ed                       HP/pulsed biph 15 15 15 15 15 15 15  15  15  15                                                                           Ther Ex                       AROM wrist 2/ 2/5 2/5 2/5 2/5 2/5 2/5  2/5  2/5  2/5   ROSALINA hook/full 2/ 2/5 2/5 2/5 2/5 2/5 2/5  2/5  2/5  2/5   Place/hold 2/5 2/5 2/5 2/5 2/5 2/5 2/5  2/5  2/5  2/5   TGE  2/5  2/5  2/5 2/5 2/5 2/5 2/5  2/5  2/5  2/5                                                                                                                                                                                                                                                   Modalities                        -12 12 ---- --- ---  12 12 12

## 2023-02-20 ENCOUNTER — OFFICE VISIT (OUTPATIENT)
Dept: PHYSICAL THERAPY | Facility: CLINIC | Age: 68
End: 2023-02-20

## 2023-02-20 DIAGNOSIS — S66.117D STRAIN OF FLEXOR MUSCLE, FASCIA AND TENDON OF LEFT LITTLE FINGER AT WRIST AND HAND LEVEL, SUBSEQUENT ENCOUNTER: Primary | ICD-10-CM

## 2023-02-20 NOTE — PROGRESS NOTES
Daily Note     Today's date: 2023  Patient name: Siobhan Mckeon  : 1955  MRN: [id number]  Referring provider: Michael Canales MD  Dx:   Encounter Diagnosis     ICD-10-CM    1  Strain of flexor muscle, fascia and tendon of left little finger at wrist and hand level, subsequent encounter  S66 117D                      Subjective: Pt reports that he continues with difficulty forming full fist  He has been complaint with use of ROSALINA for light motion  Objective: See treatment diary below  Pt wearing a DBS, removing for protected motion only  AROM left wrist E/F- 20/45 (digits flexed)  Left SF (Wrist neutral)- MP- 0/90; PIP- 0/45; DIP- 0/20  Sensation- no tingling noted  Circumference at wrist- 19 5 cm; MPs- 22 0 cm; SF 7 0 cm  Pt instructed in place/hold with wrist neutral; gentle active hook, straight and full fist with wrist neutral wearing ROSALINA    Assessment: Pt tolerates treatment well without significant complaints  Pt will benefit from continued skilled PT  Plan: Continue per plan of care  Precautions: wear orthosis as directed    Exercises as prescribed      Manuals 2/6 2/8 2/13  2/15  2/20          2/1   STM 8  8 12 12 12 12 12  12  12  12    IASTM  8  8  -->                 PROM 12  8 12 12 12 15 12  12  12  12                           AROM with ROSALINA                                        ROSALINA      1                 Neuro Re-Ed                       HP/pulsed biph 15 15 15 15 15 15 15  15  15  15                                                                           Ther Ex                       AROM wrist ///   ROSALINA hook/full    Place/hold /   TGE  / 2/ 2/ 2////                                                                                                                                                                                                                                                   Modalities                       US -12 12 12 12 12 --- ---  12 12 12

## 2023-02-22 ENCOUNTER — OFFICE VISIT (OUTPATIENT)
Dept: PHYSICAL THERAPY | Facility: CLINIC | Age: 68
End: 2023-02-22

## 2023-02-22 DIAGNOSIS — S66.117D STRAIN OF FLEXOR MUSCLE, FASCIA AND TENDON OF LEFT LITTLE FINGER AT WRIST AND HAND LEVEL, SUBSEQUENT ENCOUNTER: Primary | ICD-10-CM

## 2023-02-22 NOTE — PROGRESS NOTES
Daily Note     Today's date: 2023  Patient name: Nury Cabello  : 1955  MRN: [id number]  Referring provider: Trevor Crawford MD  Dx:   Encounter Diagnosis     ICD-10-CM    1  Strain of flexor muscle, fascia and tendon of left little finger at wrist and hand level, subsequent encounter  S66 117D                      Subjective: Pt reports that he feels like finger is moving better and able to use it more  He has visit with surgeon today after PT  Objective: See treatment diary below  Pt wearing a DBS, removing for protected motion only  AROM left wrist E/F- 20/45 (digits flexed)  Left SF (Wrist neutral)- MP- 0/90; PIP- 0/45; DIP- 0/20  Sensation- no tingling noted  Circumference at wrist- 19 5 cm; MPs- 22 0 cm; SF 7 0 cm  Pt instructed in place/hold with wrist neutral; gentle active hook, straight and full fist with wrist neutral wearing ROSALINA    Assessment: Pt continues with challenge performing composite fist and hook fist  Pt responds well to manual therapy  Pt will benefit from continued skilled PT  Plan: Continue per plan of care  Precautions: wear orthosis as directed    Exercises as prescribed      Manuals 2/6 2/8 2/13  2/15  2/20  2/22        2   STM 8  8 12 12 12 12 12  12  12  12    IASTM  8  8  -->                 PROM 12  8 12 12 12 15 12  12  12  15                           AROM with ROSALINA  //  2/  2/                                    ROSALINA      1                 Neuro Re-Ed                       HP/pulsed biph 15 15 15 15 15 15 15  15  15  15                                                                           Ther Ex                       AROM wrist / 2/ 2/ 2/ 2/ 2/  2/  2/  2/5   ROSALINA hook/full / 2/ 2/ 2/ 2/ 2/  2//  2/   Place/hold / 2/ 2/ 2/ 2/ 2/  2/  2/  2/5   TGE    2/  2/ 2/ 2/ 2/ 2/5  2//  2/5                                                                                                                                                                                                                                                   Modalities                       US -12 12 12 12 12 12 ---  12 12 12

## 2023-02-23 ENCOUNTER — APPOINTMENT (OUTPATIENT)
Dept: PHYSICAL THERAPY | Facility: CLINIC | Age: 68
End: 2023-02-23

## 2023-02-27 ENCOUNTER — OFFICE VISIT (OUTPATIENT)
Dept: PHYSICAL THERAPY | Facility: CLINIC | Age: 68
End: 2023-02-27

## 2023-02-27 DIAGNOSIS — S66.117D STRAIN OF FLEXOR MUSCLE, FASCIA AND TENDON OF LEFT LITTLE FINGER AT WRIST AND HAND LEVEL, SUBSEQUENT ENCOUNTER: Primary | ICD-10-CM

## 2023-02-27 NOTE — PROGRESS NOTES
Daily Note     Today's date: 2023  Patient name: Juanita Bella  : 1955  MRN: [id number]  Referring provider: Jewell Ya MD  Dx:   Encounter Diagnosis     ICD-10-CM    1  Strain of flexor muscle, fascia and tendon of left little finger at wrist and hand level, subsequent encounter  S66 117D                      Subjective: Pt was seen by his physician and will cont therapy for additional motion  He noted he may require a tenolysis down the line if flexion remains limited  Objective: See treatment diary below    Pt wearing a ROSALINA for all activity  AROM left wrist E/F- 65/  Left SF- MP- 0/90; PIP- 0/45; DIP- 0/20  Sensation- no tingling noted  Circumference at wrist- 19 5 cm; MPs- 22 0 cm; SF 7 0 cm  Pt instructed in place/hold with wrist neutral; gentle active hook, straight and full fist with wrist neutral wearing ROSALINA  Light TP introduced for scar stress, NM stim for motion    Assessment: Tolerated treatment well  Patient would benefit from continued PT      Plan: Continue per plan of care  Precautions: wear orthosis as directed    Exercises as prescribed      Manuals 2/6 2/8 2/13  2/15  2/20  2/22  2/28      2   STM 8  8 12 12 12 12 12  12  12  12    IASTM  8  8  -->                 PROM 12  8 12 12 12 15 12  12  12  15                           AROM with ROSALINA    2  2  HEP                                  ROSALINA      1                 Neuro Re-Ed                       HP/pulsed biph 15 15 15 15 15 15        [place] 10/10              15                                                         Ther Ex                       TGE    2  2 2 2 2 2  2  2  2    TP               P 2'          TP hook              P 2'          TP scrape              P 2'                                                                                                                                                                                 Modalities                       US -12   12  12 12 12 12   12  12  12 12

## 2023-03-01 ENCOUNTER — APPOINTMENT (OUTPATIENT)
Dept: PHYSICAL THERAPY | Facility: CLINIC | Age: 68
End: 2023-03-01

## 2023-03-02 ENCOUNTER — OFFICE VISIT (OUTPATIENT)
Dept: PHYSICAL THERAPY | Facility: CLINIC | Age: 68
End: 2023-03-02

## 2023-03-02 DIAGNOSIS — S66.117D STRAIN OF FLEXOR MUSCLE, FASCIA AND TENDON OF LEFT LITTLE FINGER AT WRIST AND HAND LEVEL, SUBSEQUENT ENCOUNTER: Primary | ICD-10-CM

## 2023-03-02 NOTE — PROGRESS NOTES
Daily Note     Today's date: 3/2/2023  Patient name: Shivani Al  : 1955  MRN: [id number]  Referring provider: Justina Chappell MD  Dx:   Encounter Diagnosis     ICD-10-CM    1  Strain of flexor muscle, fascia and tendon of left little finger at wrist and hand level, subsequent encounter  S66 117D                      Subjective: pt feels he is using his hand for more activity  He has been using his ROSALINA for protection      Objective: See treatment diary below    Pt wearing a ROSALINA for all activity  AROM left wrist E/F- 65/  Left SF- MP- 0/90; PIP- 0/45; DIP- 0/20  Sensation- no tingling noted  Circumference at wrist- 19 5 cm; MPs- 22 0 cm; SF 7 0 cm  Pt instructed in place/hold with wrist neutral; gentle active hook, straight and full fist with wrist neutral wearing ROSALINA  Light TP introduced for scar stress, NM stim for motion    Assessment: Tolerated treatment well  Patient would benefit from continued PT      Plan: Continue per plan of care  Precautions: wear orthosis as directed    Exercises as prescribed      Manuals 2/6 2/8 2/13  2/15  2/20  2/22  2/28  3/2       STM 8  8 12 12 12 12 12  12  12  12    IASTM  8  8  -->                 PROM 12  8 12 12 12 15 12  12  12  12                           AROM with ROSALINA   2/  2/  2/  2  HEP                                  ROSALINA      1                 Neuro Re-Ed                       HP/pulsed biph 15 15 15 15 15 15        [place] 10/10              15  15                                                       Ther Ex                       TGE    2/  2/ 2/ 2 2 2  2    TP               P 2'  P 2'        TP hook              P 2'  P 2'        TP scrape              P 2'  P 2'        Glen                20 2/10        Blue                L II 2/10        Flexbar                Y 20/20        DB E/F wrap                3 2/10                                                                               Modalities                       US -12   12  12 12 12 12   12  12  12 12

## 2023-03-06 ENCOUNTER — OFFICE VISIT (OUTPATIENT)
Dept: PHYSICAL THERAPY | Facility: CLINIC | Age: 68
End: 2023-03-06

## 2023-03-06 DIAGNOSIS — S66.117D STRAIN OF FLEXOR MUSCLE, FASCIA AND TENDON OF LEFT LITTLE FINGER AT WRIST AND HAND LEVEL, SUBSEQUENT ENCOUNTER: Primary | ICD-10-CM

## 2023-03-06 NOTE — PROGRESS NOTES
Daily Note     Today's date: 3/6/2023  Patient name: Mitchel Steinberg  : 1955  MRN: [id number]  Referring provider: Rodrigo Johnson MD  Dx:   Encounter Diagnosis     ICD-10-CM    1  Strain of flexor muscle, fascia and tendon of left little finger at wrist and hand level, subsequent encounter  S66 117D                      Subjective: Pt feels he is working hard to assist the SF in  and functional activity      Objective: See treatment diary below    Pt wearing a ROSALINA for all activity  AROM left wrist E/F- 65/  Left SF- MP- 0/90; PIP- 0/45; DIP- 0/20  Sensation- no tingling noted  Circumference at wrist- 19 5 cm; MPs- 22 0 cm; SF 7 0 cm  Pt instructed in place/hold with wrist neutral; gentle active hook, straight and full fist with wrist neutral wearing ROSALINA  Light TP introduced for scar stress, NM stim for motion     Assessment: Tolerated treatment well  Patient would benefit from continued PT      Plan: Continue per plan of care  Precautions: wear orthosis as directed    Exercises as prescribed      Manuals 2/6 2/8 2/13  2/15  2/20  2/22  2/28  3/2  3/6     STM 8  8 12 12 12 12 12  12  12  12    IASTM  8  8  -->                 PROM 12  8 12 12 12 15 12  12  12  15    ROSALINA                  1-L     AROM with ROSALINA   2/  2/  2/  2/  2/  HEP                                  ROSALINA      1                 Neuro Re-Ed                       HP/pulsed biph 15 15 15 15 15 15        [place] [address]              15  15  15                                                     Ther Ex                       TGE    2/  2/ 2/ 2/ 2/ 2/  2/  2/  2/5    TP               P 2'  P 2'  Y 2'      TP hook              P 2'  P 2'  Y 2'      TP scrape              P 2'  P 2' Y 2'      Glen                20 2/10  25 2/10      Blue                L II /10  L III /10      Flexbar                Y 20/20  R 20/20      DB E/F wrap                3 /10  4 2/10                              Comp / roll                  Y 3'                             Modalities                       US -12   12  12 12 12 12   12 12 12 12

## 2023-03-08 ENCOUNTER — OFFICE VISIT (OUTPATIENT)
Dept: PHYSICAL THERAPY | Facility: CLINIC | Age: 68
End: 2023-03-08

## 2023-03-08 DIAGNOSIS — S66.117D STRAIN OF FLEXOR MUSCLE, FASCIA AND TENDON OF LEFT LITTLE FINGER AT WRIST AND HAND LEVEL, SUBSEQUENT ENCOUNTER: Primary | ICD-10-CM

## 2023-03-08 NOTE — LETTER
2023    MD Murphy Garcia [address] [place] [id number]    Patient: Juanita Bella   YOB: 1955   Date of Visit: 3/8/2023     Encounter Diagnosis     ICD-10-CM    1  Strain of flexor muscle, fascia and tendon of left little finger at wrist and hand level, subsequent encounter  S66 117D           Dear Dr Lea Jacobs:    Thank you for your recent referral of Juanita Bella  Please review the attached evaluation summary from Matt's recent visit  Please verify that you agree with the plan of care by signing the attached order  If you have any questions or concerns, please do not hesitate to call  I sincerely appreciate the opportunity to share in the care of one of your patients and hope to have another opportunity to work with you in the near future  Sincerely,    MIGDALIA MeyersT, CHT    Referring Provider:      I certify that I have read the below Plan of Care and certify the need for these services furnished under this plan of treatment while under my care  MD Murphy Garcia [address] [place] [id number]  Via Fax: [phone]          PT Re-Evaluation     Today's date: 3/8/2023  Patient name: Juanita Bella  : 1955  MRN: [id number]  Referring provider: Jewell Ya MD  Dx:   Encounter Diagnosis     ICD-10-CM    1  Strain of flexor muscle, fascia and tendon of left little finger at wrist and hand level, subsequent encounter  S66 117D                      Assessment  Assessment details: Pt is a 74 YO male presenting to PT with pain, decreased AROM, strength and tolerance to activity  Pt would benefit from skilled intervention to address these issues and maximize overall function  Occupation- - currently working  Dominant- right; Involved- left SF  Pt has been compliant wearing his ROSALINA for light daily activity  He notes diminished ability to bend at the PIP  Goals  ST  Decrease pain to 0-2/10 in 4 weeks            2  Decrease swelling left hand            3  Increase AROM to [facility] in 6-8 weeks            4  Maintain clean wound and promote healing            5   Provide orthotic for protection  LT  Increase functional motion and strength for independence with ADL and self care by DC            2  Ability to RT recreational activity by DC    Plan  Patient would benefit from: skilled physical therapy  Planned modality interventions: cryotherapy, ultrasound and thermotherapy: hydrocollator packs  Planned therapy interventions: activity modification, manual therapy, orthotic management and training, orthotic fitting/training, neuromuscular re-education, strengthening, stretching, therapeutic activities, therapeutic exercise and home exercise program  Frequency: 2x week  Duration in weeks: 4  Treatment plan discussed with: patient        Subjective Evaluation    History of Present Illness  Date of surgery: 2022  Mechanism of injury: Pt noted inability to bend his left SF approximately 1 month ago  He reported no known trauma  MRI confirmed SF FDS and FDP rupture  Pain  Current pain ratin  At best pain ratin  At worst pain ratin  Location: left hand and SF  Quality: dull ache  Relieving factors: ice    Hand dominance: right    Treatments  Current treatment: physical therapy  Patient Goals  Patient goals for therapy: decreased edema, decreased pain, increased motion, increased strength, independence with ADLs/IADLs and return to sport/leisure activities          Objective     General Comments:      Wrist/Hand Comments  Pt wearing a ROSALINA for all activity  AROM left wrist E/F- 65/65  Left SF- MP- 0/90; PIP- 0/45;  DIP- 0/20  Sensation- no tingling noted  Circumference at wrist- 19 5 cm; MPs- 22 0 cm; SF 7 0 cm  Pt instructed in place/hold with wrist neutral; gentle active hook, straight and full fist with wrist neutral wearing ROSALINA  Light TP introduced for scar stress, NM stim for motion               Precautions: wear orthosis as directed    Exercises as prescribed      Manuals 3/8             STM 15  8 12 12 12 12 12  12  12  12    IASTM                     PROM 12  8 12 12 12 15 12  12 12  12    ROSALINA                  1-L     AROM with ROSALINA  2/5 2/5  2/5  2/5  2/5  2/5  HEP                                  ROSALINA      1                 Neuro Re-Ed                       HP/pulsed biph 15 15 15 15 15 15            [place] 10/10              15  15  15                                                     Ther Ex                       TGE  2/5  2/5  2/5 2/5 2/5 2/5 2/5  2/5  2/5  2/5    TP   Y 2'            P 2'  P 2'  Y 2'      TP hook  Y 2'            P 2'  P 2'  Y 2'      TP scrape  Y 2'            P 2'  P 2' Y 2'      Glen  25 2/10              20 2/10  25 2/10      Blue  L III 2/10              L II 2/10  L III 2/10      Flexbar  R 20/20              Y 20/20  R 20/20      DB E/F wrap  4 2/10              3 2/10  4 2/10                              Comp / roll  Y 3'                Y 3'                             Modalities                       US -12   12  12 12 12 12   12  12  12 12

## 2023-03-08 NOTE — PROGRESS NOTES
PT Re-Evaluation     Today's date: 3/8/2023  Patient name: Nazanin Marie  : 1955  MRN: [id number]  Referring provider: Marah Byrd MD  Dx:   Encounter Diagnosis     ICD-10-CM    1  Strain of flexor muscle, fascia and tendon of left little finger at wrist and hand level, subsequent encounter  S66 117D                      Assessment  Assessment details: Pt is a 74 YO male presenting to PT with pain, decreased AROM, strength and tolerance to activity  Pt would benefit from skilled intervention to address these issues and maximize overall function  Occupation- - currently working  Dominant- right; Involved- left SF  Pt has been compliant wearing his ROSALINA for light daily activity  He notes diminished ability to bend at the PIP  Goals  ST  Decrease pain to 0-2/10 in 4 weeks            2  Decrease swelling left hand            3  [facility] in 6-8 weeks            4  Maintain clean wound and promote healing            5   Provide orthotic for protection  LT  Increase functional motion and strength for independence with ADL and self care by DC            2  Ability to RT recreational activity by DC    Plan  Patient would benefit from: skilled physical therapy  Planned modality interventions: cryotherapy, ultrasound and thermotherapy: hydrocollator packs  Planned therapy interventions: activity modification, manual therapy, orthotic management and training, orthotic fitting/training, neuromuscular re-education, strengthening, stretching, therapeutic activities, therapeutic exercise and home exercise program  Frequency: 2x week  Duration in weeks: 4  Treatment plan discussed with: patient        Subjective Evaluation    History of Present Illness  Date of surgery: 2022  Mechanism of injury: Pt noted inability to bend his left SF approximately 1 month ago  He reported no known trauma  MRI confirmed SF FDS and FDP rupture      Pain  Current pain ratin  At best pain ratin  At worst pain ratin  Location: left hand and SF  Quality: dull ache  Relieving factors: ice    Hand dominance: right    Treatments  Current treatment: physical therapy  Patient Goals  Patient goals for therapy: decreased edema, decreased pain, increased motion, increased strength, independence with ADLs/IADLs and return to sport/leisure activities          Objective     General Comments:      Wrist/Hand Comments  Pt wearing a ROSALINA for all activity  AROM left wrist E/F- 65/65  Left SF- MP- 0/90; PIP- 0/45; DIP- 0/20  Sensation- no tingling noted  Circumference at wrist- 19 5 cm; MPs- 22 0 cm; SF 7 0 cm  Pt instructed in place/hold with wrist neutral; gentle active hook, straight and full fist with wrist neutral wearing ROSALINA  Light TP introduced for scar stress, NM stim for motion                Precautions: wear orthosis as directed    Exercises as prescribed      Manuals 3/8             STM 15  8 12 12 12 12 12  12  12  12    IASTM                     PROM 12  8 12 12 12 15 12  12  12  12    ROSALINA                  1-L     AROM with ROSALINA  2/5 2/5  2/5  2/5  2/5  2/5  HEP                                  ROSALINA      1                 Neuro Re-Ed                       HP/pulsed biph 15 15 15 15 15 15            [place] 10/10              15  15  15                                                     Ther Ex                       TGE  2/5  2/5  2/5 2/5 2/5 2/5 2/5  2/5  2/5  2/5    TP   Y 2'            P 2'  P 2'  Y 2'      TP hook  Y 2'            P 2'  P 2'  Y 2'      TP scrape  Y 2'            P 2'  P 2' Y 2'      Glen  25 2/10              20 2/10  25 2/10      Blue  L III 2/10              L II 2/10  L III 2/10      Flexbar  R 20/20              Y 20/20  R 20/20      DB E/F wrap  4 2/10              3 2/10  4 2/10                              Comp / roll  Y 3'                Y 3'                             Modalities                       US -12   12  12 12 12 12   12  12  12 12

## 2023-03-13 ENCOUNTER — OFFICE VISIT (OUTPATIENT)
Dept: PHYSICAL THERAPY | Facility: CLINIC | Age: 68
End: 2023-03-13

## 2023-03-13 DIAGNOSIS — S66.117D STRAIN OF FLEXOR MUSCLE, FASCIA AND TENDON OF LEFT LITTLE FINGER AT WRIST AND HAND LEVEL, SUBSEQUENT ENCOUNTER: Primary | ICD-10-CM

## 2023-03-13 NOTE — PROGRESS NOTES
Daily Note     Today's date: 3/13/2023  Patient name: Lori Fatima  : 1955  MRN: [id number]  Referring provider: Deyanira Hopper MD  Dx:   Encounter Diagnosis     ICD-10-CM    1  Strain of flexor muscle, fascia and tendon of left little finger at wrist and hand level, subsequent encounter  S66 117D                      Subjective: pt frustrated with lack of bending of the SF, but notes some improvement overall  Objective: See treatment diary below    Pt wearing a ROSALINA for all activity  AROM left wrist E/F- 65/65  Left SF- MP- 0/90; PIP- 0/45; DIP- 0/20  Sensation- no tingling noted  Circumference at wrist- 19 5 cm; MPs- 22 0 cm; SF 7 0 cm  Pt instructed in place/hold with wrist neutral; gentle active hook, straight and full fist with wrist neutral wearing ROSALINA  Light TP introduced for scar stress, NM stim for motion     Assessment: Tolerated treatment well  Patient would benefit from continued PT      Plan: Continue per plan of care  Precautions: wear orthosis as directed    Exercises as prescribed      Manuals 3/8 3/13            STM 15  8 12 12 12 12 12  12  12  12    IASTM                     PROM 12  8 12 12 12 15 12  12  12  12    ROSALINA                  1-L     AROM with ROSALINA  2/5 2/5  2/5  2/5  2/5  2/5  HEP                                  ROSALINA      1                 Neuro Re-Ed                       HP/pulsed biph 15 15 15 15 15 15            [place] 10/10              15  15  15                                                     Ther Ex                       TGE  2/5  2/5  2/5 2/5 2/5 2/5 2/5  2/5  2/5  2/5    TP   Y 2'  Y 2'          P 2'  P 2'  Y 2'      TP hook  Y 2'  Y 2'          P 2'  P 2'  Y 2'      TP scrape  Y 2'  Y 2'          P 2'  P 2' Y 2'      Glen  25 2/10  25 2/10            20 2/10  25 2/10      Blue  L III 2/10  L III 2/10            L II 2/10  L III 2/10     Digiflex Y 2/10 Y 2/10                         Flexbar  R 20/20  R 20/20            Y 20/20  R 20/20      DB E/F wrap  4 2/10  4 2/10            3 2/10  4 2/10                              Comp / roll  Y 3'  Y 3/3              Y 3'                             Modalities                       US -12   12  12 12 12 12   12  12  12 12

## 2023-03-15 ENCOUNTER — OFFICE VISIT (OUTPATIENT)
Dept: PHYSICAL THERAPY | Facility: CLINIC | Age: 68
End: 2023-03-15

## 2023-03-15 DIAGNOSIS — S66.117D STRAIN OF FLEXOR MUSCLE, FASCIA AND TENDON OF LEFT LITTLE FINGER AT WRIST AND HAND LEVEL, SUBSEQUENT ENCOUNTER: Primary | ICD-10-CM

## 2023-03-15 NOTE — PROGRESS NOTES
Daily Note     Today's date: 3/15/2023  Patient name: Shaheen Cobb  : 1955  MRN: [id number]  Referring provider: Irasema Garcia MD  Dx:   Encounter Diagnosis     ICD-10-CM    1  Strain of flexor muscle, fascia and tendon of left little finger at wrist and hand level, subsequent encounter  S66 117D                      Subjective: pt using his hand more at home, despite not having full composite fist   He has follow-up with Dr Tamika Mcbride nest week  Objective: See treatment diary below    Pt wearing a ROSALINA for all activity  AROM left wrist E/F- 65/65  Left SF- MP- 0/90; PIP- 0/45; DIP- 0/20  Sensation- no tingling noted  Circumference at wrist- 19 5 cm; MPs- 22 0 cm; SF 7 0 cm  Pt instructed in place/hold with wrist neutral; gentle active hook, straight and full fist with wrist neutral wearing ROSALINA  Light TP introduced for scar stress, NM stim for motion    Assessment: Tolerated treatment well  Patient would benefit from continued PT      Plan: Continue per plan of care  Precautions: wear orthosis as directed    Exercises as prescribed      Manuals 3/8 3/13 3/15           STM 15  8 12 12 12 12 12  12  12  12    IASTM                     PROM 12  8 12 12 12 15 12  12  12  12    ROSALINA                  1-L     AROM with ROSALINA  2/5 2/5  2/5  2/5  2/  2/5  HEP                                  ROSALINA      1                 Neuro Re-Ed                       HP/pulsed biph 15 15 15 15 15 15            [place] 10/10              15  15  15                                                     Ther Ex                       TGE  2/5  2/5  2/5 2/5 2/5 2/5 2/5  2/5  2/5  2/5    TP   Y 2'  Y 2'  Y 2'        P 2'  P 2'  Y 2'      TP hook  Y 2'  Y 2'  Y 2'        P 2'  P 2'  Y 2'      TP scrape  Y 2'  Y 2'  Y 2'        P 2'  P 2' Y 2'      Glen  25 /10  25 2/10  25 2/10          20 /10  25 2/10      Blue  L III 2/10  L III 2/10  L III 2/10          L II 2/10  L III 2/10     Digiflex Y 2/10 Y 2/10 Y 2/10  Flexbar  R 20/20  R 20/20  R 20/20          Y 20/20  R 20/20      DB E/F wrap  4 2/10  4 2/10  4 2/10          3 2/10  4 2/10                              Comp / roll  Y 3'  Y 3/3  Y 3/3            Y 3'                             Modalities                       US -12   12  12 12 12 12   12  12  12 12

## 2023-03-20 ENCOUNTER — OFFICE VISIT (OUTPATIENT)
Dept: PHYSICAL THERAPY | Facility: CLINIC | Age: 68
End: 2023-03-20

## 2023-03-20 DIAGNOSIS — S66.117D STRAIN OF FLEXOR MUSCLE, FASCIA AND TENDON OF LEFT LITTLE FINGER AT WRIST AND HAND LEVEL, SUBSEQUENT ENCOUNTER: Primary | ICD-10-CM

## 2023-03-20 NOTE — PROGRESS NOTES
Daily Note     Today's date: 3/20/2023  Patient name: Flower Friend  : 1955  MRN: [id number]  Referring provider: Vitaly Lei MD  Dx:   Encounter Diagnosis     ICD-10-CM    1  Strain of flexor muscle, fascia and tendon of left little finger at wrist and hand level, subsequent encounter  S66 117D                      Subjective: Pt with no significant complaints upon arriving to PT, continues with difficulty bending at the small finger  Objective: See treatment diary below  Pt wearing a ROSALINA for all activity  AROM left wrist E/F- 65/65  Left SF- MP- 0/90; PIP- 0/45; DIP- 0/20  Sensation- no tingling noted  Circumference at wrist- 19 5 cm; MPs- 22 0 cm; SF 7 0 cm  Pt instructed in place/hold with wrist neutral; gentle active hook, straight and full fist with wrist neutral wearing ROSALINA  Light TP introduced for scar stress, NM stim for motion    Assessment: Pt tolerates exercises with no complaints, promoting recruitment of finger flexors during gripping exercise  Pt with good response post manual therapy  Pt will benefit from continued skilled PT  Plan: Continue per plan of care  Precautions: wear orthosis as directed    Exercises as prescribed      Manuals 3/8 3/13 3/15 3/20          STM 15  8 12 12 12 12 12  12  12  12    IASTM                     PROM 12  8 12 12 12 15 12  12  12  12    ROSALINA                  1-L     AROM with ROSALINA    2/  2/  HEP                                  ROSALINA      1                 Neuro Re-Ed                       HP/pulsed biph 15 15 15 15 15 15            [place] 10/10              15  15  15                                                     Ther Ex                       TGE    2/  2/ 2/ 2/ 2/ 2/  2//    TP   Y 2'  Y 2'  Y 2'  Y 2'      P 2'  P 2'  Y 2'      TP hook  Y 2'  Y 2'  Y 2'  Y 2'      P 2'  P 2'  Y 2'      TP scrape  Y 2'  Y 2'  Y 2'  Y 2'      P 2'  P 2' Y 2'      Glen  25 2/10  25 2/10  25 2/10  25 2/10        20 2/10  25 2/10      Blue  L III 2/10  L III 2/10  L III 2/10  L III 2/10        L II 2/10  L III 2/10     Digiflex Y 2/10 Y 2/10 Y 2/10 Y 2/10                       Flexbar  R 20/20  R 20/20  R 20/20  R 20/20        Y 20/20  R 20/20      DB E/F wrap  4 2/10  4 2/10  4 2/10  4 2/10        3 2/10  4 2/10                              Comp / roll  Y 3'  Y 3/3  Y 3/3  Y 3'/3'          Y 3'                             Modalities                       US -12   12  12 12 12 12   12  12  12 12

## 2023-03-22 ENCOUNTER — OFFICE VISIT (OUTPATIENT)
Dept: PHYSICAL THERAPY | Facility: CLINIC | Age: 68
End: 2023-03-22

## 2023-03-22 DIAGNOSIS — S66.117D STRAIN OF FLEXOR MUSCLE, FASCIA AND TENDON OF LEFT LITTLE FINGER AT WRIST AND HAND LEVEL, SUBSEQUENT ENCOUNTER: Primary | ICD-10-CM

## 2023-03-22 NOTE — PROGRESS NOTES
Daily Note     Today's date: 3/22/2023  Patient name: Estela Enriquez  : 1955  MRN: [id number]  Referring provider: Terri Camacho MD  Dx:   Encounter Diagnosis     ICD-10-CM    1  Strain of flexor muscle, fascia and tendon of left little finger at wrist and hand level, subsequent encounter  S66 117D                      Subjective: Pt continues without significant changes since last PT visit  Pt with follow up with hand surgeon today  Objective: See treatment diary below  Pt wearing a ROSALINA for all activity  AROM left wrist E/F- 65/65  Left SF- MP- 0/90; PIP- 0/45; DIP- 0/20  Sensation- no tingling noted  Circumference at wrist- 19 5 cm; MPs- 22 0 cm; SF 7 0 cm  Pt instructed in place/hold with wrist neutral; gentle active hook, straight and full fist with wrist neutral wearing ROSALINA  Light TP introduced for scar stress, NM stim for motion    Assessment: Pt tolerates treatment well today with no complaints, continuing with challenge performing 5th digit AROM for PIP/DIP during therapeutic exercises  Pt responds well to manual therapy  Pt will benefit from continued skilled PT  Plan: Continue per plan of care  Precautions: wear orthosis as directed    Exercises as prescribed      Manuals 3/8 3/13 3/15 3/20 3/22         STM 15  8 12 12 12 12 12  12  12  12    IASTM                     PROM 12  8 12 12 12 15 12  12  12  12    ROSALINA                  1-L     AROM with ROSALINA   2/  2/  2//  HEP                                  ROSALINA      1                 Neuro Re-Ed                       HP/pulsed biph 15 15 15 15 15 15            [place] 10/10              15  15  15                                                     Ther Ex                       TGE    2/  2/5 2/5 2/5 2/5 2/5  2/5  2/5  2/5    TP   Y 2'  Y 2'  Y 2'  Y 2'  Y 2'    P 2'  P 2'  Y 2'      TP hook  Y 2'  Y 2'  Y 2'  Y 2'  Y 2'    P 2'  P 2'  Y 2'      TP scrape  Y 2'  Y 2'  Y 2'  Y 2'  Y 2'    P 2'  P 2' Y 2'      Glen  25 2/10  25 2/10  25 2/10  25 2/10  25 2/10      20 2/10  25 2/10      Blue  L III 2/10  L III 2/10  L III 2/10  L III 2/10  L III 2/10      L II 2/10  L III 2/10     Digiflex Y 2/10 Y 2/10 Y 2/10 Y 2/10 Y 2/10                      Flexbar  R 20/20  R 20/20  R 20/20  R 20/20  R 20/20      Y 20/20  R 20/20      DB E/F wrap  4 2/10  4 2/10  4 2/10  4 2/10  4 2/10      3 2/10  4 2/10                              Comp / roll  Y 3'  Y 3/3  Y 3/3  Y 3'/3'  Y 3'/3'        Y 3'                             Modalities                       US -12   12  12 12 12 12   12  12  12 12

## 2023-03-23 ENCOUNTER — TELEPHONE (OUTPATIENT)
Dept: FAMILY MEDICINE CLINIC | Facility: CLINIC | Age: 68
End: 2023-03-23

## 2023-03-24 ENCOUNTER — APPOINTMENT (OUTPATIENT)
Dept: PHYSICAL THERAPY | Facility: CLINIC | Age: 68
End: 2023-03-24

## 2023-03-24 ENCOUNTER — RA CDI HCC (OUTPATIENT)
Dept: OTHER | Facility: HOSPITAL | Age: 68
End: 2023-03-24

## 2023-03-24 NOTE — PROGRESS NOTES
[facility] 75  coding opportunities       Chart reviewed, no opportunity found: CHART REVIEWED, NO OPPORTUNITY FOUND        Patients Insurance        Commercial Insurance: [employer]

## 2023-03-27 ENCOUNTER — OFFICE VISIT (OUTPATIENT)
Dept: PHYSICAL THERAPY | Facility: CLINIC | Age: 68
End: 2023-03-27

## 2023-03-27 DIAGNOSIS — S66.117D STRAIN OF FLEXOR MUSCLE, FASCIA AND TENDON OF LEFT LITTLE FINGER AT WRIST AND HAND LEVEL, SUBSEQUENT ENCOUNTER: Primary | ICD-10-CM

## 2023-03-27 NOTE — PROGRESS NOTES
Daily Note     Today's date: 3/27/2023  Patient name: Gisela Broussard  : 1955  MRN: [id number]  Referring provider: John Gonzalez MD  Dx:   Encounter Diagnosis     ICD-10-CM    1  Strain of flexor muscle, fascia and tendon of left little finger at wrist and hand level, subsequent encounter  S66 117D                      Subjective: Pt reports having a follow up with surgeon and recommended to continue with the rehab POC, he reports that he has made some improvement  Pt's next follow up is 23  Objective: See treatment diary below  Pt wearing a ROSALINA for all activity  AROM left wrist E/F- 65/65  Left SF- MP- 0/90; PIP- 0/45; DIP- 0/20  Sensation- no tingling noted  Circumference at wrist- 19 5 cm; MPs- 22 0 cm; SF 7 0 cm  Pt instructed in place/hold with wrist neutral; gentle active hook, straight and full fist with wrist neutral wearing ROSALINA  Light TP introduced for scar stress, NM stim for motion    Assessment: Pt tolerates treatment well today with continued fatigue performing his exercises, therefore no progressions made  Pt continues with good response to manual therapy which should be continued  Pt will benefit from continued skilled PT  Plan: Continue per plan of care  Precautions: wear orthosis as directed    Exercises as prescribed      Manuals 3/8 3/13 3/15 3/20 3/22 3/27        STM 15  8 12 12 12 12 12  12  12  12    IASTM                     PROM 12  8 12 12 12 15 12  12  12  12    ROSALINA                  1-L     AROM with ROSALINA  2/ 2/  2/  2/  2/  HEP                                  ROSALINA      1                 Neuro Re-Ed                       HP/pulsed biph 15 15 15 15 15 15            [place] 10/10              15  15  15                                                     Ther Ex                       TGE  2/5  2/5  2/5 2/5 2/5 2/5 2/5  2/5  2/  2/5    TP   Y 2'  Y 2'  Y 2'  Y 2'  Y 2'  Y 2'  P 2'  P 2'  Y 2'      TP hook  Y 2'  Y 2'  Y 2'  Y 2'  Y 2'  Y 2'  P 2'  P 2'  Y 2'      TP scrape  Y 2'  Y 2'  Y 2'  Y 2'  Y 2'  Y 2'  P 2'  P 2' Y 2'      Glen  25 2/10  25 2/10  25 2/10  25 2/10  25 2/10  25 2/10    20 2/10  25 2/10      Blue  L III 2/10  L III 2/10  L III 2/10  L III 2/10  L III 2/10  L III 2/10    L II 2/10  L III 2/10     Digiflex Y 2/10 Y 2/10 Y 2/10 Y 2/10 Y 2/10 Y 2/10                     Flexbar  R 20/20  R 20/20  R 20/20  R 20/20  R 20/20  R 20/20    Y 20/20  R 20/20      DB E/F wrap  4 2/10  4 2/10  4 2/10  4 2/10  4 2/10  4 2/10    3 2/10  4 2/10                              Comp / roll  Y 3'  Y 3/3  Y 3/3  Y 3'/3'  Y 3'/3'  Y 3'/3'      Y 3'                             Modalities                       US -12   12  12 12 12 12   12 12 12 12

## 2023-03-29 ENCOUNTER — OFFICE VISIT (OUTPATIENT)
Dept: PHYSICAL THERAPY | Facility: CLINIC | Age: 68
End: 2023-03-29

## 2023-03-29 DIAGNOSIS — S66.117D STRAIN OF FLEXOR MUSCLE, FASCIA AND TENDON OF LEFT LITTLE FINGER AT WRIST AND HAND LEVEL, SUBSEQUENT ENCOUNTER: Primary | ICD-10-CM

## 2023-03-29 NOTE — PROGRESS NOTES
Daily Note     Today's date: 3/29/2023  Patient name: Eboni Sheridan  : 1955  MRN: [id number]  Referring provider: Dianne Olvera MD  Dx:   Encounter Diagnosis     ICD-10-CM    1  Strain of flexor muscle, fascia and tendon of left little finger at wrist and hand level, subsequent encounter  S66 869D                      Subjective: Pt reports no significant changes or complaints since last visit  Pt is consistent with performing his home exercise program        Objective: See treatment diary below      Assessment: Pt reports continued fatigue with current exercises, therefore no progressions in resistance made  Pt with good tolerance to manual therapy  Pt will benefit from continued skilled PT  Plan: Continue per plan of care  Precautions: wear orthosis as directed    Exercises as prescribed      Manuals 3/8 3/13 3/15 3/20 3/22 3/27 3/29       STM 15  8 12 12 12 12 12  12  12  12    IASTM                    PROM 12  8 12 12 12 15 15  12  12  12    ROSALINA                 1-L     AROM with ROSALINA  2/5 2/5  2/5  2/5  2/  2/ 2/                                  ROSALINA      1                 Neuro Re-Ed                       HP/pulsed biph 15 15 15 15 15 15            [place]ian 10/10              15  15  15                                                     Ther Ex                       TGE  2/5  2/5  2/5 2/5 2/5 2/5 2/5  2/5  2/5  2/5    TP   Y 2'  Y 2'  Y 2'  Y 2'  Y 2'  Y 2' Y 2'  P 2'  Y 2'      TP hook  Y 2'  Y 2'  Y 2'  Y 2'  Y 2'  Y 2' Y 2'  P 2'  Y 2'      TP scrape  Y 2'  Y 2'  Y 2'  Y 2'  Y 2'  Y 2' Y 2'  P 2' Y 2'      Glen  25 2/10  25 2/10  25 2/10  25 2/10  25 2/10  25 2/10  25 2/10  20 2/10  25 2/10      Blue  L III 2/10  L III 2/10  L III 2/10  L III 2/10  L III 2/10  L III 2/10  L III 2/10  L II 2/10  L III 2/10     Digiflex Y 2/10 Y 2/10 Y 2/10 Y 2/10 Y 2/10 Y 2/10 Y 2/10                    Flexbar  R 20/20  R 20/20  R 20/20  R 20/20  R 20/20  R 20/20  R 20/20  Y 20/20  R 20/20      DB E/F wrap  4 2/10  4 2/10  4 2/10  4 2/10  4 2/10  4 2/10  4 2/10  3 2/10  4 2/10                              Comp / roll  Y 3'  Y 3/3  Y 3/3  Y 3'/3'  Y 3'/3'  Y 3'/3'  Y 3'/3'    Y 3'                             Modalities                       US -12   12  12 12 12 12   12  12 12 12

## 2023-03-30 ENCOUNTER — APPOINTMENT (OUTPATIENT)
Dept: PHYSICAL THERAPY | Facility: CLINIC | Age: 68
End: 2023-03-30

## 2023-03-30 ENCOUNTER — OFFICE VISIT (OUTPATIENT)
Dept: FAMILY MEDICINE CLINIC | Facility: CLINIC | Age: 68
End: 2023-03-30

## 2023-03-30 VITALS
WEIGHT: 213 LBS | SYSTOLIC BLOOD PRESSURE: 122 MMHG | DIASTOLIC BLOOD PRESSURE: 82 MMHG | BODY MASS INDEX: 31.55 KG/M2 | HEIGHT: 69 IN

## 2023-03-30 DIAGNOSIS — Z86.69 HISTORY OF OBSTRUCTIVE SLEEP APNEA: Primary | ICD-10-CM

## 2023-03-30 DIAGNOSIS — R06.81 WITNESSED APNEIC SPELLS: ICD-10-CM

## 2023-03-30 DIAGNOSIS — E66.09 CLASS 1 OBESITY DUE TO EXCESS CALORIES WITH SERIOUS COMORBIDITY AND BODY MASS INDEX (BMI) OF 31.0 TO 31.9 IN ADULT: ICD-10-CM

## 2023-03-30 DIAGNOSIS — R06.83 SNORING: ICD-10-CM

## 2023-03-30 DIAGNOSIS — D03.30 MELANOMA IN SITU OF FACE EXCLUDING EYELID, NOSE, LIP, AND EAR (HCC): ICD-10-CM

## 2023-03-30 NOTE — ASSESSMENT & PLAN NOTE
I am unable to find information about prior sleep issues and machine Patient has not seen anyone and does not remember who he did see

## 2023-03-30 NOTE — PROGRESS NOTES
Chief Complaint   Patient presents with   • Sleep Apnea     Pt states he needs a new cpap      Name: Viki Nicholas      : 1955      MRN: [id number]  Encounter Provider: Emelia Arenas DO  Encounter Date: 3/30/2023   Encounter department: [facility] PRIMARY CARE    Assessment & Plan     1  History of obstructive sleep apnea  Assessment & Plan:  I am unable to find information about prior sleep issues and machine Patient has not seen anyone and does not remember who he did see    Orders:  -     Ambulatory Referral to Sleep Medicine; Future    2  Snoring  Assessment & Plan:  Suspect sleep apnea refer to sleep medicine    Orders:  -     Ambulatory Referral to Sleep Medicine; Future    3  Witnessed apneic spells  Assessment & Plan:  Patient to see sleep medicine    Orders:  -     Ambulatory Referral to Sleep Medicine; Future    4  Melanoma in situ of face excluding eyelid, nose, lip, and ear [facility])  Assessment & Plan:  See dermatology as scheduled      5  Class 1 obesity due to excess calories with serious comorbidity and body mass index (BMI) of 31 0 to 31 9 in adult  Assessment & Plan:  Discussed diet and exercise Patient needs to lose some weight            Subjective      Patient has a history of sleep apnea Patient had a sleep study and CPAP machine issued about 10 yrs ago Patient stopped using it He felt he did not need it He was not tired and is sleeping soundly Patient sleeps 8 hours at night He snores loudly and now his wife is concerned as she has noted that he stops breathing at times during sleep Patient has issues with falling asleep watching TV     Review of Systems   Constitutional: Negative for fatigue and unexpected weight change  Neurological: Negative for dizziness and light-headedness  Psychiatric/Behavioral: Positive for sleep disturbance  Negative for confusion, decreased concentration and dysphoric mood  The patient is not nervous/anxious          Current Outpatient "Medications on File Prior to Visit   Medication Sig   • allopurinol (ZYLOPRIM) 300 mg tablet Take 445 mg by mouth daily   • predniSONE 5 mg tablet Take by mouth daily   • sildenafil (Viagra) 50 MG tablet Take 1 tablet (50 mg total) by mouth as needed for erectile dysfunction   • simvastatin (ZOCOR) 20 mg tablet TAKE 1 TABLET DAILY       Objective     /82   Ht 5' 9\" (1 753 m)   Wt 96 6 kg (213 lb)   BMI 31 45 kg/m²     Physical Exam  Vitals and nursing note reviewed  Constitutional:       Appearance: He is obese  Eyes:      Extraocular Movements: Extraocular movements intact  Conjunctiva/sclera: Conjunctivae normal       Pupils: Pupils are equal, round, and reactive to light  Cardiovascular:      Rate and Rhythm: Normal rate and regular rhythm  Heart sounds: Normal heart sounds  Pulmonary:      Effort: Pulmonary effort is normal       Breath sounds: Normal breath sounds  Neurological:      General: No focal deficit present  Mental Status: He is alert and oriented to person, place, and time     Psychiatric:         Mood and Affect: Mood normal          Behavior: Behavior normal        Addison Miller DO  "

## 2023-04-03 ENCOUNTER — OFFICE VISIT (OUTPATIENT)
Dept: PHYSICAL THERAPY | Facility: CLINIC | Age: 68
End: 2023-04-03

## 2023-04-03 DIAGNOSIS — S66.117D STRAIN OF FLEXOR MUSCLE, FASCIA AND TENDON OF LEFT LITTLE FINGER AT WRIST AND HAND LEVEL, SUBSEQUENT ENCOUNTER: Primary | ICD-10-CM

## 2023-04-03 NOTE — PROGRESS NOTES
PT Re-Evaluation     Today's date: 4/3/2023  Patient name: Scott Lozada  : 1955  MRN: [id number]  Referring provider: Shannan Weiss MD  Dx:   Encounter Diagnosis     ICD-10-CM    1  Strain of flexor muscle, fascia and tendon of left little finger at wrist and hand level, subsequent encounter  S66 117D           Start Time: 0755  Stop Time: 0910  Total time in clinic (min): 75 minutes    Assessment  Assessment details: Pt is a 76 YO male presenting to PT with pain, decreased AROM, strength and tolerance to activity  Pt would benefit from skilled intervention to address these issues and maximize overall function  Occupation- - currently working  Dominant- right; Involved- left SF  Pt has been compliant wearing his ROSALINA for light daily activity  He notes diminished ability to bend at the PIP, but has continued advancing with strengthening and modification of activity as needed    Goals  ST  Decrease pain to 0-2/10 in 4 weeks            2  Decrease swelling left hand            3  Increase AROM to [facility] in 6-8 weeks            4  Maintain clean wound and promote healing            5   Provide orthotic for protection  LT  Increase functional motion and strength for independence with ADL and self care by DC            2   Ability to RT recreational activity by DC    Plan  Patient would benefit from: skilled physical therapy  Planned modality interventions: cryotherapy, ultrasound and thermotherapy: hydrocollator packs  Planned therapy interventions: activity modification, manual therapy, orthotic management and training, orthotic fitting/training, neuromuscular re-education, strengthening, stretching, therapeutic activities, therapeutic exercise and home exercise program  Frequency: 2x week  Duration in weeks: 4  Treatment plan discussed with: patient        Subjective Evaluation    History of Present Illness  Date of surgery: 2022  Mechanism of injury: Pt noted inability to bend his left SF approximately 1 month ago  He reported no known trauma  MRI confirmed SF FDS and FDP rupture  Pain  Current pain ratin  At best pain ratin  At worst pain ratin  Location: left hand and SF  Quality: dull ache  Relieving factors: ice    Hand dominance: right    Treatments  Current treatment: physical therapy  Patient Goals  Patient goals for therapy: decreased edema, decreased pain, increased motion, increased strength, independence with ADLs/IADLs and return to sport/leisure activities          Objective     General Comments:      Wrist/Hand Comments   Pt wearing a ROSALINA for all activity  AROM left wrist E/F-   Left SF- MP- 0; PIP- 0/15 (blocked-40); DIP- 15/15   II- ; III   Sensation- no tingling noted  Circumference at wrist- 19 5 cm; MPs- 22 0 cm; SF 7 0 cm  Pt instructed in place/hold with wrist neutral; gentle active hook, straight and full fist with wrist neutral wearing ROSAILNA             Precautions: wear orthosis as directed    Exercises as prescribed    Manuals 3/8 3/13 3/15 3/20 3/22 3/27 3/29 4/3       STM 15  8 12 12 12 12 12  12  12  12    IASTM                       PROM 12  8 12 12 12 15 15  12  12  12    ROSALINA                  1-L     AROM with ROSALINA                                  ROSALINA      1                 Neuro Re-Ed                       HP/pulsed biph 15 15 15 15 15 15            [place] 10/10              15  15  15                                                     Ther Ex                       TGE    2/  2/ 2/ 2///    TP   Y 2'  Y 2'  Y 2'  Y 2'  Y 2'  Y 2' Y 2'  Y 2'  Y 2'      TP hook  Y 2'  Y 2'  Y 2'  Y 2'  Y 2'  Y 2' Y 2'  Y 2'  Y 2'      TP scrape  Y 2'  Y 2'  Y 2'  Y 2'  Y 2'  Y 2' Y 2'  Y 2' Y 2'      Glen  25 2/10  25 2/10  25 2/10  25 2/10  25 2/10  25 2/10  25 /10  30# 2/10  25 2/10      Blue  L III 2/10  L III 2/10  L III 2/10  L III 2/10  L III 2/10  L III 2/10  L III 2/10  L IV 2/10  L III 2/10     Digiflex Y 2/10 Y 2/10 Y 2/10 Y 2/10 Y 2/10 Y 2/10 Y 2/10 Y 2/10                                Flexbar  R 20/20  R 20/20  R 20/20  R 20/20  R 20/20  R 20/20  R 20/20  R 20/20  R 20/20      DB E/F wrap  4 2/10  4 2/10  4 2/10  4 2/10  4 2/10  4 2/10  4 2/10  4 2/10  4 2/10                              Comp / roll  Y 3'  Y 3/3  Y 3/3  Y 3'/3'  Y 3'/3'  Y 3'/3'  Y 3'/3'  Y 3'/3'  Y 3'                             Modalities                       US -12   12  12 12 12 12   12 12 12 12

## 2023-04-03 NOTE — LETTER
April 3, 2023    Jennifer Solis MD  [address] [place] [address]    Patient: Ann Bustillos   YOB: 1955   Date of Visit: 4/3/2023     Encounter Diagnosis     ICD-10-CM    1  Strain of flexor muscle, fascia and tendon of left little finger at wrist and hand level, subsequent encounter  S66 117D           Dear Dr Diana Calzada:    Thank you for your recent referral of Ann Bustillos  Please review the attached evaluation summary from Matt's recent visit  Please verify that you agree with the plan of care by signing the attached order  If you have any questions or concerns, please do not hesitate to call  I sincerely appreciate the opportunity to share in the care of one of your patients and hope to have another opportunity to work with you in the near future  Sincerely,    Margareth Rivero, DPT, CHT    Referring Provider:      I certify that I have read the below Plan of Care and certify the need for these services furnished under this plan of treatment while under my care  Jennifer Solis MD  [facility]  Via Fax: [phone]          Daily Note     Today's date: 4/3/2023  Patient name: Ann Bustillos  : 1955  MRN: [id number]  Referring provider: Mele Lerner MD  Dx:   Encounter Diagnosis     ICD-10-CM    1  Strain of flexor muscle, fascia and tendon of left little finger at wrist and hand level, subsequent encounter  S66 117D           Start Time: 0755  Stop Time: 0910  Total time in clinic (min): 75 minutes    Subjective: Pt reports that he continues to feel that therapy is helping with strength  His little finger continues to have trouble bending, however with typing on keyboard able to make appropriate modifications without too much issue  Objective: See treatment diary below  Pt wearing a ROSALINA for all activity  AROM left wrist E/F- 65/65  Left SF- MP- 0/90; PIP- 0/45;  DIP- 0/20  Sensation- no tingling noted  Circumference at wrist- 19 5 cm; MPs- 22 0 cm; SF 7 0 cm  Pt instructed in place/hold with wrist neutral; gentle active hook, straight and full fist with wrist neutral wearing ROSALINA  Light TP introduced for scar stress, NM stim for motion    Assessment: Pt tolerates exercises well, a few progressions made without significant complaints  Pt will benefit from continued skilled PT to maximize function  Plan: Continue per plan of care  Precautions: wear orthosis as directed    Exercises as prescribed      Manuals 3/8 3/13 3/15 3/20 3/22 3/27 3/29 4/3      STM 15  8 12 12 12 12 12  12  12  12    IASTM                    PROM 12  8 12 12 12 15 15  12  12  12    ROSALINA                 1-L     AROM with ROSALINA  2/5 2/5 2/5 2/5 2/5 2/5 2/5 2/5                                ROSALINA      1                 Neuro Re-Ed                       HP/pulsed biph 15 15 15 15 15 15            Peruvian 10/10              15  15  15                                                     Ther Ex                       TGE  2/5  2/5  2/5 2/5 2/5 2/5 2/5  2/5  2/5  2/5    TP   Y 2'  Y 2'  Y 2'  Y 2'  Y 2'  Y 2' Y 2'  Y 2'  Y 2'      TP hook  Y 2'  Y 2'  Y 2'  Y 2'  Y 2'  Y 2' Y 2'  Y 2'  Y 2'      TP scrape  Y 2'  Y 2'  Y 2'  Y 2'  Y 2'  Y 2' Y 2'  Y 2' Y 2'      Glen  25 2/10  25 2/10  25 2/10  25 2/10  25 2/10  25 2/10  25 2/10  30# 2/10  25 2/10      Blue  L III 2/10  L III 2/10  L III 2/10  L III 2/10  L III 2/10  L III 2/10  L III 2/10  L IV 2/10  L III 2/10     Digiflex Y 2/10 Y 2/10 Y 2/10 Y 2/10 Y 2/10 Y 2/10 Y 2/10 Y 2/10                   Flexbar  R 20/20  R 20/20  R 20/20  R 20/20  R 20/20  R 20/20  R 20/20  R 20/20  R 20/20      DB E/F wrap  4 2/10  4 2/10  4 2/10  4 2/10  4 2/10  4 2/10  4 2/10  4 2/10  4 2/10                              Comp / roll  Y 3'  Y 3/3  Y 3/3  Y 3'/3'  Y 3'/3'  Y 3'/3'  Y 3'/3'  Y 3'/3'  Y 3'                             Modalities                       US -12   12 12 12 12 12 12  12  12 12                                                  PT Re-Evaluation     Today's date: 4/3/2023  Patient name: Nahomy Jon  : 1955  MRN: [id number]  Referring provider: Delicia Hernandez MD  Dx:   Encounter Diagnosis     ICD-10-CM    1  Strain of flexor muscle, fascia and tendon of left little finger at wrist and hand level, subsequent encounter  S66 117D           Start Time: 0755  Stop Time: 0910  Total time in clinic (min): 75 minutes    Assessment  Assessment details: Pt is a 76 YO male presenting to PT with pain, decreased AROM, strength and tolerance to activity  Pt would benefit from skilled intervention to address these issues and maximize overall function  Occupation- - currently working  Dominant- right; Involved- left SF  Pt has been compliant wearing his ROSALINA for light daily activity  He notes diminished ability to bend at the PIP, but has continued advancing with strengthening and modification of activity as needed    Goals  ST  Decrease pain to 0-2/10 in 4 weeks            2  Decrease swelling left hand            3  Increase AROM to DEAN/[employer] [facility] in 6-8 weeks            4  Maintain clean wound and promote healing            5   Provide orthotic for protection  LT  Increase functional motion and strength for independence with ADL and self care by DC            2   Ability to RT recreational activity by DC    Plan  Patient would benefit from: skilled physical therapy  Planned modality interventions: cryotherapy, ultrasound and thermotherapy: hydrocollator packs  Planned therapy interventions: activity modification, manual therapy, orthotic management and training, orthotic fitting/training, neuromuscular re-education, strengthening, stretching, therapeutic activities, therapeutic exercise and home exercise program  Frequency: 2x week  Duration in weeks: 4  Treatment plan discussed with: patient        Subjective Evaluation    History of Present Illness  Date of surgery: 2022  Mechanism of injury: Pt noted inability to bend his left SF approximately 1 month ago  He reported no known trauma  MRI confirmed SF FDS and FDP rupture  Pain  Current pain ratin  At best pain ratin  At worst pain ratin  Location: left hand and SF  Quality: dull ache  Relieving factors: ice    Hand dominance: right    Treatments  Current treatment: physical therapy  Patient Goals  Patient goals for therapy: decreased edema, decreased pain, increased motion, increased strength, independence with ADLs/IADLs and return to sport/leisure activities          Objective     General Comments:      Wrist/Hand Comments   Pt wearing a ROSALINA for all activity  AROM left wrist E/F-   Left SF- MP- 0; PIP- 015 (blocked-40); DIP- 15/15   II- ; III   Sensation- no tingling noted  Circumference at wrist- 19 5 cm; MPs- 22 0 cm; SF 7 0 cm  Pt instructed in place/hold with wrist neutral; gentle active hook, straight and full fist with wrist neutral wearing ROSALINA            Precautions: wear orthosis as directed    Exercises as prescribed    Manuals 3/8 3/13 3/15 3/20 3/22 3/27 3/29 4/3       STM 15  8 12 12 12 12 12  12  12  12    IASTM                       PROM 12  8 12 12 12 15 15  12  12  12    ROSALINA                  1-L     AROM with ROSALINA                                  ROSALINA      1                 Neuro Re-Ed                       HP/pulsed biph 15 15 15 15 15 15            [place]azakh 10/10              15  15  15                                                     Ther Ex                       TGE      TP   Y 2'  Y 2'  Y 2'  Y 2'  Y 2'  Y 2' Y 2'  Y 2'  Y 2'      TP hook  Y 2'  Y 2'  Y 2'  Y 2'  Y 2'  Y 2' Y 2'  Y 2'  Y 2'      TP scrape  Y 2'  Y 2'  Y 2'  Y 2'  Y 2'  Y 2' Y 2'  Y 2' Y 2'      Glen  25 2/10  25 2/10  25 2/10  25 2/10  25 2/10  25 2/10  25 2/10  30# 2/10  25 2/10      Blue  L III 2/10  L III 2/10  L III 2/10  L III 2/10  L III 2/10  L III 2/10  L III 2/10  L IV 2/10  L III 2/10     Digiflex Y 2/10 Y 2/10 Y 2/10 Y 2/10 Y 2/10 Y 2/10 Y 2/10 Y 2/10                                Flexbar  R 20/20  R 20/20  R 20/20  R 20/20  R 20/20  R 20/20  R 20/20  R 20/20  R 20/20      DB E/F wrap  4 2/10  4 2/10  4 2/10  4 2/10  4 2/10  4 2/10  4 2/10  4 2/10  4 2/10                              Comp / roll  Y 3'  Y 3/3  Y 3/3  Y 3'/3'  Y 3'/3'  Y 3'/3'  Y 3'/3'  Y 3'/3'  Y 3'                             Modalities                       US -12   12  12 12 12 12   12  12 12 12

## 2023-04-03 NOTE — PROGRESS NOTES
Daily Note     Today's date: 4/3/2023  Patient name: Brianna Butterfield  : 1955  MRN: [id number]  Referring provider: Jan Gonzalez MD  Dx:   Encounter Diagnosis     ICD-10-CM    1  Strain of flexor muscle, fascia and tendon of left little finger at wrist and hand level, subsequent encounter  S66 117D           Start Time: 0755  Stop Time: 0910  Total time in clinic (min): 75 minutes    Subjective: Pt reports that he continues to feel that therapy is helping with strength  His little finger continues to have trouble bending, however with typing on keyboard able to make appropriate modifications without too much issue  Objective: See treatment diary below  Pt wearing a ROSALINA for all activity  AROM left wrist E/F- 65/65  Left SF- MP- 0/90; PIP- 0/45; DIP- 0/20  Sensation- no tingling noted  Circumference at wrist- 19 5 cm; MPs- 22 0 cm; SF 7 0 cm  Pt instructed in place/hold with wrist neutral; gentle active hook, straight and full fist with wrist neutral wearing ROSALINA  Light TP introduced for scar stress, NM stim for motion    Assessment: Pt tolerates exercises well, a few progressions made without significant complaints  Pt will benefit from continued skilled PT to maximize function  Plan: Continue per plan of care  Precautions: wear orthosis as directed    Exercises as prescribed      Manuals 3/8 3/13 3/15 3/20 3/22 3/27 3/29 4/3      STM 15  8 12 12 12 12 12  12  12  12    IASTM                    PROM 12  8 12 12 12 15 15  12  12  12    ROSALINA                 1-L     AROM with ROSALINA                                  ROSALINA      1                 Neuro Re-Ed                       HP/pulsed biph 15 15 15 15 15 15            Vincentian 10/10              15  15  15                                                     Ther Ex                       TGE    2/  2/ 2/ 2/ 2/ 2/  2  2/  2/    TP   Y 2'  Y 2'  Y 2'  Y 2'  Y 2'  Y 2' Y 2'  Y 2'  Y 2'      TP hook  Y 2'  Y 2'  Y 2'  Y 2'  Y 2'  Y 2' Y 2'  Y 2'  Y 2'      TP scrape  Y 2'  Y 2'  Y 2'  Y 2'  Y 2'  Y 2' Y 2'  Y 2' Y 2'      Glen  25 2/10  25 2/10  25 2/10  25 2/10  25 2/10  25 2/10  25 2/10  30# 2/10  25 2/10      Blue  L III 2/10  L III 2/10  L III 2/10  L III 2/10  L III 2/10  L III 2/10  L III 2/10  L IV 2/10  L III 2/10     Digiflex Y 2/10 Y 2/10 Y 2/10 Y 2/10 Y 2/10 Y 2/10 Y 2/10 Y 2/10                   Flexbar  R 20/20  R 20/20  R 20/20  R 20/20  R 20/20  R 20/20  R 20/20  R 20/20  R 20/20      DB E/F wrap  4 2/10  4 2/10  4 2/10  4 2/10  4 2/10  4 2/10  4 2/10  4 2/10  4 2/10                              Comp / roll  Y 3'  Y 3/3  Y 3/3  Y 3'/3'  Y 3'/3'  Y 3'/3'  Y 3'/3'  Y 3'/3'  Y 3'                             Modalities                       US -12   12  12 12 12 12   12  12  12 12

## 2023-04-04 ENCOUNTER — HOSPITAL ENCOUNTER (OUTPATIENT)
Dept: BONE DENSITY | Facility: HOSPITAL | Age: 68
Discharge: HOME | End: 2023-04-04

## 2023-04-04 VITALS — BODY MASS INDEX: 32.74 KG/M2 | HEIGHT: 68 IN | WEIGHT: 216 LBS

## 2023-04-04 DIAGNOSIS — Z13.820 SPECIAL SCREENING FOR OSTEOPOROSIS: ICD-10-CM

## 2023-04-05 ENCOUNTER — OFFICE VISIT (OUTPATIENT)
Dept: PHYSICAL THERAPY | Facility: CLINIC | Age: 68
End: 2023-04-05

## 2023-04-05 DIAGNOSIS — S66.117D STRAIN OF FLEXOR MUSCLE, FASCIA AND TENDON OF LEFT LITTLE FINGER AT WRIST AND HAND LEVEL, SUBSEQUENT ENCOUNTER: Primary | ICD-10-CM

## 2023-04-05 NOTE — PROGRESS NOTES
Daily Note     Today's date: 2023  Patient name: Nahomy Jon  : 1955  MRN: [id number]  Referring provider: Delicia Hernandez MD  Dx:   Encounter Diagnosis     ICD-10-CM    1  Strain of flexor muscle, fascia and tendon of left little finger at wrist and hand level, subsequent encounter  S66 117D                      Subjective: pt able to complete a tighter grasp with some force for ADLs      Objective: See treatment diary below    PT reviewed use of reema tape and ROSALINA to stress glide of the FDS/FDP  AROM left wrist E/F-   Left SF- MP- 0; PIP- 0/15 (blocked-45); DIP- 10/20 (blocked-25)   II- ; III 35  Sensation- no tingling noted  Circumference at wrist- 19 5 cm; MPs- 22 0 cm; SF 7 0 cm  Pt instructed in place/hold with wrist neutral; gentle active hook, straight and full fist with wrist neutral wearing ROSALINA    Assessment: Tolerated treatment well  Patient would benefit from continued PT      Plan: Continue per plan of care  Precautions: wear orthosis as directed    Exercises as prescribed    Manuals 3/8 3/13 3/15 3/20 3/22 3/27 3/29 4/3  4/5     STM 15  8 12 12 12 12 12  12  12  12                           PROM 12  8 12 12 12 15 15  12  12  12    ROSALINA                       Reema tape         1                                   1                 Neuro Re-Ed                       HP/pulsed biph 15 15 15 15 15 15            British 10/10              15  15  15                                                     Ther Ex                       TGE      TP   Y 2'  Y 2'  Y 2'  Y 2'  Y 2'  Y 2' Y 2'  Y 2'  Y 2'      TP hook  Y 2'  Y 2'  Y 2'  Y 2'  Y 2'  Y 2' Y 2'  Y 2'  Y 2'      TP scrape  Y 2'  Y 2'  Y 2'  Y 2'  Y 2'  Y 2' Y 2'  Y 2' Y 2'      Glen  25 /10  25 /10  25 /10  25 /10  25 /10  25 /10  25 /10  25  /10  25 /10      Blue  L III /10  L III /10  L III /10  L III 2/10  L III 2/10  L III 2/10  L III 2/10  L IV 2/10  L III 2/10     Digiflex Y 2/10 Y 2/10 Y 2/10 Y 2/10 Y 2/10 Y 2/10 Y 2/10 Y 2/10  Y 2/10 R                            Flexbar  R 20/20  R 20/20  R 20/20  R 20/20  R 20/20  R 20/20  R 20/20  R 20/20  R 20/20      DB E/F wrap  4 2/10  4 2/10  4 2/10  4 2/10  4 2/10  4 2/10  4 2/10  4 2/10  4 2/10                              Comp / roll  Y 3'  Y 3/3  Y 3/3  Y 3'/3'  Y 3'/3'  Y 3'/3'  Y 3'/3'  Y 3'/3'  Y 3'/3'                             Modalities                       US -12   12  12 12 12 12   12 12 12 12

## 2023-04-17 ENCOUNTER — APPOINTMENT (OUTPATIENT)
Dept: PHYSICAL THERAPY | Facility: CLINIC | Age: 68
End: 2023-04-17

## 2023-04-19 ENCOUNTER — APPOINTMENT (OUTPATIENT)
Dept: PHYSICAL THERAPY | Facility: CLINIC | Age: 68
End: 2023-04-19

## 2023-04-24 ENCOUNTER — OFFICE VISIT (OUTPATIENT)
Dept: PHYSICAL THERAPY | Facility: CLINIC | Age: 68
End: 2023-04-24

## 2023-04-24 DIAGNOSIS — S66.117D STRAIN OF FLEXOR MUSCLE, FASCIA AND TENDON OF LEFT LITTLE FINGER AT WRIST AND HAND LEVEL, SUBSEQUENT ENCOUNTER: Primary | ICD-10-CM

## 2023-04-24 NOTE — PROGRESS NOTES
Re-evaluation    Today's date: 2023  Patient name: Regina Tinsley  : 1955  MRN: [id number]  Referring provider: Shanna Morrison MD  Dx:   Encounter Diagnosis     ICD-10-CM    1  Strain of flexor muscle, fascia and tendon of left little finger at wrist and hand level, subsequent encounter  S66 117D                      Assessment  Assessment details: Pt is a 74 YO male presenting to PT with pain, decreased AROM, strength and tolerance to activity  Pt would benefit from skilled intervention to address these issues and maximize overall function  Occupation- - currently working  Dominant- right; Involved- left SF  Pt has completed 31 visits of skilled PT thus far with good compliance  Pt has made improvements primarily in  strength per objective measurements  However, pt continues with primary deficit of difficulty performing active flexion of SF PIP/DIP  Pt to follow up with hand surgeon and pending recommendation will modify current PT POC  Goals  ST  Decrease pain to 0-2/10 in 4 weeks            2  Decrease swelling left hand            3  [facility] in 6-8 weeks            4  Maintain clean wound and promote healing            5   Provide orthotic for protection  LT  Increase functional motion and strength for independence with ADL and self care by DC            2   Ability to RT recreational activity by DC    Plan  Patient would benefit from: skilled physical therapy  Planned modality interventions: cryotherapy, ultrasound and thermotherapy: hydrocollator packs  Planned therapy interventions: activity modification, manual therapy, orthotic management and training, orthotic fitting/training, neuromuscular re-education, strengthening, stretching, therapeutic activities, therapeutic exercise and home exercise program  Frequency: 2x week  Duration in weeks: 4  Treatment plan discussed with: patient        Subjective Evaluation    History of Present Illness  Date of surgery: 2022  Mechanism of injury: Pt noted inability to bend his left SF approximately 1 month ago  He reported no known trauma  MRI confirmed SF FDS and FDP rupture  Pain  Current pain ratin  At best pain ratin  At worst pain ratin  Location: left hand and SF  Quality: dull ache  Relieving factors: ice    Hand dominance: right    Treatments  Current treatment: physical therapy  Patient Goals  Patient goals for therapy: decreased edema, decreased pain, increased motion, increased strength, independence with ADLs/IADLs and return to sport/leisure activities          Objective     General Comments:      Wrist/Hand Comments  PT reviewed use of reema tape and ROSALINA to stress glide of the FDS/FDP  AROM left wrist E/F- /  Left SF- MP- 0/90; PIP- 0/15 (blocked-45); DIP- 10/20 (blocked-25)   II- 48/90; III 47/105  Sensation- no tingling noted  Circumference at wrist- 19 5 cm; MPs- 22 0 cm; SF 7 0 cm  Pt instructed in place/hold with wrist neutral; gentle active hook, straight and full fist with wrist neutral wearing ROSALINA          Plan: Continue per plan of care  Precautions: wear orthosis as directed    Exercises as prescribed    Manuals 4/10 4/12 4/24          STM 12  12 12 12 12 12 12  12  12                            PROM 12  12 12 12 12 15 15  12  12      ROSALINA                       Reema tape         1                                                    Neuro Re-Ed                       HP/pulsed biph 15 15 15 15 15 15            Venezuelan 10/10              15  15  15                                                     Ther Ex                       TGE       TP   Y 2'  Y 2'  Y 2'  Y 2'  Y 2'  Y 2' Y 2'  Y 2'  Y 2'      TP hook  Y 2'  Y 2'  Y 2'  Y 2'  Y 2'  Y 2' Y 2'  Y 2'  Y 2'      TP scrape  Y 2'  Y 2'  Y 2'  Y 2'  Y 2'  Y 2' Y 2'  Y 2' Y 2'      Glen  25 2/10  25 2/10  25 2/10  25 2/10  25 2/10  25 2/10  25 2/10  25  2/10  25 2/10      Blue  L IV 2/10  L IV 2/10  L IV 2/10  L III 2/10  L III 2/10  L III 2/10  L III 2/10  L IV 2/10  L III 2/10     Digiflex R 2/10 R 2/10 R 2/10 Y 2/10 Y 2/10 Y 2/10 Y 2/10 Y 2/10  Y 2/10                             Flexbar  R 20/20  R 20/20  R 20/20  R 20/20  R 20/20  R 20/20  R 20/20  R 20/20  R 20/20      DB E/F wrap  4 2/10  4 2/10  4 2/10  4 2/10  4 2/10  4 2/10  4 2/10  4 2/10  4 2/10                              Comp / roll  Y 3'/3  Y 3'/3'  Y 3'/3'  Y 3'/3'  Y 3'/3'  Y 3'/3'  Y 3'/3'  Y 3'/3'  Y 3'/3'                             Modalities                       US -12   12  12 12 12 12   12  12  12

## 2023-04-24 NOTE — LETTER
2023    MD Murphy Benson [address] [facility]    Patient: Wale Shoemaker   YOB: 1955   Date of Visit: 2023     Encounter Diagnosis     ICD-10-CM    1  Strain of flexor muscle, fascia and tendon of left little finger at wrist and hand level, subsequent encounter  S66 117D           Dear Dr Delfina Arciniega:    Thank you for your recent referral of Wale Shoemaker  Please review the attached evaluation summary from Matt's recent visit  Please verify that you agree with the plan of care by signing the attached order  If you have any questions or concerns, please do not hesitate to call  I sincerely appreciate the opportunity to share in the care of one of your patients and hope to have another opportunity to work with you in the near future  Sincerely,    Christa Good  DPT, T    Referring Provider:      I certify that I have read the below Plan of Care and certify the need for these services furnished under this plan of treatment while under my care  MD Murphy Benson 3 [id number]  Via Fax: [phone]          Re-evaluation    Today's date: 2023  Patient name: Wale Shoemaker  : 1955  MRN: [id number]  Referring provider: Cecilia Rangel MD  Dx:   Encounter Diagnosis     ICD-10-CM    1  Strain of flexor muscle, fascia and tendon of left little finger at wrist and hand level, subsequent encounter  S66 117D                      Assessment  Assessment details: Pt is a 76 YO male presenting to PT with pain, decreased AROM, strength and tolerance to activity  Pt would benefit from skilled intervention to address these issues and maximize overall function  Occupation- - currently working  Dominant- right; Involved- left SF  Pt has completed 31 visits of skilled PT thus far with good compliance   Pt has made improvements primarily in  strength per objective measurements  However, pt continues with primary deficit of difficulty performing active flexion of SF PIP/DIP  Pt to follow up with hand surgeon and pending recommendation will modify current PT POC  Goals  ST  Decrease pain to 0-2/10 in 4 weeks            2  Decrease swelling left hand            3  Increase AROM to [facility] in 6-8 weeks            4  Maintain clean wound and promote healing            5   Provide orthotic for protection  LT  Increase functional motion and strength for independence with ADL and self care by DC            2  Ability to RT recreational activity by DC    Plan  Patient would benefit from: skilled physical therapy  Planned modality interventions: cryotherapy, ultrasound and thermotherapy: hydrocollator packs  Planned therapy interventions: activity modification, manual therapy, orthotic management and training, orthotic fitting/training, neuromuscular re-education, strengthening, stretching, therapeutic activities, therapeutic exercise and home exercise program  Frequency: 2x week  Duration in weeks: 4  Treatment plan discussed with: patient        Subjective Evaluation    History of Present Illness  Date of surgery: 2022  Mechanism of injury: Pt noted inability to bend his left SF approximately 1 month ago  He reported no known trauma  MRI confirmed SF FDS and FDP rupture      Pain  Current pain ratin  At best pain ratin  At worst pain ratin  Location: left hand and SF  Quality: dull ache  Relieving factors: ice    Hand dominance: right    Treatments  Current treatment: physical therapy  Patient Goals  Patient goals for therapy: decreased edema, decreased pain, increased motion, increased strength, independence with ADLs/IADLs and return to sport/leisure activities          Objective     General Comments:      Wrist/Hand Comments  PT reviewed use of reema tape and ROSALINA to stress glide of the FDS/FDP  AROM left wrist E/F- 65/65  Left SF- MP- 0/90; PIP- 0/15 (blocked-45); DIP- 10/20 (blocked-25)   II- 48/90; III 47/105  Sensation- no tingling noted  Circumference at wrist- 19 5 cm; MPs- 22 0 cm; SF 7 0 cm  Pt instructed in place/hold with wrist neutral; gentle active hook, straight and full fist with wrist neutral wearing ROSALINA          Plan: Continue per plan of care  Precautions: wear orthosis as directed    Exercises as prescribed    Manuals 4/10 4/12 4/24          STM 12  12 12 12 12 12 12  12  12                            PROM 12  12 12 12 12 15 15  12  12      ROSALINA                       Francisco tape         1                                                    Neuro Re-Ed                       HP/pulsed biph 15 15 15 15 15 15            [place]ian 10/10              15  15  15                                                     Ther Ex                       TGE  2/5  2/5  2/5 2/5 2/5 2/5 2/5  2/5  2/5     TP   Y 2'  Y 2'  Y 2'  Y 2'  Y 2'  Y 2' Y 2'  Y 2'  Y 2'      TP hook  Y 2'  Y 2'  Y 2'  Y 2'  Y 2'  Y 2' Y 2'  Y 2'  Y 2'      TP scrape  Y 2'  Y 2'  Y 2'  Y 2'  Y 2'  Y 2' Y 2'  Y 2' Y 2'      Glen  25 2/10  25 2/10  25 2/10  25 2/10  25 2/10  25 2/10  25 2/10  25  2/10  25 2/10      Blue  L IV 2/10  L IV 2/10  L IV 2/10  L III 2/10  L III 2/10  L III 2/10  L III 2/10  L IV 2/10  L III 2/10     Digiflex R 2/10 R 2/10 R 2/10 Y 2/10 Y 2/10 Y 2/10 Y 2/10 Y 2/10  Y 2/10                             Flexbar  R 20/20  R 20/20  R 20/20  R 20/20  R 20/20  R 20/20  R 20/20  R 20/20  R 20/20      DB E/F wrap  4 2/10  4 2/10  4 2/10  4 2/10  4 2/10  4 2/10  4 2/10  4 2/10  4 2/10                              Comp / roll  Y 3'/3  Y 3'/3'  Y 3'/3'  Y 3'/3'  Y 3'/3'  Y 3'/3'  Y 3'/3'  Y 3'/3'  Y 3'/3'                             Modalities                       US -12   12 12 12 12 12 12 12 12

## 2023-04-26 ENCOUNTER — APPOINTMENT (OUTPATIENT)
Dept: PHYSICAL THERAPY | Facility: CLINIC | Age: 68
End: 2023-04-26

## 2023-05-01 ENCOUNTER — OFFICE VISIT (OUTPATIENT)
Dept: PHYSICAL THERAPY | Facility: CLINIC | Age: 68
End: 2023-05-01

## 2023-05-01 DIAGNOSIS — S66.117D STRAIN OF FLEXOR MUSCLE, FASCIA AND TENDON OF LEFT LITTLE FINGER AT WRIST AND HAND LEVEL, SUBSEQUENT ENCOUNTER: Primary | ICD-10-CM

## 2023-05-01 NOTE — PROGRESS NOTES
Daily Note     Today's date: 2023  Patient name: Hillary Frederick  : 1955  MRN: [id number]  Referring provider: Stasia Mortimer, MD  Dx:   Encounter Diagnosis     ICD-10-CM    1  Strain of flexor muscle, fascia and tendon of left little finger at wrist and hand level, subsequent encounter  S66 117D                      Subjective: Pt reports he had a follow up with his hand surgeon last Wednesday  He reports at the time discussed potential option of surgery again as pt continues with difficulty performing joint flexion  Pt is to follow up with surgeon again next Wednesday  Pt reports that he is leaning towards surgery  Objective: See treatment diary below  PT reviewed use of reema tape and ROSALINA to stress glide of the FDS/FDP  AROM left wrist E/F- /  Left SF- MP- 0/90; PIP- 0/15 (blocked-45); DIP- 10/20 (blocked-25)   II- 48/90; III 47/105  Sensation- no tingling noted  Circumference at wrist- 19 5 cm; MPs- 22 0 cm; SF 7 0 cm  Pt instructed in place/hold with wrist neutral; gentle active hook, straight and full fist with wrist neutral wearing ROSALINA    Assessment: Pt tolerates treatment well without significant complaints  Continued with therapeutic exercises without significant complaints  Gentle blocking performed but focused on composite fist  Pt will benefit from continued skilled PT  Plan: Continue per plan of care  Precautions: wear orthosis as directed    Exercises as prescribed    Manuals 4/10 4/12 4/24 5/1         STM 12  12 12 12 12 12 12  12  12                            PROM 12  12 12 12 12 15 15  12  15      ROSALINA                       Reema tape         1                                                    Neuro Re-Ed                       HP/pulsed biph 15 15 15 15 15 15            Georgian 10/10              15  15  15                                                     Ther Ex                       TGE       TP   Y 2'  Y 2'  Y 2'  Y 2'  Y 2'  Y 2' Y 2'  Y 2'  Y 2'      TP hook  Y 2'  Y 2'  Y 2'  Y 2'  Y 2'  Y 2' Y 2'  Y 2'  Y 2'      TP scrape  Y 2'  Y 2'  Y 2'  Y 2'  Y 2'  Y 2' Y 2'  Y 2' Y 2'      Glen  25 2/10  25 2/10  25 2/10  25 2/10  25 2/10  25 2/10  25 2/10  25  2/10  25 2/10      Blue  L IV 2/10  L IV 2/10  L IV 2/10  L IV 2/10  L III 2/10  L III 2/10  L III 2/10  L IV 2/10  L III 2/10     Digiflex R 2/10 R 2/10 R 2/10 R 2/10 Y 2/10 Y 2/10 Y 2/10 Y 2/10  Y 2/10                             Flexbar  R 20/20  R 20/20  R 20/20  R 20/20  R 20/20  R 20/20  R 20/20  R 20/20  R 20/20      DB E/F wrap  4 2/10  4 2/10  4 2/10  4 2/10  4 2/10  4 2/10  4 2/10  4 2/10  4 2/10                              Comp / roll  Y 3'/3  Y 3'/3'  Y 3'/3'  Y 3'/3'  Y 3'/3'  Y 3'/3'  Y 3'/3'  Y 3'/3'  Y 3'/3'                             Modalities                       US -12   12  12 dc 12 12   44  12  12

## 2023-05-08 ENCOUNTER — OFFICE VISIT (OUTPATIENT)
Dept: PHYSICAL THERAPY | Facility: CLINIC | Age: 68
End: 2023-05-08

## 2023-05-08 DIAGNOSIS — S66.117D STRAIN OF FLEXOR MUSCLE, FASCIA AND TENDON OF LEFT LITTLE FINGER AT WRIST AND HAND LEVEL, SUBSEQUENT ENCOUNTER: Primary | ICD-10-CM

## 2023-05-08 NOTE — PROGRESS NOTES
Daily Note     Today's date: 2023  Patient name: Benigno Pascual  : 1955  MRN: [id number]  Referring provider: Donavan Smith MD  Dx:   Encounter Diagnosis     ICD-10-CM    1  Strain of flexor muscle, fascia and tendon of left little finger at wrist and hand level, subsequent encounter  S66 117D                      Subjective: Pt is seeing hand surgeon this coming Wednesday for discussion of appropriate next steps  Pt reports no significant changes in motion of the L hand  Objective: See treatment diary below  PT reviewed use of reema tape and ROSALINA to stress glide of the FDS/FDP  AROM left wrist E/F- 65/65  Left SF- MP- 090; PIP- 0/15 (blocked-45); DIP- 10 (blocked-25)   II- 48; III 47  Sensation- no tingling noted  Circumference at wrist- 19 5 cm; MPs- 22 0 cm; SF 7 0 cm  Pt instructed in place/hold with wrist neutral; gentle active hook, straight and full fist with wrist neutral wearing ROSALINA    Assessment: Pt tolerates treatment well without significant complaints or pain voiced  Pt will benefit from continued skilled PT, he is to contact PT after follow up with hand surgeon  Pending plan with physician, will make appropriate modifications to PT POC  Plan: Continue per plan of care  Precautions: wear orthosis as directed    Exercises as prescribed    Manuals 4/10 4/12 4/24 5/1 5/8        STM 12  12 12 12 12 12 12  12  12                            PROM 12  12 12 12 12 15 15  12  12      ROSALINA                       Reema tape         1                                                    Neuro Re-Ed                       HP/pulsed biph 15 15 15 15 15 15            Peruvian 10/10              15  15  15                                                     Ther Ex                       TGE       TP   Y 2'  Y 2'  Y 2'  Y 2' Y 2'  Y 2' Y 2'  Y 2'  Y 2'      TP hook  Y 2'  Y 2'  Y 2'  Y 2' Y 2'  Y 2' Y 2'  Y 2'  Y 2'      TP scrape  Y 2'  Y 2'  Y 2'  Y 2' Y 2'  Y 2' Y 2'  Y 2' Y 2'      Glen  25 2/10  25 2/10  25 2/10  25 2/10 25 2/10  25 2/10  25 2/10  25  2/10  25 2/10      Blue  L IV 2/10  L IV 2/10  L IV 2/10  L IV 2/10 L IV 2/10  L III 2/10  L III 2/10  L IV 2/10  L III 2/10     Digiflex R 2/10 R 2/10 R 2/10 R 2/10 R 2/10 Y 2/10 Y 2/10 Y 2/10  Y 2/10                            Flexbar  R 20/20  R 20/20  R 20/20  R 20/20 G 20/20  R 20/20  R 20/20  R 20/20  R 20/20      DB E/F wrap  4 2/10  4 2/10  4 2/10  4 2/10 4 2/10  4 2/10  4 2/10  4 2/10  4 2/10                             Comp / roll  Y 3'/3  Y 3'/3'  Y 3'/3'  Y 3'/3' Y 3'/3'  Y 3'/3'  Y 3'/3'  Y 3'/3'  Y 3'/3'                             Modalities                       US -12   12  12 dc SD 12   46  66  12

## 2023-05-18 ENCOUNTER — TELEPHONE (OUTPATIENT)
Dept: UROLOGY | Facility: AMBULATORY SURGERY CENTER | Age: 68
End: 2023-05-18

## 2023-05-18 NOTE — TELEPHONE ENCOUNTER
Patient accidentally had PSA done in January of this year done and it was normal  He's coming in for his f/u appointment in July, does he need another PSA? He would like a call back      Patient can be reached at [phone]

## 2023-05-23 ENCOUNTER — OFFICE VISIT (OUTPATIENT)
Dept: SLEEP CENTER | Facility: CLINIC | Age: 68
End: 2023-05-23

## 2023-05-23 VITALS
BODY MASS INDEX: 34.21 KG/M2 | DIASTOLIC BLOOD PRESSURE: 80 MMHG | HEART RATE: 79 BPM | WEIGHT: 218 LBS | SYSTOLIC BLOOD PRESSURE: 123 MMHG | HEIGHT: 67 IN

## 2023-05-23 DIAGNOSIS — Z86.69 HISTORY OF OBSTRUCTIVE SLEEP APNEA: Primary | ICD-10-CM

## 2023-05-23 DIAGNOSIS — G47.19 EXCESSIVE DAYTIME SLEEPINESS: ICD-10-CM

## 2023-05-23 DIAGNOSIS — R35.1 NOCTURIA: ICD-10-CM

## 2023-05-23 DIAGNOSIS — R06.83 SNORING: ICD-10-CM

## 2023-05-23 DIAGNOSIS — R06.81 WITNESSED APNEIC SPELLS: ICD-10-CM

## 2023-05-23 DIAGNOSIS — E66.9 OBESITY (BMI 30-39.9): ICD-10-CM

## 2023-05-23 NOTE — PROGRESS NOTES
Consultation - [address]  75 y o  male  QFZ:  FF  DOS:2023    Physician Requesting Consult: Aarti Corona DO             Reason for Consult : At your kind request I saw Jaylon Lassiter for initial sleep evaluation today  The patient is here to evaluate for suspected Obstructive Sleep Apnea  PFSH, Problem List, Medications & Allergies were reviewed in EMR  Juliette Saenz  has a past medical history of Acid reflux, Arthritis, Cancer ([address] ), Colon polyp, Essential hypertriglyceridemia, H/O renal calculi, History of hypogonadism, Hyperlipidemia, Kidney stone, Nasal lesion, Neck mass, Tinnitus, and Wears contact lenses  He has a current medication list which includes the following prescription(s): allopurinol, prednisone, sildenafil, and simvastatin  HPI: He was diagnosed with obstructive sleep apnea over 10 years ago and prescribed CPAP  He discontinued use but because of loud snoring that disturbs his bed partner, reinitiated use in the past 3 weeks  Since then he is no longer snoring and believes he is sleeping better because he is not worrying about snoring  Other Complaints: None  Restless Leg Syndrome: reports no suggestive symptoms  Parasomnia: no features reported    Sleep Routine (on average): Typical Bedtime: 10 PM gets OOB: 6:30 AM TIB:8 5 hrs  Sleep latency:< 15 minutes Sleep Interruptions:1-2/nite because of nocturia and no longer having interruptions since recommend CPAP  Laisha Jones Awakens: spontaneously  Upon awakening: feels more refreshed since on Rx  [He estimates getting 7-8 hrs sleep ] Juliette Saenz reports excessive daytime sleepiness [feels like napping & does when has the opportunity] and this too has improved in the past few weeks  When not using CPAP, rated [himself] at Total score: 11 /24 on the [place] Sleepiness Scale  Habits:   reports that he has never smoked   He has never used smokeless tobacco ;  reports current alcohol use ;[ reports no "history of drug use  ];[  E-Cigarette/Vaping   • E-Cigarette Use Never User    ]; Caffeine use:limited; Exercise routine: regular  Family History: Negative for sleep disturbance  ROS: Significant for has been stable  A 10 point review of systems was otherwise negative  Pola Khan EXAM:  /80 (BP Location: Left arm, Patient Position: Sitting, Cuff Size: Large)   Pulse 79   Ht 5' 7\" (1 702 m)   Wt 98 9 kg (218 lb)   BMI 34 14 kg/m²    General: Well groomed male, well appearing, in no apparent distress  Neurological: Alert and orientated; cooperative; Cranial nerves intact;    Psychiatric: Speech: Clear and coherent; normal mood, affect & thought   Skin: Warm and dry; Color& Hydration good; no facial rashes or lesions   HEENT:  Craniofacial anatomy: normal Sinuses: Non-tender  TMJ: Normal   Eyes: EOM's intact; conjunctiva/corneas clear   Ears: Externally appear normal     Nasal Airway: is patent Septum: Intact; Mucosa: Normal; Turbinates: Normal; Rhinorrhea: None  Mouth: Lips: Normal posture; Dentition: normal   Mucosa: Moist; Hard Palate:normal    Oropharryx: crowded and AP narrowing Tongue: Mallampati:Class IV and MobileSoft Palate:  redundant  Tonsils: absent  Neck:[is thick and excess fatty tissue;] [Neck Circumference: 17 25 \";] Supple; no abnormal masses; Thyroid: Normal  Trachea: Central     Lymph: No cervical or submandibular Lymhadenopathy  Heart: S1,S2 normal; RRR; no gallop; no murmur s  Lungs: Respiratory Effort: Normal  Air entry good bilaterally  No wheezes  No rales  Abdomen: Obese, soft & non-tender    Extremities: No pedal edema  No clubbing or cyanosis  Musculoskeletal:  Motor normal; Gait: Normal        IMPRESSION: Primary/Secondary Sleep Diagnoses (to Medical or Psych conditions) & Comorbidities   1  History of obstructive sleep apnea  Ambulatory Referral to Sleep Medicine      2  Snoring  Ambulatory Referral to Sleep Medicine    Home Study      3   Witnessed apneic spells  " "Ambulatory Referral to Sleep Medicine    Home Study      4  Excessive daytime sleepiness  Home Study      5  Nocturia        6  Obesity (BMI 30-39  9)             PLAN:  1  With respect to above conditions, comprehensive counseling provided on pathophysiology; effects on symptoms and comorbidities, diagnostic strategies & limitations; treatment options; risks or no treament; risks & benefits of the various therapeutic options; costs and insurance aspects  2  I advised weight reduction, avoiding sleeping supine, using alcohol or sedating medications close to bed time and on safe driving practices  3   Since results of prior studies are no longer available, repeat nocturnal polysomnography is indicated and home sleep testing will be scheduled  4   Patient is willing to reinitiate positive airway pressure therapy and will need a new device  He will be scheduled for a titration study if indicated  5  Follow-up to be scheduled after the studies to review results, further details of treatment options and to initiate/adjust therapy  Sincerely,      Authenticated electronically on 71/87/59   Board Certified Specialist     Portions of the record may have been created with voice recognition software  Occasional wrong word or \"sound a like\" substitutions may have occurred due to the inherent limitations of voice recognition software  There may also be notations and random deletions of words or characters from malfunctioning software  Read the chart carefully and recognize, using context, where substitutions/deletions have occurred       "

## 2023-05-23 NOTE — PATIENT INSTRUCTIONS

## 2023-06-21 ENCOUNTER — HOSPITAL ENCOUNTER (OUTPATIENT)
Dept: SLEEP CENTER | Facility: CLINIC | Age: 68
Discharge: HOME | End: 2023-06-21
Payer: COMMERCIAL

## 2023-06-21 DIAGNOSIS — R06.83 SNORING: ICD-10-CM

## 2023-06-21 DIAGNOSIS — R06.81 WITNESSED APNEIC SPELLS: ICD-10-CM

## 2023-06-21 DIAGNOSIS — G47.19 EXCESSIVE DAYTIME SLEEPINESS: ICD-10-CM

## 2023-06-21 PROCEDURE — G0399 HOME SLEEP TEST/TYPE 3 PORTA: HCPCS

## 2023-06-22 NOTE — PROGRESS NOTES
Home Sleep Study Documentation    HOME STUDY DEVICE: Noxturnal no                                           [place] G3 yes      Pre-Sleep Home Study:    Set-up and instructions performed by: MA-Tech    Technician performed demonstration for Patient: yes    Return demonstration performed by Patient: yes    Written instructions provided to Patient: yes    Patient signed consent form: yes        Post-Sleep Home Study:    Additional comments by Patient: None    Home Sleep Study Failed:no:    Failure reason: N/A    Reported or Detected: N/A    Scored by: E Jennye Sandifer

## 2023-06-23 DIAGNOSIS — G47.33 OSA (OBSTRUCTIVE SLEEP APNEA): Primary | ICD-10-CM

## 2023-06-27 ENCOUNTER — TELEPHONE (OUTPATIENT)
Dept: SLEEP CENTER | Facility: CLINIC | Age: 68
End: 2023-06-27

## 2023-06-27 NOTE — TELEPHONE ENCOUNTER
Called patient and advised sleep study resulted and shows severe FARA (SHIMA 58 0) and significant hypoxia  Scheduled CPAP study 7/25/23 in [facility]  Instructions provided  Verbalized understanding 
DISCHARGE

## 2023-07-11 ENCOUNTER — APPOINTMENT (OUTPATIENT)
Dept: LAB | Facility: MEDICAL CENTER | Age: 68
End: 2023-07-11
Payer: COMMERCIAL

## 2023-07-11 DIAGNOSIS — Z79.899 ENCOUNTER FOR LONG-TERM (CURRENT) USE OF OTHER MEDICATIONS: ICD-10-CM

## 2023-07-11 DIAGNOSIS — M10.09 IDIOPATHIC GOUT OF MULTIPLE SITES, UNSPECIFIED CHRONICITY: ICD-10-CM

## 2023-07-11 LAB
ALBUMIN SERPL BCP-MCNC: 3.7 G/DL (ref 3.5–5)
ALP SERPL-CCNC: 55 U/L (ref 46–116)
ALT SERPL W P-5'-P-CCNC: 31 U/L (ref 12–78)
ANION GAP SERPL CALCULATED.3IONS-SCNC: 5 MMOL/L
AST SERPL W P-5'-P-CCNC: 12 U/L (ref 5–45)
BASOPHILS # BLD AUTO: 0.04 THOUSANDS/ÂΜL (ref 0–0.1)
BASOPHILS NFR BLD AUTO: 1 % (ref 0–1)
BILIRUB SERPL-MCNC: 0.74 MG/DL (ref 0.2–1)
BUN SERPL-MCNC: 18 MG/DL (ref 5–25)
CALCIUM SERPL-MCNC: 9.6 MG/DL (ref 8.3–10.1)
CHLORIDE SERPL-SCNC: 114 MMOL/L (ref 96–108)
CO2 SERPL-SCNC: 24 MMOL/L (ref 21–32)
CREAT SERPL-MCNC: 1.4 MG/DL (ref 0.6–1.3)
CRP SERPL QL: 4.3 MG/L
EOSINOPHIL # BLD AUTO: 0.14 THOUSAND/ÂΜL (ref 0–0.61)
EOSINOPHIL NFR BLD AUTO: 2 % (ref 0–6)
ERYTHROCYTE [DISTWIDTH] IN BLOOD BY AUTOMATED COUNT: 13.5 % (ref 11.6–15.1)
ERYTHROCYTE [SEDIMENTATION RATE] IN BLOOD: 24 MM/HOUR (ref 0–19)
GFR SERPL CREATININE-BSD FRML MDRD: 50 ML/MIN/1.73SQ M
GLUCOSE P FAST SERPL-MCNC: 96 MG/DL (ref 65–99)
HCT VFR BLD AUTO: 43.5 % (ref 36.5–49.3)
HGB BLD-MCNC: 14.6 G/DL (ref 12–17)
IMM GRANULOCYTES # BLD AUTO: 0.06 THOUSAND/UL (ref 0–0.2)
IMM GRANULOCYTES NFR BLD AUTO: 1 % (ref 0–2)
LYMPHOCYTES # BLD AUTO: 1.53 THOUSANDS/ÂΜL (ref 0.6–4.47)
LYMPHOCYTES NFR BLD AUTO: 22 % (ref 14–44)
MCH RBC QN AUTO: 31.5 PG (ref 26.8–34.3)
MCHC RBC AUTO-ENTMCNC: 33.6 G/DL (ref 31.4–37.4)
MCV RBC AUTO: 94 FL (ref 82–98)
MONOCYTES # BLD AUTO: 0.58 THOUSAND/ÂΜL (ref 0.17–1.22)
MONOCYTES NFR BLD AUTO: 8 % (ref 4–12)
NEUTROPHILS # BLD AUTO: 4.56 THOUSANDS/ÂΜL (ref 1.85–7.62)
NEUTS SEG NFR BLD AUTO: 66 % (ref 43–75)
NRBC BLD AUTO-RTO: 0 /100 WBCS
PLATELET # BLD AUTO: 214 THOUSANDS/UL (ref 149–390)
PMV BLD AUTO: 9.9 FL (ref 8.9–12.7)
POTASSIUM SERPL-SCNC: 4.1 MMOL/L (ref 3.5–5.3)
PROT SERPL-MCNC: 7.1 G/DL (ref 6.4–8.4)
RBC # BLD AUTO: 4.63 MILLION/UL (ref 3.88–5.62)
SODIUM SERPL-SCNC: 143 MMOL/L (ref 135–147)
URATE SERPL-MCNC: 5.8 MG/DL (ref 3.5–8.5)
WBC # BLD AUTO: 6.91 THOUSAND/UL (ref 4.31–10.16)

## 2023-07-11 PROCEDURE — 84550 ASSAY OF BLOOD/URIC ACID: CPT

## 2023-07-11 PROCEDURE — 85025 COMPLETE CBC W/AUTO DIFF WBC: CPT

## 2023-07-11 PROCEDURE — 36415 COLL VENOUS BLD VENIPUNCTURE: CPT

## 2023-07-11 PROCEDURE — 86140 C-REACTIVE PROTEIN: CPT

## 2023-07-11 PROCEDURE — 80053 COMPREHEN METABOLIC PANEL: CPT

## 2023-07-11 PROCEDURE — 85652 RBC SED RATE AUTOMATED: CPT

## 2023-07-12 DIAGNOSIS — N40.1 BENIGN PROSTATIC HYPERPLASIA WITH URINARY OBSTRUCTION: ICD-10-CM

## 2023-07-12 DIAGNOSIS — N13.8 BENIGN PROSTATIC HYPERPLASIA WITH URINARY OBSTRUCTION: ICD-10-CM

## 2023-07-12 DIAGNOSIS — R97.20 ELEVATED PSA: Primary | ICD-10-CM

## 2023-07-13 ENCOUNTER — APPOINTMENT (OUTPATIENT)
Dept: LAB | Facility: MEDICAL CENTER | Age: 68
End: 2023-07-13
Payer: COMMERCIAL

## 2023-07-13 LAB — PSA SERPL-MCNC: 1.17 NG/ML (ref 0–4)

## 2023-07-13 PROCEDURE — 36415 COLL VENOUS BLD VENIPUNCTURE: CPT

## 2023-07-13 PROCEDURE — 84153 ASSAY OF PSA TOTAL: CPT

## 2023-07-17 ENCOUNTER — HOSPITAL ENCOUNTER (OUTPATIENT)
Dept: ULTRASOUND IMAGING | Facility: HOSPITAL | Age: 68
Discharge: HOME | End: 2023-07-17
Payer: COMMERCIAL

## 2023-07-17 DIAGNOSIS — N20.0 CALCULUS OF KIDNEY: ICD-10-CM

## 2023-07-17 PROCEDURE — 76775 US EXAM ABDO BACK WALL LIM: CPT

## 2023-07-19 ENCOUNTER — OFFICE VISIT (OUTPATIENT)
Dept: UROLOGY | Facility: MEDICAL CENTER | Age: 68
End: 2023-07-19

## 2023-07-19 VITALS
HEART RATE: 65 BPM | WEIGHT: 216 LBS | BODY MASS INDEX: 33.9 KG/M2 | OXYGEN SATURATION: 95 % | DIASTOLIC BLOOD PRESSURE: 80 MMHG | SYSTOLIC BLOOD PRESSURE: 118 MMHG | HEIGHT: 67 IN

## 2023-07-19 DIAGNOSIS — Z12.5 PROSTATE CANCER SCREENING: ICD-10-CM

## 2023-07-19 DIAGNOSIS — N28.1 COMPLEX RENAL CYST: Primary | ICD-10-CM

## 2023-07-19 RX ORDER — COVID-19 ANTIGEN TEST
KIT MISCELLANEOUS
COMMUNITY
Start: 2023-05-09

## 2023-07-19 RX ORDER — PREDNISONE 2.5 MG/1
5 TABLET ORAL DAILY
COMMUNITY
Start: 2023-07-06

## 2023-07-19 NOTE — PROGRESS NOTES
7/19/2023      Chief Complaint   Patient presents with   • Follow-up     PSA done 7/13/23 1.17         Assessment and Plan    70 y.o. male managed by Dr Sal Jay     1. BPH  · Minimal symptoms. Notes improvement in nocturia since starting his CPAP  · AUA score: 0   · Continue without oral medication  · F/u in 1 year     2. Renal cyst  · 1.6 cm lower pole cystic lesion with internal septation - septation not preivously appreciated on upper tract imaging  · No previous contrast films  · CT abdomen w wo contrast ordered  · BMP this month with stable renal function. Instructed to drink plenty of water 24 hrs prior to imaging. 3. Nephrolithiasis   · No stones on US this year  · Continue allopurinol for history of uric acid nephrolithiasis. 4. Prostate cancer screening   · PSA: 1.17 (7/13/23)  · Previous PSAs: 0.8 (1/3/23). 1.0 (11/24/21), 1.1 (5/24/21), 1.3 (5/7/20)   · ARELI today revealed smooth, minimally enlarged prostate without appreciable nodule  · No family history of prostate cancer  · F/u in 1 year with PSA       History of Present Illness  Nicolette Wu is a 70 y.o. male here for evaluation of BPH, nephrolithiasis, and prostate cancer screening. Patient has not been managed on any oral medication for his BPH as his symptoms have remained mild. Patient states he is doing even better this year ever since starting his CPAP. He does have a history of uric acid stones and on ultrasound in 2021 had bilateral punctate stones. Patient is unsure if he had any stone episodes from last year to this year. Ultrasound was negative for any urinary tract calculi. Ultrasound did reveal a right renal cyst with thin septation. Upon review of previous upper tract imaging, thin septation not previously appreciated. There has been no contrast upper tract films. Patient denies any flank pain or gross hematuria. His PSA is stable this year and he denies any family history of prostate cancer.   He is agreeable to plan above.    Review of Systems   Constitutional: Negative for chills and fever. HENT: Negative. Respiratory: Negative. Cardiovascular: Negative. Gastrointestinal: Negative for abdominal pain, nausea and vomiting. Genitourinary: Negative for difficulty urinating, dysuria, flank pain, frequency, hematuria, scrotal swelling, testicular pain and urgency. Denies nocturia   Musculoskeletal: Negative. Skin: Negative. Neurological: Negative. AUA SYMPTOM SCORE    Flowsheet Row Most Recent Value   AUA SYMPTOM SCORE    How often have you had a sensation of not emptying your bladder completely after you finished urinating? 0 (P)     How often have you had to urinate again less than two hours after you finished urinating? 0 (P)     How often have you found you stopped and started again several times when you urinate? 0 (P)     How often have you found it difficult to postpone urination? 0 (P)     How often have you had a weak urinary stream? 0 (P)     How often have you had to push or strain to begin urination? 0 (P)     How many times did you most typically get up to urinate from the time you went to bed at night until the time you got up in the morning? 0 (P)     Quality of Life: If you were to spend the rest of your life with your urinary condition just the way it is now, how would you feel about that? 2 (P)     AUA SYMPTOM SCORE 0 (P)            Vitals  Vitals:    07/19/23 0835   BP: 118/80   BP Location: Left arm   Patient Position: Sitting   Cuff Size: Adult   Pulse: 65   SpO2: 95%   Weight: 98 kg (216 lb)   Height: 5' 7" (1.702 m)       Physical Exam  Vitals reviewed. Constitutional:       General: He is not in acute distress. Appearance: Normal appearance. He is obese. He is not ill-appearing, toxic-appearing or diaphoretic. HENT:      Head: Normocephalic and atraumatic.    Eyes:      Conjunctiva/sclera: Conjunctivae normal.   Pulmonary:      Effort: Pulmonary effort is normal. No respiratory distress. Abdominal:      General: There is no distension. Palpations: Abdomen is soft. Tenderness: There is no abdominal tenderness. There is no right CVA tenderness, left CVA tenderness, guarding or rebound. Genitourinary:     Comments: ARELI today revealed smooth, minimally enlarged prostate without appreciable nodule  Musculoskeletal:         General: Normal range of motion. Cervical back: Normal range of motion. Skin:     General: Skin is warm and dry. Neurological:      General: No focal deficit present. Mental Status: He is alert and oriented to person, place, and time. Psychiatric:         Mood and Affect: Mood normal.         Behavior: Behavior normal.         Thought Content:  Thought content normal.         Judgment: Judgment normal.         Past History  Past Medical History:   Diagnosis Date   • Acid reflux    • Arthritis    • Cancer (HCC)     skin   • Colon polyp    • Essential hypertriglyceridemia     last assessed: 5/23/2013   • H/O renal calculi    • History of hypogonadism     last assessed: 12/15/2014   • Hyperlipidemia    • Kidney stone    • Nasal lesion     left side   • Neck mass    • Tinnitus    • Wears contact lenses      Social History     Socioeconomic History   • Marital status: /Civil Union     Spouse name: None   • Number of children: None   • Years of education: None   • Highest education level: None   Occupational History   • None   Tobacco Use   • Smoking status: Never   • Smokeless tobacco: Never   • Tobacco comments:     nonsmoker   Vaping Use   • Vaping Use: Never used   Substance and Sexual Activity   • Alcohol use: Yes     Comment: few x yea   • Drug use: No   • Sexual activity: Yes     Partners: Female   Other Topics Concern   • None   Social History Narrative    Daily coffee consumption-eight or more cups a day     Social Determinants of Health     Financial Resource Strain: Not on file   Food Insecurity: Not on file Transportation Needs: Not on file   Physical Activity: Not on file   Stress: Not on file   Social Connections: Not on file   Intimate Partner Violence: Not on file   Housing Stability: Not on file     Social History     Tobacco Use   Smoking Status Never   Smokeless Tobacco Never   Tobacco Comments    nonsmoker     Family History   Problem Relation Age of Onset   • Diabetes Mother         mellitus   • Dementia Mother    • Other Father    • No Known Problems Half-Sister        The following portions of the patient's history were reviewed and updated as appropriate: allergies, current medications, past medical history, past social history, past surgical history and problem list.    Results  No results found for this or any previous visit (from the past 1 hour(s)).]  Lab Results   Component Value Date    PSA 1.17 07/13/2023    PSA 0.8 01/03/2023    PSA 1.0 11/24/2021    PSA 1.1 05/24/2021     Lab Results   Component Value Date    CALCIUM 9.6 07/11/2023     08/23/2017    K 4.1 07/11/2023    CO2 24 07/11/2023     (H) 07/11/2023    BUN 18 07/11/2023    CREATININE 1.40 (H) 07/11/2023     Lab Results   Component Value Date    WBC 6.91 07/11/2023    HGB 14.6 07/11/2023    HCT 43.5 07/11/2023    MCV 94 07/11/2023     07/11/2023     Quinn Baumann PA-C

## 2023-07-25 ENCOUNTER — HOSPITAL ENCOUNTER (OUTPATIENT)
Dept: SLEEP CENTER | Facility: CLINIC | Age: 68
Discharge: HOME | End: 2023-07-25
Payer: COMMERCIAL

## 2023-07-25 DIAGNOSIS — G47.33 OSA (OBSTRUCTIVE SLEEP APNEA): ICD-10-CM

## 2023-07-25 PROCEDURE — 95811 POLYSOM 6/>YRS CPAP 4/> PARM: CPT

## 2023-07-26 ENCOUNTER — HOSPITAL ENCOUNTER (OUTPATIENT)
Dept: CT IMAGING | Facility: HOSPITAL | Age: 68
Discharge: HOME | End: 2023-07-26
Payer: COMMERCIAL

## 2023-07-26 DIAGNOSIS — N28.1 COMPLEX RENAL CYST: ICD-10-CM

## 2023-07-26 DIAGNOSIS — G47.33 OSA (OBSTRUCTIVE SLEEP APNEA): Primary | ICD-10-CM

## 2023-07-26 PROCEDURE — 74170 CT ABD WO CNTRST FLWD CNTRST: CPT

## 2023-07-26 PROCEDURE — G1004 CDSM NDSC: HCPCS

## 2023-07-26 RX ADMIN — IOHEXOL 100 ML: 350 INJECTION, SOLUTION INTRAVENOUS at 14:17

## 2023-07-26 NOTE — PROGRESS NOTES
Sleep Study Documentation    Pre-Sleep Study       Sleep testing procedure explained to patient:YES    Patient napped prior to study:NO    [employer] worker after 12PM.  Caffeine use:NO    Alcohol:Dayshift workers after 5PM: Alcohol use:NO    Typical day for patient:YES       Study Documentation    Sleep Study Indications: FARA - diagnosed home study    Sleep Study: Treatment   Optimal PAP pressure: +15/10 cmH2O  Leak:Small  Snore:Eliminated  REM Obtained:yes  Supplemental O2: no    Minimum SaO2 86%  Baseline SaO2 94%    PAP mask choice (final)ResMed AirTouch F20  PAP mask type:full face  PAP pressure at which snoring was eliminated +9cmH2O  Minimum SaO2 at final PAP pressure 91%  Mode of Therapy:BiPAP  ETCO2:No  CPAP changed to BiPAP:Yes. If yes why CPAP failed to solve obstructive respiratory events and due to CSA    Mode of Therapy:BiPAP    EKG abnormalities: no     EEG abnormalities: no    Sleep Study Recorded < 2 hours: N/A    Sleep Study Recorded > 2 hours but incomplete study: N/A    Sleep Study Recorded 6 hours but no sleep obtained: NO    Patient classification: employed       Post-Sleep Study    Medication used at bedtime or during sleep study:NO    Patient reports time it took to fall asleep:less than 20 minutes    Patient reports waking up during study:Denied    Patient reports sleeping 6 to 8 hours without dreaming. Patient reports sleep during study:typical    Patient rated sleepiness: Somewhat sleepy or tired    PAP treatment:yes: Post PAP treatment patient reports feeling unchanged and would wear PAP mask at home.

## 2023-07-27 ENCOUNTER — TELEPHONE (OUTPATIENT)
Dept: SLEEP CENTER | Facility: CLINIC | Age: 68
End: 2023-07-27

## 2023-07-27 LAB
DME PARACHUTE DELIVERY DATE REQUESTED: NORMAL
DME PARACHUTE DELIVERY NOTE: NORMAL
DME PARACHUTE ITEM DESCRIPTION: NORMAL
DME PARACHUTE ORDER STATUS: NORMAL
DME PARACHUTE SUPPLIER NAME: NORMAL
DME PARACHUTE SUPPLIER PHONE: NORMAL

## 2023-07-27 NOTE — ASSESSMENT & PLAN NOTE
Orthopaedic Surgery - Office Note  Sebas Santana (82 y.o. male)   : 1990   MRN: [id number]  Encounter Date: 2023    Chief Complaint   Patient presents with   • Left Elbow - Pain         Assessment/Plan  Diagnoses and all orders for this visit:    Pain in left elbow  -     XR elbow 3+ vw left; Future  -     Ambulatory Referral to Physical Therapy; Future  -     Ambulatory Referral to Pain Management; Future    Chronic neck pain  -     Ambulatory Referral to Physical Therapy; Future  -     Ambulatory Referral to Pain Management; Future    Radicular pain in left arm  -     Ambulatory Referral to Physical Therapy; Future  -     Ambulatory Referral to Pain Management; Future    I had a long discussion with the patient in the office today regarding his symptoms. I reviewed with the patient I suspect he has cervical neck pathology resulting in left arm nerve symptoms. I would not recommend oral steroids at that time as he is healing a surgical incision from lipoma removal.  He will start Aleve 1 tablet twice daily with food stopping and calling if any stomach upset occurs. He will be started in formal physical therapy for the cervical neck and left arm symptoms. He will be referred to spine and pain with follow-up in 6 weeks. Patient was advised his elbow symptoms are very mild at this time and do not require any further treatment. All question and concerns were answered in the office today. Patient was advised he should continue to follow with dermatology regarding his lipoma excision and pathology results. Initial impression was reviewed that the lipoma was unlikely to be causing his symptoms due to timing of onset of symptoms as well as physical exam findings today. Return for Recheck in 6 weeks with spine and pain. History of Present Illness  This is a new patient with left arm pain. He reports he has pain in the left arm with some numbness and tingling in the small finger as well. fatemeh allopurinol and follow up with the rheumatology team No trauma to the elbow is noted. Patient reports he has had 6 months of symptoms. He reports he started with left-sided neck pain that radiates across the back of his shoulder into his axilla down the arm ending in the small finger. He reports the symptoms have stayed about the same. He had a lipoma in his posterior shoulder which was removed last Friday and he is unsure if the lipoma was causing any of his symptoms but there has been no change since its removal.  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 He reports the elbow symptoms are different than the pain he has down the left arm which on occasion makes it very difficult to move the small finger in the a.m. when he first awakes. Patient has a history of a lipoma excision in the posterior shoulder this week at a dermatologist.  Patient has a family history of Hodgkin's and non-Hodgkin's lymphoma. Review of Systems  Pertinent items are noted in HPI. All other systems were reviewed and are negative. Physical Exam  /75 (BP Location: Right arm, Patient Position: Sitting, Cuff Size: Standard)   Pulse 75   Ht 5' 7" (1.702 m)   Wt 67.6 kg (149 lb)   BMI 23.34 kg/m²   Cons: Appears well. No apparent distress. Psych: Alert. Oriented x3. Mood and affect normal.  Patient's left arm has no skin breakdown lesions or signs of infection. Patient's left shoulder has full active and passive range of motion. Left elbow has full active and passive range of motion. Patient's left elbow has very mild tenderness at the lateral condyle. There is no medial epicondyle tenderness. He has a negative Tinel's at the cubital tunnel. He has mild pain against resistance to wrist extension. He has sensation intact to light touch in all dermatomes. Distal radial and ulnar pulses are +2. Hand and wrist exam is unremarkable. Patient cervical neck range of motion is limited to extension.   Flexion is full and reproduces the left arm symptoms. He has a positive Spurling's exam to the left. Negative Spurling's exam to the right. Lateral rotation to the left and right are within normal limits. He has postprocedural dressings in the left posterior shoulder from lipoma excision. Incision was not visualized. Patient is neurovascular grossly intact in the left upper extremity without any gross neurological deficits. 3 views of the left elbow performed in the office today show no acute fractures dislocations or significant degenerative changes. X-rays were read from orthopedic standpoint will await official radiologist interpretation        Studies Reviewed  No records are available from recent procedure in the shoulder. Procedures  No procedures today. Medical, Surgical, Family, and Social History  The patient's medical history, family history, and social history, were reviewed and updated as appropriate.     Past Medical History:   Diagnosis Date   • Addiction to drug ([address])    • Hx of migraine headaches    • Psoriasis        Past Surgical History:   Procedure Laterality Date   • TYMPANOSTOMY TUBE PLACEMENT      age 10       Family History   Problem Relation Age of Onset   • No Known Problems Mother    • Hypertension Father    • Other Father         Colitiis   • Hodgkin's lymphoma Maternal Grandmother 72   • Lymphoma Maternal Grandfather 72        non-hodgkin's       Social History     Occupational History   • Not on file   Tobacco Use   • Smoking status: Former     Packs/day: 0.50     Years: 11.00     Total pack years: 5.50     Types: Cigarettes     Start date:      Quit date: 2018     Years since quittin.5   • Smokeless tobacco: Never   Vaping Use   • Vaping Use: Never used   Substance and Sexual Activity   • Alcohol use: No   • Drug use: Not Currently   • Sexual activity: Not on file       Allergies   Allergen Reactions   • Augmentin [Amoxicillin-Pot Clavulanate] Hives   • Cefaclor Hives   • Cephalosporins Hives         Current Outpatient Medications:   •  tamsulosin (FLOMAX) 0.4 mg, Take 1 capsule (0.4 mg total) by mouth daily with dinner (Patient not taking: Reported on 7/27/2023), Disp: 30 capsule, Rfl: Nona Desouza [facility], JORGE

## 2023-07-27 NOTE — TELEPHONE ENCOUNTER
Called patient and advised CPAP study resulted and BiPAP ordered. Scheduled DME set up 8/10/23 in [facility]. Rx for CPAP and clinical information sent to [address] [place] [address] via [employer]. Scheduled compliance follow up in [facility] 10/20/23.

## 2023-07-28 DIAGNOSIS — N28.1 COMPLEX RENAL CYST: Primary | ICD-10-CM

## 2023-07-31 LAB
DME PARACHUTE DELIVERY DATE EXPECTED: NORMAL
DME PARACHUTE DELIVERY DATE REQUESTED: NORMAL
DME PARACHUTE DELIVERY NOTE: NORMAL
DME PARACHUTE ITEM DESCRIPTION: NORMAL
DME PARACHUTE ORDER STATUS: NORMAL
DME PARACHUTE SUPPLIER NAME: NORMAL
DME PARACHUTE SUPPLIER PHONE: NORMAL

## 2023-08-10 LAB
DME PARACHUTE DELIVERY DATE ACTUAL: NORMAL
DME PARACHUTE DELIVERY DATE EXPECTED: NORMAL
DME PARACHUTE DELIVERY DATE REQUESTED: NORMAL
DME PARACHUTE DELIVERY NOTE: NORMAL
DME PARACHUTE ITEM DESCRIPTION: NORMAL
DME PARACHUTE ORDER STATUS: NORMAL
DME PARACHUTE SUPPLIER NAME: NORMAL
DME PARACHUTE SUPPLIER PHONE: NORMAL

## 2023-08-15 ENCOUNTER — TELEPHONE (OUTPATIENT)
Dept: SLEEP CENTER | Facility: CLINIC | Age: 68
End: 2023-08-15

## 2023-08-15 NOTE — TELEPHONE ENCOUNTER
Remy Cunningham was set up on 8/10/23 by [employer] on a ResMed S10 AirCurve PAP IPAP 15 EPAP 10 and mask AirTouch F20 medium.

## 2023-08-23 ENCOUNTER — EVALUATION (OUTPATIENT)
Facility: CLINIC | Age: 68
End: 2023-08-23
Payer: COMMERCIAL

## 2023-08-23 DIAGNOSIS — M25.642 STIFFNESS OF LEFT HAND, NOT ELSEWHERE CLASSIFIED: Primary | ICD-10-CM

## 2023-08-23 PROCEDURE — 97035 APP MDLTY 1+ULTRASOUND EA 15: CPT | Performed by: PHYSICAL THERAPIST

## 2023-08-23 PROCEDURE — 97161 PT EVAL LOW COMPLEX 20 MIN: CPT | Performed by: PHYSICAL THERAPIST

## 2023-08-23 PROCEDURE — 97110 THERAPEUTIC EXERCISES: CPT | Performed by: PHYSICAL THERAPIST

## 2023-08-23 PROCEDURE — 97112 NEUROMUSCULAR REEDUCATION: CPT | Performed by: PHYSICAL THERAPIST

## 2023-08-23 PROCEDURE — 97140 MANUAL THERAPY 1/> REGIONS: CPT | Performed by: PHYSICAL THERAPIST

## 2023-08-23 NOTE — LETTER
2023    Merlinda Neighbor, MD  [facility]    Patient: Susan Glass   YOB: 1955   Date of Visit: 2023     Encounter Diagnosis     ICD-10-CM    1. Stiffness of left hand, not elsewhere classified  M25.642           Dear Dr. Aracelis Prince:    Thank you for your recent referral of Susan Glass. Please review the attached evaluation summary from Matt's recent visit. Please verify that you agree with the plan of care by signing the attached order. If you have any questions or concerns, please do not hesitate to call. I sincerely appreciate the opportunity to share in the care of one of your patients and hope to have another opportunity to work with you in the near future. Sincerely,    Pacheco Ritchie, DPT, T      Referring Provider:      I certify that I have read the below Plan of Care and certify the need for these services furnished under this plan of treatment while under my care. Merlinda Neighbor, MD  [address] [place] [address]  Via Fax: [phone]          PT Evaluation     Today's date: 2023  Patient name: Susan Glass  : 1955  MRN: [id number]  Referring provider: Merlinda Neighbor, MD  Dx:   Encounter Diagnosis     ICD-10-CM    1. Stiffness of left hand, not elsewhere classified  M25.642                      Assessment  Assessment details: Pt is a 71 YO male presenting to PT with pain, decreased AROM, strength and tolerance to activity. Pt would benefit from skilled intervention to address these issues and maximize overall function. Occupation- - currently working from home  Dominant- right; Involved- left SF      Goals  ST. Decrease pain to 0-2/10 in 4 weeks            2. Decrease swelling left hand            3. Increase AROM to [facility] in 6-8 weeks            4.    Maintain clean wound and promote healing            5.  Provide orthotic for protection  LT. Increase functional motion and strength for independence with ADL and self care by DC            2. Ability to RT recreational activity by DC    Plan  Patient would benefit from: skilled physical therapy  Planned modality interventions: cryotherapy, ultrasound and thermotherapy: hydrocollator packs  Planned therapy interventions: activity modification, manual therapy, orthotic management and training, orthotic fitting/training, neuromuscular re-education, strengthening, stretching, therapeutic activities, therapeutic exercise and home exercise program  Frequency: 2x week  Duration in weeks: 4  Treatment plan discussed with: patient        Subjective Evaluation    History of Present Illness  Date of surgery: 2023  Mechanism of injury: Pt noted inability to bend his left SF approximately 2022. He reported no known trauma. MRI confirmed SF FDS and FDP rupture. Primary repair completed 2022. Recent tenolysis with surgeon noting significant adhesions and tendon attenuation  Patient Goals  Patient goals for therapy: decreased edema, decreased pain, increased motion, increased strength, independence with ADLs/IADLs and return to sport/leisure activities    Pain  Current pain ratin  At best pain ratin  At worst pain ratin  Location: left hand and SF  Quality: dull ache  Relieving factors: ice    Hand dominance: right    Treatments  Current treatment: physical therapy        Objective     General Comments:      Wrist/Hand Comments  Post op dressing removed-sutures clean, sporadic bleeding noted  AROM left wrist E/F- 60/50  Left SF MP- 0/60; PIP- 10/30; DIP- 0/10  Sensation- no tingling noted  Circumference at wrist- 17.5 cm; MPs- 22.5cm  Pt instructed in AAROM/partial fisting, isolated AROM at PIP/DIP  Pt also instructed in wound care, scar management            Precautions: wear orthosis as directed.   No aggressive activity with left hand      Manuals             STM 15 PROM 2/10            FDS 2/10            FPD 2/10                                      Neuro Re-Ed             HP/pulsed biph 15                                                   Ther Ex             AROM W 2/5            TGE --->            Place hold fist 2/10                                                                                                                                                           Modalities             US 12            CP 15

## 2023-08-23 NOTE — PROGRESS NOTES
PT Evaluation     Today's date: 2023  Patient name: Monica Singh  : 1955  MRN: [id number]  Referring provider: Jenna Grande MD  Dx:   Encounter Diagnosis     ICD-10-CM    1. Stiffness of left hand, not elsewhere classified  M25.642                      Assessment  Assessment details: Pt is a 71 YO male presenting to PT with pain, decreased AROM, strength and tolerance to activity. Pt would benefit from skilled intervention to address these issues and maximize overall function. Occupation- - currently working from home  Dominant- right; Involved- left SF      Goals  ST. Decrease pain to 0-2/10 in 4 weeks            2. Decrease swelling left hand            3. [facility] to [facility] in 6-8 weeks            4. Maintain clean wound and promote healing            5.  Provide orthotic for protection  LT. Increase functional motion and strength for independence with ADL and self care by DC            2. Ability to RT recreational activity by DC    Plan  Patient would benefit from: skilled physical therapy  Planned modality interventions: cryotherapy, ultrasound and thermotherapy: hydrocollator packs  Planned therapy interventions: activity modification, manual therapy, orthotic management and training, orthotic fitting/training, neuromuscular re-education, strengthening, stretching, therapeutic activities, therapeutic exercise and home exercise program  Frequency: 2x week  Duration in weeks: 4  Treatment plan discussed with: patient        Subjective Evaluation    History of Present Illness  Date of surgery: 2023  Mechanism of injury: Pt noted inability to bend his left SF approximately 2022. He reported no known trauma. MRI confirmed SF FDS and FDP rupture. Primary repair completed 2022.   Recent tenolysis with surgeon noting significant adhesions and tendon attenuation  Patient Goals  Patient goals for therapy: decreased edema, decreased pain, increased motion, increased strength, independence with ADLs/IADLs and return to sport/leisure activities    Pain  Current pain ratin  At best pain ratin  At worst pain ratin  Location: left hand and SF  Quality: dull ache  Relieving factors: ice    Hand dominance: right    Treatments  Current treatment: physical therapy        Objective     General Comments:      Wrist/Hand Comments  Post op dressing removed-sutures clean, sporadic bleeding noted  AROM left wrist E/F- 60/50  Left SF MP- 0/60; PIP- 10/30; DIP- 0/10  Sensation- no tingling noted  Circumference at wrist- 17.5 cm; MPs- 22.5cm  Pt instructed in AAROM/partial fisting, isolated AROM at PIP/DIP  Pt also instructed in wound care, scar management             Precautions: wear orthosis as directed.   No aggressive activity with left hand      Manuals             STM 15                         PROM 2/10            FDS 2/10            FPD 2/10                                      Neuro Re-Ed             HP/pulsed biph 15                                                   Ther Ex             AROM W             TGE --->            Place hold fist 2/10                                                                                                                                                           Modalities             US 12            CP 15

## 2023-08-24 ENCOUNTER — OFFICE VISIT (OUTPATIENT)
Facility: CLINIC | Age: 68
End: 2023-08-24
Payer: COMMERCIAL

## 2023-08-24 DIAGNOSIS — M25.642 STIFFNESS OF LEFT HAND, NOT ELSEWHERE CLASSIFIED: Primary | ICD-10-CM

## 2023-08-24 PROCEDURE — 97110 THERAPEUTIC EXERCISES: CPT | Performed by: PHYSICAL THERAPIST

## 2023-08-24 PROCEDURE — 97035 APP MDLTY 1+ULTRASOUND EA 15: CPT | Performed by: PHYSICAL THERAPIST

## 2023-08-24 PROCEDURE — 97112 NEUROMUSCULAR REEDUCATION: CPT | Performed by: PHYSICAL THERAPIST

## 2023-08-24 PROCEDURE — 97140 MANUAL THERAPY 1/> REGIONS: CPT | Performed by: PHYSICAL THERAPIST

## 2023-08-24 NOTE — PROGRESS NOTES
Daily Note     Today's date: 2023  Patient name: Shavon Shafer  : 1955  MRN: [id number]  Referring provider: Jacek Camacho MD  Dx:   Encounter Diagnosis     ICD-10-CM    1. Stiffness of left hand, not elsewhere classified  M25.642                      Subjective: pt feels less swelling and pain today      Objective: See treatment diary below    AROM left wrist E/F- 60/50  Left SF MP- 0/60; PIP- 10/30; DIP- 0/10  Sensation- no tingling noted  Circumference at wrist- 17.5 cm; MPs- 22.5cm  Pt instructed in AAROM/partial fisting, isolated AROM at PIP/DIP  Pt also instructed in wound care, scar management    Assessment: Tolerated treatment well. Patient would benefit from continued PT      Plan: Continue per plan of care. Precautions: wear orthosis as directed.   No aggressive activity with left hand      Manuals            STM 15 15                        PROM 2/10 2/10           FDS 2/10 2/10           FPD 2/10 2/10                                     Neuro Re-Ed             HP/pulsed biph 15 15                                                  Ther Ex             AROM W  25           TGE ---> 2           Place hold/towel                                                                                                                                                           Modalities             US 12 12           CP 15 15

## 2023-08-28 ENCOUNTER — OFFICE VISIT (OUTPATIENT)
Facility: CLINIC | Age: 68
End: 2023-08-28
Payer: COMMERCIAL

## 2023-08-28 DIAGNOSIS — M25.642 STIFFNESS OF LEFT HAND, NOT ELSEWHERE CLASSIFIED: Primary | ICD-10-CM

## 2023-08-28 PROCEDURE — 97140 MANUAL THERAPY 1/> REGIONS: CPT | Performed by: PHYSICAL THERAPIST

## 2023-08-28 PROCEDURE — 97035 APP MDLTY 1+ULTRASOUND EA 15: CPT | Performed by: PHYSICAL THERAPIST

## 2023-08-28 PROCEDURE — 97112 NEUROMUSCULAR REEDUCATION: CPT | Performed by: PHYSICAL THERAPIST

## 2023-08-28 PROCEDURE — 97110 THERAPEUTIC EXERCISES: CPT | Performed by: PHYSICAL THERAPIST

## 2023-08-28 NOTE — PROGRESS NOTES
Daily Note     Today's date: 2023  Patient name: Meghan Sesay  : 1955  MRN: [id number]  Referring provider: Paulette Ortega MD  Dx:   Encounter Diagnosis     ICD-10-CM    1. Stiffness of left hand, not elsewhere classified  M25.642                      Subjective: pt notes he has been using his hand for light ADL to bend the SF    Objective: See treatment diary below    AROM left wrist E/F- 60/50  Left SF MP- 0/60; PIP- 10/30; DIP- 0/10  Sensation- no tingling noted  Circumference at wrist- 17.5 cm; MPs- 22.5cm  Pt instructed in AAROM/partial fisting, isolated AROM at PIP/DIP  Pt also instructed in wound care, scar management       Assessment: Tolerated treatment well. Patient would benefit from continued PT      Plan: Continue per plan of care. Precautions: wear orthosis as directed.   No aggressive activity with left hand      Manuals           STM 15 15 15                       PROM 2/10 2/10 2/10          FDS 2/10 2/10 2/10          FPD 2/10 2/10 210                                    Neuro Re-Ed             HP/pulsed biph 15 15 15                                                 Ther Ex             AROM W  2/ 2/5          TGE ---> 2 25          Place hold/towel  2 2/5          MCP block/hook   2/5                                                                                                                                            Modalities             US 12 12 12          CP 15 15 15

## 2023-08-29 ENCOUNTER — OFFICE VISIT (OUTPATIENT)
Facility: CLINIC | Age: 68
End: 2023-08-29
Payer: COMMERCIAL

## 2023-08-29 DIAGNOSIS — M25.642 STIFFNESS OF LEFT HAND, NOT ELSEWHERE CLASSIFIED: Primary | ICD-10-CM

## 2023-08-29 PROCEDURE — 97110 THERAPEUTIC EXERCISES: CPT | Performed by: PHYSICAL THERAPIST

## 2023-08-29 PROCEDURE — 97035 APP MDLTY 1+ULTRASOUND EA 15: CPT | Performed by: PHYSICAL THERAPIST

## 2023-08-29 PROCEDURE — 97140 MANUAL THERAPY 1/> REGIONS: CPT | Performed by: PHYSICAL THERAPIST

## 2023-08-29 PROCEDURE — 97112 NEUROMUSCULAR REEDUCATION: CPT | Performed by: PHYSICAL THERAPIST

## 2023-08-29 NOTE — PROGRESS NOTES
Daily Note     Today's date: 2023  Patient name: Akash Will  : 1955  MRN: [id number]  Referring provider: Rebecca Gallegos MD  Dx:   Encounter Diagnosis     ICD-10-CM    1. Stiffness of left hand, not elsewhere classified  M25.642                      Subjective: Pt attempting light hand activity with focus on composite motion as well as isolated PIP and DIP motion. Objective: See treatment diary below    Sutures to be removed tomorrow   AROM left wrist E/F- 60/50  Left SF MP- 0/60; PIP- 10/60; DIP- 0/30 (light block)  Sensation- no tingling noted  Circumference at wrist- 17.5 cm; MPs- 22.5cm  Pt instructed in AAROM/partial fisting, isolated AROM at PIP/DIP  Pt also instructed in wound care, scar management    Assessment: Tolerated treatment well. Patient would benefit from continued PT      Plan: Continue per plan of care. Precautions: wear orthosis as directed.   No aggressive activity with left hand      Manuals          STM 15 15 15 115                      PROM 2/10 2/10 2/10 2/10         FDS 2/10 2/10 2/10 2/10         FDP 2/10 2/10 2/10 2/10                                   Neuro Re-Ed             HP/pulsed biph 15 15 15 15                                                Ther Ex             AROM W 2/ 2/ 2/ 2/5         TGE ---> 2/ 2/ 2/5         Place hold/towel 2/5 2/5 2/5 2/5         MCP hook   2/ 2/5         [facility] wrap    2/5         Ball lift    2/5                                                                                                                 Modalities             US 12 12 12 12         CP 15 15 15 15

## 2023-08-31 ENCOUNTER — OFFICE VISIT (OUTPATIENT)
Facility: CLINIC | Age: 68
End: 2023-08-31
Payer: COMMERCIAL

## 2023-08-31 DIAGNOSIS — M25.642 STIFFNESS OF LEFT HAND, NOT ELSEWHERE CLASSIFIED: Primary | ICD-10-CM

## 2023-08-31 PROCEDURE — 97112 NEUROMUSCULAR REEDUCATION: CPT | Performed by: PHYSICAL THERAPIST

## 2023-08-31 PROCEDURE — 97110 THERAPEUTIC EXERCISES: CPT | Performed by: PHYSICAL THERAPIST

## 2023-08-31 PROCEDURE — 97140 MANUAL THERAPY 1/> REGIONS: CPT | Performed by: PHYSICAL THERAPIST

## 2023-08-31 PROCEDURE — 97035 APP MDLTY 1+ULTRASOUND EA 15: CPT | Performed by: PHYSICAL THERAPIST

## 2023-08-31 NOTE — PROGRESS NOTES
Daily Note     Today's date: 2023  Patient name: Dain Marcos  : 1955  MRN: [id number]  Referring provider: Juan M Andino MD  Dx:   Encounter Diagnosis     ICD-10-CM    1. Stiffness of left hand, not elsewhere classified  M25.642                      Subjective: pt had sutures removed and will advance his program.        Objective: See treatment diary below    Pt fitted with ROSALINA for activity  AROM left wrist E/F- 60/50  Left SF MP- 0/60; PIP- 10/60; DIP- 0/30 (light block)  Sensation- no tingling noted  Circumference at wrist- 17.5 cm; MPs- 22.5cm  Pt instructed in AAROM/partial fisting, isolated AROM at PIP/DIP  Pt also instructed scar management     Assessment: Tolerated treatment well. Patient would benefit from continued PT      Plan: Continue per plan of care. Precautions: wear orthosis as directed.   No aggressive activity with left hand      Manuals         STM 15 15 15 15 15                     PROM 2/10 2/10 2/10 2/10 2/10        FDS 2/10 2/10 2/10 2/10 2/10        FDP 2/10 2/10 2/10 2/10 2/10                                  Neuro Re-Ed             HP/pulsed biph 15 15 15 15 15                                               Ther Ex             AROM W / 2/ 2/ 2/ 2/5        TGE ---> 2/ 2/ 2/5 2/5        Place hold/towel / 2/5 2/5 2/5 2/5        MCP hook   2/ 2/ 2/5        [facility] wrap    2/ 2/5        Ball lift (yellow)    2/5 2/5                                                                                                                Modalities             US 12 12 12 12 12        CP 15 15 15 15 12

## 2023-09-05 ENCOUNTER — OFFICE VISIT (OUTPATIENT)
Facility: CLINIC | Age: 68
End: 2023-09-05
Payer: COMMERCIAL

## 2023-09-05 DIAGNOSIS — M25.642 STIFFNESS OF LEFT HAND, NOT ELSEWHERE CLASSIFIED: Primary | ICD-10-CM

## 2023-09-05 PROCEDURE — 97112 NEUROMUSCULAR REEDUCATION: CPT

## 2023-09-05 PROCEDURE — 97140 MANUAL THERAPY 1/> REGIONS: CPT

## 2023-09-05 PROCEDURE — 97035 APP MDLTY 1+ULTRASOUND EA 15: CPT

## 2023-09-05 PROCEDURE — 97110 THERAPEUTIC EXERCISES: CPT

## 2023-09-05 NOTE — PROGRESS NOTES
Daily Note     Today's date: 2023  Patient name: Kerry Reveles  : 1955  MRN: [id number]  Referring provider: Juliane Moreira MD  Dx:   Encounter Diagnosis     ICD-10-CM    1. Stiffness of left hand, not elsewhere classified  M25.642                      Subjective: Pt reports compliancy with HEP. "MF is stiff and sore today."      Objective: See treatment diary below    Pt fitted with ROSALINA for activity  AROM left wrist E/F- 60/50  Left SF MP- 0/60; PIP- 10/60; DIP- 0/30 (light block)  Sensation- no tingling noted  Circumference at wrist- 17.5 cm; MPs- 22.5cm  Pt instructed in AAROM/partial fisting, isolated AROM at PIP/DIP  Pt also instructed scar management    Assessment: Tolerated treatment well today. Continue with current program. Wound clean and dry. Plan: Progress treatment as tolerated. Precautions: wear orthosis as directed.   No aggressive activity with left hand      Manuals        STM 15 15 15 15 15 15                    PROM 2/10 2/10 2/10 2/10 2/10 2/10       FDS 2/10 2/10 2/10 2/10 2/10 2/10       FDP 2/10 2/10 2/10 2/10 2/10 2/10                                 Neuro Re-Ed             HP/pulsed biph 15 15 15 15 15 15                                              Ther Ex             AROM W / 2/ 2/ 2/ 2/ 2/5       TGE --->  2/ 2/ 2/ 2/5       Place hold/towel / 2/5 2/5 2/5 2/5 2/5       MCP hook   2/ 2/ 2/ 2/5       [facility] wrap    / 2/ 2/5       Ball lift (yellow)    2/ 2/5 2/5                                                                                                               Modalities             US 12 12 12 12 12 12       CP 15 15 15 15 12 12

## 2023-09-06 ENCOUNTER — OFFICE VISIT (OUTPATIENT)
Facility: CLINIC | Age: 68
End: 2023-09-06
Payer: COMMERCIAL

## 2023-09-06 DIAGNOSIS — E78.2 MIXED HYPERLIPIDEMIA: ICD-10-CM

## 2023-09-06 DIAGNOSIS — M25.642 STIFFNESS OF LEFT HAND, NOT ELSEWHERE CLASSIFIED: Primary | ICD-10-CM

## 2023-09-06 PROCEDURE — 97035 APP MDLTY 1+ULTRASOUND EA 15: CPT

## 2023-09-06 PROCEDURE — 97140 MANUAL THERAPY 1/> REGIONS: CPT

## 2023-09-06 PROCEDURE — 97112 NEUROMUSCULAR REEDUCATION: CPT

## 2023-09-06 PROCEDURE — 97110 THERAPEUTIC EXERCISES: CPT

## 2023-09-06 RX ORDER — SIMVASTATIN 20 MG
TABLET ORAL
Qty: 90 TABLET | Refills: 1 | Status: SHIPPED | OUTPATIENT
Start: 2023-09-06

## 2023-09-06 NOTE — PROGRESS NOTES
Daily Note     Today's date: 2023  Patient name: Geo Gonzalez  : 1955  MRN: [id number]  Referring provider: Caro Arce MD  Dx:   Encounter Diagnosis     ICD-10-CM    1. Stiffness of left hand, not elsewhere classified  M25.642                      Subjective: Pt reports "the hardness in his SF is his CC. "I am compliant with HEP."      Objective: See treatment diary below    Pt fitted with ROSALINA for activity  AROM left wrist E/F- 60/50  Left SF MP- 0/60; PIP- 10/60; DIP- 0/30 (light block)  Sensation- no tingling noted  Circumference at wrist- 17.5 cm; MPs- 22.5cm  Pt instructed in AAROM/partial fisting, isolated AROM at PIP/DIP  Pt also instructed scar management      Assessment: Tolerated treatment well today. Initiated light gentle IASTM for scar control. Plan: Progress treatment as tolerated. Precautions: wear orthosis as directed.   No aggressive activity with left hand      Manuals  9/ 9/6      STM 15 15 15 15 15 15 15                   PROM 2/10 2/10 2/10 2/10 2/10 2/10 2/10      FDS 2/10 2/10 2/10 2/10 2/10 2/10 2/10      FDP 2/10 2/10 2/10 2/10 2/10 2/10 2/10                   IASTM       TS      Neuro Re-Ed             HP/pulsed biph 15 15 15 15 15 15 15                                             Ther Ex             AROM W / 2/ 2/ 2/ 2/ 2/5 2/5      TGE ---> 2/ 2/5 2/5 2/5 2/5 2/5      Place hold/towel 2/ 2/5 2/5 2/5 2/5 2/5 2/5      MCP hook   2/ 2/5 2/5 2/5 2/5      [facility] wrap    2/5 2/5 2/5 2/5      Ball lift (yellow)    2/5 2/5 2/5 2/5                                                                                                              Modalities             US 12 12 12 12 12 12 12      CP 15 15 15 15 12 12 DEF

## 2023-09-11 ENCOUNTER — OFFICE VISIT (OUTPATIENT)
Facility: CLINIC | Age: 68
End: 2023-09-11
Payer: COMMERCIAL

## 2023-09-11 DIAGNOSIS — M25.642 STIFFNESS OF LEFT HAND, NOT ELSEWHERE CLASSIFIED: Primary | ICD-10-CM

## 2023-09-11 PROCEDURE — 97112 NEUROMUSCULAR REEDUCATION: CPT | Performed by: PHYSICAL THERAPIST

## 2023-09-11 PROCEDURE — 97035 APP MDLTY 1+ULTRASOUND EA 15: CPT | Performed by: PHYSICAL THERAPIST

## 2023-09-11 PROCEDURE — 97110 THERAPEUTIC EXERCISES: CPT | Performed by: PHYSICAL THERAPIST

## 2023-09-11 PROCEDURE — 97140 MANUAL THERAPY 1/> REGIONS: CPT | Performed by: PHYSICAL THERAPIST

## 2023-09-11 NOTE — PROGRESS NOTES
Daily Note     Today's date: 2023  Patient name: Tray Mahmood  : 1955  MRN: [id number]  Referring provider: Kamla Gage MD  Dx:   Encounter Diagnosis     ICD-10-CM    1. Stiffness of left hand, not elsewhere classified  M25.642                      Subjective: pt feels he is using his hand as much as possible. He still has difficulty grasping with the SF bending fully. Objective: See treatment diary below    Pt fitted with ROSALINA for activity  AROM left wrist E/F- 60/50  Left SF MP- 0/60; PIP- 10/60; DIP- 0/30 (light block)  Sensation- no tingling noted  Circumference at wrist- 17.5 cm; MPs- 22.5cm  Pt instructed in AAROM/partial fisting, isolated AROM at PIP/DIP  Pt also instructed scar management    Assessment: Tolerated treatment well. Patient would benefit from continued PT      Plan: Continue per plan of care. Precautions: wear orthosis as directed.   No aggressive activity with left hand      Manuals  9 9     STM 15 15 15 15 15 15 15 115                  PROM 2/10 2/10 2/10 2/10 2/10 2/10 2/10 2/10     FDS 2/10 2/10 2/10 2/10 2/10 2/10 2/10 2/10     FDP 2/10 2/10 2/10 2/10 2/10 2/10 2/10 2/10                  IASTM       TS      Neuro Re-Ed             HP/[place]izen [place] F/ball 15 15 15 15 15 15 15 15                                            Ther Ex             TGE --->  2 2/ 2/ 2 2/      Ball lift (yellow)     2 2 2      TP         P 2'     TP scrape        P 2'     TP FDS         P 2'     Glen        10 2/10     Blue        II 2/10     Flexbar        Y                                                                       Modalities             US 12 12 12 12 12 12 12 12

## 2023-09-13 ENCOUNTER — APPOINTMENT (OUTPATIENT)
Facility: CLINIC | Age: 68
End: 2023-09-13
Payer: COMMERCIAL

## 2023-09-18 ENCOUNTER — OFFICE VISIT (OUTPATIENT)
Facility: CLINIC | Age: 68
End: 2023-09-18
Payer: COMMERCIAL

## 2023-09-18 DIAGNOSIS — M25.642 STIFFNESS OF LEFT HAND, NOT ELSEWHERE CLASSIFIED: Primary | ICD-10-CM

## 2023-09-18 PROCEDURE — 97140 MANUAL THERAPY 1/> REGIONS: CPT | Performed by: PHYSICAL THERAPIST

## 2023-09-18 PROCEDURE — 97112 NEUROMUSCULAR REEDUCATION: CPT | Performed by: PHYSICAL THERAPIST

## 2023-09-18 PROCEDURE — 97110 THERAPEUTIC EXERCISES: CPT | Performed by: PHYSICAL THERAPIST

## 2023-09-18 PROCEDURE — 97035 APP MDLTY 1+ULTRASOUND EA 15: CPT | Performed by: PHYSICAL THERAPIST

## 2023-09-18 NOTE — PROGRESS NOTES
PT Re-Evaluation     Today's date: 2023  Patient name: Javy Tristan  : 1955  MRN: [id number]  Referring provider: Yana Mayo MD  Dx:   Encounter Diagnosis     ICD-10-CM    1. Stiffness of left hand, not elsewhere classified  M25.642                      Assessment  Assessment details: Pt is a 69 YO male presenting to PT with pain, decreased AROM, strength and tolerance to activity. Pt would benefit from skilled intervention to address these issues and maximize overall function. Occupation- - currently working from home  Dominant- right; Involved- left SF  Pt cont to lack adequate FDS isolation to enable composite fisting. PT utilizing ROSALINA, IASTM isolated FDS/FDP AROM and PIP PROM. Goals  ST. Decrease pain to 0-2/10 in 4 weeks            2. Decrease swelling left hand            3. Increase AROM to [facility] in 6-8 weeks            4. Maintain clean wound and promote healing            5.  Provide orthotic for protection  LT. Increase functional motion and strength for independence with ADL and self care by DC            2. Ability to RT recreational activity by DC    Plan  Patient would benefit from: skilled physical therapy  Planned modality interventions: cryotherapy, ultrasound and thermotherapy: hydrocollator packs  Planned therapy interventions: activity modification, manual therapy, orthotic management and training, orthotic fitting/training, neuromuscular re-education, strengthening, stretching, therapeutic activities, therapeutic exercise and home exercise program  Frequency: 2x week  Duration in weeks: 4  Treatment plan discussed with: patient        Subjective Evaluation    History of Present Illness  Date of surgery: 2023  Mechanism of injury: Pt noted inability to bend his left SF approximately 2022. He reported no known trauma. MRI confirmed SF FDS and FDP rupture. Primary repair completed 2022.   Recent tenolysis with surgeon noting significant adhesions and tendon attenuation  Patient Goals  Patient goals for therapy: decreased edema, decreased pain, increased motion, increased strength, independence with ADLs/IADLs and return to sport/leisure activities    Pain  Current pain ratin  At best pain ratin  At worst pain ratin  Location: left hand and SF  Quality: dull ache  Relieving factors: ice    Hand dominance: right    Treatments  Current treatment: physical therapy        Objective     General Comments:      Wrist/Hand Comments  Pt fitted with ROSALINA for activity  AROM left wrist E/F- 60/65  Left SF MP- 0/90; PIP- 0/45; DIP- 0/30   Sensation- no tingling noted    Level II- 30/70; level III 40/80  Circumference at wrist- 17.5 cm; MPs- 22.5cm  Pt instructed in AAROM/partial fisting, isolated AROM at PIP/DIP  Pt also instructed scar management             Precautions: wear orthosis as directed.   No aggressive activity with left hand    Manuals  9     STM 15 15 15 15 15 15 15 15  8                             PROM 2/10 2/10 2/10 2/10 2/10 2/10 2/10 2/10  2/10     FDS 2/10 2/10 2/10 2/10 2/10 2/10 2/10 2/10  2/10     FDP 2/10 2/10 2/10 2/10 2/10 2/10 2/10 2/10  2/10                             IASTM             TS  10  10     Neuro Re-Ed                       HP/[place] F/ball 15 15 15 15 15 15 15 15  15                                                                             Ther Ex                       TGE ---> 2/ 2/ 2/ 2/ 2/ 2/5         Ball lift (yellow)        2/ 2/5 2/5         TP                P 2'  T 2'     TP scrape               P 2'  T 1     TP FDS                P 2'  T 10     Geln               10 2/10  25 2/10     Blue               II 2/10  L II 2/10     Flexbar               Y 20/20  Y 20/20                                                                                                                             Modalities                       US 12 12 12 12 12 12 12 12  12

## 2023-09-18 NOTE — LETTER
2023    Fredrik Gilford, MD  [address] [place] [id number]    Patient: Rosa Isela Desai   YOB: 1955   Date of Visit: 2023     Encounter Diagnosis     ICD-10-CM    1. Stiffness of left hand, not elsewhere classified  M25.642           Dear Dr. Rob Leonardo:    Thank you for your recent referral of Rosa Isela Desai. Please review the attached evaluation summary from Matt's recent visit. Please verify that you agree with the plan of care by signing the attached order. If you have any questions or concerns, please do not hesitate to call. I sincerely appreciate the opportunity to share in the care of one of your patients and hope to have another opportunity to work with you in the near future. Sincerely,    Ruth Miller DPT, T      Referring Provider:      I certify that I have read the below Plan of Care and certify the need for these services furnished under this plan of treatment while under my care. Fredrik Gilford, MD  [address] [place] [phone]  Via Fax: [phone]          PT Re-Evaluation     Today's date: 2023  Patient name: Rosa Isela Desai  : 1955  MRN: [id number]  Referring provider: Fredrik Gilford, MD  Dx:   Encounter Diagnosis     ICD-10-CM    1. Stiffness of left hand, not elsewhere classified  M25.642                      Assessment  Assessment details: Pt is a 69 YO male presenting to PT with pain, decreased AROM, strength and tolerance to activity. Pt would benefit from skilled intervention to address these issues and maximize overall function. Occupation- - currently working from home  Dominant- right; Involved- left SF  Pt cont to lack adequate FDS isolation to enable composite fisting. PT utilizing ROSALINA, IASTM isolated FDS/FDP AROM and PIP PROM. Goals  ST. Decrease pain to 0-2/10 in 4 weeks            2.   Decrease swelling left hand            3. Increase AROM to [facility] in 6-8 weeks            4. Maintain clean wound and promote healing            5.  Provide orthotic for protection  LT. Increase functional motion and strength for independence with ADL and self care by DC            2. Ability to RT recreational activity by DC    Plan  Patient would benefit from: skilled physical therapy  Planned modality interventions: cryotherapy, ultrasound and thermotherapy: hydrocollator packs  Planned therapy interventions: activity modification, manual therapy, orthotic management and training, orthotic fitting/training, neuromuscular re-education, strengthening, stretching, therapeutic activities, therapeutic exercise and home exercise program  Frequency: 2x week  Duration in weeks: 4  Treatment plan discussed with: patient        Subjective Evaluation    History of Present Illness  Date of surgery: 2023  Mechanism of injury: Pt noted inability to bend his left SF approximately 2022. He reported no known trauma. MRI confirmed SF FDS and FDP rupture. Primary repair completed 2022. Recent tenolysis with surgeon noting significant adhesions and tendon attenuation  Patient Goals  Patient goals for therapy: decreased edema, decreased pain, increased motion, increased strength, independence with ADLs/IADLs and return to sport/leisure activities    Pain  Current pain ratin  At best pain ratin  At worst pain ratin  Location: left hand and SF  Quality: dull ache  Relieving factors: ice    Hand dominance: right    Treatments  Current treatment: physical therapy        Objective     General Comments:      Wrist/Hand Comments  Pt fitted with ROSALINA for activity  AROM left wrist E/F- 60/65  Left SF MP- 0/90; PIP- 0/45;  DIP- 0/30   Sensation- no tingling noted    Level II- 30/70; level III 40/80  Circumference at wrist- 17.5 cm; MPs- 22.5cm  Pt instructed in AAROM/partial fisting, isolated AROM at PIP/DIP  Pt also instructed scar management            Precautions: wear orthosis as directed.   No aggressive activity with left hand    Manuals 8/23 8/24 8/28 8/29 8/31 9/5 9/6 9/11 9/18     STM 15 15 15 15 15 15 15 15  8                             PROM 2/10 2/10 2/10 2/10 2/10 2/10 2/10 2/10  2/10     FDS 2/10 2/10 2/10 2/10 2/10 2/10 2/10 2/10  2/10     FDP 2/10 2/10 2/10 2/10 2/10 2/10 2/10 2/10  2/10                             IASTM             TS  10  10     Neuro Re-Ed                       HP/[place] F/ball 15 15 15 15 15 15 15 15  15                                                                             Ther Ex                       TGE ---> 2/5 2/5 2/5 2/5 2/5 2/5         Ball lift (yellow)       2/5 2/5 2/5 2/5         TP                P 2'  T 2'     TP scrape               P 2'  T 1     TP FDS                P 2'  T 2/10     Glen               10 2/10  25 2/10     Blue               II 2/10  L II 2/10     Flexbar               Y 20/20  Y 20/20                                                                                                                             Modalities                       US 12 12 12 12 12 12 12 12  12

## 2023-09-20 ENCOUNTER — APPOINTMENT (OUTPATIENT)
Facility: CLINIC | Age: 68
End: 2023-09-20
Payer: COMMERCIAL

## 2023-09-21 ENCOUNTER — OFFICE VISIT (OUTPATIENT)
Facility: CLINIC | Age: 68
End: 2023-09-21
Payer: COMMERCIAL

## 2023-09-21 DIAGNOSIS — M25.642 STIFFNESS OF LEFT HAND, NOT ELSEWHERE CLASSIFIED: Primary | ICD-10-CM

## 2023-09-21 PROCEDURE — 97112 NEUROMUSCULAR REEDUCATION: CPT | Performed by: PHYSICAL THERAPIST

## 2023-09-21 PROCEDURE — 97035 APP MDLTY 1+ULTRASOUND EA 15: CPT | Performed by: PHYSICAL THERAPIST

## 2023-09-21 PROCEDURE — 97110 THERAPEUTIC EXERCISES: CPT | Performed by: PHYSICAL THERAPIST

## 2023-09-21 PROCEDURE — 97140 MANUAL THERAPY 1/> REGIONS: CPT | Performed by: PHYSICAL THERAPIST

## 2023-09-21 NOTE — PROGRESS NOTES
Daily Note     Today's date: 2023  Patient name: Romain Mcmahon  : 1955  MRN: [id number]  Referring provider: Dick Florian MD  Dx:   Encounter Diagnosis     ICD-10-CM    1. Stiffness of left hand, not elsewhere classified  M25.642                      Subjective: pt notes mild to moderate limitations with activities at home requiring strong grasp. Scar thickness and decreased tendon glide preventing tight composite fisting      Objective: See treatment diary below    Pt fitted with ROSALINA for activity  AROM left wrist E/F- 60/65  Left SF MP- 0/90; PIP- 0/45; DIP- 0/30   Sensation- no tingling noted    Level II- 30/70; level III 40/80  Circumference at wrist- 17.5 cm; MPs- 22.5cm  Pt instructed in AAROM/partial fisting, isolated AROM at PIP/DIP  Pt also instructed scar management    Assessment: Tolerated treatment well. Patient would benefit from continued PT      Plan: Continue per plan of care. Precautions: wear orthosis as directed.   No aggressive activity with left hand    Manuals              STM 15 15 15 15 15 15 15 15  8                             PROM 2/10 2/10 2/10 2/10 2/10 2/10 2/10 2/10  2/10     FDS 2/10 2/10 2/10 2/10 2/10 2/10 2/10 2/10  2/10     FDP 2/10 2/10 2/10 2/10 2/10 2/10 2/10 2/10  2/10                             IASTM             TS  10  10     Neuro Re-Ed                       HP/[place] F/ball 15 15 15 15 15 15 15 15  15                                                                             Ther Ex                       TGE  2/ 2/ 2/ 2/ 2/5         Ball lift (yellow)        2/ 2/ 2/5         TP   T 2'             P 2'  T 2'     TP scrape  T 1'             P 2'  T 1     TP FDS  T 10              P 2'  T 2/10     Glen  25 2/10             10 2/10  25 2/10     Blue  L II /10             II /10  L II /10     Flexbar  Y 20/20             Y 20/20  Y 20/20                                                                                                                             Modalities                       US 12 12 12 12 12 12 12 12  12

## 2023-09-22 ENCOUNTER — OFFICE VISIT (OUTPATIENT)
Dept: FAMILY MEDICINE CLINIC | Facility: CLINIC | Age: 68
End: 2023-09-22
Payer: COMMERCIAL

## 2023-09-22 VITALS
BODY MASS INDEX: 33.8 KG/M2 | HEIGHT: 67 IN | DIASTOLIC BLOOD PRESSURE: 90 MMHG | SYSTOLIC BLOOD PRESSURE: 138 MMHG | WEIGHT: 215.38 LBS

## 2023-09-22 DIAGNOSIS — N18.31 STAGE 3A CHRONIC KIDNEY DISEASE (HCC): ICD-10-CM

## 2023-09-22 DIAGNOSIS — Z00.00 ANNUAL PHYSICAL EXAM: ICD-10-CM

## 2023-09-22 DIAGNOSIS — F52.21 ERECTILE DYSFUNCTION OF NON-ORGANIC ORIGIN: ICD-10-CM

## 2023-09-22 DIAGNOSIS — G47.33 OSA (OBSTRUCTIVE SLEEP APNEA): ICD-10-CM

## 2023-09-22 DIAGNOSIS — E78.2 MIXED HYPERLIPIDEMIA: ICD-10-CM

## 2023-09-22 DIAGNOSIS — N40.1 BENIGN PROSTATIC HYPERPLASIA WITH URINARY OBSTRUCTION: ICD-10-CM

## 2023-09-22 DIAGNOSIS — N13.8 BENIGN PROSTATIC HYPERPLASIA WITH URINARY OBSTRUCTION: ICD-10-CM

## 2023-09-22 DIAGNOSIS — Z23 NEED FOR VACCINATION: Primary | ICD-10-CM

## 2023-09-22 DIAGNOSIS — M1A.09X0 IDIOPATHIC CHRONIC GOUT OF MULTIPLE SITES WITHOUT TOPHUS: ICD-10-CM

## 2023-09-22 DIAGNOSIS — E66.09 CLASS 1 OBESITY DUE TO EXCESS CALORIES WITH SERIOUS COMORBIDITY AND BODY MASS INDEX (BMI) OF 33.0 TO 33.9 IN ADULT: ICD-10-CM

## 2023-09-22 PROBLEM — R06.83 SNORING: Status: RESOLVED | Noted: 2023-03-30 | Resolved: 2023-09-22

## 2023-09-22 PROBLEM — R06.81 WITNESSED APNEIC SPELLS: Status: RESOLVED | Noted: 2023-03-30 | Resolved: 2023-09-22

## 2023-09-22 PROBLEM — R31.29 OTHER MICROSCOPIC HEMATURIA: Status: RESOLVED | Noted: 2020-06-01 | Resolved: 2023-09-22

## 2023-09-22 PROCEDURE — 99214 OFFICE O/P EST MOD 30 MIN: CPT | Performed by: FAMILY MEDICINE

## 2023-09-22 PROCEDURE — 90471 IMMUNIZATION ADMIN: CPT

## 2023-09-22 PROCEDURE — 90662 IIV NO PRSV INCREASED AG IM: CPT

## 2023-09-22 PROCEDURE — 99397 PER PM REEVAL EST PAT 65+ YR: CPT | Performed by: FAMILY MEDICINE

## 2023-09-22 RX ORDER — SILDENAFIL 50 MG/1
50 TABLET, FILM COATED ORAL AS NEEDED
Qty: 10 TABLET | Refills: 1 | Status: SHIPPED | OUTPATIENT
Start: 2023-09-22

## 2023-09-22 RX ORDER — MELATONIN
2000 DAILY
COMMUNITY

## 2023-09-22 NOTE — PROGRESS NOTES
[address] [facility]    NAME: Rosa Isela Desai  AGE: 70 y.o. SEX: male  : 1955     DATE: 2023     Assessment and Plan:     Problem List Items Addressed This Visit        Respiratory    FARA (obstructive sleep apnea)     Patient to continue CPAP and see me in  6months            Genitourinary    Benign prostatic hyperplasia with urinary obstruction     Stable PSA and symptoms yearly follow up with urology         Relevant Medications    sildenafil (Viagra) 50 MG tablet    Stage 3a chronic kidney disease ([address])     Lab Results   Component Value Date    EGFR 50 2023    EGFR 53 2023    EGFR 47 2022    CREATININE 1.40 (H) 2023    CREATININE 1.35 (H) 2023    CREATININE 1.48 (H) 2022   renal function is stable continue meds maintain BP control and avoid NSAIDS I will see him in 6 months            Other    Erectile dysfunction of non-organic origin     Continue with viagra as needed         Relevant Medications    sildenafil (Viagra) 50 MG tablet    Mixed hyperlipidemia     Lipids stable continue meds and follow up in 6 months         Relevant Orders    Lipid panel    Class 1 obesity due to excess calories with serious comorbidity and body mass index (BMI) of 33.0 to 33.9 in adult     Discussed need for diet and exercise Patient to work on that I will see him in 6months         Idiopathic chronic gout of multiple sites without tophus     Continue allopurinal and follow up with rheumatology Avoid NSAIDS due to chronic kidney isseus        Other Visit Diagnoses     Need for vaccination    -  Primary    Relevant Orders    influenza vaccine, high-dose, PF 0.7 mL (FLUZONE HIGH-DOSE) (Completed)    Annual physical exam              Immunizations and preventive care screenings were discussed with patient today. Appropriate education was printed on patient's after visit summary.     Discussed risks and benefits of prostate cancer screening. We discussed the controversial history of PSA screening for prostate cancer in the [facility] as well as the risk of over detection and over treatment of prostate cancer by way of PSA screening. The patient understands that PSA blood testing is an imperfect way to screen for prostate cancer and that elevated PSA levels in the blood may also be caused by infection, inflammation, prostatic trauma or manipulation, urological procedures, or by benign prostatic enlargement. The role of the digital rectal examination in prostate cancer screening was also discussed and I discussed with him that there is large interobserver variability in the findings of digital rectal examination. Counseling:  Alcohol/drug use: discussed moderation in alcohol intake, the recommendations for healthy alcohol use, and avoidance of illicit drug use. Dental Health: discussed importance of regular tooth brushing, flossing, and dental visits. Injury prevention: discussed safety/seat belts, safety helmets, smoke detectors, carbon dioxide detectors, and smoking near bedding or upholstery. Sexual health: discussed sexually transmitted diseases, partner selection, use of condoms, avoidance of unintended pregnancy, and contraceptive alternatives. · Exercise: the importance of regular exercise/physical activity was discussed. Recommend exercise 3-5 times per week for at least 30 minutes. BMI Counseling: Body mass index is 33.73 kg/m². The BMI is above normal. Nutrition recommendations include decreasing portion sizes and moderation in carbohydrate intake. Exercise recommendations include exercising 3-5 times per week. Rationale for BMI follow-up plan is due to patient being overweight or obese. Return in 6 months (on 3/22/2024). Chief Complaint:     Chief Complaint   Patient presents with   • Physical Exam     Discuss labs.       History of Present Illness:     Adult Annual Physical   Patient here for a comprehensive physical exam. The patient reports isseus with gout and arthritis Patient is feeling well and is seeing the specialist Patient is taking cholesterol meds Patien renal isseus are stable Patient notes stable on prednisone 5 mg daily Patint is using CPAP and is doing well Patient avoid NSAIDs to control kidney issues  Patient is seeing derm every 3 months. Diet and Physical Activity  · Diet/Nutrition: well balanced diet. · Exercise: 5-7 times a week on average and less than 30 minutes on average. Depression Screening  PHQ-2/9 Depression Screening         General Health  · Sleep: sleeps well and  using CPAP. · Hearing: normal - bilateral.  · Vision: goes for regular eye exams, most recent eye exam <1 year ago and wears contacts. · Dental: regular dental visits and brushes teeth twice daily.  Health  · Symptoms include: erectile dysfunction and nocturia Patient is no longer getting up at night He sees urology     Review of Systems:     Review of Systems   Constitutional: Negative for fatigue, fever and unexpected weight change. HENT: Negative for congestion, sinus pain and trouble swallowing. Eyes: Negative for discharge and visual disturbance. Respiratory: Negative for cough, chest tightness, shortness of breath and wheezing. Cardiovascular: Negative for chest pain, palpitations and leg swelling. Gastrointestinal: Negative for abdominal pain, blood in stool, constipation, diarrhea, nausea and vomiting. Genitourinary: Negative for difficulty urinating, dysuria, frequency and hematuria. Musculoskeletal: Positive for arthralgias and myalgias. Negative for gait problem and joint swelling. Skin: Negative for rash and wound. Allergic/Immunologic: Negative for environmental allergies and food allergies. Neurological: Negative for dizziness, syncope, weakness, numbness and headaches. Hematological: Negative for adenopathy. Does not bruise/bleed easily. Psychiatric/Behavioral: Negative for confusion, decreased concentration and sleep disturbance. The patient is not nervous/anxious.        Past Medical History:     Past Medical History:   Diagnosis Date   • Acid reflux    • Arthritis    • Cancer ([address])     skin   • Colon polyp    • Essential hypertriglyceridemia     last assessed: 5/23/2013   • H/O renal calculi    • History of hypogonadism     last assessed: 12/15/2014   • Hyperlipidemia    • Kidney stone    • Nasal lesion     left side   • Neck mass    • Tinnitus    • Wears contact lenses       Past Surgical History:     Past Surgical History:   Procedure Laterality Date   • COLONOSCOPY      polyp repeat in 5 yrs   • ESOPHAGOGASTRODUODENOSCOPY      gastritis and hiatal hernia   • FL RETROGRADE PYELOGRAM  5/26/2021   • FLAP LOCAL HEAD / NECK Left 8/7/2020    Procedure: EXCISION OF NECK SCC W/LOCAL FLAP;  Surgeon: Suellen Sifuentes MD;  Location: [address] [place] [address] OR;  Service: Plastics   • MASS EXCISION Left 5/9/2016    Procedure: EXCISION BASAL CELL SKIN CANCER LEFT NOSE, FROZEN SECTION, LOCAL FLAP;  Surgeon: Suellen Sifuentes MD;  [facility];  Service:    • MOHS SURGERY      mohs mirographic surgery nose   • MN ADJT TIS TRNS/REARGMT F/C/C/M/N/A/G/H/F 10SQCM/< N/A 12/7/2018    Procedure: EXCISION BCC OF NECK W/LOCAL FLAP, FROZEN SECTION;  Surgeon: Suellen Sifuentes MD;  Location: [address] [place] [address] OR;  Service: Plastics   • MN CYSTO/URETERO W/LITHOTRIPSY &INDWELL STENT INSRT Right 5/26/2021    Procedure: CYSTOSCOPY URETEROSCOPY WITH BASKET STONE EXTRACTION, RETROGRADE PYELOGRAM AND INSERTION STENT URETERAL;  Surgeon: Karla Ronquillo MD;  Location:  MAIN OR;  Service: Urology   • MN FTH/GFT FREE W/DIRECT CLOSURE N/E/E/L 20 SQ CM/< N/A 5/9/2016    Procedure:  LOCAL FLAP SKIN GRAFT ;  Surgeon: Suellen Sifuentes MD;  Location: [facility];  Service: Plastics   • SCALP EXCISION N/A 7/14/2022    Procedure: EXCISION SCALP LESION, FROZEN SECTION;  Surgeon: Delores Duvall DO;  Location:  MAIN OR; Service: Plastics   • VASECTOMY     • WISDOM TOOTH EXTRACTION        Family History:     Family History   Problem Relation Age of Onset   • Diabetes Mother         mellitus   • Dementia Mother    • Other Father    • No Known Problems Half-Sister       Social History:     Social History     Socioeconomic History   • Marital status: /Civil Union     Spouse name: None   • Number of children: None   • Years of education: None   • Highest education level: None   Occupational History   • None   Tobacco Use   • Smoking status: Never   • Smokeless tobacco: Never   • Tobacco comments:     nonsmoker   Vaping Use   • Vaping Use: Never used   Substance and Sexual Activity   • Alcohol use: Yes     Comment: few x yea   • Drug use: No   • Sexual activity: Yes     Partners: Female   Other Topics Concern   • None   Social History Narrative    Daily coffee consumption-eight or more cups a day     Social Determinants of Health     Financial Resource Strain: Not on file   Food Insecurity: Not on file   Transportation Needs: Not on file   Physical Activity: Not on file   Stress: Not on file   Social Connections: Not on file   Intimate Partner Violence: Not on file   Housing Stability: Not on file      Current Medications:     Current Outpatient Medications   Medication Sig Dispense Refill   • cholecalciferol (VITAMIN D3) 1,000 units tablet Take 2,000 Units by mouth daily     • predniSONE 2.5 mg tablet Take 5 mg by mouth daily     • sildenafil (Viagra) 50 MG tablet Take 1 tablet (50 mg total) by mouth as needed for erectile dysfunction 10 tablet 1   • simvastatin (ZOCOR) 20 mg tablet TAKE 1 TABLET DAILY 90 tablet 1   • allopurinol (ZYLOPRIM) 300 mg tablet Take 445 mg by mouth daily     • Flowflex COVID-19 Ag Home Test KIT FOLLOW INSTRUCTIONS INCLUDED WITH THE PACKAGE. (Patient not taking: Reported on 9/22/2023)       No current facility-administered medications for this visit.       Allergies:     No Known Allergies   Physical Exam:     /90 (BP Location: Left arm, Patient Position: Sitting, Cuff Size: Large)   Ht 5' 7" (1.702 m)   Wt 97.7 kg (215 lb 6 oz)   BMI 33.73 kg/m²     Physical Exam  Vitals and nursing note reviewed. Constitutional:       Appearance: He is well-developed. He is obese. HENT:      Head: Normocephalic and atraumatic. Right Ear: Tympanic membrane and external ear normal.      Left Ear: Tympanic membrane and external ear normal.      Nose: Nose normal.      Mouth/Throat:      Pharynx: No oropharyngeal exudate. Eyes:      General:         Right eye: No discharge. Left eye: No discharge. Extraocular Movements: Extraocular movements intact. Conjunctiva/sclera: Conjunctivae normal.      Pupils: Pupils are equal, round, and reactive to light. Neck:      Thyroid: No thyromegaly. Cardiovascular:      Rate and Rhythm: Normal rate and regular rhythm. Heart sounds: Normal heart sounds. Pulmonary:      Effort: Pulmonary effort is normal.      Breath sounds: Normal breath sounds. Abdominal:      General: Bowel sounds are normal.      Palpations: Abdomen is soft. There is no mass. Tenderness: There is no abdominal tenderness. There is no rebound. Musculoskeletal:         General: Normal range of motion. Cervical back: Normal range of motion and neck supple. Lymphadenopathy:      Cervical: No cervical adenopathy. Skin:     Coloration: Skin is not pale. Findings: No erythema or rash. Neurological:      General: No focal deficit present. Mental Status: He is alert and oriented to person, place, and time. Cranial Nerves: No cranial nerve deficit. Psychiatric:         Mood and Affect: Mood normal.         Behavior: Behavior normal.         Thought Content:  Thought content normal.         Judgment: Judgment normal.          Real Sas, DO  [facility]

## 2023-09-22 NOTE — ASSESSMENT & PLAN NOTE
Lab Results   Component Value Date    EGFR 50 07/11/2023    EGFR 53 01/03/2023    EGFR 47 12/01/2022    CREATININE 1.40 (H) 07/11/2023    CREATININE 1.35 (H) 01/03/2023    CREATININE 1.48 (H) 12/01/2022   renal function is stable continue meds maintain BP control and avoid NSAIDS I will see him in 6 months

## 2023-09-22 NOTE — PROGRESS NOTES
Name: Ivy Vasquez      : 1955      MRN: [id number]  Encounter Provider: Keron Ceja DO  Encounter Date: 2023   Encounter department: [address] [place] [address] PRIMARY CARE    Assessment & Plan     Chief Complaint   Patient presents with   • Physical Exam     Discuss labs. {There are no diagnoses linked to this encounter.  (Refresh or delete this SmartLink)}       Subjective      HPI  Review of Systems    Current Outpatient Medications on File Prior to Visit   Medication Sig   • predniSONE 2.5 mg tablet Take 5 mg by mouth daily   • sildenafil (Viagra) 50 MG tablet Take 1 tablet (50 mg total) by mouth as needed for erectile dysfunction   • simvastatin (ZOCOR) 20 mg tablet TAKE 1 TABLET DAILY   • allopurinol (ZYLOPRIM) 300 mg tablet Take 445 mg by mouth daily   • Flowflex COVID-19 Ag Home Test KIT FOLLOW INSTRUCTIONS INCLUDED WITH THE PACKAGE. (Patient not taking: Reported on 2023)   • predniSONE 5 mg tablet Take by mouth daily       Objective     /90 (BP Location: Left arm, Patient Position: Sitting, Cuff Size: Large)   Ht 5' 7" (1.702 m)   Wt 97.7 kg (215 lb 6 oz)   BMI 33.73 kg/m²     Physical Exam  Keron Ceja DO

## 2023-09-25 ENCOUNTER — APPOINTMENT (OUTPATIENT)
Dept: LAB | Facility: MEDICAL CENTER | Age: 68
End: 2023-09-25
Payer: COMMERCIAL

## 2023-09-25 ENCOUNTER — OFFICE VISIT (OUTPATIENT)
Facility: CLINIC | Age: 68
End: 2023-09-25
Payer: COMMERCIAL

## 2023-09-25 DIAGNOSIS — E78.2 MIXED HYPERLIPIDEMIA: ICD-10-CM

## 2023-09-25 DIAGNOSIS — Z12.5 PROSTATE CANCER SCREENING: ICD-10-CM

## 2023-09-25 DIAGNOSIS — M25.642 STIFFNESS OF LEFT HAND, NOT ELSEWHERE CLASSIFIED: Primary | ICD-10-CM

## 2023-09-25 LAB
CHOLEST SERPL-MCNC: 133 MG/DL
HDLC SERPL-MCNC: 44 MG/DL
LDLC SERPL CALC-MCNC: 65 MG/DL (ref 0–100)
NONHDLC SERPL-MCNC: 89 MG/DL
TRIGL SERPL-MCNC: 122 MG/DL

## 2023-09-25 PROCEDURE — 80061 LIPID PANEL: CPT

## 2023-09-25 PROCEDURE — 97112 NEUROMUSCULAR REEDUCATION: CPT | Performed by: PHYSICAL THERAPIST

## 2023-09-25 PROCEDURE — 97035 APP MDLTY 1+ULTRASOUND EA 15: CPT | Performed by: PHYSICAL THERAPIST

## 2023-09-25 PROCEDURE — 36415 COLL VENOUS BLD VENIPUNCTURE: CPT

## 2023-09-25 PROCEDURE — 97110 THERAPEUTIC EXERCISES: CPT | Performed by: PHYSICAL THERAPIST

## 2023-09-25 PROCEDURE — 97140 MANUAL THERAPY 1/> REGIONS: CPT | Performed by: PHYSICAL THERAPIST

## 2023-09-25 NOTE — PROGRESS NOTES
Daily Note     Today's date: 2023  Patient name: Sal Syed  : 1955  MRN: [id number]  Referring provider: Makenzie Mcgarry MD  Dx:   Encounter Diagnosis     ICD-10-CM    1. Stiffness of left hand, not elsewhere classified  M25.642                      Subjective: pt feels he is using his hand more and more with the ROSALINA      Objective: See treatment diary below    Pt fitted with ROSALINA for activity; used pulley ring with partial success  AROM left wrist E/F- 60/65  Left SF MP- 0/90; PIP- 0/45; DIP- 0/30   Sensation- no tingling noted    Level II- 30/70; level III 40/80  Circumference at wrist- 17.5 cm; MPs- 22.5cm  Pt instructed in AAROM/partial fisting, isolated AROM at PIP/DIP  Pt also instructed scar management    Assessment: Tolerated treatment well. Patient would benefit from continued PT      Plan: Continue per plan of care. Precautions: wear orthosis as directed.   No aggressive activity with left hand    Manuals             STM 15 15 15 15 15 15 15 15  8                             PROM 2/10 2/10 2/10 2/10 2/10 2/10 2/10 2/10  2/10     FDS 2/10 2/10 2/10 2/10 2/10 2/10 2/10 2/10  2/10     FDP 2/10 2/10 2/10 2/10 2/10 /10 /10 /10  2/10                             IASTM             TS  10  10     Neuro Re-Ed                       HP/English F/ball 15 15 15 15 15 15 15 15  15                                                                             Ther Ex                       TGE / 2/ 2/5 2/5 2/5         Ball lift (yellow)        2/ 2/ 2/5         TP   T 2'  T 2'           P 2'  T 2'     TP scrape  T 1'  T 1'           P 2'  T 1     TP FDS  T 2/10  T 2/10            P 2'  T 2/10     Glen  25 2/10  25 2/10           10 2/10  25 2/10     Blue  L II 2/10  L III 2/10           II 2/10  L II 2/10     Flexbar  Y 20/20  Y 20/20  R         Y /  Y                                                                                                                        Modalities                       US 12 12 12 12 12 12 12 12  12

## 2023-09-27 ENCOUNTER — OFFICE VISIT (OUTPATIENT)
Facility: CLINIC | Age: 68
End: 2023-09-27
Payer: COMMERCIAL

## 2023-09-27 DIAGNOSIS — M25.642 STIFFNESS OF LEFT HAND, NOT ELSEWHERE CLASSIFIED: Primary | ICD-10-CM

## 2023-09-27 PROCEDURE — 97112 NEUROMUSCULAR REEDUCATION: CPT | Performed by: PHYSICAL THERAPIST

## 2023-09-27 PROCEDURE — 97035 APP MDLTY 1+ULTRASOUND EA 15: CPT | Performed by: PHYSICAL THERAPIST

## 2023-09-27 PROCEDURE — 97140 MANUAL THERAPY 1/> REGIONS: CPT | Performed by: PHYSICAL THERAPIST

## 2023-09-27 PROCEDURE — 97110 THERAPEUTIC EXERCISES: CPT | Performed by: PHYSICAL THERAPIST

## 2023-09-27 NOTE — PROGRESS NOTES
Daily Note     Today's date: 2023  Patient name: Satya Leiva  : 1955  MRN: [id number]  Referring provider: Logan Kelly MD  Dx:   Encounter Diagnosis     ICD-10-CM    1. Stiffness of left hand, not elsewhere classified  M25.642                      Subjective: using hand as much as possible with lack of SF composite flexion      Objective: See treatment diary below    Pt fitted with ROSALINA for activity; used pulley ring with partial success  AROM left wrist E/F- 60/65  Left SF MP- 0/90; PIP- 0/45; DIP- 0/30   Sensation- no tingling noted    Level II- 30/70; level III 40/80  Circumference at wrist- 17.5 cm; MPs- 22.5cm  Pt instructed in AAROM/partial fisting, isolated AROM at PIP/DIP  Pt also instructed scar management     Assessment: Tolerated treatment well. Patient would benefit from continued PT      Plan: Continue per plan of care. Precautions: wear orthosis as directed.   No aggressive activity with left hand    Manuals            STM 15 15 15 15 15 15 15 15  8                             PROM 2/10 2/10 /10 2/10 2/10 2/10 2/10 2/10  2/10     FDS 2/10 2/10 2/10 2/10 2/10 2/10 2/10 2/10  2/10     FDP 2/10 2/10 2/10 /10 2/10 /10 2/10 2/10  2/10                             IASTM             TS  10  10     Neuro Re-Ed                       HP/Malawian F/ball 15 15 15 15 15 15 15 15  15                                                                             Ther Ex                       TGE / 2/ 2/ 2/5         Ball lift (yellow)       / 2/ 2/5         TP   T 2'  T 2'  T 2'         P 2'  T 2'     TP scrape  T 1'  T 1'  T 1;'         P 2'  T 1     TP FDS  T 2/10  T 2/10  T 2/10          P 2'  T 2/10     Glen  25 2/10  25 2/10  25 2/10         10 2/10  25 2/10     Blue  L II 2/10  L III 2/10  L III 2/10         II 2/10  L II 2/10     Flexbar  Y 20/20  Y 20/20  R 20/20         Y 20/20  Y 20/20                                                                                                                       Modalities                       US 12 12 12 12 12 12 12 12  12

## 2023-10-03 ENCOUNTER — OFFICE VISIT (OUTPATIENT)
Facility: CLINIC | Age: 68
End: 2023-10-03
Payer: COMMERCIAL

## 2023-10-03 DIAGNOSIS — M25.642 STIFFNESS OF LEFT HAND, NOT ELSEWHERE CLASSIFIED: Primary | ICD-10-CM

## 2023-10-03 PROCEDURE — 97035 APP MDLTY 1+ULTRASOUND EA 15: CPT | Performed by: PHYSICAL THERAPIST

## 2023-10-03 PROCEDURE — 97112 NEUROMUSCULAR REEDUCATION: CPT | Performed by: PHYSICAL THERAPIST

## 2023-10-03 PROCEDURE — 97110 THERAPEUTIC EXERCISES: CPT | Performed by: PHYSICAL THERAPIST

## 2023-10-03 PROCEDURE — 97140 MANUAL THERAPY 1/> REGIONS: CPT | Performed by: PHYSICAL THERAPIST

## 2023-10-03 NOTE — PROGRESS NOTES
Daily Note     Today's date: 10/3/2023  Patient name: Nilesh Fox  : 1955  MRN: [id number]  Referring provider: Jaye Hammans, MD  Dx:   Encounter Diagnosis     ICD-10-CM    1. Stiffness of left hand, not elsewhere classified  M25.642                      Subjective: pt was seen by his physician and will cont therapy to gain strength and motion at the [facility]. Pt also cont to note tenderness at the MF P1 area residual from gout. Pt was injected in the=is location by his physician. Objective: See treatment diary below    Pt fitted with ROSALINA for activity; used pulley ring with partial success  AROM left wrist E/F- 60/65  Left SF MP- 0/90; PIP- 0/45; DIP- 0/30   Sensation- no tingling noted    Level II- 30/70; level III 40/80  Circumference at wrist- 17.5 cm; MPs- 22.5cm  Pt instructed in AAROM/partial fisting, isolated AROM at PIP/DIP  Pt also instructed scar management    Assessment: Tolerated treatment well. Patient would benefit from continued PT      Plan: Continue per plan of care. Precautions: wear orthosis as directed.   No aggressive activity with left hand    Manuals  10/3          STM MF/SF 10 10 10 10 15 15 15 15  8                             PROM 2/10 2/10 2/10 2/10 2/10 2/10 2/10 2/10  2/10     FDS 2/10 2/10 2/10 2/10 2/10 2/10 2/10 2/10  2/10     FDP 2/10 2/10 2/10 2/10 2/10 2/10 2/10 2/10  2/10                             IASTM  8  8 8 8     TS  10  10     Neuro Re-Ed                       HP/Thai F/ball 15 15 15 15 15 15 15 15  15                                                     Ther Ex                       TGE                          TP   T 2'  T 2'  T 2' T 2'       P 2'  T 2'     TP scrape  T 1'  T 1'  T 1;'  T 1'       P 2'  T 1     TP FDS  T 2/10  T 2/10  T 2/10  T 2/10        P 2'  T 2/10     Glen  25 2/10  25 2/10  25 2/10  25 2/10       10 2/10  25 2/10     Blue  L II 2/10  L III 2/10  L III 2/10  L III 2/10       II 2/10  L II 2/10     Flexbar  Y 20/20  Y 20/20  R 20/20  G 20/20       Y 20/20  Y 20/20     DB E/F w/buildup      4 2/10                     S/P  w/buildup      2 2/10                FF/Scapt        6 2/10               OH tricep        4 2/10                                       Modalities                       US 12 12 12 12 12 12 12 12  12

## 2023-10-04 ENCOUNTER — OFFICE VISIT (OUTPATIENT)
Facility: CLINIC | Age: 68
End: 2023-10-04
Payer: COMMERCIAL

## 2023-10-04 DIAGNOSIS — M25.642 STIFFNESS OF LEFT HAND, NOT ELSEWHERE CLASSIFIED: Primary | ICD-10-CM

## 2023-10-04 PROCEDURE — 97110 THERAPEUTIC EXERCISES: CPT | Performed by: PHYSICAL THERAPIST

## 2023-10-04 PROCEDURE — 97112 NEUROMUSCULAR REEDUCATION: CPT | Performed by: PHYSICAL THERAPIST

## 2023-10-04 PROCEDURE — 97035 APP MDLTY 1+ULTRASOUND EA 15: CPT | Performed by: PHYSICAL THERAPIST

## 2023-10-04 PROCEDURE — 97140 MANUAL THERAPY 1/> REGIONS: CPT | Performed by: PHYSICAL THERAPIST

## 2023-10-04 NOTE — PROGRESS NOTES
Daily Note     Today's date: 10/4/2023  Patient name: Tray Mahmood  : 1955  MRN: [id number]  Referring provider: Kamla Gage MD  Dx:   Encounter Diagnosis     ICD-10-CM    1. Stiffness of left hand, not elsewhere classified  M25.642                      Subjective: pt using his hand for most activity; still finds it difficult to hold onto heavier objects due to lack of tight closure of SF      Objective: See treatment diary below    Pt provided reema tape when not using his ROSALINA; ROSALINA for activity. AROM left wrist E/F- 60/65  Left SF MP- 0/90; PIP- 0/45; DIP- 0/30   Sensation- no tingling noted    Level II- 30/70; level III 40/80  Circumference at wrist- 17.5 cm; MPs- 22.5cm  Pt instructed in AAROM/partial fisting, isolated AROM at PIP/DIP  Pt also instructed scar management     Assessment: Tolerated treatment well. Patient would benefit from continued PT      Plan: Continue per plan of care. Precautions: wear orthosis as directed.   No aggressive activity with left hand    Manuals 9/21 9/25 9/27 10/3 10         STM MF/SF 10 10 10 10 15 15 15 15  8                             PROM 2/10 2/10 2/10 2/10 2/10 2/10 2/10 2/10  2/10     FDS 2/10 2/10 2/10 2/10 2/10 2/10 2/10 2/10  2/10     FDP 2/10 2/10 2/10 2/10 2/10 2/10 2/10 2/10  2/10                             IASTM  8  8 8 8  8   TS  10  10     Neuro Re-Ed                       HP/Bulgarian F/ball 15 15 15 15 15 15 15 15  15                                                     Ther Ex                       TGE                          TP   T 2'  T 2'  T 2' T 2'  T 2'     P 2'  T 2'     TP scrape  T 1'  T 1'  T 1;'  T 1'  T 2/10     P 2'  T 1     TP FDS  T 2/10  T 2/10  T 2/10  T 2/10  T 2/10      P 2'  T 2/10     Glen  25 2/10  25 2/10  25 2/10  25 2/10  25 2/10     10 2/10  25 2/10     Blue  L II 2/10  L III 2/10  L III 2/10  L III 2/10  L III 2/10     II 2/10  L II 2/10     Flexbar  Y 20/20  Y 20/20  R 20/20  G 20/20  G 20/20     Y 20/20  Y 20/20     DB E/F w/buildup      4 2/10  4 2/10                   S/P  w/buildup      2 2/10  2 2/10              FF/Scapt        6 2/10  6 2/10             OH tricep        4 2/10  4 2/10                                     Modalities                       US 12 12 12 12 12 12 12 12  12

## 2023-10-09 ENCOUNTER — OFFICE VISIT (OUTPATIENT)
Facility: CLINIC | Age: 68
End: 2023-10-09
Payer: COMMERCIAL

## 2023-10-09 DIAGNOSIS — M25.642 STIFFNESS OF LEFT HAND, NOT ELSEWHERE CLASSIFIED: Primary | ICD-10-CM

## 2023-10-09 PROCEDURE — 97140 MANUAL THERAPY 1/> REGIONS: CPT | Performed by: PHYSICAL THERAPIST

## 2023-10-09 PROCEDURE — 97110 THERAPEUTIC EXERCISES: CPT | Performed by: PHYSICAL THERAPIST

## 2023-10-09 PROCEDURE — 97112 NEUROMUSCULAR REEDUCATION: CPT | Performed by: PHYSICAL THERAPIST

## 2023-10-09 PROCEDURE — 97035 APP MDLTY 1+ULTRASOUND EA 15: CPT | Performed by: PHYSICAL THERAPIST

## 2023-10-09 NOTE — PROGRESS NOTES
Daily Note     Today's date: 10/9/2023  Patient name: Cristo Ritchie  : 1955  MRN: [id number]  Referring provider: Seabron Ahumada, MD  Dx:   Encounter Diagnosis     ICD-10-CM    1. Stiffness of left hand, not elsewhere classified  M25.642                      Subjective: pt feels he is making progress with SF bending, but still must use the reema tape or ROSALINA for maximum PIP motion      Objective: See treatment diary below    Pt provided reema tape when not using his ROSALINA; ROSALINA for activity. AROM left wrist E/F- 60/65  Left SF MP- 0/90; PIP- 0/45; DIP- 0/30   Sensation- no tingling noted    Level II- 30/70; level III 40/80  Circumference at wrist- 17.5 cm; MPs- 22.5cm  Pt instructed in AAROM/partial fisting, isolated AROM at PIP/DIP  Pt also instructed scar management     Assessment: Tolerated treatment well. Patient would benefit from continued PT      Plan: Continue per plan of care. Precautions: wear orthosis as directed.   No aggressive activity with left hand    Manuals 9/21 9/25 9/27 10/3 10/4 10/9        STM MF/SF 10 10 10 10 15 10 15 15  8                             PROM 2/10 2/10 2/10 2/10 2/10 2/10 2/10 2/10  2/10     FDS 2/10 2/10 2/10 2/10 2/10 2/10 2/10 2/10  2/10     FDP 2/10 2/10 2/10 2/10 2/10 2/10 2/10 2/10  2/10                             IASTM  8  8 8 8  8  8 TS  10  10     Neuro Re-Ed                       HP/Jordanian F/ball 15 15 15 15 15 15 15 15  15                                                     Ther Ex                       TGE                          TP   T 2'  T 2'  T 2' T 2'  T 2'  T 2'   P 2'  T 2'     TP scrape  T 1'  T 1'  T 1;'  T 1'  T 2/10  Y 2/10   P 2'  T 1     TP FDS  T 2/10  T 2/10  T 2/10  T 2/10  T 2/10  T 2/10    P 2'  T 2/10     Glen  25 2/10  25 2/10  25 2/10  25 2/10  25 2/10  25 2/10   10 2/10  25 2/10     Blue  L II 2/10  L III 2/10  L III 2/10  L III 2/10  L III 2/10  L III 2/10   II 2/10  L II 2/10     Flexbar  Y 20/20  Y 20/20  R 20/20  G 20/20  G 20/20  G 20/20   Y 20/20  Y 20/20     DB E/F w/buildup      4 2/10  4 2/10 4 2/10                 S/P  w/buildup      2 2/10  2 2/10  2 2/10            FF/Scapt        6 2/10  6 2/10  6 2/10           OH tricep        4 2/10  4 2/10  4 2/10                                   Modalities                       US 12 12 12 12 12 12 12 12  12

## 2023-10-11 ENCOUNTER — OFFICE VISIT (OUTPATIENT)
Facility: CLINIC | Age: 68
End: 2023-10-11
Payer: COMMERCIAL

## 2023-10-11 DIAGNOSIS — M25.642 STIFFNESS OF LEFT HAND, NOT ELSEWHERE CLASSIFIED: Primary | ICD-10-CM

## 2023-10-11 PROCEDURE — 97035 APP MDLTY 1+ULTRASOUND EA 15: CPT | Performed by: PHYSICAL THERAPIST

## 2023-10-11 PROCEDURE — 97110 THERAPEUTIC EXERCISES: CPT | Performed by: PHYSICAL THERAPIST

## 2023-10-11 PROCEDURE — 97140 MANUAL THERAPY 1/> REGIONS: CPT | Performed by: PHYSICAL THERAPIST

## 2023-10-11 PROCEDURE — 97112 NEUROMUSCULAR REEDUCATION: CPT | Performed by: PHYSICAL THERAPIST

## 2023-10-11 NOTE — PROGRESS NOTES
Daily Note     Today's date: 10/11/2023  Patient name: Javy Tristan  : 1955  MRN: [id number]  Referring provider: Yana Mayo MD  Dx:   Encounter Diagnosis     ICD-10-CM    1. Stiffness of left hand, not elsewhere classified  M25.642                      Subjective: pt continues to work  on composite fisting for function      Objective: See treatment diary below    Pt provided reema tape when not using his ROSALINA; ROSALINA for activity. AROM left wrist E/F- 60/65  Left SF MP- 0/90; PIP- 0/45; DIP- 0/30   Sensation- no tingling noted    Level II- 30/70; level III 40/80  Circumference at wrist- 17.5 cm; MPs- 22.5cm  Pt instructed in AAROM/partial fisting, isolated AROM at PIP/DIP  Pt also instructed scar management    Assessment: Tolerated treatment well. Patient would benefit from continued PT      Plan: Continue per plan of care. Precautions: wear orthosis as directed.   No aggressive activity with left hand    Manuals 9/21 9/25 9/27 10/3 10/4 10/9 10/11       STM MF/SF 10 10 10 10 15 10 10 10  8                             PROM 2/10 2/10 2/10 2/10 2/10 2/10 2/10 2/10  2/10     FDS 2/10 2/10 2/10 2/10 2/10 2/10 2/10 2/10  2/10     FDP 2/10 2/10 2/10 2/10 2/10 2/10 2/10 2/10  2/10                             IASTM  8  8 8 8  8  8 8  8  8     Neuro Re-Ed                       HP/Brazilian F/ball 15 15 15 15 15 15 15 15  15                                                     Ther Ex                       TGE                          TP   T 2'  T 2'  T 2' T 2'  T 2'  T 2'  T 2' P 2'  T 2'     TP scrape  T 1'  T 1'  T 1;'  T 1'  T 10   T 10  T 2' P 2'  T 1     TP FDS  T /10  T /10  T /10  T /10  T /10  T /10  T /10  P 2'  T 2/10     Glen  25 2/10  25 2/10  25 2/10  25 2/10  25 2/10  25 2/10  25 2/10 10 2/10  25 2/10     Blue  L II 2/10  L III 2/10  L III 2/10  L III 2/10  L III 2/10  L III 2/10  L III 2/10 II 2/10  L II 2/10     Flexbar  Y 20/20  Y 20/20  R 20/20  G 20/20  G 20/20  G 20/20  G 20/20 Y 20/20  Y 20/20     DB E/F w/buildup      4 2/10  4 2/10 4 2/10  4 2/10               S/P  w/buildup      2 2/10  2 2/10  2 2/10  2 2/10          FF/Scapt        6 2/10  6 2/10  6 2/10  6 2/10         OH tricep        4 2/10  4 2/10  4 2/10  4 2/10                                 Modalities                       US 12 12 12 12 12 12 12 12  12

## 2023-10-16 ENCOUNTER — OFFICE VISIT (OUTPATIENT)
Facility: CLINIC | Age: 68
End: 2023-10-16
Payer: COMMERCIAL

## 2023-10-16 DIAGNOSIS — M25.642 STIFFNESS OF LEFT HAND, NOT ELSEWHERE CLASSIFIED: Primary | ICD-10-CM

## 2023-10-16 PROCEDURE — 97140 MANUAL THERAPY 1/> REGIONS: CPT | Performed by: PHYSICAL THERAPIST

## 2023-10-16 PROCEDURE — 97110 THERAPEUTIC EXERCISES: CPT | Performed by: PHYSICAL THERAPIST

## 2023-10-16 PROCEDURE — 97035 APP MDLTY 1+ULTRASOUND EA 15: CPT | Performed by: PHYSICAL THERAPIST

## 2023-10-16 PROCEDURE — 97112 NEUROMUSCULAR REEDUCATION: CPT | Performed by: PHYSICAL THERAPIST

## 2023-10-16 NOTE — LETTER
2023    Anastasia Yanez MD  [address] [place] [address]    Patient: Alma Roman   YOB: 1955   Date of Visit: 10/16/2023     Encounter Diagnosis     ICD-10-CM    1. Stiffness of left hand, not elsewhere classified  M25.642           Dear Dr. Rozina Givens:    Thank you for your recent referral of Alma Roman. Please review the attached evaluation summary from Matt's recent visit. Please verify that you agree with the plan of care by signing the attached order. If you have any questions or concerns, please do not hesitate to call. I sincerely appreciate the opportunity to share in the care of one of your patients and hope to have another opportunity to work with you in the near future. Sincerely,    Benito Blair, DPT, T      Referring Provider:      I certify that I have read the below Plan of Care and certify the need for these services furnished under this plan of treatment while under my care. Anastasia Yanez MD  [address] [place] [id number]  Via Fax: [phone]          PT Re-Evaluation     Today's date: 10/16/2023  Patient name: Alma Roman  : 1955  MRN: [id number]  Referring provider: Anastasia Yanez MD  Dx:   Encounter Diagnosis     ICD-10-CM    1. Stiffness of left hand, not elsewhere classified  M25.642                      Assessment  Assessment details: Pt is a 69 YO male presenting to PT with pain, decreased AROM, strength and tolerance to activity. Pt would benefit from skilled intervention to address these issues and maximize overall function. Occupation- - currently working from home  Dominant- right; Involved- left SF  Pt cont to lack adequate FDS isolation to enable composite fisting. PT utilizing ROSALINA, IASTM isolated FDS/FDP AROM and PIP PROM. Goals  ST. Decrease pain to 0-2/10 in 4 weeks            2.   Decrease swelling left hand            3. Increase AROM to [facility] in 6-8 weeks            4. Maintain clean wound and promote healing            5.  Provide orthotic for protection  LT. Increase functional motion and strength for independence with ADL and self care by DC            2. Ability to RT recreational activity by DC    Plan  Patient would benefit from: skilled physical therapy  Planned modality interventions: cryotherapy, ultrasound and thermotherapy: hydrocollator packs  Planned therapy interventions: activity modification, manual therapy, orthotic management and training, orthotic fitting/training, neuromuscular re-education, strengthening, stretching, therapeutic activities, therapeutic exercise and home exercise program  Frequency: 2x week  Duration in weeks: 4  Treatment plan discussed with: patient      Subjective Evaluation    History of Present Illness  Date of surgery: 2023  Mechanism of injury: Pt noted inability to bend his left SF approximately 2022. He reported no known trauma. MRI confirmed SF FDS and FDP rupture. Primary repair completed 2022. Recent tenolysis with surgeon noting significant adhesions and tendon attenuation  Patient Goals  Patient goals for therapy: decreased edema, decreased pain, increased motion, increased strength, independence with ADLs/IADLs and return to sport/leisure activities    Pain  Current pain ratin  At best pain ratin  At worst pain ratin  Location: left hand and SF  Quality: dull ache  Relieving factors: ice    Hand dominance: right    Treatments  Current treatment: physical therapy      Objective     General Comments:      Wrist/Hand Comments  Pt provided reema tape when not using his ROSALINA; ROSALINA for activity. AROM left wrist E/F- 60/65  Left SF MP- 0/90; PIP- 0/45;  DIP- 0/30   Sensation- no tingling noted    Level II- 55/75; level III 50/80; 43 EULA- 10/16; Key- 20/20  Circumference at wrist- 17.5 cm; MPs- 22.5cm  Pt instructed in AAROM/partial fisting, isolated AROM at PIP/DIP  Pt also instructed scar management             Precautions: wear orthosis as directed.   No aggressive activity with left hand    Manuals 9/21 9/25 9/27 10/3 10/4 10/9 10/11  10/16       STM MF/SF 10 10 10 10 15 10 10 10  8                             PROM 2/10 2/10 2/10 2/10 2/10 2/10 2/10 2/10  2/10     FDS 2/10 2/10 2/10 2/10 2/10 2/10 2/10 2/10  2/10     FDP 2/10 2/10 2/10 2/10 2/10 2/10 2/10 2/10  2/10                             IASTM  8  8 8 8  8  8 8  8  8     Neuro Re-Ed                       HP/Nauruan F/ball 15 15 15 15 15 15 15 15  15                                                     Ther Ex                       TGE 2/5 2/5 2/5 2/5 2/5 2/5 2/5 2/5                               TP   T 2'  T 2'  T 2' T 2'  T 2'  T 2'  T 2'  T 2'  T 2'     TP scrape  T 1'  T 1'  T 1;'  T 1'  T 2/10   T 2/10  T 2' T 2'  T 1     TP FDS  T 2/10  T 2/10  T 2/10  T 2/10  T 2/10  T 2/10  T 2/10  T 2'  T 2/10     Glen  25 2/10  25 2/10  25 2/10  25 2/10  25 2/10  25 2/10  25 2/10  25 2/10  25 2/10     Blue  L II 2/10  L III 2/10  L III 2/10  L III 2/10  L III 2/10  L III 2/10  L III 2/10 L III  2/10  L II 2/10     Flexbar  Y 20/20  Y 20/20  R 20/20  G 20/20  G 20/20  G 20/20  G 20/20 YG20/20  Y 20/20     DB E/F w/buildup        4 2/10  4 2/10 4 2/10  4 2/10  4 2/10             S/P  w/buildup        2 2/10  2 2/10  2 2/10  2 2/10  2 2/10        FF/Scapt        6 2/10  6 2/10  6 2/10  6 2/10  6 2/10       OH tricep        4 2/10  4 2/10  4 2/10  4 2/10  4 2/10                               Modalities                       US 12 12 12 12 12 12 12 12  12

## 2023-10-16 NOTE — PROGRESS NOTES
PT Re-Evaluation     Today's date: 10/16/2023  Patient name: Romain Mcmahon  : 1955  MRN: [id number]  Referring provider: Dick Florian MD  Dx:   Encounter Diagnosis     ICD-10-CM    1. Stiffness of left hand, not elsewhere classified  M25.642                      Assessment  Assessment details: Pt is a 69 YO male presenting to PT with pain, decreased AROM, strength and tolerance to activity. Pt would benefit from skilled intervention to address these issues and maximize overall function. Occupation- - currently working from home  Dominant- right; Involved- left SF  Pt cont to lack adequate FDS isolation to enable composite fisting. PT utilizing ROSALINA, IASTM isolated FDS/FDP AROM and PIP PROM. Goals  ST. Decrease pain to 0-2/10 in 4 weeks            2. Decrease swelling left hand            3. Increase AROM to [facility] in 6-8 weeks            4. Maintain clean wound and promote healing            5.  Provide orthotic for protection  LT. Increase functional motion and strength for independence with ADL and self care by DC            2. Ability to RT recreational activity by DC    Plan  Patient would benefit from: skilled physical therapy  Planned modality interventions: cryotherapy, ultrasound and thermotherapy: hydrocollator packs  Planned therapy interventions: activity modification, manual therapy, orthotic management and training, orthotic fitting/training, neuromuscular re-education, strengthening, stretching, therapeutic activities, therapeutic exercise and home exercise program  Frequency: 2x week  Duration in weeks: 4  Treatment plan discussed with: patient      Subjective Evaluation    History of Present Illness  Date of surgery: 2023  Mechanism of injury: Pt noted inability to bend his left SF approximately 2022. He reported no known trauma. MRI confirmed SF FDS and FDP rupture. Primary repair completed 2022.   Recent tenolysis with surgeon noting significant adhesions and tendon attenuation  Patient Goals  Patient goals for therapy: decreased edema, decreased pain, increased motion, increased strength, independence with ADLs/IADLs and return to sport/leisure activities    Pain  Current pain ratin  At best pain ratin  At worst pain ratin  Location: left hand and SF  Quality: dull ache  Relieving factors: ice    Hand dominance: right    Treatments  Current treatment: physical therapy      Objective     General Comments:      Wrist/Hand Comments  Pt provided reema tape when not using his ROSALINA; ROSALINA for activity. AROM left wrist E/F- 60/65  Left SF MP- 0/90; PIP- 0/45; DIP- 0/30   Sensation- no tingling noted    Level II- 55/75; level III 50/80; 43 EULA- 10/16; Key-   Circumference at wrist- 17.5 cm; MPs- 22.5cm  Pt instructed in AAROM/partial fisting, isolated AROM at PIP/DIP  Pt also instructed scar management             Precautions: wear orthosis as directed.   No aggressive activity with left hand    Manuals 9/21 9/25 9/27 10/3 10/4 10/9 10/11  10/16       STM MF/SF 10 10 10 10 15 10 10 10  8                             PROM 2/10 2/10 2/10 2/10 2/10 2/10 2/10 2/10  2/10     FDS 2/10 2/10 2/10 2/10 2/10 2/10 2/10 2/10  2/10     FDP 2/10 2/10 2/10 2/10 2/10 2/10 2/10 2/10  2/10                             IASTM  8  8 8 8  8  8 8  8  8     Neuro Re-Ed                       HP/[place] F/ball 15 15 15 15 15 15 15 15  15                                                     Ther Ex                       TGE                                TP   T 2'  T 2'  T 2' T 2'  T 2'  T 2'  T 2'  T 2'  T 2'     TP scrape  T 1'  T 1'  T 1;'  T 1'  T 2/10   T 2/10  T 2' T 2'  T 1     TP FDS  T 2/10  T 2/10  T 2/10  T 2/10  T 2/10  T 2/10  T 2/10  T 2'  T 2/10     Glen  25 2/10  25 2/10  25 2/10  25 2/10  25 2/10  25 2/10  25 2/10  25 2/10  25 2/10     Blue  L II 2/10  L III 2/10  L III 2/10  L III 2/10  L III 2/10  L III 2/10 L III 2/10 L III  2/10  L II 2/10     Flexbar  Y 20/20  Y 20/20  R 20/20  G 20/20  G 20/20  G 20/20  G 20/20 YG20/20  Y 20/20     DB E/F w/buildup        4 2/10  4 2/10 4 2/10  4 2/10  4 2/10             S/P  w/buildup        2 2/10  2 2/10  2 2/10  2 2/10  2 2/10        FF/Scapt        6 2/10  6 2/10  6 2/10  6 2/10  6 2/10       OH tricep        4 2/10  4 2/10  4 2/10  4 2/10  4 2/10                               Modalities                       US 12 12 12 12 12 12 12 12  12

## 2023-10-18 ENCOUNTER — OFFICE VISIT (OUTPATIENT)
Facility: CLINIC | Age: 68
End: 2023-10-18
Payer: COMMERCIAL

## 2023-10-18 DIAGNOSIS — M25.642 STIFFNESS OF LEFT HAND, NOT ELSEWHERE CLASSIFIED: Primary | ICD-10-CM

## 2023-10-18 PROCEDURE — 97035 APP MDLTY 1+ULTRASOUND EA 15: CPT | Performed by: PHYSICAL THERAPIST

## 2023-10-18 PROCEDURE — 97110 THERAPEUTIC EXERCISES: CPT | Performed by: PHYSICAL THERAPIST

## 2023-10-18 PROCEDURE — 97140 MANUAL THERAPY 1/> REGIONS: CPT | Performed by: PHYSICAL THERAPIST

## 2023-10-18 PROCEDURE — 97112 NEUROMUSCULAR REEDUCATION: CPT | Performed by: PHYSICAL THERAPIST

## 2023-10-18 NOTE — PROGRESS NOTES
Daily Note     Today's date: 10/18/2023  Patient name: Meagan Brunner  : 1955  MRN: [id number]  Referring provider: Drea Winter MD  Dx:   Encounter Diagnosis     ICD-10-CM    1. Stiffness of left hand, not elsewhere classified  M25.642                      Subjective: pt cont to work on AROM and strength using the SF for more activity at home    Objective: See treatment diary below    Pt provided reema tape when not using his ROSALINA; ROSALINA for activity. AROM left wrist E/F- 60/65  Left SF MP- 0/90; PIP- 0/45; DIP- 0/30   Sensation- no tingling noted    Level II- 55/75; level III 50/80; 43 EULA- 10/16; Key-   Circumference at wrist- 17.5 cm; MPs- 22.5cm  Pt instructed in AAROM/partial fisting, isolated AROM at PIP/DIP  Pt also instructed scar management     Assessment: Tolerated treatment well. Patient would benefit from continued PT      Plan: Continue per plan of care. Precautions: wear orthosis as directed.   No aggressive activity with left hand    Manuals 9/21 9/25 9/27 10/3 10/4 10/9 10/11  10/16  10/18     STM MF/SF 10 10 10 10 15 10 10 10  10                             PROM 2/10 2/10 2/10 2/10 2/10 2/10 2/10 2/10  2/10     FDS 2/10 2/10 2/10 2/10 2/10 2/10 2/10 2/10  2/10     FDP 2/10 2/10 2/10 2/10 2/10 2/10 2/10 2/10  2/10                             IASTM  8  8 8 8  8  8 8  8  8     Neuro Re-Ed                       HP/[place] F/ball 15 15 15 15 15 15 15 15  15                                                     Ther Ex                       TGE                              TP   T 2'  T 2'  T 2' T 2'  T 2'  T 2'  T 2'  T 2'  T 2'     TP scrape  T 1'  T 1'  T 1;'  T 1'  T 2/10   T 2/10  T 2' T 2'  T 1     TP FDS  T 2/10  T 2/10  T 2/10  T 2/10  T 2/10  T 2/10  T 2/10  T 2'  T 2/10     Glen  25 2/10  25 2/10  25 2/10  25 2/10  25 2/10  25 2/10  25 2/10  25 2/10  25 2/10     Blue  L II 2/10  L III 2/10  L III 2/10  L III 2/10  L III 2/10  L III 2/10  L III 2/10 L III  2/10  L III 2/10     Flexbar  Y 20/20  Y 20/20  R 20/20  G 20/20  G 20/20  G 20/20  G 20/20 G 20/20   G 20/20     DB E/F w/buildup        4 2/10  4 2/10 4 2/10  4 2/10  4 2/10  4 2/10           S/P  w/buildup        2 2/10  2 2/10  2 2/10  2 2/10  2 2/10  2 2/10      FF/Scapt        6 2/10  6 2/10  6 2/10  6 2/10  6 2/10  6 2/10     OH tricep        4 2/10  4 2/10  4 2/10  4 2/10  4 2/10  4 2/10                             Modalities                       US 12 12 12 12 12 12 12 12  12

## 2023-10-23 ENCOUNTER — OFFICE VISIT (OUTPATIENT)
Facility: CLINIC | Age: 68
End: 2023-10-23
Payer: COMMERCIAL

## 2023-10-23 DIAGNOSIS — M25.642 STIFFNESS OF LEFT HAND, NOT ELSEWHERE CLASSIFIED: Primary | ICD-10-CM

## 2023-10-23 PROCEDURE — 97035 APP MDLTY 1+ULTRASOUND EA 15: CPT | Performed by: PHYSICAL THERAPIST

## 2023-10-23 PROCEDURE — 97112 NEUROMUSCULAR REEDUCATION: CPT | Performed by: PHYSICAL THERAPIST

## 2023-10-23 PROCEDURE — 97110 THERAPEUTIC EXERCISES: CPT | Performed by: PHYSICAL THERAPIST

## 2023-10-23 PROCEDURE — 97140 MANUAL THERAPY 1/> REGIONS: CPT | Performed by: PHYSICAL THERAPIST

## 2023-10-23 NOTE — PROGRESS NOTES
Daily Note     Today's date: 10/23/2023  Patient name: Tonia Bridges  : 1955  MRN: [id number]  Referring provider: Jakob Villa MD  Dx:   Encounter Diagnosis     ICD-10-CM    1. Stiffness of left hand, not elsewhere classified  M25.642                      Subjective: pt cont to work on isolated SF motion for composite fist.      Objective: See treatment diary below    Pt provided reema tape when not using his ROSALINA; ROSALINA for activity. AROM left wrist E/F- 60/65  Left SF MP- 0/90; PIP- 0/45; DIP- 0/30   Sensation- no tingling noted    Level II- 55/75; level III 50/80; 43 EULA- 10/16; Key- 20/20  Circumference at wrist- 17.5 cm; MPs- 22.5cm  Pt instructed in AAROM/partial fisting, isolated AROM at PIP/DIP  Pt also instructed scar management    Assessment: Tolerated treatment well. Patient would benefit from continued PT      Plan: Continue per plan of care. Precautions: wear orthosis as directed.   No aggressive activity with left hand    Manuals 10/23             STM MF/SF 10 10 10 10 15 10 10 10  10                             PROM 2/10 2/10 2/10 2/10 2/10 2/10 2/10 2/10  2/10     FDS 2/10 2/10 2/10 2/10 2/10 2/10 2/10 2/10  2/10     FDP 2/10 2/10 2/10 2/10 2/10 2/10 2/10 2/10  2/10                             IASTM  8  8 8 8  8  8 8  8  8     Neuro Re-Ed                       HP/[place] F/ball 15 15 15 15 15 15 15 15  15                                                     Ther Ex                       TGE                              TP   T 2'  T 2'  T 2' T 2'  T 2'  T 2'  T 2'  T 2'  T 2'     TP scrape  T 1'  T 1'  T 1;'  T 1'  T 2/10   T 2/10  T 2' T 2'  T 1     TP FDS  T 10  T 2/10  T 2/10  T 2/10  T 2/10  T 2/10  T 2/10  T 2'  T 2/10     Glen  25 2/10  25 2/10  25 2/10  25 2/10  25 2/10  25 2/10  25 2/10  25 2/10  25 2/10     Blue  L III 2/10  L III 2/10  L III 2/10  L III 2/10  L III 2/10  L III 2/10  L III 2/10 L III  2/10  L III 2/10     Flexbar G 20/20  Y 20/20  R 20/20  G 20/20  G 20/20  G 20/20  G 20/20 G 20/20   G 20/20     DB E/F w/buildup  4 2/10  5    4 2/10  4 2/10 4 2/10  4 2/10  4 2/10  4 2/10           S/P  w/buildup  2 2/10      2 2/10  2 2/10  2 2/10  2 2/10  2 2/10  2 2/10      FF/Scapt  6 2/10      6 2/10  6 2/10  6 2/10  6 2/10  6 2/10  6 2/10     OH tricep  4 2/10  5    4 2/10  4 2/10  4 2/10  4 2/10  4 2/10  4 2/10                             Modalities                       US 12 12 12 12 12 12 12 12  12

## 2023-10-25 ENCOUNTER — OFFICE VISIT (OUTPATIENT)
Facility: CLINIC | Age: 68
End: 2023-10-25
Payer: COMMERCIAL

## 2023-10-25 DIAGNOSIS — M25.642 STIFFNESS OF LEFT HAND, NOT ELSEWHERE CLASSIFIED: Primary | ICD-10-CM

## 2023-10-25 PROCEDURE — 97035 APP MDLTY 1+ULTRASOUND EA 15: CPT | Performed by: PHYSICAL THERAPIST

## 2023-10-25 PROCEDURE — 97112 NEUROMUSCULAR REEDUCATION: CPT | Performed by: PHYSICAL THERAPIST

## 2023-10-25 PROCEDURE — 97110 THERAPEUTIC EXERCISES: CPT | Performed by: PHYSICAL THERAPIST

## 2023-10-25 PROCEDURE — 97140 MANUAL THERAPY 1/> REGIONS: CPT | Performed by: PHYSICAL THERAPIST

## 2023-10-26 ENCOUNTER — APPOINTMENT (OUTPATIENT)
Facility: CLINIC | Age: 68
End: 2023-10-26
Payer: COMMERCIAL

## 2023-10-30 ENCOUNTER — OFFICE VISIT (OUTPATIENT)
Facility: CLINIC | Age: 68
End: 2023-10-30
Payer: COMMERCIAL

## 2023-10-30 DIAGNOSIS — M25.642 STIFFNESS OF LEFT HAND, NOT ELSEWHERE CLASSIFIED: Primary | ICD-10-CM

## 2023-10-30 PROCEDURE — 97112 NEUROMUSCULAR REEDUCATION: CPT | Performed by: PHYSICAL THERAPIST

## 2023-10-30 PROCEDURE — 97035 APP MDLTY 1+ULTRASOUND EA 15: CPT | Performed by: PHYSICAL THERAPIST

## 2023-10-30 PROCEDURE — 97110 THERAPEUTIC EXERCISES: CPT | Performed by: PHYSICAL THERAPIST

## 2023-10-30 PROCEDURE — 97140 MANUAL THERAPY 1/> REGIONS: CPT | Performed by: PHYSICAL THERAPIST

## 2023-10-30 NOTE — PROGRESS NOTES
Daily Note     Today's date: 10/30/2023  Patient name: Rosa Isela Desai  : 1955  MRN: [id number]  Referring provider: Fredrik Gilford, MD  Dx:   Encounter Diagnosis     ICD-10-CM    1. Stiffness of left hand, not elsewhere classified  M25.642                      Subjective: pt feels he is making slow, steady progress with motion of the RF and SF      Objective: See treatment diary below    Pt provided reema tape when not using his ROSALINA; ROSALINA for activity. AROM left wrist E/F- 60/65  Left SF MP- 0/90; PIP- 0/45; DIP- 0/30   Sensation- no tingling noted    Level II- 55/75; level III 50/80; 43 EULA- 10/16; Key- 20/20  Circumference at wrist- 17.5 cm; MPs- 22.5cm  Pt instructed in AAROM/partial fisting, isolated AROM at PIP/DIP  Pt also instructed scar management    Assessment: Tolerated treatment well. Patient would benefit from continued PT      Plan: Continue per plan of care. Precautions: wear orthosis as directed.   No aggressive activity with left hand    Manuals 10/23 10/25 10/30           STM MF/SF 10 10 10 10 15 10 10 10  10                             PROM 2/10 2/10 2/10 2/10 2/10 2/10 2/10 2/10  2/10     FDS 2/10 2/10 2/10 2/10 2/10 2/10 2/10 2/10  2/10     FDP 2/10 2/10 2/10 2/10 2/10 2/10 2/10 2/10  2/10                             IASTM  8  8 8 8  8  8 8  8  8     Neuro Re-Ed                       HP/[place] F/ball 15 15 15 15 15 15 15 15  15                                                     Ther Ex                       TGE                              TP   T 2'  T 2'  T 2' T 2'  T 2'  T 2'  T 2'  T 2'  T 2'     TP scrape  T 1'  T 1'  T 1;'  T 1'  T 2/10   T 10  T 2' T 2'  T 1     TP FDS  T /10  T /10  T /10  T 2/10  T /10  T 2/10  T 2/10  T 2'  T 2/10     Glen  25 2/10  25 2/10  25 2/10  25 2/10  25 2/10  25 2/10  25 2/10  25 2/10  25 2/10     Blue  L III 2/10  L III 2/10  L III 2/10  L III 2/10  L III 2/10  L III 2/10  L III 2/10 L III  2/10 L III 2/10     Flexbar   G 20/20  G 20/20  R 20/20  G 20/20  G 20/20  G 20/20  G 20/20 G 20/20   G 20/20     DB E/F w/buildup  4 2/10  4 2/10  5 2/10  4 2/10  4 2/10 4 2/10  4 2/10  4 2/10  4 2/10           S/P  w/buildup  2 2/10  2 2/10  2 2/10  2 2/10  2 2/10  2 2/10  2 2/10  2 2/10  2 2/10      FF/Scapt  6 2/10  6 2/10  6 2/10  6 2/10  6 2/10  6 2/10  6 2/10  6 2/10  6 2/10     OH tricep  4 2/10  4 2/10  5 2/10  4 2/10  4 2/10  4 2/10  4 2/10  4 2/10  4 2/10                               Comp  Y 3' Y3' Y 3'                                  Modalities                       US 12 12 12 12 12 12 12 12  12

## 2023-11-01 ENCOUNTER — OFFICE VISIT (OUTPATIENT)
Facility: CLINIC | Age: 68
End: 2023-11-01
Payer: COMMERCIAL

## 2023-11-01 DIAGNOSIS — M25.642 STIFFNESS OF LEFT HAND, NOT ELSEWHERE CLASSIFIED: Primary | ICD-10-CM

## 2023-11-01 PROCEDURE — 97112 NEUROMUSCULAR REEDUCATION: CPT | Performed by: PHYSICAL THERAPIST

## 2023-11-01 PROCEDURE — 97035 APP MDLTY 1+ULTRASOUND EA 15: CPT | Performed by: PHYSICAL THERAPIST

## 2023-11-01 PROCEDURE — 97140 MANUAL THERAPY 1/> REGIONS: CPT | Performed by: PHYSICAL THERAPIST

## 2023-11-01 PROCEDURE — 97110 THERAPEUTIC EXERCISES: CPT | Performed by: PHYSICAL THERAPIST

## 2023-11-01 NOTE — PROGRESS NOTES
PT Re-Evaluation     Today's date: 2023  Patient name: Jt Thurman  : 1955  MRN: [id number]  Referring provider: Dav Barbosa MD  Dx:   Encounter Diagnosis     ICD-10-CM    1. Stiffness of left hand, not elsewhere classified  M25.642                      Assessment  Assessment details: Pt is a 71 YO male presenting to PT with pain, decreased AROM, strength and tolerance to activity. Pt would benefit from skilled intervention to address these issues and maximize overall function. Occupation- - currently working from home  Dominant- right; Involved- left SF  Pt cont to lack adequate FDS isolation to enable composite fisting, although FDS/FDP work well in isolation. PT utilizing ROSALINA, IASTM isolated FDS/FDP AROM and PIP PROM. Goals  ST. Decrease pain to 0-2/10 in 4 weeks            2. Decrease swelling left hand            3. Increase AROM to [facility] in 6-8 weeks            4. Maintain clean wound and promote healing            5.  Provide orthotic for protection  LT. Increase functional motion and strength for independence with ADL and self care by DC            2. Ability to RT recreational activity by DC    Plan  Patient would benefit from: skilled physical therapy  Planned modality interventions: cryotherapy, ultrasound and thermotherapy: hydrocollator packs  Planned therapy interventions: activity modification, manual therapy, orthotic management and training, orthotic fitting/training, neuromuscular re-education, strengthening, stretching, therapeutic activities, therapeutic exercise and home exercise program  Frequency: 2x week  Duration in weeks: 4  Treatment plan discussed with: patient      Subjective Evaluation    History of Present Illness  Date of surgery: 2023  Mechanism of injury: Pt noted inability to bend his left SF approximately 2022. He reported no known trauma. MRI confirmed SF FDS and FDP rupture.   Primary repair completed 2022. Recent tenolysis with surgeon noting significant adhesions and tendon attenuation  Patient Goals  Patient goals for therapy: decreased edema, decreased pain, increased motion, increased strength, independence with ADLs/IADLs and return to sport/leisure activities    Pain  Current pain ratin  At best pain ratin  At worst pain ratin  Location: left hand and SF  Quality: dull ache  Relieving factors: ice    Hand dominance: right    Treatments  Current treatment: physical therapy      Objective     General Comments:      Wrist/Hand Comments  Pt provided reema tape when not using his ROSALINA; ROSALINA for activity. AROM left wrist E/F- 60/65  Left SF MP- 0/90; PIP- 0/65; DIP- 0/35 (isolated)   Sensation- no tingling noted    Level II- 55/86; level III 55/85; 43 EULA- 10/16; Wise-   Circumference at wrist- 17.5 cm; MPs- 22.5cm  Pt instructed in AAROM/partial fisting, isolated AROM at PIP/DIP  Pt also instructed scar management             Precautions: wear orthosis as directed.   No aggressive activity with left hand    Manuals 10/23 10/25 10/30  11/1               STM MF/SF 10 10 10 10 15 10 10 10  10                             PROM 2/10 2/10 2/10 2/10 2/10 2/10 2/10 2/10  2/10     FDS 2/10 2/10 2/10 2/10 2/10 2/10 2/10 2/10  2/10     FDP 2/10 2/10 2/10 2/10 2/10 2/10 2/10 2/10  2/10                             IASTM  8  8 8 8  8  8 8  8  8     Neuro Re-Ed                       HP/[place] F/ball 15 15 15 15 15 15 15 15  15                                                     Ther Ex                       TGE                              TP   T 2'  T 2'  T 2' T 2'  T 2'  T 2'  T 2'  T 2'  T 2'     TP scrape  T 1'  T 1'  T 1;'  T 1'  T 2/10   T 2/10  T 2' T 2'  T 1     TP FDS  T 2/10  T 2/10  T 2/10  T 2/10  T 2/10  T 2/10  T 2/10  T 2'  T 2/10     Glen  25 2/10  25 2/10  25 /10  25 /10  25 /10  25 /10  25 2/10  25 2/10  25 /10     Blue  L III 2/10  L III 2/10  L III 2/10  L III 2/10  L III 2/10  L III 2/10  L III 2/10 L III  2/10  L III 2/10     Flexbar   G 20/20  G 20/20  G 20/20  G 20/20  G 20/20  G 20/20  G 20/20 G 20/20   G 20/20     DB E/F w/buildup  4 2/10  4 2/10  5 2/10  5 2/10  4 2/10 4 2/10  4 2/10  4 2/10  4 2/10           S/P  w/buildup  2 2/10  2 2/10  2 2/10  2 2/10  2 2/10  2 2/10  2 2/10  2 2/10  2 2/10      FF/Scapt  6 2/10  6 2/10  6 2/10  6 2/10  6 2/10  6 2/10  6 2/10  6 2/10  6 2/10     OH tricep  4 2/10  4 2/10  5 2/10  5 2/10  4 2/10  4 2/10  4 2/10  4 2/10  4 2/10                                                     Comp  Y 3' Y3' Y 3'  Y 3'                                       Modalities                       US 12 12 12 12 12 12 12 12  12

## 2023-11-01 NOTE — LETTER
2023    Iban Gudino MD  [address]    Patient: Radha Park   YOB: 1955   Date of Visit: 2023     Encounter Diagnosis     ICD-10-CM    1. Stiffness of left hand, not elsewhere classified  M25.642           Dear Dr. Mitzy Cannon:    Thank you for your recent referral of Radha Park. Please review the attached evaluation summary from Matt's recent visit. Please verify that you agree with the plan of care by signing the attached order. If you have any questions or concerns, please do not hesitate to call. I sincerely appreciate the opportunity to share in the care of one of your patients and hope to have another opportunity to work with you in the near future. Sincerely,  Madelyn Eaton, DPT, T      Referring Provider:      I certify that I have read the below Plan of Care and certify the need for these services furnished under this plan of treatment while under my care. Iban Gudino MD  [facility] [address]  Via Fax: [phone]          PT Re-Evaluation     Today's date: 2023  Patient name: Radha Park  : 1955  MRN: [id number]  Referring provider: Iban Gudino MD  Dx:   Encounter Diagnosis     ICD-10-CM    1. Stiffness of left hand, not elsewhere classified  M25.642                      Assessment  Assessment details: Pt is a 71 YO male presenting to PT with pain, decreased AROM, strength and tolerance to activity. Pt would benefit from skilled intervention to address these issues and maximize overall function. Occupation- - currently working from home  Dominant- right; Involved- left SF  Pt cont to lack adequate FDS isolation to enable composite fisting, although FDS/FDP work well in isolation. PT utilizing ROSALINA, IASTM isolated FDS/FDP AROM and PIP PROM. Goals  ST.   Decrease pain to 0-2/10 in 4 weeks            2. Decrease swelling left hand            3. Increase AROM to [facility] in 6-8 weeks            4. Maintain clean wound and promote healing            5.  Provide orthotic for protection  LT. Increase functional motion and strength for independence with ADL and self care by DC            2. Ability to RT recreational activity by DC    Plan  Patient would benefit from: skilled physical therapy  Planned modality interventions: cryotherapy, ultrasound and thermotherapy: hydrocollator packs  Planned therapy interventions: activity modification, manual therapy, orthotic management and training, orthotic fitting/training, neuromuscular re-education, strengthening, stretching, therapeutic activities, therapeutic exercise and home exercise program  Frequency: 2x week  Duration in weeks: 4  Treatment plan discussed with: patient      Subjective Evaluation    History of Present Illness  Date of surgery: 2023  Mechanism of injury: Pt noted inability to bend his left SF approximately 2022. He reported no known trauma. MRI confirmed SF FDS and FDP rupture. Primary repair completed 2022. Recent tenolysis with surgeon noting significant adhesions and tendon attenuation  Patient Goals  Patient goals for therapy: decreased edema, decreased pain, increased motion, increased strength, independence with ADLs/IADLs and return to sport/leisure activities    Pain  Current pain ratin  At best pain ratin  At worst pain ratin  Location: left hand and SF  Quality: dull ache  Relieving factors: ice    Hand dominance: right    Treatments  Current treatment: physical therapy      Objective     General Comments:      Wrist/Hand Comments  Pt provided reema tape when not using his ROSALINA; ROSALINA for activity.     AROM left wrist E/F- 60/65  Left SF MP- 0/90; PIP- 0/65; DIP- 0/35 (isolated)   Sensation- no tingling noted    Level II- 55/86; level III 55/85; 43 EULA- 10/16; Wise-   Circumference at wrist- 17.5 cm; MPs- 22.5cm  Pt instructed in AAROM/partial fisting, isolated AROM at PIP/DIP  Pt also instructed scar management             Precautions: wear orthosis as directed.   No aggressive activity with left hand    Manuals 10/23 10/25 10/30  11/1               STM MF/SF 10 10 10 10 15 10 10 10  10                             PROM 2/10 2/10 2/10 2/10 2/10 2/10 2/10 2/10  2/10     FDS 2/10 2/10 2/10 2/10 2/10 2/10 2/10 2/10  2/10     FDP 2/10 2/10 2/10 2/10 2/10 2/10 2/10 2/10  2/10                             IASTM  8  8 8 8  8  8 8  8  8     Neuro Re-Ed                       HP/[place] F/ball 15 15 15 15 15 15 15 15  15                                                     Ther Ex                       TGE 2/5 2/5 2/5 2/5 2/5 2/5 2/5 2/5 2/5                             TP   T 2'  T 2'  T 2' T 2'  T 2'  T 2'  T 2'  T 2'  T 2'     TP scrape  T 1'  T 1'  T 1;'  T 1'  T 2/10   T 2/10  T 2' T 2'  T 1     TP FDS  T 2/10  T 2/10  T 2/10  T 2/10  T 2/10  T 2/10  T 2/10  T 2'  T 2/10     Glen  25 2/10  25 2/10  25 2/10  25 2/10  25 2/10  25 2/10  25 2/10  25 2/10  25 2/10     Blue  L III 2/10  L III 2/10  L III 2/10  L III 2/10  L III 2/10  L III 2/10  L III 2/10 L III  2/10  L III 2/10     Flexbar   G 20/20  G 20/20  G 20/20  G 20/20  G 20/20  G 20/20  G 20/20 G 20/20   G 20/20     DB E/F w/buildup  4 2/10  4 2/10  5 2/10  5 2/10  4 2/10 4 2/10  4 2/10  4 2/10  4 2/10           S/P  w/buildup  2 2/10  2 2/10  2 2/10  2 2/10  2 2/10  2 2/10  2 2/10  2 2/10  2 2/10      FF/Scapt  6 2/10  6 2/10  6 2/10  6 2/10  6 2/10  6 2/10  6 2/10  6 2/10  6 2/10     OH tricep  4 2/10  4 2/10  5 2/10  5 2/10  4 2/10  4 2/10  4 2/10  4 2/10  4 2/10                                                     Comp  Y 3' Y3' Y 3'  Y 3'                                       Modalities                        12 12 12 12 12 12 12 12  12

## 2023-11-06 ENCOUNTER — OFFICE VISIT (OUTPATIENT)
Facility: CLINIC | Age: 68
End: 2023-11-06
Payer: COMMERCIAL

## 2023-11-06 DIAGNOSIS — M25.642 STIFFNESS OF LEFT HAND, NOT ELSEWHERE CLASSIFIED: Primary | ICD-10-CM

## 2023-11-06 PROCEDURE — 97112 NEUROMUSCULAR REEDUCATION: CPT

## 2023-11-06 PROCEDURE — 97110 THERAPEUTIC EXERCISES: CPT

## 2023-11-06 PROCEDURE — 97035 APP MDLTY 1+ULTRASOUND EA 15: CPT

## 2023-11-06 PROCEDURE — 97140 MANUAL THERAPY 1/> REGIONS: CPT

## 2023-11-06 NOTE — PROGRESS NOTES
Daily Note     Today's date: 2023  Patient name: Nilesh Fox  : 1955  MRN: [id number]  Referring provider: Jaye Hammans, MD  Dx:   Encounter Diagnosis     ICD-10-CM    1. Stiffness of left hand, not elsewhere classified  M25.642                      Subjective: Pt reports 'he is coming along slowly." Just wish my finger would bend more" MD appt Wed      Objective: See treatment diary below    Pt provided reema tape when not using his ROSALINA; ROSALINA for activity. AROM left wrist E/F- 60/65  Left SF MP- 0/90; PIP- 0/65; DIP- 0/35 (isolated)   Sensation- no tingling noted    Level II- 55/86; level III 55/85; 43 EULA- 10/16; Wise-   Circumference at wrist- 17.5 cm; MPs- 22.5cm  Pt instructed in AAROM/partial fisting, isolated AROM at PIP/DIP  Pt also instructed scar management  Assessment: Tolerated treatment well. Patient demonstrated fatigue post treatment, exhibited good technique with therapeutic exercises, and would benefit from continued PT      Plan: Progress treatment as tolerated. Precautions: wear orthosis as directed.   No aggressive activity with left hand    Manuals 10/23 10/25 10/30  11/1  11/6             STM MF/SF 10 10 10 10 15 10 10 10  10                             PROM 2/10 2/10 2/10 2/10 2/10 2/10 2/10 2/10  2/10     FDS 2/10 2/10 2/10 2/10 2/10 2/10 2/10 2/10  2/10     FDP 2/10 2/10 2/10 2/10 2/10 2/10 2/10 2/10  2/10                             IASTM  8  8 8 8  8  8 8  8  8     Neuro Re-Ed                       HP/Lao F/ball 15 15 15 15 15 15 15 15  15                                                     Ther Ex                       TGE                              TP   T 2'  T 2'  T 2' T 2'  T 2'  T 2'  T 2'  T 2'  T 2'     TP scrape  T 1'  T 1'  T 1;'  T 1'  T 2/10   T 2/10  T 2' T 2'  T 1     TP FDS  T 2/10  T 2/10  T 2/10  T 2/10  T /10  T /10  T /10  T 2'  T 2/10     Glen  25 2/10  25 2/10  25 2/10  25 2/10  25 2/10  25 2/10  25 2/10  25 2/10  25 2/10     Blue  L III 2/10  L III 2/10  L III 2/10  L III 2/10  L III 2/10  L III 2/10  L III 2/10 L III  2/10  L III 2/10     Flexbar   G 20/20  G 20/20  G 20/20  G 20/20  G 20/20  G 20/20  G 20/20 G 20/20   G 20/20     DB E/F w/buildup  4 2/10  4 2/10  5 2/10  5 2/10  5 2/10 4 2/10  4 2/10  4 2/10  4 2/10           S/P  w/buildup  2 2/10  2 2/10  2 2/10  2 2/10  2 2/10  2 2/10  2 2/10  2 2/10  2 2/10      FF/Scapt  6 2/10  6 2/10  6 2/10  6 2/10  6 2/10  6 2/10  6 2/10  6 2/10  6 2/10     OH tricep  4 2/10  4 2/10  5 2/10  5 2/10  5 2/10  4 2/10  4 2/10  4 2/10  4 2/10                                                     Comp  Y 3' Y3' Y 3'  Y 3'  Y3'                                     Modalities                       US 12 12 12 12 12 12 12 12  12

## 2023-11-13 ENCOUNTER — OFFICE VISIT (OUTPATIENT)
Facility: CLINIC | Age: 68
End: 2023-11-13
Payer: COMMERCIAL

## 2023-11-13 DIAGNOSIS — M25.642 STIFFNESS OF LEFT HAND, NOT ELSEWHERE CLASSIFIED: Primary | ICD-10-CM

## 2023-11-13 PROCEDURE — 97112 NEUROMUSCULAR REEDUCATION: CPT | Performed by: PHYSICAL THERAPIST

## 2023-11-13 PROCEDURE — 97110 THERAPEUTIC EXERCISES: CPT | Performed by: PHYSICAL THERAPIST

## 2023-11-13 PROCEDURE — 97140 MANUAL THERAPY 1/> REGIONS: CPT | Performed by: PHYSICAL THERAPIST

## 2023-11-13 PROCEDURE — 97035 APP MDLTY 1+ULTRASOUND EA 15: CPT | Performed by: PHYSICAL THERAPIST

## 2023-11-13 NOTE — PROGRESS NOTES
Daily Note     Today's date: 2023  Patient name: Keren Moody  : 1955  MRN: [id number]  Referring provider: Khadra Ba MD  Dx:   Encounter Diagnosis     ICD-10-CM    1. Stiffness of left hand, not elsewhere classified  M25.642                      Subjective: pt was seen by his physician, who is pleased with his progress and instructed him to continue therapy. Objective: See treatment diary below    Pt provided reema tape when not using his ROSALINA; ROSALINA for activity. AROM left wrist E/F- 60/65  Left SF MP- 0/90; PIP- 0/65; DIP- 0/35 (isolated)   Sensation- no tingling noted    Level II- 55/86; Level III- 55/85; 43 EULA- 10/16; Wise-   Circumference at wrist- 17.5 cm; MPs- 22.5cm  Pt instructed in AAROM/partial fisting, isolated AROM at PIP/DIP  Pt also instructed scar management    Assessment: Tolerated treatment well. Patient would benefit from continued PT      Plan: Continue per plan of care. Precautions: wear orthosis as directed.   No aggressive activity with left hand    Manuals 10/23 10/25 10/30  11/1  11/6  11/13           STM MF/SF 10 10 10 10 15 10 10 10  10                             PROM 2/10 2/10 2/10 2/10 2/10 2/10 2/10 2/10  2/10     FDS 2/10 2/10 2/10 2/10 2/10 2/10 2/10 2/10  2/10     FDP 2/10 2/10 2/10 2/10 2/10 2/10 2/10 2/10  2/10                             IASTM  8  8 8 8  8  8 8  8  8     Neuro Re-Ed                       HP/[place]rainian F/ball 15 15 15 15 15 15 15 15  15                                                     Ther Ex                       TGE                              TP   T 2'  T 2'  T 2' T 2'  T 2' Y  2'  T 2'  T 2'  T 2'     TP scrape  T 1'  T 1'  T 1;'  T 1'  T 2/10 Y 2/10  T 2' T 2'  T 1     TP FDS  T 2/10  T 2/10  T 2/10  T 2/10  T 2/10  Y 2/10  T 2/10  T 2'  T 2/10     Glen  25 2/10  25 2/10  25 2/10  25 2/10  25 2/10  25 2/10  25 2/10  25 2/10  25 2/10     Blue  L III 2/10  L III 2/10  L III 2/10 L III 2/10  L III 2/10  L III 2/10  L III 2/10 L III  2/10  L III 2/10     Flexbar   G 20/20  G 20/20  G 20/20  G 20/20  G 20/20  G 20/20  G 20/20 G 20/20   G 20/20     DB E/F w/buildup  4 2/10  4 2/10  5 2/10  5 2/10  5 2/10 5 2/10  4 2/10  4 2/10  4 2/10           S/P  w/buildup  2 2/10  2 2/10  2 2/10  2 2/10  2 2/10  2 2/10  2 2/10  2 2/10  2 2/10      FF/Scapt  6 2/10  6 2/10  6 2/10  6 2/10  6 2/10  6 2/10  6 2/10  6 2/10  6 2/10     OH tricep  4 2/10  4 2/10  5 2/10  5 2/10  5 2/10  5 2/10  4 2/10  4 2/10  4 2/10                                                     Comp  Y 3' Y3' Y 3'  Y 3'  Y3'  Y 3                                   Modalities                       US 12 12 12 12 12 12 12 12  12

## 2023-11-14 NOTE — PROGRESS NOTES
[facility] [facility]  Sleep Medicine Follow up/ Established Patient Visit      Assessment/Plan:  Jamir Villasenor was seen today for compliance . Diagnoses and all orders for this visit:    FARA (obstructive sleep apnea)  -     PAP DME Pressure Change    Obesity (BMI 30-39.9)  -     PAP DME Pressure Change      Jamir Villasenor is a pleasant 69-year-old gentleman with a PMHx of osteoarthritis, history of basal cell carcinoma, melanoma in situ, stage III chronic kidney disease, HLD who presents in follow up for FARA (SHIMA 58, O2 ling 78% per HSAT 6/2023). He actually overall is doing quite well with BiPAP therapy from a symptomatic standpoint, endorsing significant improvements in his snoring and daytime sleepiness. However, he is continuing to endorse ongoing nocturnal awakenings. Upon review of his compliance download, his AHI is still supratherapeutic at 10.9, and at least per the compliance report, it appears that a significant number of these are obstructive (index of 6.2, compared to central apnea index of 3.9). As such, I would like to try adjusting his bilevel PAP settings to obtain a better control over the obstructive events. - Continue bilevel PAP; I did adjust the settings to auto BiPAP with a Maximum IPAP 25 cmH2O, minimum EPAP [id number], PS 5 cmH2O.  - Reviewed CMS/insurance requirements and resupply guidelines  - Information provided on the above as well as general maintenance steps  - he is to see me back in ~3 to 6 months to see how he is doing and what effect the change to his settings had. Should he continue to have a supratherapeutic AHI, I will review the central versus obstructive indices and decide whether any further adjustments need to be made. ________________________________________________________________________________________________    Per Last Visit Note (Date: 5/23/2023): PLAN:  1.  With respect to above conditions, comprehensive counseling provided on pathophysiology; effects on symptoms and comorbidities, diagnostic strategies & limitations; treatment options; risks or no treament; risks & benefits of the various therapeutic options; costs and insurance aspects. 2. I advised weight reduction, avoiding sleeping supine, using alcohol or sedating medications close to bed time and on safe driving practices. 3.  Since results of prior studies are no longer available, repeat nocturnal polysomnography is indicated and home sleep testing will be scheduled. 4.  Patient is willing to reinitiate positive airway pressure therapy and will need a new device. He will be scheduled for a titration study if indicated. 5. Follow-up to be scheduled after the studies to review results, further details of treatment options and to initiate/adjust therapy. Sleep Studies:  -HSAT 6/21/2023: .9 minutes. SHIMA 58.0, O2 ling 78%. -PAP titration 7/26/2023: Bilevel PAP pressure of 15/10 cm H2O utilizing ResMed AirTouch F20 resulted in  reduction of SHIMA to 9.7, with more than 50% central events. Minimum O2 saturation maintained above 90%. Patient was observed during REM sleep but not supine at this final pressure.    ________________________________________________________________________________________________      Interval History: Ivy Vasquez is a 70 y.o. male with a PMHx of osteoarthritis, history of basal cell carcinoma, melanoma in situ, stage III chronic kidney disease, HLD who presents in follow up for FARA. SDB:  -Current experience with PAP Therapy: He tells me that he feels it is working well; snoring reduced. Does feel that his sleepiness during the day is much improved, however he still feels like he is waking up a couple times throughout the night.  -Mask type: FFM  -Difficulties with mask: Denies  -Device: ResMed AirCurve v10 Auto  -Difficulties with device: Denies  -Compliance:          [place] Sleepiness Scale:  What are your chances of dozing?    0= no chance  1= slight chance  2= moderate chance  3= high chance    Sitting and readin   Watching TV: 1  Sitting, inactive in a public place (e.g. a theatre or a meeting):1  As a passenger in a car for an hour without a break: 1  Lying down to rest in the afternoon when circumstances permit: 1   Sitting and talking to someone: 0  Sitting quietly after a lunch without alcohol: 0  In a car, while stopped for a few minutes in the traffic: 0       TOTAL  5/24  Greater or equal to 10 is positive for excessive daytime sleepiness        SLEEP HYGIENE QUESTIONS:  Bedtime: 2300   Time it takes to fall sleep: 15 minutes  Wake up Time: 0600   Number of times patient wakes up per night: 2-3  Reason (s) why patient wakes up during the night: Adjustment, body position   Estimated total sleep time ( in a 24 hour period of time): ~7  hours  Naps: 0 "Not anymore"    PMH, PSH, SH: Denies     SLEEP RELATED ROS   No Known Allergies  CURRENT MEDICATIONS:  Current Outpatient Medications   Medication Instructions    allopurinol (ZYLOPRIM) 445 mg, Oral, Daily    cholecalciferol (VITAMIN D3) 2,000 Units, Oral, Daily    Flowflex COVID-19 Ag Home Test KIT FOLLOW INSTRUCTIONS INCLUDED WITH THE PACKAGE. predniSONE 5 mg, Oral, Daily    sildenafil (VIAGRA) 50 mg, Oral, As needed    simvastatin (ZOCOR) 20 mg tablet TAKE 1 TABLET DAILY         PHYSICAL EXAMINATION:  Vital Signs: /74 (BP Location: Right arm, Cuff Size: Large)   Pulse 85   Temp 98 °F (36.7 °C) (Temporal)   Resp 16   Ht 5' 7" (1.702 m)   Wt 97.2 kg (214 lb 3.2 oz)   SpO2 98%   BMI 33.55 kg/m²   PHYSICAL EXAM:  Constitutional: MARKY, well appearing   Mental Status: A&Ox3  Skin: Warm, dry, no rashes noted   Eyes: PERRL, normal conjunctiva  ENT: Nasal congestion absent, Nasal valve incompetence absent. Posterior airspace: Mccullough tongue position 4, retrognathia absent. Overbite absent. High arched palate absent. Tongue scalloping/ridging absent. Uvula:nonedematous  Chest: No evidence of respiratory distress, no accessory muscle use; no evidence of peripheral cyanosis  Abdomen: Soft, NT/ND  Extremities: No digital clubbing or pedal edema  Neuro: Strength 5/5 throughout, sensation grossly intact      I have spent a total time of 20-30 minutes on 11/14/23 in caring for this patient including Diagnostic results, Prognosis, Risks and benefits of tx options, Instructions for management, Patient and family education, Importance of tx compliance, Documenting in the medical record, Reviewing / ordering tests, medicine, procedures  , and Obtaining or reviewing history  . Electronically signed by:    Alfonso Corado [address] [facility]  [address] [place] [address]  11/15/2023

## 2023-11-15 ENCOUNTER — OFFICE VISIT (OUTPATIENT)
Facility: CLINIC | Age: 68
End: 2023-11-15
Payer: COMMERCIAL

## 2023-11-15 ENCOUNTER — OFFICE VISIT (OUTPATIENT)
Dept: SLEEP CENTER | Facility: CLINIC | Age: 68
End: 2023-11-15
Payer: COMMERCIAL

## 2023-11-15 VITALS
HEIGHT: 67 IN | BODY MASS INDEX: 33.62 KG/M2 | SYSTOLIC BLOOD PRESSURE: 120 MMHG | TEMPERATURE: 98 F | OXYGEN SATURATION: 98 % | DIASTOLIC BLOOD PRESSURE: 74 MMHG | RESPIRATION RATE: 16 BRPM | WEIGHT: 214.2 LBS | HEART RATE: 85 BPM

## 2023-11-15 DIAGNOSIS — M25.642 STIFFNESS OF LEFT HAND, NOT ELSEWHERE CLASSIFIED: Primary | ICD-10-CM

## 2023-11-15 DIAGNOSIS — G47.33 OSA (OBSTRUCTIVE SLEEP APNEA): Primary | ICD-10-CM

## 2023-11-15 DIAGNOSIS — E66.9 OBESITY (BMI 30-39.9): ICD-10-CM

## 2023-11-15 PROCEDURE — 99213 OFFICE O/P EST LOW 20 MIN: CPT | Performed by: STUDENT IN AN ORGANIZED HEALTH CARE EDUCATION/TRAINING PROGRAM

## 2023-11-15 PROCEDURE — 97110 THERAPEUTIC EXERCISES: CPT | Performed by: PHYSICAL THERAPIST

## 2023-11-15 PROCEDURE — 97035 APP MDLTY 1+ULTRASOUND EA 15: CPT | Performed by: PHYSICAL THERAPIST

## 2023-11-15 PROCEDURE — 97140 MANUAL THERAPY 1/> REGIONS: CPT | Performed by: PHYSICAL THERAPIST

## 2023-11-15 PROCEDURE — 97112 NEUROMUSCULAR REEDUCATION: CPT | Performed by: PHYSICAL THERAPIST

## 2023-11-15 NOTE — PATIENT INSTRUCTIONS
Continue PAP Therapy  Continue BiPAP; I am adjusting settings today as we discussed to help obtain better control of your FARA. Remember to clean your mask and equipment regularly, as directed. You should be eligible for new supplies approximately every 3-6 months, depending on your insurance coverage. Contact your Durable Medical Equipment (DME) company for new supplies as needed. Follow up in 3-6 months. Care and Maintenance  Headgear should be washed as needed. Daily inspection and weekly washings are recommended. Do not disassemble the straps. Machine wash in warm water, making sure to attach Velcro hooks and tabs before washing. Line dry or machine dry on a low setting. Masks should be washed every day. Daily inspection is recommended. Leave the mask and tubing attached. Gently wash the mask with a soft cloth using warm water and mild detergent, concentrating on the mask cushion flaps. DO NOT use alcohol or bleach. Rinse thoroughly and air dry. Tubing and headgear should be washed weekly. Daily inspection is recommended. Wash in warm water and mild detergent and rinse thoroughly. Hook the tubing to the machine and blow until dry. Humidifier should be washed daily and filled with DISTILLED water before use. Wash with warm water and mild detergent. Disinfect weekly by soaking with a solution of 1 part white vinegar and 3 parts water for 30 minutes. Rinse thoroughly and air dry. Disposable filters should be replaced once a month. Wash reusable foam filters with warm water and mild detergent at least once a month. Rinse thoroughly and dry with paper towels. Avoid  that contain fragrance or conditioners, as these will leave a residue. NEVER iron any soft goods. CMS Requirements    Your insurance requires a face-to-face follow up visit within a 31-90 day period after starting CPAP. Your insurance requires compliance with CPAP, which is at least 4 hours per night for 70% of the time.  This must be done over a 30 day period and must occur within the initial 31-90 day period after starting CPAP. Your insurance also requires at least yearly follow ups to continue to pay for CPAP supplies. PAP Supply Guidelines    Below are the guidelines for reordering your supplies. You will be responsible for your deductible, co payments, and out of pocket expenses.     Item Frequency   Nasal Mask (no headgear) 1 every 3 months   Nasal Mask Cushion 1 every 2 weeks   Full Face Mask (no headgear) 1 every 3 months   Full Face Mask Cushion 1 every month   Nasal Pillows 1 every 2 weeks   Headgear 1 every 6 months   hin Strap 1 every 6 months   steve 1 every 3 months   Filters: Reusable 1 every 6 months   Filters: Disposable 1 every 2 weeks   Humidifier Chamber(disposable) 1 every 6 months

## 2023-11-15 NOTE — PROGRESS NOTES
Daily Note     Today's date: 11/15/2023  Patient name: Mariah Wilson  : 1955  MRN: [id number]  Referring provider: Alfonso Flanagan MD  Dx:   Encounter Diagnosis     ICD-10-CM    1. Stiffness of left hand, not elsewhere classified  M25.642                      Subjective: pt feels he is improving, but cont to lack  strength due to lack of full AROM with the SF      Objective: See treatment diary below    Pt provided reema tape when not using his ROSALINA; ROSALINA for activity. AROM left wrist E/F- 60/65  Left SF MP- 0/90; PIP- 0/65; DIP- 0/35 (isolated)   Sensation- no tingling noted    Level II- 55/86; Level III- 55/85; 43 EULA- 10/16; Wise-   Circumference at wrist- 17.5 cm; MPs- 22.5cm  Pt instructed in AAROM/partial fisting, isolated AROM at PIP/DIP  Pt also instructed scar management    Assessment: Tolerated treatment well. Patient would benefit from continued PT      Plan: Continue per plan of care. Precautions: wear orthosis as directed.   No aggressive activity with left hand    Manuals 10/23 10/25 10/30  11/1  11/6  11/13  11/15         STM MF/SF 10 10 10 10 15 10 10 10  10                             PROM 2/10 2/10 2/10 2/10 2/10 2/10 2/10 2/10  2/10     FDS 2/10 2/10 2/10 2/10 2/10 2/10 2/10 2/10  2/10     FDP 2/10 2/10 2/10 2/10 2/10 2/10 2/10 2/10  2/10                             IASTM  8  8 8 8  8  8 8  8  8     Neuro Re-Ed                       HP/Maldivian F/ball 15 15 15 15 15 15 15 15  15                                                     Ther Ex                       TGE                              TP   T 2'  T 2'  T 2' T 2'  T 2' Y  2'  Y 2'  T 2'  T 2'     TP scrape  T 1'  T 1'  T 1;'  T 1'  T 2/10 Y 2/10  Y 2' T 2'  T 1     TP FDS  T 2/10  T 2/10  T 2/10  T 2/10  T 2/10  Y 2/10  Y 2/10  T 2'  T 2/10     Glen  25 2/10  25 2/10  25 2/10  25 2/10  25 2/10  25 2/10  25 2/10  25 2/10  25 2/10     Blue  L III 2/10  L III 2/10  L III 2/10  L III 2/10  L III 2/10  L III 2/10  L III 2/10 L III  2/10  L III 2/10     Flexbar   G 20/20  G 20/20  G 20/20  G 20/20  G 20/20  G 20/20  G 20/20 G 20/20   G 20/20     DB E/F w/buildup  4 2/10  4 2/10  5 2/10  5 2/10  5 2/10 5 2/10  5 2/10  4 2/10  4 2/10           S/P  w/buildup  2 2/10  2 2/10  2 2/10  2 2/10  2 2/10  2 2/10  2 2/10  2 2/10  2 2/10      FF/Scapt  6 2/10  6 2/10  6 2/10  6 2/10  6 2/10  6 2/10  6 2/10  6 2/10  6 2/10     OH tricep  4 2/10  4 2/10  5 2/10  5 2/10  5 2/10  5 2/10  5 2/10  4 2/10  4 2/10                                                     Comp  Y 3' Y3' Y 3'  Y 3'  Y3'  Y 3  Y 3'                                 Modalities                       US 12 12 12 12 12 12 12 12  12

## 2023-11-16 ENCOUNTER — TELEPHONE (OUTPATIENT)
Dept: SLEEP CENTER | Facility: CLINIC | Age: 68
End: 2023-11-16

## 2023-11-16 LAB
DME PARACHUTE DELIVERY DATE REQUESTED: NORMAL
DME PARACHUTE ITEM DESCRIPTION: NORMAL
DME PARACHUTE ORDER STATUS: NORMAL
DME PARACHUTE SUPPLIER NAME: NORMAL
DME PARACHUTE SUPPLIER PHONE: NORMAL

## 2023-11-20 ENCOUNTER — OFFICE VISIT (OUTPATIENT)
Facility: CLINIC | Age: 68
End: 2023-11-20
Payer: COMMERCIAL

## 2023-11-20 DIAGNOSIS — M25.642 STIFFNESS OF LEFT HAND, NOT ELSEWHERE CLASSIFIED: Primary | ICD-10-CM

## 2023-11-20 LAB
DME PARACHUTE DELIVERY DATE ACTUAL: NORMAL
DME PARACHUTE DELIVERY DATE REQUESTED: NORMAL
DME PARACHUTE ITEM DESCRIPTION: NORMAL
DME PARACHUTE ORDER STATUS: NORMAL
DME PARACHUTE SUPPLIER NAME: NORMAL
DME PARACHUTE SUPPLIER PHONE: NORMAL

## 2023-11-20 PROCEDURE — 97140 MANUAL THERAPY 1/> REGIONS: CPT

## 2023-11-20 PROCEDURE — 97112 NEUROMUSCULAR REEDUCATION: CPT

## 2023-11-20 PROCEDURE — 97110 THERAPEUTIC EXERCISES: CPT

## 2023-11-20 PROCEDURE — 97035 APP MDLTY 1+ULTRASOUND EA 15: CPT

## 2023-11-20 NOTE — PROGRESS NOTES
Daily Note     Today's date: 2023  Patient name: Yaneli Monique  : 1955  MRN: [id number]  Referring provider: Jenelle Severance, MD  Dx:   Encounter Diagnosis     ICD-10-CM    1. Stiffness of left hand, not elsewhere classified  M25.642                      Subjective: Patient reports that his L digit has stiffness due to scar tissue. He feels the scar tissue is the main issue. Objective: See treatment diary below. Pt provided reema tape when not using his ROSALINA; ROSALINA for activity. AROM left wrist E/F- 60/65  Left SF MP- 0/90; PIP- 0/65; DIP- 0/35 (isolated)   Sensation- no tingling noted    Level II- 55/86; Level III- 55/85; 43 EULA- 10/16; Wise-   Circumference at wrist- 17.5 cm; MPs- 22.5cm  Pt instructed in AAROM/partial fisting, isolated AROM at PIP/DIP  Pt also instructed scar management      Assessment: Tolerated treatment well. Patient would benefit from continued PT to manage pain symptoms and reduce scar tissue in L palm and digit 5. Scar tissue assessed and noted t/o lateral palm and digit 5. Positive results to IASTM. No increase in pain t/o session. Plan: Continue per plan of care. Precautions: wear orthosis as directed.   No aggressive activity with left hand    Manuals 10/23 10/25 10/30  11/1  11/6  11/13  11/15  11/20       STM MF/SF 10 10 10 10 15 10 10   10                             PROM 2/10 2/10 2/10 2/10 2/10 2/10 2/10 2/10  2/10     FDS 2/10 2/10 2/10 2/10 2/10 2/10 2/10 2/10  2/10     FDP 2/10 2/10 2/10 2/10 2/10 2/10 2/10 2/10  2/10                             IASTM  8  8 8 8  8  8 8 15  8     Neuro Re-Ed                       HP/[place] F/ball 15 15 15 15 15 15 15 15  15                                                     Ther Ex                       TGE                              TP   T 2'  T 2'  T 2' T 2'  T 2' Y  2'  Y 2'  T 2'  T 2'     TP scrape  T 1'  T 1'  T 1;'  T 1'  T 2/10 Y 2/10  Y 2' T 2'  T 1     TP FDS  T 2/10  T 2/10  T 2/10  T 2/10  T 2/10  Y 2/10  Y 2/10  T 2'  T 2/10    Glen  25 2/10  25 2/10  25 2/10  25 2/10  25 2/10  25 2/10  25 2/10  25 2/10  25 2/10    Blue  L III 2/10  L III 2/10  L III 2/10  L III 2/10  L III 2/10  L III 2/10  L III 2/10 L IIII  2/10  L III 2/10    Flexbar   G 20/20  G 20/20  G 20/20  G 20/20  G 20/20  G 20/20  G 20/20 G 20/20   G 20/20    DB E/F w/buildup  4 2/10  4 2/10  5 2/10  5 2/10  5 2/10 5 2/10  5 2/10  5 2/10  4 2/10          S/P  w/buildup  2 2/10  2 2/10  2 2/10  2 2/10  2 2/10  2 2/10  2 2/10  2 2/10  2 2/10     FF/Scapt  6 2/10  6 2/10  6 2/10  6 2/10  6 2/10  6 2/10  6 2/10  6 2/10  6 2/10    OH tricep  4 2/10  4 2/10  5 2/10  5 2/10  5 2/10  5 2/10  5 2/10  4 2/10  4 2/10                                                    Comp  Y 3' Y3' Y 3'  Y 3'  Y3'  Y 3  Y 3'  Y 3'  Y 3'                             Modalities                       US 12 12 12 12 12 12 12 12  12

## 2023-11-22 ENCOUNTER — OFFICE VISIT (OUTPATIENT)
Facility: CLINIC | Age: 68
End: 2023-11-22
Payer: COMMERCIAL

## 2023-11-22 DIAGNOSIS — M25.642 STIFFNESS OF LEFT HAND, NOT ELSEWHERE CLASSIFIED: Primary | ICD-10-CM

## 2023-11-22 PROCEDURE — 97035 APP MDLTY 1+ULTRASOUND EA 15: CPT

## 2023-11-22 PROCEDURE — 97140 MANUAL THERAPY 1/> REGIONS: CPT

## 2023-11-22 PROCEDURE — 97112 NEUROMUSCULAR REEDUCATION: CPT

## 2023-11-22 PROCEDURE — 97110 THERAPEUTIC EXERCISES: CPT

## 2023-11-22 NOTE — PROGRESS NOTES
Daily Note     Today's date: 2023  Patient name: Jose Deng  : 1955  MRN: [id number]  Referring provider: Jack Leung MD  Dx:   Encounter Diagnosis     ICD-10-CM    1. Stiffness of left hand, not elsewhere classified  M25.642                      Subjective: Patient reports that he has some stiffness in his left digit due to incision and scar tissue that improves with IASTM. Objective: See treatment diary below. AROM left wrist E/F- 60/  Left SF MP- 0/90; PIP- 0/65; DIP- 0/35 (isolated)   Sensation- no tingling noted    Level II- 55/86; Level III- 55/85; 43 EULA- 10/16; Wise-   Circumference at wrist- 17.5 cm; MPs- 22.5cm      Assessment: Tolerated treatment well. Patient would benefit from continued PT to manage pain symptoms, increase L hand  strength, and reduce scar tissue in 5th digit and palm areas. Scar tissue noted under incision assessed with IASTM, positive results post session. Plan: Continue per plan of care. Precautions: wear orthosis as directed.   No aggressive activity with left hand    Manuals 10/23 10/25 10/30  11/1  11/6  11/13  11/15  11/20  11/22    STM MF/SF 10 10 10 10 15 10 10   10                             PROM 2/10 2/10 2/10 2/10 2/10 2/10 2/10 2/10  2/10     FDS 2/10 2/10 2/10 2/10 2/10 2/10 2/10 2/10  2/10     FDP 2/10 2/10 2/10 2/10 2/10 2/10 2/10 2/10  2/10                            IASTM  8  8 8 8  8  8 8 15  8    Neuro Re-Ed                      HP/[place]i F/ball 15 15 15 15 15 15 15 15  15                                                  Ther Ex                      TGE                            TP   T 2'  T 2'  T 2' T 2'  T 2' Y  2'  Y 2'  T 2'  T 2'    TP scrape  T 1'  T 1'  T 1;'  T 1'  T 2/10 Y 2/10  Y 2' T 2'  T 1    TP FDS  T 2/10  T 2/10  T 2/10  T 2/10  T 2/10  Y 2/10  Y 2/10  T 2'  T /10    Glen  25 2/10  25 /10  25 /10  25 /10  25 /10  25 /10  25 /10  25 /10  25 2/10 Blue  L III 2/10  L III 2/10  L III 2/10  L III 2/10  L III 2/10  L III 2/10  L III 2/10 L IIII  2/10  L III 2/10    Flexbar   G 20/20  G 20/20  G 20/20  G 20/20  G 20/20  G 20/20  G 20/20 G 20/20   G 20/20    DB E/F w/buildup  4 2/10  4 2/10  5 2/10  5 2/10  5 2/10 5 2/10  5 2/10  5 2/10  5 2/10          S/P  w/buildup  2 2/10  2 2/10  2 2/10  2 2/10  2 2/10  2 2/10  2 2/10  2 2/10  2 2/10     FF/Scapt  6 2/10  6 2/10  6 2/10  6 2/10  6 2/10  6 2/10  6 2/10  6 2/10  6 2/10    OH tricep  4 2/10  4 2/10  5 2/10  5 2/10  5 2/10  5 2/10  5 2/10  4 2/10  4 2/10                                                    Comp  Y 3' Y3' Y 3'  Y 3'  Y3'  Y 3  Y 3'  Y 3'  Y 3'                             Modalities                       US 12 12 12 12 12 12 12 12  12

## 2023-11-27 ENCOUNTER — OFFICE VISIT (OUTPATIENT)
Facility: CLINIC | Age: 68
End: 2023-11-27
Payer: COMMERCIAL

## 2023-11-27 DIAGNOSIS — M25.642 STIFFNESS OF LEFT HAND, NOT ELSEWHERE CLASSIFIED: Primary | ICD-10-CM

## 2023-11-27 PROCEDURE — 97140 MANUAL THERAPY 1/> REGIONS: CPT

## 2023-11-27 PROCEDURE — 97112 NEUROMUSCULAR REEDUCATION: CPT

## 2023-11-27 PROCEDURE — 97035 APP MDLTY 1+ULTRASOUND EA 15: CPT

## 2023-11-27 PROCEDURE — 97110 THERAPEUTIC EXERCISES: CPT

## 2023-11-27 NOTE — PROGRESS NOTES
Daily Note     Today's date: 2023  Patient name: Radha Park  : 1955  MRN: [id number]  Referring provider: Iban Gudino MD  Dx:   Encounter Diagnosis     ICD-10-CM    1. Stiffness of left hand, not elsewhere classified  M25.642                      Subjective: Pt reports "my hand is coming around just have trouble with strength  "      Objective: See treatment diary below    AROM left wrist E/F- 60/65  Left SF MP- 0/90; PIP- 0/65; DIP- 0/35 (isolated)   Sensation- no tingling noted    Level II- 55/86; Level III- 55/85; 43 EULA- 10/16; Wise-   Circumference at wrist- 17.5 cm; MPs- 22.5cm  Assessment: Tolerated treatment fair. Patient would benefit from continued PT. Program appropriate per patient. Concentrate on scar control. Plan: Progress treatment as tolerated. Precautions: wear orthosis as directed.   No aggressive activity with left hand    Manuals 10/23 10/25 10/30  11/1  11/6  11/13  11/15  11/20  11/22 11/27   STM MF/SF 10 10 10 10 15 10 10   10  10                           PROM 2/10 2/10 2/10 2/10 2/10 2/10 2/10 2/10  2/10  2/10   FDS 2/10 2/10 2/10 2/10 2/10 2/10 2/10 2/10  2/10  2/10   FDP 2/10 2/10 2/10 2/10 2/10 2/10 2/10 2/10  2/10  2/10                          IASTM  8  8 8 8  8  8 8 15  8 8   Neuro Re-Ed                      HP/[place] F/ball 15 15 15 15 15 15 15 15  15 15                                                 Ther Ex                      TGE                           TP   T 2'  T 2'  T 2' T 2'  T 2' Y  2'  Y 2'  T 2'  T 2' T2'   TP scrape  T 1'  T 1'  T 1;'  T 1'  T /10 Y /10  Y 2' T 2'  T 1 T1'   TP FDS  T 2/10  T 2/10  T 2/10  T 2/10  T 2/10  Y 2/10  Y 2/10  T 2'  T 2/10 T2/10   Glen  25 2/10  25 2/10  25 2/10  25 2/10  25 2/10  25 2/10  25 2/10  25 2/10  25 2/10 25 2/10   Blue  L III 2/10  L III 2/10  L III 2/10  L III 2/10  L III 2/10  L III 2/10  L III 2/10 L IIII  2/10  L III 2/10 LIII 2/10 Flexbar   G 20/20  G 20/20  G 20/20  G 20/20  G 20/20  G 20/20  G 20/20 G 20/20   G 20/20 G 2/10   DB E/F w/buildup  4 2/10  4 2/10  5 2/10  5 2/10  5 2/10 5 2/10  5 2/10  5 2/10  5 2/10 5 2/10         S/P  w/buildup  2 2/10  2 2/10  2 2/10  2 2/10  2 2/10  2 2/10  2 2/10  2 2/10  2 2/10 2 2/10    FF/Scapt  6 2/10  6 2/10  6 2/10  6 2/10  6 2/10  6 2/10  6 2/10  6 2/10  6 2/10 6 2/10   OH tricep  4 2/10  4 2/10  5 2/10  5 2/10  5 2/10  5 2/10  5 2/10  4 2/10  4 2/10 4 2/10                                                   Comp  Y 3' Y3' Y 3'  Y 3'  Y3'  Y 3  Y 3'  Y 3'  Y 3'  y3'                           Modalities                       US 12 12 12 12 12 12 12 12  12  12

## 2023-11-29 ENCOUNTER — TRANSCRIBE ORDERS (OUTPATIENT)
Dept: PHYSICAL THERAPY | Facility: CLINIC | Age: 68
End: 2023-11-29

## 2023-11-29 ENCOUNTER — OFFICE VISIT (OUTPATIENT)
Facility: CLINIC | Age: 68
End: 2023-11-29
Payer: COMMERCIAL

## 2023-11-29 DIAGNOSIS — E55.9 VITAMIN D DEFICIENCY: ICD-10-CM

## 2023-11-29 DIAGNOSIS — M25.642 STIFFNESS OF LEFT HAND, NOT ELSEWHERE CLASSIFIED: Primary | ICD-10-CM

## 2023-11-29 DIAGNOSIS — M10.09 IDIOPATHIC GOUT OF MULTIPLE SITES, UNSPECIFIED CHRONICITY: ICD-10-CM

## 2023-11-29 DIAGNOSIS — Z79.899 ENCOUNTER FOR LONG-TERM (CURRENT) USE OF OTHER MEDICATIONS: ICD-10-CM

## 2023-11-29 PROCEDURE — 97110 THERAPEUTIC EXERCISES: CPT | Performed by: PHYSICAL THERAPIST

## 2023-11-29 PROCEDURE — 97112 NEUROMUSCULAR REEDUCATION: CPT | Performed by: PHYSICAL THERAPIST

## 2023-11-29 PROCEDURE — 97035 APP MDLTY 1+ULTRASOUND EA 15: CPT | Performed by: PHYSICAL THERAPIST

## 2023-11-29 PROCEDURE — 97140 MANUAL THERAPY 1/> REGIONS: CPT | Performed by: PHYSICAL THERAPIST

## 2023-11-29 NOTE — PROGRESS NOTES
PT Re-Evaluation     Today's date: 2023  Patient name: Asaf Shelton  : 1955  MRN: [id number]  Referring provider: Catherine Velazquez MD  Dx:   Encounter Diagnosis     ICD-10-CM    1. Stiffness of left hand, not elsewhere classified  M25.642                      Assessment  Assessment details: Pt is a 69 YO male presenting to PT with pain, decreased AROM, strength and tolerance to activity. Pt would benefit from skilled intervention to address these issues and maximize overall function. Occupation- - currently working from home  Dominant- right; Involved- left SF  Pt improving in his ability to form a composite fist, with improving FDS/FDP isolation. PT utilizing ROSALINA, IASTM isolated FDS/FDP AROM and PIP PROM. Goals  ST. Decrease pain to 0-2/10 in 4 weeks            2. Decrease swelling left hand            3. Increase AROM to [facility] in 6-8 weeks            4. Maintain clean wound and promote healing            5.  Provide orthotic for protection  LT. Increase functional motion and strength for independence with ADL and self care by DC            2. Ability to RT recreational activity by DC    Plan  Patient would benefit from: skilled physical therapy  Planned modality interventions: cryotherapy, ultrasound and thermotherapy: hydrocollator packs  Planned therapy interventions: activity modification, manual therapy, orthotic management and training, orthotic fitting/training, neuromuscular re-education, strengthening, stretching, therapeutic activities, therapeutic exercise and home exercise program  Frequency: 2x week  Duration in weeks: 4  Treatment plan discussed with: patient      Subjective Evaluation    History of Present Illness  Date of surgery: 2023  Mechanism of injury: Pt noted inability to bend his left SF approximately 2022. He reported no known trauma. MRI confirmed SF FDS and FDP rupture. Primary repair completed 2022.   Recent tenolysis with surgeon noting significant adhesions and tendon attenuation  Patient Goals  Patient goals for therapy: decreased edema, decreased pain, increased motion, increased strength, independence with ADLs/IADLs and return to sport/leisure activities    Pain  Current pain ratin  At best pain ratin  At worst pain ratin  Location: left hand and SF  Quality: dull ache  Relieving factors: ice    Hand dominance: right    Treatments  Current treatment: physical therapy      Objective     General Comments:      Wrist/Hand Comments  AROM left wrist E/F- 60/65  Left SF MP- 0/90; PIP- 0/65; DIP- 0 (isolated)   Sensation- no tingling noted    Level II- 55/; Level III- 55/85; 43 EULA- 10/16; Wise-   Circumference at wrist- 17.5 cm; MPs- 22.5cm             Precautions: wear orthosis as directed.   No aggressive activity with left hand    Manuals             STM MF/SF 10 10 10 10 15 10 10    10  10                           PROM 2/10 2/10 2/10 2/10 2/10 2/10 2/10 2/10  2/10  2/10   FDS 2/10 2/10 2/10 2/10 2/10 2/10 2/10 2/10  2/10  2/10   FDP 2/10 2/10 2/10 2/10 2/10 2/10 2/10 2/10  2/10  2/10                           IASTM  8  8 8 8  8  8 8 15  8 8   Neuro Re-Ed                       HP/[place] F/ball 15 15 15 15 15 15 15 15  15 15                                                   Ther Ex                       TGE                            TP   T 2'  T 2'  T 2' T 2'  T 2' Y  2'  Y 2'  T 2'  T 2' T2'   TP scrape  T 1'  T 1'  T 1;'  T 1'  T 2/10 Y 2/10  Y 2' T 2'  T 1 T1'   TP FDS  T 10  T /10  T /10  T /10  T /10  Y /10  Y /10  T 2'  T 2/10 T2/10   Glen  25 /10  25 /10  25 /10  25 /10  25 /10  25 /10  25 /10  25 2/10  25 2/10 25 2/10   Blue  L III 2/10  L III 2/10  L III 2/10  L III 2/10  L III 2/10  L III 2/10  L III 2/10 L IIII  2/10  L III 2/10 LIII 2/10   Flexbar   G 20/20  G 20/20  G 20/20  G 20/20  G 20/20  G 20/20  G 20/20 G 20/20   G 20/20 G 2/10   DB E/F w/buildup  5 2/10  4 2/10  5 2/10  5 2/10  5 2/10 5 2/10  5 2/10  5 2/10  5 2/10 5 2/10         S/P  w/buildup  2 2/10  2 2/10  2 2/10  2 2/10  2 2/10  2 2/10  2 2/10  2 2/10  2 2/10 2 2/10    FF/Scapt  6 2/10  6 2/10  6 2/10  6 2/10  6 2/10  6 2/10  6 2/10  6 2/10  6 2/10 6 2/10   OH tricep  4 2/10  4 2/10  5 2/10  5 2/10  5 2/10  5 2/10  5 2/10  4 2/10  4 2/10 4 2/10                                                   Comp  Y 3' Y3' Y 3'  Y 3'  Y3'  Y 3  Y 3'  Y 3'  Y 3'  y3'                           Modalities                        12 12 12 12 12 12 12 12  12  12

## 2023-11-29 NOTE — LETTER
2023    Caro Arce MD  [address] [place] [id number]    Patient: Geo Gonzalez   YOB: 1955   Date of Visit: 2023     Encounter Diagnosis     ICD-10-CM    1. Stiffness of left hand, not elsewhere classified  M25.642           Dear Dr. Lady Morales:    Thank you for your recent referral of Geo Gonzalez. Please review the attached evaluation summary from Matt's recent visit. Please verify that you agree with the plan of care by signing the attached order. If you have any questions or concerns, please do not hesitate to call. I sincerely appreciate the opportunity to share in the care of one of your patients and hope to have another opportunity to work with you in the near future. Sincerely,    MIGDALIA WilsonT, CHT      Referring Provider:      I certify that I have read the below Plan of Care and certify the need for these services furnished under this plan of treatment while under my care. Caro Arce MD  [address] [place] [id number]  Via Fax: [phone]          PT Re-Evaluation     Today's date: 2023  Patient name: Geo Gonzalez  : 1955  MRN: [id number]  Referring provider: Caro Arce MD  Dx:   Encounter Diagnosis     ICD-10-CM    1. Stiffness of left hand, not elsewhere classified  M25.642                      Assessment  Assessment details: Pt is a 71 YO male presenting to PT with pain, decreased AROM, strength and tolerance to activity. Pt would benefit from skilled intervention to address these issues and maximize overall function. Occupation- - currently working from home  Dominant- right; Involved- left SF  Pt improving in his ability to form a composite fist, with improving FDS/FDP isolation. PT utilizing ROSALINA, IASTM isolated FDS/FDP AROM and PIP PROM. Goals  ST. Decrease pain to 0-2/10 in 4 weeks            2.   Decrease swelling left hand            3. Increase AROM to [facility] in 6-8 weeks            4. Maintain clean wound and promote healing            5.  Provide orthotic for protection  LT. Increase functional motion and strength for independence with ADL and self care by DC            2. Ability to RT recreational activity by DC    Plan  Patient would benefit from: skilled physical therapy  Planned modality interventions: cryotherapy, ultrasound and thermotherapy: hydrocollator packs  Planned therapy interventions: activity modification, manual therapy, orthotic management and training, orthotic fitting/training, neuromuscular re-education, strengthening, stretching, therapeutic activities, therapeutic exercise and home exercise program  Frequency: 2x week  Duration in weeks: 4  Treatment plan discussed with: patient      Subjective Evaluation    History of Present Illness  Date of surgery: 2023  Mechanism of injury: Pt noted inability to bend his left SF approximately 2022. He reported no known trauma. MRI confirmed SF FDS and FDP rupture. Primary repair completed 2022. Recent tenolysis with surgeon noting significant adhesions and tendon attenuation  Patient Goals  Patient goals for therapy: decreased edema, decreased pain, increased motion, increased strength, independence with ADLs/IADLs and return to sport/leisure activities    Pain  Current pain ratin  At best pain ratin  At worst pain ratin  Location: left hand and SF  Quality: dull ache  Relieving factors: ice    Hand dominance: right    Treatments  Current treatment: physical therapy      Objective     General Comments:      Wrist/Hand Comments  AROM left wrist E/F- 60/65  Left SF MP- 0/90; PIP- 0/65; DIP- 0/35 (isolated)   Sensation- no tingling noted    Level II- 55/86; Level III- 55/85; 43 EULA- 10/16; Wise-   Circumference at wrist- 17.5 cm; MPs- 22.5cm             Precautions: wear orthosis as directed.   No aggressive activity with left hand    Manuals 11/29            STM MF/SF 10 10 10 10 15 10 10    10  10                           PROM 2/10 2/10 2/10 2/10 2/10 2/10 2/10 2/10  2/10  2/10   FDS 2/10 2/10 2/10 2/10 2/10 2/10 2/10 2/10  2/10  2/10   FDP 2/10 2/10 2/10 2/10 2/10 2/10 2/10 2/10  2/10  2/10                           IASTM  8  8 8 8  8  8 8 15  8 8   Neuro Re-Ed                       HP/[place] F/ball 15 15 15 15 15 15 15 15  15 15                                                   Ther Ex                       TGE 2/5 2/5 2/5 2/5 2/5 2/5 2/5  2/5  2/5 2/5                           TP   T 2'  T 2'  T 2' T 2'  T 2' Y  2'  Y 2'  T 2'  T 2' T2'   TP scrape  T 1'  T 1'  T 1;'  T 1'  T 2/10 Y 2/10  Y 2' T 2'  T 1 T1'   TP FDS  T 2/10  T 2/10  T 2/10  T 2/10  T 2/10  Y 2/10  Y 2/10  T 2'  T 2/10 T2/10   Glen  25 2/10  25 2/10  25 2/10  25 2/10  25 2/10  25 2/10  25 2/10  25 2/10  25 2/10 25 2/10   Blue  L III 2/10  L III 2/10  L III 2/10  L III 2/10  L III 2/10  L III 2/10  L III 2/10 L IIII  2/10  L III 2/10 LIII 2/10   Flexbar   G 20/20  G 20/20  G 20/20  G 20/20  G 20/20  G 20/20  G 20/20 G 20/20   G 20/20 G 2/10   DB E/F w/buildup  5 2/10  4 2/10  5 2/10  5 2/10  5 2/10 5 2/10  5 2/10  5 2/10  5 2/10 5 2/10         S/P  w/buildup  2 2/10  2 2/10  2 2/10  2 2/10  2 2/10  2 2/10  2 2/10  2 2/10  2 2/10 2 2/10    FF/Scapt  6 2/10  6 2/10  6 2/10  6 2/10  6 2/10  6 2/10  6 2/10  6 2/10  6 2/10 6 2/10   OH tricep  4 2/10  4 2/10  5 2/10  5 2/10  5 2/10  5 2/10  5 2/10  4 2/10  4 2/10 4 2/10                                                   Comp  Y 3' Y3' Y 3'  Y 3'  Y3'  Y 3  Y 3'  Y 3'  Y 3'  y3'                           Modalities                        12 12 12 12 12 12 12 12 12  12

## 2023-12-04 ENCOUNTER — OFFICE VISIT (OUTPATIENT)
Facility: CLINIC | Age: 68
End: 2023-12-04
Payer: COMMERCIAL

## 2023-12-04 DIAGNOSIS — M25.642 STIFFNESS OF LEFT HAND, NOT ELSEWHERE CLASSIFIED: Primary | ICD-10-CM

## 2023-12-04 PROCEDURE — 97112 NEUROMUSCULAR REEDUCATION: CPT

## 2023-12-04 PROCEDURE — 97140 MANUAL THERAPY 1/> REGIONS: CPT

## 2023-12-04 PROCEDURE — 97035 APP MDLTY 1+ULTRASOUND EA 15: CPT

## 2023-12-04 PROCEDURE — 97110 THERAPEUTIC EXERCISES: CPT

## 2023-12-04 NOTE — PROGRESS NOTES
Daily Note     Today's date: 2023  Patient name: Keren Moody  : 1955  MRN: [id number]  Referring provider: Khadra Ba MD  Dx: No diagnosis found. Subjective: Pt reports that "I am doing my best to utilize my fingers better"      Objective: See treatment diary below    AROM left wrist E/F- 60/65  Left SF MP- 0/90; PIP- 0/65; DIP- 0/35 (isolated)   Sensation- no tingling noted    Level II- 55/86; Level III- 55/85; 43 EULA- 10/16; Wise-   Circumference at wrist- 17.5 cm; MPs- 22.5cm  Assessment: Tolerated treatment fair. Patient would benefit from continued PT. Pt responding well to current program with emphasis on SF flexion . Plan: Progress treatment as tolerated. Precautions: wear orthosis as directed.   No aggressive activity with left hand    Manuals            STM MF/SF 10 10 10 10 15 10 10    10  10                           PROM 2/10 2/10 2/10 2/10 2/10 2/10 2/10 2/10  2/10  2/10   FDS 2/10 2/10 2/10 2/10 2/10 2/10 2/10 2/10  2/10  2/10   FDP 2/10 2/10 2/10 2/10 2/10 2/10 2/10 2/10  2/10  2/10                           IASTM  8  8 8 8  8  8 8 15  8 8   Neuro Re-Ed                       HP/[place] F/ball 15 15 15 15 15 15 15 15  15 15                                                   Ther Ex                       TGE                            TP   T 2'  T 2'  T 2' T 2'  T 2' Y  2'  Y 2'  T 2'  T 2' T2'   TP scrape  T 1'  T 1'  T 1;'  T 1'  T 2/10 Y /10  Y 2' T 2'  T 1 T1'   TP FDS  T 10  T /10  T /10  T /10  T /10  Y /10  Y /10  T 2'  T 2/10 T2/10   Glen  25 2/10  25 2/10  25 2/10  25 2/10  25 2/10  25 2/10  25 2/10  25 2/10  25 2/10 25 2/10   Blue  L III 2/10  L III 2/10  L III 2/10  L III 2/10  L III 2/10  L III 2/10  L III 2/10 L IIII  2/10  L III 2/10 LIII 2/10   Flexbar   G 20/20  G 20/20  G 20/20  G 20/20  G 20/20  G 20/20  G 20/20 G 20/20   G 20/20 G 2/10   DB E/F w/buildup  5 10  5 2/10  5 2/10  5 2/10  5 2/10 5 2/10  5 2/10  5 2/10  5 2/10 5 2/10         S/P  w/buildup  2 2/10  NV  2 2/10  2 2/10  2 2/10  2 2/10  2 2/10  2 2/10  2 2/10 2 2/10    FF/Scapt  6 2/10  6 2/10  6 2/10  6 2/10  6 2/10  6 2/10  6 2/10  6 2/10  6 2/10 6 2/10   OH tricep  4 2/10  6 2/10  5 2/10  5 2/10  5 2/10  5 2/10  5 2/10  4 2/10  4 2/10 4 2/10                                                   Comp  Y 3' Y3' Y 3'  Y 3'  Y3'  Y 3  Y 3'  Y 3'  Y 3'  y3'                           Modalities                        12 12 12 12 12 12 12 12 12  12

## 2023-12-06 ENCOUNTER — OFFICE VISIT (OUTPATIENT)
Facility: CLINIC | Age: 68
End: 2023-12-06
Payer: COMMERCIAL

## 2023-12-06 DIAGNOSIS — M25.642 STIFFNESS OF LEFT HAND, NOT ELSEWHERE CLASSIFIED: Primary | ICD-10-CM

## 2023-12-06 PROCEDURE — 97035 APP MDLTY 1+ULTRASOUND EA 15: CPT | Performed by: PHYSICAL THERAPIST

## 2023-12-06 PROCEDURE — 97110 THERAPEUTIC EXERCISES: CPT | Performed by: PHYSICAL THERAPIST

## 2023-12-06 PROCEDURE — 97112 NEUROMUSCULAR REEDUCATION: CPT | Performed by: PHYSICAL THERAPIST

## 2023-12-06 PROCEDURE — 97140 MANUAL THERAPY 1/> REGIONS: CPT | Performed by: PHYSICAL THERAPIST

## 2023-12-06 NOTE — PROGRESS NOTES
Daily Note     Today's date: 2023  Patient name: Mani Rivas  : 1955  MRN: [id number]  Referring provider: Shannen Zavaleta MD  Dx:   Encounter Diagnosis     ICD-10-CM    1. Stiffness of left hand, not elsewhere classified  M25.642                      Subjective: pt feels he is making slow, but steady progress. He is completing his exercises and completing as much activity including his SF as able. Objective: See treatment diary below    AROM left wrist E/F- 60  Left SF MP- 0/90; PIP- 0/65; DIP- 0/35 (isolated)   Sensation- no tingling noted    Level II- 55/86; Level III- 55/85; 43 EULA- 10/16; Wise-   Circumference at wrist- 17.5 cm; MPs- 22.5cm    Assessment: Tolerated treatment well. Patient would benefit from continued PT      Plan: Continue per plan of care. Precautions: wear orthosis as directed.   No aggressive activity with left hand    Manuals           STM MF/SF 10 10 10 10 15 10 10    10  10                           PROM 2/10 2/10 2/10 2/10 2/10 2/10 2/10 2/10  2/10  2/10   FDS 2/10 2/10 2/10 2/10 2/10 2/10 2/10 2/10  2/10  2/10   FDP 2/10 2/10 2/10 2/10 2/10 2/10 2/10 2/10  2/10  2/10                           IASTM  8  8 8 8  8  8 8 15  8 8   Neuro Re-Ed                       HP/[place] F w/ball 12 13 15 15 15 15 15 15  15 15                                                   Ther Ex                       TGE                            TP   Y 2'  Y 2'  Y 2' Y 2'  Y 2' Y  2'  Y 2'  Y 2'  Y 2' Y 2'   TP scrape  T 1'  T 1'  T 1;'  T 1'  T 1' T 1'  T 2' T 2'  T 1 T1'   TP FDS  T 2/10  T 2/10  T 2/10  T 2/10  T 2/10  Y 2/10  Y 2/10  T 2'  T 2/10 T2/10   Glen  25 2/10  25 2/10  25 2/10  25 2/10  25 2/10  25 2/10  25 2/10  25 2/10  25 2/10 25 2/10   Blue  L III 2/10  L III 2/10  L III 2/10  L III 2/10  L III 2/10  L III 2/10  L III 2/10 L IIII  2/10  L III 2/10 LIII 2/10   Flexbar   G 20/20  G 20/20  G 20/20  G 20/20  G 20/20  G 20/20  G 20/20 G 20/20   G 20/20 G 2/10   DB E/F w/buildup  5 2/10  5 2/10  5 2/10  5 2/10  5 2/10 5 2/10  5 2/10  5 2/10  5 2/10 5 2/10         S/P  w/buildup  2 2/10  NV  2 2/10  2 2/10  2 2/10  2 2/10  2 2/10  2 2/10  2 2/10 2 2/10    FF/Scapt  6 2/10  6 2/10  6 2/10  6 2/10  6 2/10  6 2/10  6 2/10  6 2/10  6 2/10 6 2/10   OH tricep  4 2/10  6 2/10  5 2/10  5 2/10  5 2/10  5 2/10  5 2/10  4 2/10  4 2/10 4 2/10                                                   Comp  Y 3' Y3' Y 3'  Y 3'  Y3'  Y 3  Y 3'  Y 3'  Y 3'  Y 3'                           Modalities                       US 12 12 12 12 12 12 12 12  12  12

## 2023-12-11 ENCOUNTER — OFFICE VISIT (OUTPATIENT)
Facility: CLINIC | Age: 68
End: 2023-12-11
Payer: COMMERCIAL

## 2023-12-11 DIAGNOSIS — M25.642 STIFFNESS OF LEFT HAND, NOT ELSEWHERE CLASSIFIED: Primary | ICD-10-CM

## 2023-12-11 PROCEDURE — 97112 NEUROMUSCULAR REEDUCATION: CPT

## 2023-12-11 PROCEDURE — 97035 APP MDLTY 1+ULTRASOUND EA 15: CPT

## 2023-12-11 PROCEDURE — 97110 THERAPEUTIC EXERCISES: CPT

## 2023-12-11 PROCEDURE — 97140 MANUAL THERAPY 1/> REGIONS: CPT

## 2023-12-11 NOTE — PROGRESS NOTES
Daily Note     Today's date: 2023  Patient name: Dayami Camacho  : 1955  MRN: [id number]  Referring provider: Miller Meza MD  Dx:   Encounter Diagnosis     ICD-10-CM    1. Stiffness of left hand, not elsewhere classified  M25.642                      Subjective: Pt reports I am gaining " slowly but surely." I am trying to be aware of focusing on my  when I do things"      Objective: See treatment diary below    AROM left wrist E/F-   Left SF MP- 090; PIP- 065; DIP- 0 (isolated)   Sensation- no tingling noted    Level II- 55/86; Level III- 55/85; 43 EULA- 10/16; Wise-   Circumference at wrist- 17.5 cm; MPs- 22.5cm  Assessment: Tolerated treatment well. Patient would benefit from continued PT      Plan: Progress treatment as tolerated. Precautions: wear orthosis as directed.   No aggressive activity with left hand    Manuals          STM MF/SF 10 10 10 10 15 10 10    10  10                           PROM 2/10 2/10 2/10 2/10 2/10 2/10 2/10 2/10  2/10  2/10   FDS 2/10 2/10 2/10 2/10 2/10 2/10 2/10 2/10  2/10  2/10   FDP 2/10 2/10 2/10 2/10 2/10 2/10 2/10 2/10  2/10  2/10                           IASTM  8  8 8 8  8  8 8 15  8 8   Neuro Re-Ed                       HP/[place] F w/ball 12 13 15 15 15 15 15 15  15 15                                                   Ther Ex                       TGE                            TP   Y 2'  Y 2'  Y 2' Y 2'  Y 2' Y  2'  Y 2'  Y 2'  Y 2' Y 2'   TP scrape  T 1'  T 1'  T 1;'  T 1'  T 1' T 1'  T 2' T 2'  T 1 T1'   TP FDS  T 2/10  T 2/10  T 2/10  T 2/10  T 2/10  Y 2/10  Y 2/10  T 2'  T 2/10 T2/10   Glen  25 2/10  25 2/10  25 2/10  25 2/10  25 2/10  25 2/10  25 2/10  25 2/10  25 2/10 25 2/10   Blue  L III 2/10  L III 2/10  L III 2/10  L III 2/10  L III 2/10  L III 2/10  L III 2/10 L IIII  2/10  L III 2/10 LIII 2/10   Flexbar   G 20/20  G 20/20  G 20/20  G 20/20  G 20/20  G 20/20  G 20/20 G 20/20   G 20/20 G 2/10   DB E/F w/buildup  5 2/10  5 2/10  5 2/10  6 2/10  5 2/10 5 2/10  5 2/10  5 2/10  5 2/10 5 2/10         S/P  w/buildup  2 2/10  NV  2 2/10  2 2/10  2 2/10  2 2/10  2 2/10  2 2/10  2 2/10 2 2/10    FF/Scapt  6 2/10  6 2/10  6 2/10  6 2/10  6 2/10  6 2/10  6 2/10  6 2/10  6 2/10 6 2/10   OH tricep  4 2/10  6 2/10  5 2/10  5 2/10  5 2/10  5 2/10  5 2/10  4 2/10  4 2/10 4 2/10                                                   Comp & donuts Y 3' Y3' Y 3'  Y 3'  Y3'  Y 3  Y 3'  Y 3'  Y 3'  Y 3'                           Modalities                        12 12 12 12 12 12 12 12  12  12

## 2023-12-13 ENCOUNTER — OFFICE VISIT (OUTPATIENT)
Facility: CLINIC | Age: 68
End: 2023-12-13
Payer: COMMERCIAL

## 2023-12-13 ENCOUNTER — APPOINTMENT (OUTPATIENT)
Dept: LAB | Facility: MEDICAL CENTER | Age: 68
End: 2023-12-13
Payer: COMMERCIAL

## 2023-12-13 DIAGNOSIS — M25.642 STIFFNESS OF LEFT HAND, NOT ELSEWHERE CLASSIFIED: ICD-10-CM

## 2023-12-13 DIAGNOSIS — M25.642 STIFFNESS OF LEFT HAND, NOT ELSEWHERE CLASSIFIED: Primary | ICD-10-CM

## 2023-12-13 DIAGNOSIS — M10.09 IDIOPATHIC GOUT OF MULTIPLE SITES, UNSPECIFIED CHRONICITY: ICD-10-CM

## 2023-12-13 DIAGNOSIS — Z79.899 ENCOUNTER FOR LONG-TERM (CURRENT) USE OF OTHER MEDICATIONS: ICD-10-CM

## 2023-12-13 DIAGNOSIS — E55.9 VITAMIN D DEFICIENCY: ICD-10-CM

## 2023-12-13 LAB
25(OH)D3 SERPL-MCNC: 30.5 NG/ML (ref 30–100)
ALBUMIN SERPL BCP-MCNC: 4.2 G/DL (ref 3.5–5)
ALP SERPL-CCNC: 53 U/L (ref 34–104)
ALT SERPL W P-5'-P-CCNC: 30 U/L (ref 7–52)
ANION GAP SERPL CALCULATED.3IONS-SCNC: 5 MMOL/L
AST SERPL W P-5'-P-CCNC: 17 U/L (ref 13–39)
BASOPHILS # BLD AUTO: 0.05 THOUSANDS/ÂΜL (ref 0–0.1)
BASOPHILS NFR BLD AUTO: 1 % (ref 0–1)
BILIRUB SERPL-MCNC: 0.57 MG/DL (ref 0.2–1)
BUN SERPL-MCNC: 21 MG/DL (ref 5–25)
CALCIUM SERPL-MCNC: 9.7 MG/DL (ref 8.4–10.2)
CHLORIDE SERPL-SCNC: 108 MMOL/L (ref 96–108)
CO2 SERPL-SCNC: 29 MMOL/L (ref 21–32)
CREAT SERPL-MCNC: 1.52 MG/DL (ref 0.6–1.3)
CRP SERPL QL: 1.7 MG/L
EOSINOPHIL # BLD AUTO: 0.16 THOUSAND/ÂΜL (ref 0–0.61)
EOSINOPHIL NFR BLD AUTO: 3 % (ref 0–6)
ERYTHROCYTE [DISTWIDTH] IN BLOOD BY AUTOMATED COUNT: 13.8 % (ref 11.6–15.1)
ERYTHROCYTE [SEDIMENTATION RATE] IN BLOOD: 16 MM/HOUR (ref 0–19)
GFR SERPL CREATININE-BSD FRML MDRD: 46 ML/MIN/1.73SQ M
GLUCOSE P FAST SERPL-MCNC: 93 MG/DL (ref 65–99)
HCT VFR BLD AUTO: 43.8 % (ref 36.5–49.3)
HGB BLD-MCNC: 14.8 G/DL (ref 12–17)
IMM GRANULOCYTES # BLD AUTO: 0.05 THOUSAND/UL (ref 0–0.2)
IMM GRANULOCYTES NFR BLD AUTO: 1 % (ref 0–2)
LYMPHOCYTES # BLD AUTO: 1.54 THOUSANDS/ÂΜL (ref 0.6–4.47)
LYMPHOCYTES NFR BLD AUTO: 24 % (ref 14–44)
MCH RBC QN AUTO: 31.4 PG (ref 26.8–34.3)
MCHC RBC AUTO-ENTMCNC: 33.8 G/DL (ref 31.4–37.4)
MCV RBC AUTO: 93 FL (ref 82–98)
MONOCYTES # BLD AUTO: 0.51 THOUSAND/ÂΜL (ref 0.17–1.22)
MONOCYTES NFR BLD AUTO: 8 % (ref 4–12)
NEUTROPHILS # BLD AUTO: 4.12 THOUSANDS/ÂΜL (ref 1.85–7.62)
NEUTS SEG NFR BLD AUTO: 63 % (ref 43–75)
NRBC BLD AUTO-RTO: 0 /100 WBCS
PLATELET # BLD AUTO: 215 THOUSANDS/UL (ref 149–390)
PMV BLD AUTO: 9.6 FL (ref 8.9–12.7)
POTASSIUM SERPL-SCNC: 5 MMOL/L (ref 3.5–5.3)
PROT SERPL-MCNC: 6.9 G/DL (ref 6.4–8.4)
RBC # BLD AUTO: 4.72 MILLION/UL (ref 3.88–5.62)
SODIUM SERPL-SCNC: 142 MMOL/L (ref 135–147)
URATE SERPL-MCNC: 6.1 MG/DL (ref 3.5–8.5)
WBC # BLD AUTO: 6.43 THOUSAND/UL (ref 4.31–10.16)

## 2023-12-13 PROCEDURE — 97035 APP MDLTY 1+ULTRASOUND EA 15: CPT | Performed by: PHYSICAL THERAPIST

## 2023-12-13 PROCEDURE — 85652 RBC SED RATE AUTOMATED: CPT

## 2023-12-13 PROCEDURE — 97112 NEUROMUSCULAR REEDUCATION: CPT | Performed by: PHYSICAL THERAPIST

## 2023-12-13 PROCEDURE — 36415 COLL VENOUS BLD VENIPUNCTURE: CPT

## 2023-12-13 PROCEDURE — 85025 COMPLETE CBC W/AUTO DIFF WBC: CPT

## 2023-12-13 PROCEDURE — 97110 THERAPEUTIC EXERCISES: CPT | Performed by: PHYSICAL THERAPIST

## 2023-12-13 PROCEDURE — 82306 VITAMIN D 25 HYDROXY: CPT

## 2023-12-13 PROCEDURE — 86140 C-REACTIVE PROTEIN: CPT

## 2023-12-13 PROCEDURE — 84550 ASSAY OF BLOOD/URIC ACID: CPT

## 2023-12-13 PROCEDURE — 97140 MANUAL THERAPY 1/> REGIONS: CPT | Performed by: PHYSICAL THERAPIST

## 2023-12-13 PROCEDURE — 80053 COMPREHEN METABOLIC PANEL: CPT

## 2023-12-13 NOTE — PROGRESS NOTES
Daily Note     Today's date: 2023  Patient name: Vashti Villanueva  : 1955  MRN: [id number]  Referring provider: Celio Cruz MD  Dx:   Encounter Diagnosis     ICD-10-CM    1. Stiffness of left hand, not elsewhere classified  M25.642                      Subjective: pt cont to use his hand for increasing function. Objective: See treatment diary below    AROM left wrist E/F- 60/  Left SF MP- 0/90; PIP- 0/65; DIP- 0/35 (isolated)   Sensation- no tingling noted    Level II- 55/86; Level III- 55/85; 43 EULA- 10/16; Wise-   Circumference at wrist- 17.5 cm; MPs- 22.5cm    Assessment: Tolerated treatment well. Patient would benefit from continued PT      Plan: Continue per plan of care. Precautions: wear orthosis as directed.   No aggressive activity with left hand    Manuals         STM MF/SF 10 10 10 10 15 10 10    10  10                           PROM 2/10 2/10 2/10 2/10 2/10 2/10 2/10 2/10  2/10  2/10   FDS 2/10 2/10 2/10 2/10 2/10 2/10 2/10 2/10  2/10  2/10   FDP 2/10 2/10 2/10 2/10 2/10 2/10 2/10 2/10  2/10  2/10                           IASTM  8  8 8 8  8  8 8 15  8 8   Neuro Re-Ed                       HP/[place] F w/ball 15 13 15 15 15 15 15 15  15 15                                                   Ther Ex                       TGE                            TP   Y 2'  Y 2'  Y 2' Y 2'  Y 2' Y  2'  Y 2'  Y 2'  Y 2' Y 2'   TP scrape  T 1'  T 1'  T 1;'  T 1'  T 1' T 1'  T 2' T 2'  T 1 T1'   TP FDS  T 2/10  T 2/10  T 2/10  T 2/10  T 2/10  Y 2/10  Y 2/10  T 2'  T 2/10 T2/10   Glen  25 2/10  25 2/10  25 2/10  25 2/10  25 2/10  25 2/10  25 2/10  25 2/10  25 2/10 25 2/10   Blue  L III 2/10  L III 2/10  L III 2/10  L III 2/10  L III 2/10  L III 2/10  L III 2/10 L IIII  2/10  L III 2/10 LIII 2/10   Flexbar   G 20/20  G 20/20  G 20/20  G 20/20  G 20/20  G 20/20  G 20/20 G 20/20   G 20/20 G 2/10   DB E/F w/buildup  5 2/10  5 2/10  5 2/10  6 2/10  6 2/10 5 2/10  5 2/10  5 2/10  5 2/10 5 2/10         S/P  w/buildup  2 2/10  NV  2 2/10  2 2/10  2 2/10  2 2/10  2 2/10  2 2/10  2 2/10 2 2/10    FF/Scapt  6 2/10  6 2/10  6 2/10  6 2/10  6 2/10  6 2/10  6 2/10  6 2/10  6 2/10 6 2/10   OH tricep  4 2/10  6 2/10  5 2/10  5 2/10  5 2/10  5 2/10  5 2/10  4 2/10  4 2/10 4 2/10                                                   Comp & donuts Y 3' Y3' Y 3'  Y 3'  Y3'  Y 3  Y 3'  Y 3'  Y 3'  Y 3'                           Modalities                        12 12 12 12 12 12 12 12 12  12

## 2023-12-18 ENCOUNTER — OFFICE VISIT (OUTPATIENT)
Facility: CLINIC | Age: 68
End: 2023-12-18
Payer: COMMERCIAL

## 2023-12-18 DIAGNOSIS — M25.642 STIFFNESS OF LEFT HAND, NOT ELSEWHERE CLASSIFIED: Primary | ICD-10-CM

## 2023-12-18 PROCEDURE — 97035 APP MDLTY 1+ULTRASOUND EA 15: CPT

## 2023-12-18 PROCEDURE — 97110 THERAPEUTIC EXERCISES: CPT

## 2023-12-18 PROCEDURE — 97140 MANUAL THERAPY 1/> REGIONS: CPT

## 2023-12-18 PROCEDURE — 97112 NEUROMUSCULAR REEDUCATION: CPT

## 2023-12-18 NOTE — PROGRESS NOTES
"Daily Note     Today's date: 2023  Patient name: Matt Carreno  : 1955  MRN: [id number]  Referring provider: Andres Duke MD  Dx:   Encounter Diagnosis     ICD-10-CM    1. Stiffness of left hand, not elsewhere classified  M25.642                      Subjective: Pt states his finger is getting more functional at home.\"      Objective: See treatment diary below  AROM left wrist E/F- 60/65  Left SF MP- 0/90; PIP- 0/65; DIP- 0/35 (isolated)   Sensation- no tingling noted    Level II- 55/86; Level III- 55/85; 43 EULA- 10/16; Wise-   Circumference at wrist- 17.5 cm; MPs- 22.5cm    Assessment: Tolerated treatment well. Patient would benefit from continued PT      Plan: Progress treatment as tolerated.       Precautions: wear orthosis as directed.  No aggressive activity with left hand    Manuals        STM MF/SF 10 10 10 10 15 10 10    10  10                           PROM 2/10 2/10 2/10 2/10 2/10 2/10 2/10 2/10  2/10  2/10   FDS 2/10 2/10 2/10 2/10 2/10 2/10 2/10 2/10  2/10  2/10   FDP 2/10 2/10 2/10 2/10 2/10 2/10 2/10 2/10  2/10  2/10                           IASTM  8  8 8 8  8  8 8 15  8 8   Neuro Re-Ed                       HP/Welsh F w/ball 15 15 15 15 15 15 15 15  15 15                                                   Ther Ex                       TGE                            TP   Y 2'  Y 2'  Y 2' Y 2'  Y 2' T 2'  Y 2'  Y 2'  Y 2' Y 2'   TP scrape  T 1'  T 1'  T 1;'  T 1'  T 1' T 1'  T 2' T 2'  T 1 T1'   TP FDS  T /10  T /10  T /10  T 2/10  T /10  T 2/10  Y /10  T 2'  T 2/10 T2/10   Glen  25 2/10  25 2/10  25 2/10  25 2/10  25 2/10  25 2/10  25 2/10  25 2/10  25 2/10 25 2/10   Blue  L III 2/10  L III 2/10  L III 2/10  L III 2/10  L III 2/10  L III 2/10  L III 2/10 L IIII  2/10  L III 2/10 LIII 2/10   Flexbar   G 20/20  G 20/20  G 20/20  G 20/20  G 20/20  G 20/20  G 20/20 G 20/20   G 20/20 G 2/10   DB E/F " w/buildup  5 2/10  5 2/10  5 2/10  6 2/10  6 2/10 6 2/10  5 2/10  5 2/10  5 2/10 5 2/10         S/P  w/buildup  2 2/10  NV  2 2/10  2 2/10  2 2/10  2 2/10  2 2/10  2 2/10  2 2/10 2 2/10    FF/Scapt  6 2/10  6 2/10  6 2/10  6 2/10  6 2/10  6 2/10  6 2/10  6 2/10  6 2/10 6 2/10   OH tricep  4 2/10  6 2/10  5 2/10  5 2/10  5 2/10  6 2/10  5 2/10  4 2/10  4 2/10 4 2/10                                                   Comp & donuts Y 3' Y3' Y 3'  Y 3'  Y3'  Y 3  Y 3'  Y 3'  Y 3'  Y 3'                           Modalities                        12 12 12 12 12 12 12 12  12  12

## 2023-12-20 ENCOUNTER — OFFICE VISIT (OUTPATIENT)
Facility: CLINIC | Age: 68
End: 2023-12-20
Payer: COMMERCIAL

## 2023-12-20 DIAGNOSIS — M25.642 STIFFNESS OF LEFT HAND, NOT ELSEWHERE CLASSIFIED: Primary | ICD-10-CM

## 2023-12-20 PROCEDURE — 97112 NEUROMUSCULAR REEDUCATION: CPT | Performed by: PHYSICAL THERAPIST

## 2023-12-20 PROCEDURE — 97140 MANUAL THERAPY 1/> REGIONS: CPT | Performed by: PHYSICAL THERAPIST

## 2023-12-20 PROCEDURE — 97035 APP MDLTY 1+ULTRASOUND EA 15: CPT | Performed by: PHYSICAL THERAPIST

## 2023-12-20 PROCEDURE — 97110 THERAPEUTIC EXERCISES: CPT | Performed by: PHYSICAL THERAPIST

## 2023-12-20 NOTE — LETTER
2023    Andres Duke MD  [address] [facility] [address]    Patient: Matt Carreno   YOB: 1955   Date of Visit: 2023     Encounter Diagnosis     ICD-10-CM    1. Stiffness of left hand, not elsewhere classified  M25.642           Dear Dr. Duke:    Thank you for your recent referral of Matt Carreno. Please review the attached evaluation summary from Matt's recent visit.     Please verify that you agree with the plan of care by signing the attached order.     If you have any questions or concerns, please do not hesitate to call.     I sincerely appreciate the opportunity to share in the care of one of your patients and hope to have another opportunity to work with you in the near future.       Sincerely,    Lefty Boothe, DPT, CHT      Referring Provider:      I certify that I have read the below Plan of Care and certify the need for these services furnished under this plan of treatment while under my care.                    Andres Duke MD  [address] [place] [id number]  Via Fax: [phone]          PT Re-Evaluation     Today's date: 2023  Patient name: Matt Carreno  : 1955  MRN: [id number]  Referring provider: Andres Duke MD  Dx:   Encounter Diagnosis     ICD-10-CM    1. Stiffness of left hand, not elsewhere classified  M25.642                      Assessment  Assessment details: Pt is a 69 YO male presenting to PT with pain, decreased AROM, strength and tolerance to activity.  Pt would benefit from skilled intervention to address these issues and maximize overall function.  Occupation- - currently working from home  Dominant- right; Involved- left SF  Pt improving in his ability to form a composite fist, with improving FDS/FDP isolation.  PT utilizing ROSALINA, IASTM isolated FDS/FDP AROM and PIP PROM.      Goals  ST.  Decrease pain to 0-2/10 in 4 weeks            2.  Decrease swelling left  hand            3.  Increase AROM to WFL in 6-8 weeks            4.   Maintain clean wound and promote healing            5.  Provide orthotic for protection  LT.  Increase functional motion and strength for independence with ADL and self care by DC            2. Ability to RT recreational activity by DC    Plan  Patient would benefit from: skilled physical therapy  Planned modality interventions: cryotherapy, ultrasound and thermotherapy: hydrocollator packs  Planned therapy interventions: activity modification, manual therapy, orthotic management and training, orthotic fitting/training, neuromuscular re-education, strengthening, stretching, therapeutic activities, therapeutic exercise and home exercise program  Frequency: 2x week  Duration in weeks: 4  Treatment plan discussed with: patient        Subjective Evaluation    History of Present Illness  Date of surgery: 2023  Mechanism of injury: Pt noted inability to bend his left SF approximately 2022.  He reported no known trauma.  MRI confirmed SF FDS and FDP rupture.  Primary repair completed 2022.  Recent tenolysis with surgeon noting significant adhesions and tendon attenuation  Patient Goals  Patient goals for therapy: decreased edema, decreased pain, increased motion, increased strength, independence with ADLs/IADLs and return to sport/leisure activities    Pain  Current pain ratin  At best pain ratin  At worst pain ratin  Location: left hand and SF  Quality: dull ache  Relieving factors: ice    Hand dominance: right    Treatments  Current treatment: physical therapy        Objective     General Comments:      Wrist/Hand Comments  AROM left wrist E/F- 60/65  Left SF MP- 0/90; PIP- 0/65; DIP- 0/35 (isolated)   Sensation- no tingling noted    Level II- 55/86; Level III- 55/85; 43 EULA- 10/16; Wise-   Circumference at wrist- 17.5 cm; MPs- 22.5cm             Precautions: wear orthosis as directed.  No aggressive activity with  left hand  Manuals 11/29 12/4 12/6 12/11 12/13 12/18 12/20         STM MF/SF 10 10 10 10 15 10 10    10  10                           PROM 2/10 2/10 2/10 2/10 2/10 2/10 2/10 2/10  2/10  2/10   FDS 2/10 2/10 2/10 2/10 2/10 2/10 2/10 2/10  2/10  2/10   FDP 2/10 2/10 2/10 2/10 2/10 2/10 2/10 2/10  2/10  2/10                           IASTM  8  8 8 8  8  8 8 15  8 8   Neuro Re-Ed                       HP/Uzbek F w/ball 15 15 15 15 15 15 15 15  15 15                                                   Ther Ex                       TGE 2/5 2/5 2/5 2/5 2/5 2/5 2/5  2/5  2/5 2/5                           TP   Y 2'  Y 2'  Y 2' Y 2'  Y 2' Y 2'  Y 2'  Y 2'  Y 2' Y 2'   TP scrape  T 1'  T 1'  T 1;'  T 1'  T 1' T 1'  T 2' T 2'  T 1 T1'   TP FDS  T 2/10  T 2/10  T 2/10  T 2/10  T 2/10  T 2/10  T 2/10  T 2'  T 2/10 T2/10   Glen  25 2/10  25 2/10  25 2/10  25 2/10  25 2/10  25 2/10  25 2/10  25 2/10  25 2/10 25 2/10   Blue  L III 2/10  L III 2/10  L III 2/10  L III 2/10  L III 2/10  L III 2/10  L III 2/10 L IIII  2/10  L III 2/10 LIII 2/10   Flexbar   G 20/20  G 20/20  G 20/20  G 20/20  G 20/20  G 20/20  G 20/20 G 20/20   G 20/20 G 2/10   DB E/F w/buildup  5 2/10  5 2/10  5 2/10  6 2/10  6 2/10 6 2/10  6 2/10  5 2/10  5 2/10 5 2/10         S/P  w/buildup  2 2/10  NV  2 2/10  2 2/10  2 2/10  2 2/10  2 2/10  2 2/10  2 2/10 2 2/10    FF/Scapt  6 2/10  6 2/10  6 2/10  6 2/10  6 2/10  6 2/10  6 2/10  6 2/10  6 2/10 6 2/10   OH tricep  4 2/10  6 2/10  5 2/10  5 2/10  5 2/10  6 2/10  6 2/10  4 2/10  4 2/10 4 2/10                                                   Comp & donuts Y 3' Y3' Y 3'  Y 3'  Y3'  Y 3  Y 3'  Y 3'  Y 3'  Y 3'                           Modalities                        12 12 12 12 12 12 12 12 12 12

## 2023-12-20 NOTE — PROGRESS NOTES
PT Re-Evaluation     Today's date: 2023  Patient name: Matt Carreno  : 1955  MRN: [id number]  Referring provider: Andres Duke MD  Dx:   Encounter Diagnosis     ICD-10-CM    1. Stiffness of left hand, not elsewhere classified  M25.642                      Assessment  Assessment details: Pt is a 67 YO male presenting to PT with pain, decreased AROM, strength and tolerance to activity.  Pt would benefit from skilled intervention to address these issues and maximize overall function.  Occupation- - currently working from home  Dominant- right; Involved- left SF  Pt improving in his ability to form a composite fist, with improving FDS/FDP isolation.  PT utilizing ROSALINA, IASTM isolated FDS/FDP AROM and PIP PROM.      Goals  ST.  Decrease pain to 0-2/10 in 4 weeks            2.  Decrease swelling left hand            3.  Increase AROM to WFL in 6-8 weeks            4.   Maintain clean wound and promote healing            5.  Provide orthotic for protection  LT.  Increase functional motion and strength for independence with ADL and self care by DC            2. Ability to RT recreational activity by DC    Plan  Patient would benefit from: skilled physical therapy  Planned modality interventions: cryotherapy, ultrasound and thermotherapy: hydrocollator packs  Planned therapy interventions: activity modification, manual therapy, orthotic management and training, orthotic fitting/training, neuromuscular re-education, strengthening, stretching, therapeutic activities, therapeutic exercise and home exercise program  Frequency: 2x week  Duration in weeks: 4  Treatment plan discussed with: patient        Subjective Evaluation    History of Present Illness  Date of surgery: 2023  Mechanism of injury: Pt noted inability to bend his left SF approximately 2022.  He reported no known trauma.  MRI confirmed SF FDS and FDP rupture.  Primary repair completed 2022.  Recent  tenolysis with surgeon noting significant adhesions and tendon attenuation  Patient Goals  Patient goals for therapy: decreased edema, decreased pain, increased motion, increased strength, independence with ADLs/IADLs and return to sport/leisure activities    Pain  Current pain ratin  At best pain ratin  At worst pain ratin  Location: left hand and SF  Quality: dull ache  Relieving factors: ice    Hand dominance: right    Treatments  Current treatment: physical therapy        Objective     General Comments:      Wrist/Hand Comments  AROM left wrist E/F- 60/65  Left SF MP- 0/90; PIP- 0/65; DIP- 035 (isolated)   Sensation- no tingling noted    Level II- 55/86; Level III- 55/85; 43 EULA- 10/16; Wise-   Circumference at wrist- 17.5 cm; MPs- 22.5cm             Precautions: wear orthosis as directed.  No aggressive activity with left hand  Manuals          STM MF/SF 10 10 10 10 15 10 10    10  10                           PROM 2/10 2/10 2/10 2/10 2/10 2/10 2/10 2/10  2/10  2/10   FDS 2/10 2/10 2/10 2/10 2/10 2/10 2/10 2/10  2/10  2/10   FDP 2/10 2/10 2/10 2/10 2/10 2/10 2/10 2/10  2/10  2/10                           IASTM  8  8 8 8  8  8 8 15  8 8   Neuro Re-Ed                       HP/Salvadorean F w/ball 15 15 15 15 15 15 15 15  15 15                                                   Ther Ex                       TGE                            TP   Y 2'  Y 2'  Y 2' Y 2'  Y 2' Y 2'  Y 2'  Y 2'  Y 2' Y 2'   TP scrape  T 1'  T 1'  T 1;'  T 1'  T 1' T 1'  T 2' T 2'  T 1 T1'   TP FDS  T 2/10  T 10  T 2/10  T 2/10  T 2/10  T 2/10  T 2/10  T 2'  T 2/10 T2/10   Glen  25 2/10  25 2/10  25 2/10  25 2/10  25 2/10  25 2/10  25 2/10  25 2/10  25 2/10 25 2/10   Blue  L III 2/10  L III 2/10  L III 2/10  L III 2/10  L III 2/10  L III 2/10  L III 2/10 L IIII  2/10  L III 2/10 LIII 2/10   Flexbar   G 20/20  G 20/20  G 20/20  G 20/20  G  20/20  G 20/20  G 20/20 G 20/20   G 20/20 G 2/10   DB E/F w/buildup  5 2/10  5 2/10  5 2/10  6 2/10  6 2/10 6 2/10  6 2/10  5 2/10  5 2/10 5 2/10         S/P  w/buildup  2 2/10  NV  2 2/10  2 2/10  2 2/10  2 2/10  2 2/10  2 2/10  2 2/10 2 2/10    FF/Scapt  6 2/10  6 2/10  6 2/10  6 2/10  6 2/10  6 2/10  6 2/10  6 2/10  6 2/10 6 2/10   OH tricep  4 2/10  6 2/10  5 2/10  5 2/10  5 2/10  6 2/10  6 2/10  4 2/10  4 2/10 4 2/10                                                   Comp & donuts Y 3' Y3' Y 3'  Y 3'  Y3'  Y 3  Y 3'  Y 3'  Y 3'  Y 3'                           Modalities                       US 12 12 12 12 12 12 12 12  12  12

## 2023-12-27 ENCOUNTER — APPOINTMENT (OUTPATIENT)
Facility: CLINIC | Age: 68
End: 2023-12-27
Payer: COMMERCIAL

## 2023-12-28 ENCOUNTER — OFFICE VISIT (OUTPATIENT)
Facility: CLINIC | Age: 68
End: 2023-12-28
Payer: COMMERCIAL

## 2023-12-28 DIAGNOSIS — M25.642 STIFFNESS OF LEFT HAND, NOT ELSEWHERE CLASSIFIED: Primary | ICD-10-CM

## 2023-12-28 PROCEDURE — 97110 THERAPEUTIC EXERCISES: CPT | Performed by: PHYSICAL THERAPIST

## 2023-12-28 PROCEDURE — 97140 MANUAL THERAPY 1/> REGIONS: CPT | Performed by: PHYSICAL THERAPIST

## 2023-12-28 PROCEDURE — 97035 APP MDLTY 1+ULTRASOUND EA 15: CPT | Performed by: PHYSICAL THERAPIST

## 2023-12-28 PROCEDURE — 97112 NEUROMUSCULAR REEDUCATION: CPT | Performed by: PHYSICAL THERAPIST

## 2023-12-28 NOTE — PROGRESS NOTES
Daily Note     Today's date: 2023  Patient name: Matt Carreno  : 1955  MRN: [id number]  Referring provider: Andres Duke MD  Dx:   Encounter Diagnosis     ICD-10-CM    1. Stiffness of left hand, not elsewhere classified  M25.642                      Subjective: pt was seen by his physician and notes progress and will continue with therapy to gain motion and strength.        Objective: See treatment diary below    AROM left wrist E/F- 60  Left SF MP- 0/90; PIP- 065; DIP- 0/35 (isolated)   Sensation- no tingling noted    Level II- 55/86; Level III- 55/85; 43 EULA- 10/16; Wise-   Circumference at wrist- 17.5 cm; MPs- 22.5cm     Assessment: Tolerated treatment well. Patient would benefit from continued PT      Plan: Continue per plan of care.      Precautions: wear orthosis as directed.  No aggressive activity with left hand  Manuals        STM MF/SF 10 10 10 10 15 10 10    10  10                           PROM 2/10 2/10 2/10 2/10 2/10 2/10 2/10 2/10  2/10  2/10   FDS 2/10 2/10 2/10 2/10 2/10 2/10 2/10 2/10  2/10  2/10   FDP 2/10 2/10 2/10 2/10 2/10 2/10 2/10 2/10  2/10  2/10                           IASTM  8  8 8 8  8  8 8 15  8 8   Neuro Re-Ed                       HP/Monegasque F w/ball 15 15 15 15 15 15 15 15  15 15                                                   Ther Ex                       TGE                            TP   Y 2'  Y 2'  Y 2' Y 2'  Y 2' Y 2'  Y 2'  Y 2'  Y 2' Y 2'   TP scrape  T 1'  T 1'  T 1;'  T 1'  T 1' T 1'  T 2' T 2'  T 1 T1'   TP FDS  T 2/10  T 2/10  T 2/10  T 2/10  T 2/10  T 2/10  T 2/10  T 2'  T 2/10 T2/10   Glen  25 2/10  25 2/10  25 2/10  25 2/10  25 2/10  25 2/10  25 2/10  25 2/10  25 2/10 25 2/10   Blue  L III 2/10  L III 2/10  L III 2/10  L III 2/10  L III 2/10  L III 2/10  L III 2/10 L IIII  2/10  L III 2/10 LIII 2/10   Flexbar   G 20/20  G 20/20  G 20/20  G 20/20   G 20/20  G 20/20  G 20/20 G 20/20   G 20/20 G 2/10   DB E/F w/buildup  5 2/10  5 2/10  5 2/10  6 2/10  6 2/10 6 2/10  6 2/10  6 2/10  5 2/10 5 2/10         S/P  w/buildup  2 2/10  NV  2 2/10  2 2/10  2 2/10  2 2/10  2 2/10  2 2/10  2 2/10 2 2/10    FF/Scapt  6 2/10  6 2/10  6 2/10  6 2/10  6 2/10  6 2/10  6 2/10  6 2/10  6 2/10 6 2/10   OH tricep  4 2/10  6 2/10  5 2/10  5 2/10  5 2/10  6 2/10  6 2/10  6 2/10  4 2/10 4 2/10                                                   Comp & donuts Y 3' Y3' Y 3'  Y 3'  Y3'  Y 3  Y 3'  Y 3'  Y 3'  Y 3'                           Modalities                       US 12 12 12 12 12 12 12 12  12  12

## 2024-01-02 ENCOUNTER — OFFICE VISIT (OUTPATIENT)
Facility: CLINIC | Age: 69
End: 2024-01-02
Payer: COMMERCIAL

## 2024-01-02 DIAGNOSIS — M25.642 STIFFNESS OF LEFT HAND, NOT ELSEWHERE CLASSIFIED: Primary | ICD-10-CM

## 2024-01-02 PROCEDURE — 97035 APP MDLTY 1+ULTRASOUND EA 15: CPT | Performed by: PHYSICAL THERAPIST

## 2024-01-02 PROCEDURE — 97140 MANUAL THERAPY 1/> REGIONS: CPT | Performed by: PHYSICAL THERAPIST

## 2024-01-02 PROCEDURE — 97112 NEUROMUSCULAR REEDUCATION: CPT | Performed by: PHYSICAL THERAPIST

## 2024-01-02 PROCEDURE — 97110 THERAPEUTIC EXERCISES: CPT | Performed by: PHYSICAL THERAPIST

## 2024-01-02 NOTE — PROGRESS NOTES
Daily Note     Today's date: 2024  Patient name: Matt Carreno  : 1955  MRN: [id number]  Referring provider: Andres Duke MD  Dx:   Encounter Diagnosis     ICD-10-CM    1. Stiffness of left hand, not elsewhere classified  M25.642                      Subjective: pt cont to note strengthening in his .  He feels the SF lags behind at the PIP      Objective: See treatment diary below    AROM left wrist E/F- 60  Left SF MP- 0/90; PIP- 065; DIP- 0/35 (isolated)   Sensation- no tingling noted    Level II- 55/86; Level III- 55/85; 43 EULA- 10/16; Wise-   Circumference at wrist- 17.5 cm; MPs- 22.5cm       Assessment: Tolerated treatment well. Patient would benefit from continued PT      Plan: Continue per plan of care.      Precautions: wear orthosis as directed.  No aggressive activity with left hand  Manuals 24              STM MF/SF 10 10 10 10 15 10 10    10  10                           PROM 2/10 2/10 2/10 2/10 2/10 2/10 2/10 2/10  2/10  2/10   FDS 2/10 2/10 2/10 2/10 2/10 2/10 2/10 2/10  2/10  2/10   FDP 2/10 2/10 2/10 2/10 2/10 2/10 2/10 2/10  2/10  2/10                           IASTM  8  8 8 8  8  8 8 15  8 8   Neuro Re-Ed                       HP/Maldivian F w/ball 15 15 15 15 15 15 15 15  15 15                                                   Ther Ex                       TGE                            TP   Y 2'  Y 2'  Y 2' Y 2'  Y 2' Y 2'  Y 2'  Y 2'  Y 2' Y 2'   TP scrape  T 1'  T 1'  T 1;'  T 1'  T 1' T 1'  T 2' T 2'  T 1 T1'   TP FDS  T 2/10  T 10  T 2/10  T 2/10  T 2/10  T 2/10  T 2/10  T 2'  T 2/10 T2/10   Glen  25 2/10  25 2/10  25 2/10  25 2/10  25 2/10  25 2/10  25 2/10  25 2/10  25 2/10 25 2/10   Blue  L III 2/10  L III 2/10  L III 2/10  L III 2/10  L III 2/10  L III 2/10  L III 2/10 L III  2/10  L III 2/10 LIII 2/10   Flexbar   G 20/20  G 20/20  G 20/20  G 20/20  G 20/20  G 20/20  G 20/20 G 20/20   G 20/20 G 2/10   DB E/F  w/buildup  6 2/10  6 2/10  6 2/10  6 2/10  6 2/10 6 2/10  6 2/10  6 2/10  6 2/10 6 2/10         S/P  w/buildup  2 2/10  NV  2 2/10  2 2/10  2 2/10  2 2/10  2 2/10  2 2/10  2 2/10 2 2/10    FF/Scapt  6 2/10  6 2/10  6 2/10  6 2/10  6 2/10  6 2/10  6 2/10  6 2/10  6 2/10 6 2/10   OH tricep  4 2/10  4 2/10  4 2/10  4 2/10  4 2/10  4 2/10  4 2/10  4 2/10  4 2/10 4 2/10                                                   Comp & donuts Y 3' Y3' Y 3'  Y 3'  Y3'  Y 3  Y 3'  Y 3'  Y 3'  Y 3'                           Modalities                        12 12 12 12 12 12 12 12  12  12

## 2024-01-04 ENCOUNTER — OFFICE VISIT (OUTPATIENT)
Facility: CLINIC | Age: 69
End: 2024-01-04
Payer: COMMERCIAL

## 2024-01-04 DIAGNOSIS — M25.642 STIFFNESS OF LEFT HAND, NOT ELSEWHERE CLASSIFIED: Primary | ICD-10-CM

## 2024-01-04 PROCEDURE — 97112 NEUROMUSCULAR REEDUCATION: CPT | Performed by: PHYSICAL THERAPIST

## 2024-01-04 PROCEDURE — 97140 MANUAL THERAPY 1/> REGIONS: CPT | Performed by: PHYSICAL THERAPIST

## 2024-01-04 PROCEDURE — 97035 APP MDLTY 1+ULTRASOUND EA 15: CPT | Performed by: PHYSICAL THERAPIST

## 2024-01-04 PROCEDURE — 97110 THERAPEUTIC EXERCISES: CPT | Performed by: PHYSICAL THERAPIST

## 2024-01-08 ENCOUNTER — OFFICE VISIT (OUTPATIENT)
Facility: CLINIC | Age: 69
End: 2024-01-08
Payer: COMMERCIAL

## 2024-01-08 DIAGNOSIS — M25.642 STIFFNESS OF LEFT HAND, NOT ELSEWHERE CLASSIFIED: Primary | ICD-10-CM

## 2024-01-08 PROCEDURE — 97112 NEUROMUSCULAR REEDUCATION: CPT | Performed by: PHYSICAL THERAPIST

## 2024-01-08 PROCEDURE — 97035 APP MDLTY 1+ULTRASOUND EA 15: CPT | Performed by: PHYSICAL THERAPIST

## 2024-01-08 PROCEDURE — 97110 THERAPEUTIC EXERCISES: CPT | Performed by: PHYSICAL THERAPIST

## 2024-01-08 PROCEDURE — 97140 MANUAL THERAPY 1/> REGIONS: CPT | Performed by: PHYSICAL THERAPIST

## 2024-01-08 NOTE — PROGRESS NOTES
Daily Note     Today's date: 2024  Patient name: Matt Carreno  : 1955  MRN: [id number]  Referring provider: Andres Duke MD  Dx:   Encounter Diagnosis     ICD-10-CM    1. Stiffness of left hand, not elsewhere classified  M25.642                      Subjective: pt notes his MF still is painful and swollen at the proximal phalanx which is limiting his forceful grasp.        Objective: See treatment diary below    AROM left wrist E/F- 60/65  Left SF MP- 0/90; PIP- 0/65; DIP- 0/35 (isolated)   Sensation- no tingling noted    Level II- 55/86; Level III- 55/85; 43 EULA- 10/16; Wise-   Circumference at wrist- 17.5 cm; MPs- 22.5cm    Assessment: Tolerated treatment well. Patient would benefit from continued PT      Plan: Continue per plan of care.      Precautions: wear orthosis as directed.  No aggressive activity with left hand  Manuals 24            STM MF/SF 10 10 10 10 15 10 10    10  10                           PROM 2/10 2/10 2/10 2/10 2/10 2/10 2/10 2/10  2/10  2/10   FDS 2/10 2/10 2/10 2/10 2/10 2/10 2/10 2/10  2/10  2/10   FDP 2/10 2/10 2/10 2/10 2/10 2/10 2/10 2/10  2/10  2/10                           IASTM  8  8 8 8  8  8 8 15  8 8   Neuro Re-Ed                       HP/Romanian F w/ball 15 15 15 15 15 15 15 15  15 15                                                   Ther Ex                       TGE                            TP   Y 2'  Y 2'  Y 2' Y 2'  Y 2' Y 2'  Y 2'  Y 2'  Y 2' Y 2'   TP scrape  T 1'  T 1'  T 1;'  T 1'  T 1' T 1'  T 2' T 2'  T 1 T1'   TP FDS  T 2/10  T 2/10  T 2/10  T 2/10  T 2/10  T 2/10  T 2/10  T 2'  T 2/10 T2/10   Glen  25 2/10  25 2/10  25 2/10  25 2/10  25 2/10  25 2/10  25 2/10  25 2/10  25 2/10 25 2/10   Blue  L III 2/10  L III 2/10  L IV 2/10  L III 2/10  L III 2/10  L III 2/10  L III 2/10 L III  2/10  L III 2/10 LIII 2/10   Flexbar   G 20/20  G 20/20  G 20/20  G 20/20  G 20/20  G 20/20  G 20/20 G 20/20    G 20/20 G 2/10   DB E/F w/buildup  6 2/10  6 2/10  6 2/10  6 2/10  6 2/10 6 2/10  6 2/10  6 2/10  6 2/10 6 2/10         S/P  w/buildup  2 2/10  2 2/10  2 2/10  2 2/10  2 2/10  2 2/10  2 2/10  2 2/10  2 2/10 2 2/10    FF/Scapt  6 2/10  6 2/10  6 2/10  6 2/10  6 2/10  6 2/10  6 2/10  6 2/10  6 2/10 6 2/10   OH tricep  6 2/10  6 2/10  6 2/10  6 2/10  6 2/10  6 2/10  6 2/10  6 2/10  6 2/10 6 2/10                                                   Comp & donuts Y 3' Y3' Y 3'  Y 3'  Y3'  Y 3  Y 3'  Y 3'  Y 3'  Y 3'                           Modalities                       US 12 12 12 12 12 12 12 12  12  12

## 2024-01-10 ENCOUNTER — OFFICE VISIT (OUTPATIENT)
Facility: CLINIC | Age: 69
End: 2024-01-10
Payer: COMMERCIAL

## 2024-01-10 DIAGNOSIS — M25.642 STIFFNESS OF LEFT HAND, NOT ELSEWHERE CLASSIFIED: Primary | ICD-10-CM

## 2024-01-10 PROCEDURE — 97140 MANUAL THERAPY 1/> REGIONS: CPT | Performed by: PHYSICAL THERAPIST

## 2024-01-10 PROCEDURE — 97110 THERAPEUTIC EXERCISES: CPT | Performed by: PHYSICAL THERAPIST

## 2024-01-10 PROCEDURE — 97035 APP MDLTY 1+ULTRASOUND EA 15: CPT | Performed by: PHYSICAL THERAPIST

## 2024-01-10 PROCEDURE — 97112 NEUROMUSCULAR REEDUCATION: CPT | Performed by: PHYSICAL THERAPIST

## 2024-01-10 NOTE — PROGRESS NOTES
Daily Note     Today's date: 1/10/2024  Patient name: Matt Carreno  : 1955  MRN: [id number]  Referring provider: Andres Duek MD  Dx:   Encounter Diagnosis     ICD-10-CM    1. Stiffness of left hand, not elsewhere classified  M25.642                      Subjective: pt still notes swelling and pain in proximal phalanx of MF are limiting his ability to form a tight grasp with all fingers.        Objective: See treatment diary below    AROM left wrist E/F- 60/  Left SF MP- 0/90; PIP- 065; DIP- 0/35 (isolated)   Sensation- no tingling noted    Level II- 55/86; Level III- 55/85; 43 EULA- 10/16; Wise-   Circumference at wrist- 17.5 cm; MPs- 22.5cm     Assessment: Tolerated treatment well. Patient would benefit from continued PT      Plan: Continue per plan of care.      Precautions: wear orthosis as directed.  No aggressive activity with left hand  Manuals 1/2/24 1/4 1/8 1/10           STM MF/SF 10 10 10 10 15 10 10    10  10                           PROM 2/10 2/10 2/10 2/10 2/10 2/10 2/10 2/10  2/10  2/10   FDS 2/10 2/10 2/10 2/10 2/10 2/10 2/10 2/10  2/10  2/10   FDP 2/10 2/10 2/10 2/10 2/10 2/10 2/10 2/10  2/10  2/10                           IASTM  8  8 8 8  8  8 8 15  8 8   Neuro Re-Ed                       HP/Dominican F w/ball 15 15 15 15 15 15 15 15  15 15                                                   Ther Ex                       TGE                            TP   Y 2'  Y 2'  Y 2' Y 2'  Y 2' Y 2'  Y 2'  Y 2'  Y 2' Y 2'   TP scrape  T 1'  T 1'  T 1;'  T 1'  T 1' T 1'  T 2' T 2'  T 1 T1'   TP FDS  T 2/10  T 2/10  T 2/10  T 2/10  T 2/10  T 2/10  T 2/10  T 2'  T 2/10 T2/10   Glen  25 2/10  25 2/10  25 2/10  35 2/10  25 2/10  25 2/10  25 2/10  25 2/10  25 2/10 25 2/10   Blue  L III 2/10  L III 2/10  L IV 2/10  L IV 2/10  L III 2/10  L III 2/10  L III 2/10 L III  2/10  L III 2/10 LIII 2/10   Flexbar   G 20/20  G 20/20  G 20/20  G 20/20  G 20/20  G  20/20  G 20/20 G 20/20   G 20/20 G 2/10   DB E/F w/buildup  6 2/10  6 2/10  6 2/10  6 2/10  6 2/10 6 2/10  6 2/10  6 2/10  6 2/10 6 2/10         S/P  w/buildup  2 2/10  2 2/10  2 2/10  2 2/10  2 2/10  2 2/10  2 2/10  2 2/10  2 2/10 2 2/10    FF/Scapt  6 2/10  6 2/10  6 2/10  6 2/10  6 2/10  6 2/10  6 2/10  6 2/10  6 2/10 6 2/10   OH tricep  6 2/10  6 2/10  6 2/10  6 2/10  6 2/10  6 2/10  6 2/10  6 2/10  6 2/10 6 2/10                                                   Comp & donuts Y 3' Y3' Y 3'  Y 3'  Y3'  Y 3  Y 3'  Y 3'  Y 3'  Y 3'                           Modalities                        12 12 12 12 12 12 12 12  12  12

## 2024-01-15 ENCOUNTER — OFFICE VISIT (OUTPATIENT)
Facility: CLINIC | Age: 69
End: 2024-01-15
Payer: COMMERCIAL

## 2024-01-15 DIAGNOSIS — M25.642 STIFFNESS OF LEFT HAND, NOT ELSEWHERE CLASSIFIED: Primary | ICD-10-CM

## 2024-01-15 PROCEDURE — 97110 THERAPEUTIC EXERCISES: CPT | Performed by: PHYSICAL THERAPIST

## 2024-01-15 PROCEDURE — 97035 APP MDLTY 1+ULTRASOUND EA 15: CPT | Performed by: PHYSICAL THERAPIST

## 2024-01-15 PROCEDURE — 97140 MANUAL THERAPY 1/> REGIONS: CPT | Performed by: PHYSICAL THERAPIST

## 2024-01-15 PROCEDURE — 97112 NEUROMUSCULAR REEDUCATION: CPT | Performed by: PHYSICAL THERAPIST

## 2024-01-15 NOTE — PROGRESS NOTES
PT Re-Evaluation     Today's date: 1/15/2024  Patient name: Matt Carreno  : 1955  MRN: [id number]  Referring provider: Andres Duke MD  Dx:   Encounter Diagnosis     ICD-10-CM    1. Stiffness of left hand, not elsewhere classified  M25.642                      Assessment  Assessment details: Pt is a 69 YO male presenting to PT with pain, decreased AROM, strength and tolerance to activity.  Pt would benefit from skilled intervention to address these issues and maximize overall function.  Occupation- - currently working from home  Dominant- right; Involved- left SF  Pt improving in his ability to form a composite fist, with improving FDS/FDP isolation.  PT utilizing ROSALINA, IASTM isolated FDS/FDP AROM and PIP PROM.  Pt feels he is able to control his grasp better with improved strength.      Goals  ST.  Decrease pain to 0-2/10 in 4 weeks            2.  Decrease swelling left hand            3.  Increase AROM to WFL in 6-8 weeks            4.   Maintain clean wound and promote healing            5.  Provide orthotic for protection  LT.  Increase functional motion and strength for independence with ADL and self care by DC            2. Ability to RT recreational activity by DC    Plan  Patient would benefit from: skilled physical therapy  Planned modality interventions: cryotherapy, ultrasound and thermotherapy: hydrocollator packs  Planned therapy interventions: activity modification, manual therapy, orthotic management and training, orthotic fitting/training, neuromuscular re-education, strengthening, stretching, therapeutic activities, therapeutic exercise and home exercise program  Frequency: 2x week  Duration in weeks: 4  Treatment plan discussed with: patient        Subjective Evaluation    History of Present Illness  Date of surgery: 2023  Mechanism of injury: Pt noted inability to bend his left SF approximately 2022.  He reported no known trauma.  MRI confirmed SF FDS  and FDP rupture.  Primary repair completed 2022.  Recent tenolysis with surgeon noting significant adhesions and tendon attenuation  Patient Goals  Patient goals for therapy: decreased edema, decreased pain, increased motion, increased strength, independence with ADLs/IADLs and return to sport/leisure activities    Pain  Current pain ratin  At best pain ratin  At worst pain ratin  Location: left hand and SF  Quality: dull ache  Relieving factors: ice    Hand dominance: right    Treatments  Current treatment: physical therapy        Objective     General Comments:      Wrist/Hand Comments  AROM left wrist E/F- 60/65  Left SF MP- 0/90; PIP- 0/45 (50 blocked); DIP- 0/35 (isolated)   Sensation- no tingling noted    Level II- 50/85; Level III- 52/95; 43 EULA- ; Wise-   Circumference at wrist- 17.5 cm; MPs- 22.5cm             Precautions: wear orthosis as directed.  No aggressive activity with left hand    Manuals 1/2/24 1/4 1/8 1/10  1/15             STM MF/SF 10 10 10 10 15 10 10    10  10                           PROM 2/10 2/10 2/10 2/10 2/10 2/10 2/10 2/10  2/10  2/10   FDS 2/10 2/10 2/10 2/10 2/10 2/10 2/10 2/10  2/10  2/10   FDP 2/10 2/10 2/10 2/10 2/10 2/10 2/10 2/10  2/10  2/10                           IASTM  8  8 8 8  8  8 8 15  8 8   Neuro Re-Ed                       HP/[place] F w/ball 15 15 15 15 15 15 15 15  15 15                                                   Ther Ex                       TGE                            TP   Y 2'  Y 2'  Y 2' Y 2'  Y 2' Y 2'  Y 2'  Y 2'  Y 2' Y 2'   TP scrape  T 1'  T 1'  T 1;'  T 1'  T 1' T 1'  T 2' T 2'  T 1 T1'   TP FDS  T 2/10  T 2/10  T 2/10  T 2/10  T 2/10  T 2/10  T 2/10  T 2'  T 2/10 T2/10   Glen  25 2/10  25 2/10  25 2/10  35 2/10  35 2/10  25 2/10  25 2/10  25 2/10  25 2/10 25 2/10   Blue  L III 2/10  L III 2/10  L IV 2/10  L IV 2/10  L IV 2/10  L III 2/10  L III 2/10 L III  2/10  L III  2/10 LIII 2/10   Flexbar   G 20/20  G 20/20  G 20/20  G 20/20  G 20/20  G 20/20  G 20/20 G 20/20   G 20/20 G 2/10   DB E/F w/buildup  6 2/10  6 2/10  6 2/10  6 2/10  6 2/10 6 2/10  6 2/10  6 2/10  6 2/10 6 2/10         S/P  w/buildup  2 2/10  2 2/10  2 2/10  2 2/10  2 2/10  2 2/10  2 2/10  2 2/10  2 2/10 2 2/10    FF/Scapt  6 2/10  6 2/10  6 2/10  6 2/10  6 2/10  6 2/10  6 2/10  6 2/10  6 2/10 6 2/10   OH tricep  6 2/10  6 2/10  6 2/10  6 2/10  6 2/10  6 2/10  6 2/10  6 2/10  6 2/10 6 2/10                                                   Comp & donuts Y 3' Y3' Y 3'  Y 3'  Y3'  Y 3  Y 3'  Y 3'  Y 3'  Y 3'                           Modalities                       US 12 12 12 12 12 12 12 12  12  12

## 2024-01-15 NOTE — PROGRESS NOTES
Daily Note     Today's date: 1/15/2024  Patient name: Matt Carreno  : 1955  MRN: [id number]  Referring provider: Andres Duke MD  Dx:   Encounter Diagnosis     ICD-10-CM    1. Stiffness of left hand, not elsewhere classified  M25.642                      Subjective: Pt feels overall, he is feeling better with his bending and grasp      Objective: See treatment diary below    AROM left wrist E/F- 60  Left SF MP- 0/90; PIP- 065; DIP- 035 (isolated)   Sensation- no tingling noted    Level II- 55/86; Level III- 55/85; 43 EULA- 10/16; Wise-   Circumference at wrist- 17.5 cm; MPs- 22.5 cm    Assessment: Tolerated treatment well. Patient would benefit from continued PT      Plan: Continue per plan of care.      Precautions: wear orthosis as directed.  No aggressive activity with left hand  Manuals 1/2/24 1/4 1/8 1/10 1/15          STM MF/SF 10 10 10 10 15 10 10    10  10                           PROM 2/10 2/10 2/10 2/10 2/10 2/10 2/10 2/10  2/10  2/10   FDS 2/10 2/10 2/10 2/10 2/10 2/10 2/10 2/10  2/10  2/10   FDP 2/10 2/10 2/10 2/10 2/10 2/10 2/10 2/10  2/10  2/10                           IASTM  8  8 8 8  8  8 8 15  8 8   Neuro Re-Ed                       HP/[place] F w/ball 15 15 15 15 15 15 15 15  15 15                                                   Ther Ex                       TGE                            TP   Y 2'  Y 2'  Y 2' Y 2'  Y 2' Y 2'  Y 2'  Y 2'  Y 2' Y 2'   TP scrape  T 1'  T 1'  T 1;'  T 1'  T 1' T 1'  T 2' T 2'  T 1 T1'   TP FDS  T 2/10  T 2/10  T 2/10  T 2/10  T 2/10  T 2/10  T 2/10  T 2'  T 2/10 T2/10   Glen  25 2/10  25 2/10  25 2/10  35 2/10  35 2/10  25 2/10  25 2/10  25 2/10  25 2/10 25 2/10   Blue  L III 2/10  L III 2/10  L IV 2/10  L IV 2/10  L IV 2/10  L III 2/10  L III 2/10 L III  2/10  L III 2/10 LIII 2/10   Flexbar   G 20/20  G 20/20  G 20/20  G 20/20  G 20/20  G 20/20  G 20/20 G 20/20   G 20/20 G 2/10   DB E/F w/buildup   6 2/10  6 2/10  6 2/10  6 2/10  6 2/10 6 2/10  6 2/10  6 2/10  6 2/10 6 2/10         S/P  w/buildup  2 2/10  2 2/10  2 2/10  2 2/10  2 2/10  2 2/10  2 2/10  2 2/10  2 2/10 2 2/10    FF/Scapt  6 2/10  6 2/10  6 2/10  6 2/10  6 2/10  6 2/10  6 2/10  6 2/10  6 2/10 6 2/10   OH tricep  6 2/10  6 2/10  6 2/10  6 2/10  6 2/10  6 2/10  6 2/10  6 2/10  6 2/10 6 2/10                                                   Comp & donuts Y 3' Y3' Y 3'  Y 3'  Y3'  Y 3  Y 3'  Y 3'  Y 3'  Y 3'                           Modalities                       US 12 12 12 12 12 12 12 12 12  12

## 2024-01-15 NOTE — LETTER
January 15, 2024    Andres Duke MD  [address] [facility] [address]    Patient: Matt Carreno   YOB: 1955   Date of Visit: 1/15/2024     Encounter Diagnosis     ICD-10-CM    1. Stiffness of left hand, not elsewhere classified  M25.642           Dear Dr. Duke:    Thank you for your recent referral of Matt Carreno. Please review the attached evaluation summary from Matt's recent visit.     Please verify that you agree with the plan of care by signing the attached order.     If you have any questions or concerns, please do not hesitate to call.     I sincerely appreciate the opportunity to share in the care of one of your patients and hope to have another opportunity to work with you in the near future.       Sincerely,    Lefty Boothe, DPT, CHT      Referring Provider:      I certify that I have read the below Plan of Care and certify the need for these services furnished under this plan of treatment while under my care.                    Andres Duke MD  [address] [place] [id number]  Via Fax: [phone]          PT Re-Evaluation     Today's date: 1/15/2024  Patient name: Matt Carreno  : 1955  MRN: [id number]  Referring provider: Andres Duke MD  Dx:   Encounter Diagnosis     ICD-10-CM    1. Stiffness of left hand, not elsewhere classified  M25.642                      Assessment  Assessment details: Pt is a 67 YO male presenting to PT with pain, decreased AROM, strength and tolerance to activity.  Pt would benefit from skilled intervention to address these issues and maximize overall function.  Occupation- - currently working from home  Dominant- right; Involved- left SF  Pt improving in his ability to form a composite fist, with improving FDS/FDP isolation.  PT utilizing ROSALINA, IASTM isolated FDS/FDP AROM and PIP PROM.  Pt feels he is able to control his grasp better with improved strength.      Goals  ST.   Decrease pain to 0-2/10 in 4 weeks            2.  Decrease swelling left hand            3.  Increase AROM to WFL in 6-8 weeks            4.   Maintain clean wound and promote healing            5.  Provide orthotic for protection  LT.  Increase functional motion and strength for independence with ADL and self care by DC            2. Ability to RT recreational activity by DC    Plan  Patient would benefit from: skilled physical therapy  Planned modality interventions: cryotherapy, ultrasound and thermotherapy: hydrocollator packs  Planned therapy interventions: activity modification, manual therapy, orthotic management and training, orthotic fitting/training, neuromuscular re-education, strengthening, stretching, therapeutic activities, therapeutic exercise and home exercise program  Frequency: 2x week  Duration in weeks: 4  Treatment plan discussed with: patient        Subjective Evaluation    History of Present Illness  Date of surgery: 2023  Mechanism of injury: Pt noted inability to bend his left SF approximately 2022.  He reported no known trauma.  MRI confirmed SF FDS and FDP rupture.  Primary repair completed 2022.  Recent tenolysis with surgeon noting significant adhesions and tendon attenuation  Patient Goals  Patient goals for therapy: decreased edema, decreased pain, increased motion, increased strength, independence with ADLs/IADLs and return to sport/leisure activities    Pain  Current pain ratin  At best pain ratin  At worst pain ratin  Location: left hand and SF  Quality: dull ache  Relieving factors: ice    Hand dominance: right    Treatments  Current treatment: physical therapy        Objective     General Comments:      Wrist/Hand Comments  AROM left wrist E/F- 60/65  Left SF MP- 0/90; PIP- 0/45 (50 blocked); DIP- 0/35 (isolated)   Sensation- no tingling noted    Level II- 50/85; Level III- 52/95; 43 EULA- ; Wise-   Circumference at wrist- 17.5 cm; MPs-  22.5cm             Precautions: wear orthosis as directed.  No aggressive activity with left hand    Manuals 1/2/24 1/4 1/8 1/10  1/15             STM MF/SF 10 10 10 10 15 10 10    10  10                           PROM 2/10 2/10 2/10 2/10 2/10 2/10 2/10 2/10  2/10  2/10   FDS 2/10 2/10 2/10 2/10 2/10 2/10 2/10 2/10  2/10  2/10   FDP 2/10 2/10 2/10 2/10 2/10 2/10 2/10 2/10  2/10  2/10                           IASTM  8  8 8 8  8  8 8 15  8 8   Neuro Re-Ed                       HP/[place] F w/ball 15 15 15 15 15 15 15 15  15 15                                                   Ther Ex                       TGE 2/5 2/5 2/5 2/5 2/5 2/5 2/5 2/5 2/5 2/5                           TP   Y 2'  Y 2'  Y 2' Y 2'  Y 2' Y 2'  Y 2'  Y 2'  Y 2' Y 2'   TP scrape  T 1'  T 1'  T 1;'  T 1'  T 1' T 1'  T 2' T 2'  T 1 T1'   TP FDS  T 2/10  T 2/10  T 2/10  T 2/10  T 2/10  T 2/10  T 2/10  T 2'  T 2/10 T2/10   Glen  25 2/10  25 2/10  25 2/10  35 2/10  35 2/10  25 2/10  25 2/10  25 2/10  25 2/10 25 2/10   Blue  L III 2/10  L III 2/10  L IV 2/10  L IV 2/10  L IV 2/10  L III 2/10  L III 2/10 L III  2/10  L III 2/10 LIII 2/10   Flexbar   G 20/20  G 20/20  G 20/20  G 20/20  G 20/20  G 20/20  G 20/20 G 20/20   G 20/20 G 2/10   DB E/F w/buildup  6 2/10  6 2/10  6 2/10  6 2/10  6 2/10 6 2/10  6 2/10  6 2/10  6 2/10 6 2/10         S/P  w/buildup  2 2/10  2 2/10  2 2/10  2 2/10  2 2/10  2 2/10  2 2/10  2 2/10  2 2/10 2 2/10    FF/Scapt  6 2/10  6 2/10  6 2/10  6 2/10  6 2/10  6 2/10  6 2/10  6 2/10  6 2/10 6 2/10   OH tricep  6 2/10  6 2/10  6 2/10  6 2/10  6 2/10  6 2/10  6 2/10  6 2/10  6 2/10 6 2/10                                                   Comp & donuts Y 3' Y3' Y 3'  Y 3'  Y3'  Y 3  Y 3'  Y 3'  Y 3'  Y 3'                           Modalities                        12 12 12 12 12 12 12 12 12 12

## 2024-01-17 ENCOUNTER — APPOINTMENT (OUTPATIENT)
Facility: CLINIC | Age: 69
End: 2024-01-17
Payer: COMMERCIAL

## 2024-01-22 ENCOUNTER — OFFICE VISIT (OUTPATIENT)
Facility: CLINIC | Age: 69
End: 2024-01-22
Payer: COMMERCIAL

## 2024-01-22 DIAGNOSIS — M25.642 STIFFNESS OF LEFT HAND, NOT ELSEWHERE CLASSIFIED: Primary | ICD-10-CM

## 2024-01-22 PROCEDURE — 97140 MANUAL THERAPY 1/> REGIONS: CPT | Performed by: PHYSICAL THERAPIST

## 2024-01-22 PROCEDURE — 97112 NEUROMUSCULAR REEDUCATION: CPT | Performed by: PHYSICAL THERAPIST

## 2024-01-22 PROCEDURE — 97110 THERAPEUTIC EXERCISES: CPT | Performed by: PHYSICAL THERAPIST

## 2024-01-22 PROCEDURE — 97035 APP MDLTY 1+ULTRASOUND EA 15: CPT | Performed by: PHYSICAL THERAPIST

## 2024-01-24 ENCOUNTER — OFFICE VISIT (OUTPATIENT)
Facility: CLINIC | Age: 69
End: 2024-01-24
Payer: COMMERCIAL

## 2024-01-24 DIAGNOSIS — M25.642 STIFFNESS OF LEFT HAND, NOT ELSEWHERE CLASSIFIED: Primary | ICD-10-CM

## 2024-01-24 PROCEDURE — 97140 MANUAL THERAPY 1/> REGIONS: CPT | Performed by: PHYSICAL THERAPIST

## 2024-01-24 PROCEDURE — 97110 THERAPEUTIC EXERCISES: CPT | Performed by: PHYSICAL THERAPIST

## 2024-01-24 PROCEDURE — 97112 NEUROMUSCULAR REEDUCATION: CPT | Performed by: PHYSICAL THERAPIST

## 2024-01-24 PROCEDURE — 97035 APP MDLTY 1+ULTRASOUND EA 15: CPT | Performed by: PHYSICAL THERAPIST

## 2024-01-24 NOTE — PROGRESS NOTES
Daily Note     Today's date: 2024  Patient name: Matt Carreno  : 1955  MRN: [id number]  Referring provider: Andres Duke MD  Dx:   Encounter Diagnosis     ICD-10-CM    1. Stiffness of left hand, not elsewhere classified  M25.642                      Subjective: pt notes his using his hand for daily activity, notin his SF is not able to form a composite fist      Objective: See treatment diary below    AROM left wrist E/F- 60/65  Left SF MP- 0/90; PIP- 0/45 (50 blocked); DIP- 0/35 (isolated)   Sensation- no tingling noted    Level II- 50/85; Level III- 52/95; 43 EULA- ; Wise-   Circumference at wrist- 17.5 cm; MPs- 22.5cm    Assessment: Tolerated treatment well. Patient would benefit from continued PT      Plan: Continue per plan of care.      Precautions: wear orthosis as directed.  No aggressive activity with left hand    Manuals 1/2/24 1/4 1/8 1/10  1/15  1/22  1/24         STM MF/SF 10 10 10 10 15 10 10    10  10                           PROM 2/10 2/10 2/10 2/10 2/10 2/10 2/10 2/10  2/10  2/10   FDS 2/10 2/10 2/10 2/10 2/10 2/10 2/10 2/10  2/10  2/10   FDP 2/10 2/10 2/10 2/10 2/10 2/10 2/10 2/10  2/10  2/10                           IASTM  8  8 8 8  8  8 8 15  8 8   Neuro Re-Ed                       HP/[place] F w/ball 15 15 15 15 15 15 15 15  15 15                                                   Ther Ex                       TGE                            TP   Y 2'  Y 2'  Y 2' Y 2'  Y 2' Y 2'  Y 2'  Y 2'  Y 2' Y 2'   TP scrape  T 1'  T 1'  T 1;'  T 1'  T 1' T 1'  T 2' T 2'  T 1 T1'   TP FDS  T 2/10  T 2/10  T 2/10  T 2/10  T 2/10  T 2/10  T 2/10  T 2'  T 2/10 T2/10   Glen  25 2/10  25 2/10  25 2/10  35 2/10  35 2/10  35 2/10  35 2/10  25 2/10  25 2/10 25 2/10   Blue  L III 2/10  L III 2/10  L IV 2/10  L IV 2/10  L IV 2/10  L IV 2/10  L IV 2/10 L III  2/10  L III 2/10 LIII 2/10   Flexbar   G 20/20  G 20/20  G 20/20  G 20/20  G 20/20  G  20/20  G 20/20 G 20/20   G 20/20 G 2/10   DB E/F w/buildup  6 2/10  6 2/10  6 2/10  6 2/10  6 2/10 6 2/10  6 2/10  6 2/10  6 2/10 6 2/10         S/P  w/buildup  2 2/10  2 2/10  2 2/10  2 2/10  2 2/10  2 2/10  2 2/10  2 2/10  2 2/10 2 2/10    FF/Scapt  6 2/10  6 2/10  6 2/10  6 2/10  6 2/10  6 2/10  6 2/10  6 2/10  6 2/10 6 2/10   OH tricep  6 2/10  6 2/10  6 2/10  6 2/10  6 2/10  6 2/10  6 2/10  6 2/10  6 2/10 6 2/10                                                   Comp & donuts Y 3' Y3' Y 3'  Y 3'  Y3'  Y 3  Y 3'  Y 3'  Y 3'  Y 3'                           Modalities                        12 12 12 12 12 12 12 12  12  12

## 2024-01-29 ENCOUNTER — OFFICE VISIT (OUTPATIENT)
Facility: CLINIC | Age: 69
End: 2024-01-29
Payer: COMMERCIAL

## 2024-01-29 DIAGNOSIS — M25.642 STIFFNESS OF LEFT HAND, NOT ELSEWHERE CLASSIFIED: Primary | ICD-10-CM

## 2024-01-29 PROCEDURE — 97112 NEUROMUSCULAR REEDUCATION: CPT | Performed by: PHYSICAL THERAPIST

## 2024-01-29 PROCEDURE — 97035 APP MDLTY 1+ULTRASOUND EA 15: CPT | Performed by: PHYSICAL THERAPIST

## 2024-01-29 PROCEDURE — 97110 THERAPEUTIC EXERCISES: CPT | Performed by: PHYSICAL THERAPIST

## 2024-01-29 PROCEDURE — 97140 MANUAL THERAPY 1/> REGIONS: CPT | Performed by: PHYSICAL THERAPIST

## 2024-01-29 NOTE — PROGRESS NOTES
Daily Note     Today's date: 2024  Patient name: Matt Carreno  : 1955  MRN: [id number]  Referring provider: Andres Duke MD  Dx:   Encounter Diagnosis     ICD-10-CM    1. Stiffness of left hand, not elsewhere classified  M25.642                      Subjective: pt feeling he cont to make progress with strength- AROM still limited at SF PIP      Objective: See treatment diary below    ROM left wrist E/F- 60/65  Left SF MP- 0/90; PIP- 0/45 (50 blocked); DIP- 0/35 (isolated)   Sensation- no tingling noted    Level II- 50/85; Level III- 52/95; 43 EULA- ; Wise-   Circumference at wrist- 17.5 cm; MPs- 22.5 cm    Assessment: Tolerated treatment well. Patient would benefit from continued PT      Plan: Continue per plan of care.      Precautions: wear orthosis as directed.  No aggressive activity with left hand    Manuals 1/2/24 1/4 1/8 1/10  1/15  1/22  1/24  1/29       STM MF/SF 10 10 10 10 15 10 10  10  10  10                           PROM 2/10 2/10 2/10 2/10 2/10 2/10 2/10 2/10  2/10  2/10   FDS 2/10 2/10 2/10 2/10 2/10 2/10 2/10 2/10  2/10  2/10   FDP 2/10 2/10 2/10 2/10 2/10 2/10 2/10 2/10  2/10  2/10                           IASTM  8  8 8 8  8  8 8 15  8 8   Neuro Re-Ed                       HP/[place] F w/ball 15 15 15 15 15 15 15 15  15 15                                                   Ther Ex                       TGE                            TP   Y 2'  Y 2'  Y 2' Y 2'  Y 2' Y 2'  Y 2'  Y 2'  Y 2' Y 2'   TP scrape  T 1'  T 1'  T 1;'  T 1'  T 1' T 1'  T 2' T 2'  T 1 T1'   TP FDS  T 2/10  T 2/10  T 2/10  T 2/10  T 2/10  T 2/10  T 2/10  T 2'  T 2/10 T2/10   Glen  25 2/10  25 2/10  25 2/10  35 2/10  35 2/10  35 2/10  35 2/10  35 2/10  25 2/10 25 2/10   Blue  L III 2/10  L III 2/10  L IV 2/10  L IV 2/10  L IV 2/10  L IV 2/10  L IV 2/10 L IV  2/10  L III 2/10 LIII 2/10   Flexbar   G 20/20  G 20/20  G 20/20  G 20/20  G 20/20  G 20/20  G 20/20  G 20/20   G 20/20 G 2/10   DB E/F w/buildup  6 2/10  6 2/10  6 2/10  6 2/10  6 2/10 6 2/10  6 2/10  6 2/10  6 2/10 6 2/10         S/P  w/buildup  2 2/10  2 2/10  2 2/10  2 2/10  2 2/10  2 2/10  2 2/10  2 2/10  2 2/10 2 2/10    FF/Scapt  6 2/10  6 2/10  6 2/10  6 2/10  6 2/10  6 2/10  6 2/10  6 2/10  6 2/10 6 2/10   OH tricep  6 2/10  6 2/10  6 2/10  6 2/10  6 2/10  6 2/10  6 2/10  6 2/10  6 2/10 6 2/10                                                   Comp & donuts Y 3' Y3' Y 3'  Y 3'  Y3'  Y 3  Y 3'  Y 3'  Y 3'  Y 3'                           Modalities                       US 12 12 12 12 12 12 12 12  12  12

## 2024-01-31 ENCOUNTER — OFFICE VISIT (OUTPATIENT)
Facility: CLINIC | Age: 69
End: 2024-01-31
Payer: COMMERCIAL

## 2024-01-31 DIAGNOSIS — M25.642 STIFFNESS OF LEFT HAND, NOT ELSEWHERE CLASSIFIED: Primary | ICD-10-CM

## 2024-01-31 PROCEDURE — 97110 THERAPEUTIC EXERCISES: CPT | Performed by: PHYSICAL THERAPIST

## 2024-01-31 PROCEDURE — 97112 NEUROMUSCULAR REEDUCATION: CPT | Performed by: PHYSICAL THERAPIST

## 2024-01-31 PROCEDURE — 97035 APP MDLTY 1+ULTRASOUND EA 15: CPT | Performed by: PHYSICAL THERAPIST

## 2024-01-31 PROCEDURE — 97140 MANUAL THERAPY 1/> REGIONS: CPT | Performed by: PHYSICAL THERAPIST

## 2024-01-31 NOTE — PROGRESS NOTES
PT Discharge    Today's date: 2024  Patient name: Matt Carreno  : 1955  MRN: [id number]  Referring provider: Andres Duke MD  Dx:   Encounter Diagnosis     ICD-10-CM    1. Stiffness of left hand, not elsewhere classified  M25.642                      Assessment  Assessment details: Pt is a 69 YO male presenting to PT with pain, decreased AROM, strength and tolerance to activity.  Pt would benefit from skilled intervention to address these issues and maximize overall function.  Occupation- - currently working from home  Dominant- right; Involved- left SF  Pt improving in his ability to form a composite fist, with improving FDS/FDP isolation.  PT utilizing ROSALINA, IASTM isolated FDS/FDP AROM and PIP PROM.  Pt feels he is able to control his grasp better with improved strength.    Pt has made a plateau in progress and will transition to a HEP.    Goals  ST.  Decrease pain to 0-2/10 in 4 weeks            2.  Decrease swelling left hand            3.  Increase AROM to WFL in 6-8 weeks            4.   Maintain clean wound and promote healing            5.  Provide orthotic for protection  LT.  Increase functional motion and strength for independence with ADL and self care by DC            2. Ability to RT recreational activity by DC  Goals achieved    Plan  Patient would benefit from: skilled physical therapy  Planned modality interventions: cryotherapy, ultrasound and thermotherapy: hydrocollator packs  Planned therapy interventions: activity modification, manual therapy, orthotic management and training, orthotic fitting/training, neuromuscular re-education, strengthening, stretching, therapeutic activities, therapeutic exercise and home exercise program  Frequency: 2x week  Duration in weeks: 4  Treatment plan discussed with: patient      Subjective Evaluation    History of Present Illness  Date of surgery: 2023  Mechanism of injury: Pt noted inability to bend his left SF  approximately 2022.  He reported no known trauma.  MRI confirmed SF FDS and FDP rupture.  Primary repair completed 2022.  Recent tenolysis with surgeon noting significant adhesions and tendon attenuation  Patient Goals  Patient goals for therapy: decreased edema, decreased pain, increased motion, increased strength, independence with ADLs/IADLs and return to sport/leisure activities    Pain  Current pain ratin  At best pain ratin  At worst pain ratin  Location: left hand and SF  Quality: dull ache  Relieving factors: ice    Hand dominance: right    Treatments  Current treatment: physical therapy      Objective     General Comments:      Wrist/Hand Comments  ROM left wrist E/F- 60/65  Left SF MP- 0/90; PIP- 0/45 (50 blocked); DIP- 0/35 (isolated)   Sensation- no tingling noted    Level II- 50/85; Level III- 52/95; 43 EULA- ; Wise-   Circumference at wrist- 17.5 cm; MPs- 22.5 cm           Precautions: wear orthosis as directed.  No aggressive activity with left hand    Manuals 1/2/24 1/4 1/8 1/10  1/15  1/22  1/24  1/29  1/31     STM MF/SF 10 10 10 10 15 10 10  10  10  10                           PROM 2/10 2/10 2/10 2/10 2/10 2/10 2/10 2/10  2/10  2/10   FDS 2/10 2/10 2/10 2/10 2/10 2/10 2/10 2/10  2/10  2/10   FDP 2/10 2/10 2/10 2/10 2/10 2/10 2/10 2/10  2/10  2/10                           IASTM  8  8 8 8  8  8 8 8  8 8   Neuro Re-Ed                       HP/[place] F w/ball 15 15 15 15 15 15 15 15  15 15                                                   Ther Ex                       TGE                            TP   Y 2'  Y 2'  Y 2' Y 2'  Y 2' Y 2'  Y 2'  Y 2'  Y 2' Y 2'   TP scrape  T 1'  T 1'  T 1;'  T 1'  T 1' T 1'  T 2' T 2'  T 1 T1'   TP FDS  T 2/10  T 2/10  T 2/10  T 2/10  T 2/10  T 2/10  T 2/10  T 2'  T 2/10 T2/10   Glen  25 2/10  25 2/10  25 2/10  35 2/10  35 2/10  35 2/10  35 2/10  35 2/10  35 2/10 25 2/10   Blue  L III 2/10  L  III 2/10  L IV 2/10  L IV 2/10  L IV 2/10  L IV 2/10  L IV 2/10 L IV  2/10  L IV 2/10 LIII 2/10   Flexbar   G 20/20  G 20/20  G 20/20  G 20/20  G 20/20  G 20/20  G 20/20 G 20/20   G 20/20 G 2/10   DB E/F w/buildup  6 2/10  6 2/10  6 2/10  6 2/10  6 2/10 6 2/10  6 2/10  6 2/10  6 2/10 6 2/10         S/P  w/buildup  2 2/10  2 2/10  2 2/10  2 2/10  2 2/10  2 2/10  2 2/10  2 2/10  2 2/10 2 2/10    FF/Scapt  6 2/10  6 2/10  6 2/10  6 2/10  6 2/10  6 2/10  6 2/10  6 2/10  6 2/10 6 2/10   OH tricep  6 2/10  6 2/10  6 2/10  6 2/10  6 2/10  6 2/10  6 2/10  6 2/10  6 2/10 6 2/10                                                   Comp & donuts Y 3' Y3' Y 3'  Y 3'  Y3'  Y 3  Y 3'  Y 3'  Y 3'  Y 3'                           Modalities                       US 12 12 12 12 12 12 12 12  12  12

## 2024-02-21 PROBLEM — Z12.5 SCREENING FOR PROSTATE CANCER: Status: RESOLVED | Noted: 2020-06-01 | Resolved: 2024-02-21

## 2024-03-20 ENCOUNTER — OFFICE VISIT (OUTPATIENT)
Dept: SLEEP CENTER | Facility: CLINIC | Age: 69
End: 2024-03-20
Payer: COMMERCIAL

## 2024-03-20 VITALS
TEMPERATURE: 96.7 F | BODY MASS INDEX: 34.56 KG/M2 | OXYGEN SATURATION: 99 % | SYSTOLIC BLOOD PRESSURE: 122 MMHG | WEIGHT: 220.2 LBS | HEART RATE: 62 BPM | HEIGHT: 67 IN | DIASTOLIC BLOOD PRESSURE: 78 MMHG

## 2024-03-20 DIAGNOSIS — G47.33 OSA (OBSTRUCTIVE SLEEP APNEA): Primary | ICD-10-CM

## 2024-03-20 DIAGNOSIS — G47.31 CENTRAL SLEEP APNEA: ICD-10-CM

## 2024-03-20 DIAGNOSIS — E66.9 OBESITY (BMI 30-39.9): ICD-10-CM

## 2024-03-20 PROCEDURE — 99213 OFFICE O/P EST LOW 20 MIN: CPT | Performed by: STUDENT IN AN ORGANIZED HEALTH CARE EDUCATION/TRAINING PROGRAM

## 2024-03-20 NOTE — PROGRESS NOTES
[facility] [address]  Sleep Medicine Follow up/ Established Patient Visit      Assessment/Plan:  1. FARA (obstructive sleep apnea)  PAP DME Resupply/Reorder    Mask fitting only      2. Central sleep apnea  PAP DME Resupply/Reorder    Mask fitting only    Echo complete w/ contrast if indicated      3. Obesity (BMI 30-39.9)  PAP DME Resupply/Reorder    Mask fitting only          Matt is a pleasant 68-year-old gentleman with a PMHx of osteoarthritis, history of basal cell carcinoma, melanoma in situ, stage III chronic kidney disease, HLD who presents in follow up for FARA with a central component.  Overall, since his last visit, his compliance report shows an improvement in his AHI, although this is still somewhat supratherapeutic at 6.5, with more central and obstructive (4.1 versus 2.0).  Reassuringly, both of these are less than 5, thus it is questionable as to whether they are clinically relevant or not.  Upon review of his data over a month, it looks as though he has a good number of nights that are between 5 and 10 AHI.  Again, it is not clear at present just how clinically significant this is, as the only potential symptom of this is nighttime awakening, although he endorses that this could be due to other factors..    For now, I want him to continue auto BiPAP at settings of max IPAP 25 cm H2O, minimum EPAP 10 cm H2O, PS 5 cm H2O.  Prescription for new supplies ordered today  Reviewed CMS/insurance requirements and resupply guidelines  Information provided on the above as well as general maintenance steps  At his next visit, we will review his compliance download.  If he has a significant number of central events at that time, we may pursue an ASV or BiPAP ST study.  I have placed an order for an echocardiogram in case an ASV may be indicated in the future.  I have also placed a referral for a formal mask fitting, to help ensure that he has the proper mask fit for his face to avoid irritating  the mole on the right side of his face (he is seeing dermatology this afternoon)  He will Return in about 6 months (around 9/20/2024).        ________________________________________________________________________________________________    Per Last Visit Note (Date: 11/15/2023):  Matt is a pleasant 68-year-old gentleman with a PMHx of osteoarthritis, history of basal cell carcinoma, melanoma in situ, stage III chronic kidney disease, HLD who presents in follow up for FARA (SHIMA 58, O2 ling 78% per HSAT 6/2023).  He actually overall is doing quite well with BiPAP therapy from a symptomatic standpoint, endorsing significant improvements in his snoring and daytime sleepiness.  However, he is continuing to endorse ongoing nocturnal awakenings.  Upon review of his compliance download, his AHI is still supratherapeutic at 10.9, and at least per the compliance report, it appears that a significant number of these are obstructive (index of 6.2, compared to central apnea index of 3.9).  As such, I would like to try adjusting his bilevel PAP settings to obtain a better control over the obstructive events.     - Continue bilevel PAP; I did adjust the settings to auto BiPAP with a Maximum IPAP 25 cmH2O, minimum EPAP [id number], PS 5 cmH2O.  - Reviewed CMS/insurance requirements and resupply guidelines  - Information provided on the above as well as general maintenance steps  - he is to see me back in ~3 to 6 months to see how he is doing and what effect the change to his settings had.  Should he continue to have a supratherapeutic AHI, I will review the central versus obstructive indices and decide whether any further adjustments need to be made.      Sleep Studies:  -HSAT 6/21/2023: .9 minutes.  SHIMA 58.0, O2 ling 78%.     -PAP titration 7/26/2023: Bilevel PAP pressure of 15/10 cm H2O utilizing ResMed AirTouch F20 resulted in  reduction of SHIMA to 9.7, with more than 50% central events.  Minimum O2 saturation maintained  above 90%.  Patient was observed during REM sleep but not supine at this final pressure.    ________________________________________________________________________________________________      Interval History: Matt Carreno is a 68 y.o. male with a PMHx of osteoarthritis, history of basal cell carcinoma, melanoma in situ, stage III chronic kidney disease, HLD who presents in follow up for FARA.       SDB:  -Current experience with PAP Therapy: Beneficial; eliminates snoring  -Mask type: FFM  -Difficulties with mask: Thinks maybe irritating mole beneath eye/between eye and nose, making bigger. Wioll be seeing dermatologist.   -Device: Clifford Thames 10 Auto; received ~2023  -Difficulties with device: Denies  -Compliance:            [place] Sleepiness Scale:  What are your chances of dozing?   0= no chance  1= slight chance  2= moderate chance  3= high chance    Sitting and readin   Watching TV: 0  Sitting, inactive in a public place (e.g. a theatre or a meeting):0  As a passenger in a car for an hour without a break: 0  Lying down to rest in the afternoon when circumstances permit: 0   Sitting and talking to someone: 0  Sitting quietly after a lunch without alcohol: 0  In a car, while stopped for a few minutes in the traffic: 0       TOTAL  0/24  Greater or equal to 10 is positive for excessive daytime sleepiness        SLEEP HYGIENE QUESTIONS:  Bedtime: 2300             Time it takes to fall sleep: 15 minutes  Wake up Time: 0600   Number of times patient wakes up per night: 2-3  Reason (s) why patient wakes up during the night: Adjustment, body position   Estimated total sleep time ( in a 24 hour period of time): ~7  hours  Naps: 0     Changes to PMH, PSH, SH: None       SLEEP RELATED ROS    No Known Allergies    CURRENT MEDICATIONS:  Current Outpatient Medications   Medication Instructions    allopurinol (ZYLOPRIM) 445 mg, Oral, Daily    cholecalciferol 2,000 Units, Daily    Flowflex COVID-19 Ag Home Test  "KIT FOLLOW INSTRUCTIONS INCLUDED WITH THE PACKAGE.    predniSONE 5 mg, Oral, Daily    sildenafil (VIAGRA) 50 mg, Oral, As needed    simvastatin (ZOCOR) 20 mg tablet TAKE 1 TABLET DAILY           PHYSICAL EXAMINATION:  Vital Signs: /78 (BP Location: Left arm, Patient Position: Sitting, Cuff Size: Large)   Pulse 62   Temp (!) 96.7 °F (35.9 °C)   Ht 5' 7\" (1.702 m)   Wt 99.9 kg (220 lb 3.2 oz)   SpO2 99%   BMI 34.49 kg/m²     Constitutional: NAD, well appearing   Mental Status: AAOx3  Skin: Warm, dry, no rashes noted.  Mole noted between eye and superior aspect of nose on right side.  Eyes: PERRL, normal conjunctiva  ENT: Nasal congestion absent, nasal valve incompetence absent.  Posterior Airspace:   [place] Tongue Position: 4  Retrognathia: absent  Overbite: absent  High Arched Palate: absent  Tongue Scalloping/Ridging: absent  Uvula: normal  Chest: No evidence of respiratory distress, no accessory muscle use; no evidence of peripheral cyanosis   Abdomen: Soft, NT/ND  Extremities: No digital clubbing or pedal edema  Neuro: Strength 5/5 throughout, sensation grossly intact      I have spent a total time of 20-30 minutes on 03/20/24 in caring for this patient including Diagnostic results, Prognosis, Risks and benefits of tx options, Instructions for management, Patient and family education, Importance of tx compliance, Risk factor reductions, Documenting in the medical record, Reviewing / ordering tests, medicine, procedures  , and Obtaining or reviewing history  .        Electronically signed by:    Hans Peña DO  Board-Certified Neurology and Sleep Medicine  [facility] [address]  03/20/24      "

## 2024-03-20 NOTE — PATIENT INSTRUCTIONS
Continue PAP Therapy  Continue AutoBiPAP at current settings.  Remember to clean your mask and equipment regularly, as directed.  We may consider something called an ASV-titration study at your next appointment if the central apneas remain. As I said, there is a strong chance that these will improve before your next appointment.  I am ordering an echocardiogram to evaluate your heart function in case we do need to pursue the ASV Titration.   You should be eligible for new supplies approximately every 3-6 months, depending on your insurance coverage. Contact your Durable Medical Equipment (DME) company for new supplies as needed.  Follow up in 6 months.      Care and Maintenance  Headgear should be washed as needed. Daily inspection and weekly washings are recommended. Do not disassemble the straps. Machine wash in warm water, making sure to attach Velcro hooks and tabs before washing. Line dry or machine dry on a low setting.  Masks should be washed every day. Daily inspection is recommended. Leave the mask and tubing attached. Gently wash the mask with a soft cloth using warm water and mild detergent, concentrating on the mask cushion flaps. DO NOT use alcohol or bleach. Rinse thoroughly and air dry.  Tubing and headgear should be washed weekly. Daily inspection is recommended. Wash in warm water and mild detergent and rinse thoroughly. Hook the tubing to the machine and blow until dry.  Humidifier should be washed daily and filled with DISTILLED water before use. Wash with warm water and mild detergent. Disinfect weekly by soaking with a solution of 1 part white vinegar and 3 parts water for 30 minutes. Rinse thoroughly and air dry.  Disposable filters should be replaced once a month. Wash reusable foam filters with warm water and mild detergent at least once a month. Rinse thoroughly and dry with paper towels.  Avoid  that contain fragrance or conditioners, as these will leave a residue.  NEVER iron any  soft goods.      CMS Requirements    Your insurance requires a face-to-face follow up visit within a 31-90 day period after starting CPAP.  Your insurance requires compliance with CPAP, which is at least 4 hours per night for 70% of the time. This must be done over a 30 day period and must occur within the initial 31-90 day period after starting CPAP.  Your insurance also requires at least yearly follow ups to continue to pay for CPAP supplies.       PAP Supply Guidelines    Below are the guidelines for reordering your supplies. You will be responsible for your deductible, co payments, and out of pocket expenses.    Item Frequency   Nasal Mask (no headgear) 1 every 3 months   Nasal Mask Cushion 1 every 2 weeks   Full Face Mask (no headgear) 1 every 3 months   Full Face Mask Cushion 1 every month   Nasal Pillows 1 every 2 weeks   Headgear 1 every 6 months   hin Strap 1 every 6 months   steve 1 every 3 months   Filters: Reusable 1 every 6 months   Filters: Disposable 1 every 2 weeks   Humidifier Chamber(disposable) 1 every 6 months         Good Sleep Hygiene    Wake up at the same time every day, even on the weekends.  Use your bed for sleep and intimacy only.  If you have been in bed awake for 30 minutes, get up and leave the bedroom. Choose a dull activity not involving a blue screen (TV, computer, handheld devices). Go back to bed when you feel sleepy.  Avoid caffeine, nicotine and alcohol before you go to bed.  Avoid large meals before you go to bed.  Avoid using screens (computers, tablets, smartphones, etc.) for at least 1 hour before bedtime  Exercise regularly, but do not exercise right before you go to bed.  Avoid daytime naps. If you do take a nap, sleep for 20-40 minutes, and not after dinner.

## 2024-03-22 ENCOUNTER — RA CDI HCC (OUTPATIENT)
Dept: OTHER | Facility: HOSPITAL | Age: 69
End: 2024-03-22

## 2024-03-22 NOTE — PROGRESS NOTES
HCC coding opportunities          Chart Reviewed number of suggestions sent to Provider: 1   N18.31    Patients Insurance        Commercial Insurance: [employer] Commercial [employer]

## 2024-03-25 ENCOUNTER — TELEPHONE (OUTPATIENT)
Dept: SLEEP CENTER | Facility: CLINIC | Age: 69
End: 2024-03-25

## 2024-03-26 DIAGNOSIS — E78.2 MIXED HYPERLIPIDEMIA: ICD-10-CM

## 2024-03-26 RX ORDER — SIMVASTATIN 20 MG
TABLET ORAL
Qty: 90 TABLET | Refills: 1 | Status: SHIPPED | OUTPATIENT
Start: 2024-03-26

## 2024-03-27 LAB

## 2024-03-28 ENCOUNTER — OFFICE VISIT (OUTPATIENT)
Dept: FAMILY MEDICINE CLINIC | Facility: CLINIC | Age: 69
End: 2024-03-28
Payer: COMMERCIAL

## 2024-03-28 VITALS
SYSTOLIC BLOOD PRESSURE: 118 MMHG | HEIGHT: 67 IN | WEIGHT: 214 LBS | BODY MASS INDEX: 33.59 KG/M2 | DIASTOLIC BLOOD PRESSURE: 80 MMHG

## 2024-03-28 DIAGNOSIS — E66.9 OBESITY (BMI 30-39.9): ICD-10-CM

## 2024-03-28 DIAGNOSIS — E55.9 VITAMIN D DEFICIENCY: ICD-10-CM

## 2024-03-28 DIAGNOSIS — E66.09 CLASS 1 OBESITY DUE TO EXCESS CALORIES WITH SERIOUS COMORBIDITY AND BODY MASS INDEX (BMI) OF 33.0 TO 33.9 IN ADULT: ICD-10-CM

## 2024-03-28 DIAGNOSIS — G47.33 OSA (OBSTRUCTIVE SLEEP APNEA): ICD-10-CM

## 2024-03-28 DIAGNOSIS — N18.31 STAGE 3A CHRONIC KIDNEY DISEASE (HCC): ICD-10-CM

## 2024-03-28 DIAGNOSIS — E78.2 MIXED HYPERLIPIDEMIA: Primary | ICD-10-CM

## 2024-03-28 DIAGNOSIS — Z85.89 HISTORY OF SQUAMOUS CELL CARCINOMA: ICD-10-CM

## 2024-03-28 DIAGNOSIS — D03.30 MELANOMA IN SITU OF FACE EXCLUDING EYELID, NOSE, LIP, AND EAR (HCC): ICD-10-CM

## 2024-03-28 DIAGNOSIS — Z85.828 HISTORY OF BASAL CELL CARCINOMA (BCC): ICD-10-CM

## 2024-03-28 PROCEDURE — 99214 OFFICE O/P EST MOD 30 MIN: CPT | Performed by: FAMILY MEDICINE

## 2024-03-28 NOTE — ASSESSMENT & PLAN NOTE
Lab Results   Component Value Date    EGFR 46 12/13/2023    EGFR 50 07/11/2023    EGFR 53 01/03/2023    CREATININE 1.52 (H) 12/13/2023    CREATININE 1.40 (H) 07/11/2023    CREATININE 1.35 (H) 01/03/2023   Patient labs were reviewed and are stable Pateint is euvolemic in office and his blood pressure is good Patient will continue to avoid NSAID and  will stay hydrated pateint to followup in 6 months

## 2024-03-28 NOTE — PROGRESS NOTES
Chief Complaint   Patient presents with    Hyperlipidemia     No refills needed      Name: Matt Carreno      : 1955      MRN: [id number]  Encounter Provider: Danae Darnell DO  Encounter Date: 3/28/2024   Encounter department: [facility]    Assessment & Plan     1. Mixed hyperlipidemia  Assessment & Plan:  Last lab from 2024 showed LDL was 65 Goal is <80 ASCVD score is 13.4  Patient to have repeat labs and continue zocor    Orders:  -     Comprehensive metabolic panel; Future; Expected date: 2024  -     Lipid panel; Future; Expected date: 2024    2. Stage 3a chronic kidney disease (HCC)  Assessment & Plan:  Lab Results   Component Value Date    EGFR 46 2023    EGFR 50 2023    EGFR 53 2023    CREATININE 1.52 (H) 2023    CREATININE 1.40 (H) 2023    CREATININE 1.35 (H) 2023   Patient labs were reviewed and are stable Pateint is euvolemic in office and his blood pressure is good Patient will continue to avoid NSAID and  will stay hydrated pateint to followup in 6 months     Orders:  -     Comprehensive metabolic panel; Future; Expected date: 2024    3. FARA (obstructive sleep apnea)  Assessment & Plan:  Patient stable with CPAP continue that and keep scheduled followup with the sleep medicine doctor      4. Obesity (BMI 30-39.9)  Assessment & Plan:  Weight is unchanged continue current care       5. 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 History of basal cell carcinoma (BCC)  Assessment & Plan:  Followup with dermatology      7. Class 1 obesity due to excess calories with serious comorbidity and body mass index (BMI) of 33.0 to 33.9 in adult  Assessment & Plan:  Weight is stable discussed diet and exercise with patient follow up in 6 months       8. Vitamin D deficiency  -     Vitamin D 25 hydroxy; Future; Expected date:  03/28/2024    9. History of squamous cell carcinoma  Assessment & Plan:  Followup with dermatology             Subjective      Patient is here for followup of hyperlipidemia sleep apnea stage 3 renal disease gout his weight as well as history of melanoma insitu of face and also squamous cell cancer skin Patient sees dermatology every 3 months He is maintained on 2.5 mg of prednisone for the gout Patient is taking his cholesterol meds He is due for repeat labs Patient is staying hydrate and avoiding NSAIDs Patient is compliant with the CPAP He has no complaints      Review of Systems   Constitutional:  Negative for fatigue, fever and unexpected weight change.   HENT:  Negative for congestion, sinus pain and trouble swallowing.    Eyes:  Negative for discharge and visual disturbance.   Respiratory:  Negative for cough, chest tightness, shortness of breath and wheezing.    Cardiovascular:  Negative for chest pain, palpitations and leg swelling.   Gastrointestinal:  Negative for abdominal pain, blood in stool, constipation, diarrhea, nausea and vomiting.   Genitourinary:  Negative for difficulty urinating, dysuria, frequency and hematuria.   Musculoskeletal:  Negative for arthralgias, gait problem and joint swelling.   Skin:  Negative for rash and wound.   Allergic/Immunologic: Negative for environmental allergies and food allergies.   Neurological:  Negative for dizziness, syncope, weakness, numbness and headaches.   Hematological:  Negative for adenopathy. Does not bruise/bleed easily.   Psychiatric/Behavioral:  Negative for confusion, decreased concentration and sleep disturbance. The patient is not nervous/anxious.        Current Outpatient Medications on File Prior to Visit   Medication Sig    allopurinol (ZYLOPRIM) 300 mg tablet Take 445 mg by mouth daily    predniSONE 2.5 mg tablet Take 5 mg by mouth daily    sildenafil (Viagra) 50 MG tablet Take 1 tablet (50 mg total) by mouth as needed for erectile dysfunction  "   simvastatin (ZOCOR) 20 mg tablet TAKE 1 TABLET DAILY    Flowflex COVID-19 Ag Home Test KIT FOLLOW INSTRUCTIONS INCLUDED WITH THE PACKAGE. (Patient not taking: Reported on 9/22/2023)    [DISCONTINUED] cholecalciferol (VITAMIN D3) 1,000 units tablet Take 2,000 Units by mouth daily (Patient not taking: Reported on 3/20/2024)       Objective     /80   Ht 5' 7\" (1.702 m)   Wt 97.1 kg (214 lb)   BMI 33.52 kg/m²     Physical Exam  Vitals and nursing note reviewed.   Constitutional:       Appearance: He is well-developed. He is obese.   HENT:      Head: Normocephalic and atraumatic.      Right Ear: Hearing, tympanic membrane and external ear normal.      Left Ear: Hearing, tympanic membrane and external ear normal.   Eyes:      Extraocular Movements: Extraocular movements intact.      Conjunctiva/sclera: Conjunctivae normal.      Pupils: Pupils are equal, round, and reactive to light.   Neck:      Thyroid: No thyromegaly.   Cardiovascular:      Rate and Rhythm: Normal rate and regular rhythm.      Heart sounds: Normal heart sounds.   Pulmonary:      Effort: Pulmonary effort is normal.      Breath sounds: Normal breath sounds. No wheezing or rales.   Abdominal:      General: Bowel sounds are normal. There is no distension.      Palpations: Abdomen is soft.      Tenderness: There is no abdominal tenderness.   Musculoskeletal:         General: No tenderness.      Cervical back: Neck supple.   Lymphadenopathy:      Cervical: No cervical adenopathy.   Skin:     General: Skin is warm and dry.      Findings: No rash.   Neurological:      General: No focal deficit present.      Mental Status: He is alert and oriented to person, place, and time.      Cranial Nerves: No cranial nerve deficit.      Coordination: Coordination normal.   Psychiatric:         Behavior: Behavior normal.         Thought Content: Thought content normal.         Judgment: Judgment normal.       Danae Darnell,     "

## 2024-03-28 NOTE — PATIENT INSTRUCTIONS
Cholesterol and Your Health   WHAT YOU NEED TO KNOW:   What is cholesterol?  Cholesterol is a waxy, fat-like substance. Your body uses cholesterol to make hormones and new cells, and to protect nerves. Cholesterol is made by your body. It also comes from certain foods you eat, such as meat and dairy products. Your healthcare provider can help you set goals for your cholesterol levels. Your provider can help you create a plan to meet your goals.  What are cholesterol level goals?  Your cholesterol level goals depend on your risk for heart disease, your age, and your other health conditions. The following are general guidelines:  Total cholesterol  includes low-density lipoprotein (LDL), high-density lipoprotein (HDL), and triglyceride levels. The total cholesterol level should be lower than 200 mg/dL and is best at about 150 mg/dL.    LDL cholesterol  is called bad cholesterol  because it forms plaque in your arteries. As plaque builds up, your arteries become narrow, and less blood flows through. When plaque decreases blood flow to your heart, you may have chest pain. If plaque completely blocks an artery that brings blood to your heart, you may have a heart attack. Plaque can break off and form blood clots. Blood clots may block arteries in your brain and cause a stroke. The level should be less than 130 mg/dL and is best at about 100 mg/dL.         HDL cholesterol  is called good cholesterol  because it helps remove LDL cholesterol from your arteries. It does this by attaching to LDL cholesterol and carrying it to your liver. Your liver breaks down LDL cholesterol so your body can get rid of it. High levels of HDL cholesterol can help prevent a heart attack and stroke. Low levels of HDL cholesterol can increase your risk for heart disease, heart attack, and stroke. The level should be at least 40 mg/dL in males or at least 50 mg/dL in females.    Triglycerides  are a type of fat that store energy from foods you  eat. High levels of triglycerides also cause plaque buildup. This can increase your risk for a heart attack or stroke. If your triglyceride level is high, your LDL cholesterol level may also be high. The level should be less than 150 mg/dL.    What increases my risk for high cholesterol?   Smoking or drinking large amounts of alcohol    Having overweight or obesity, or not getting enough exercise    A medical condition such as hypertension (high blood pressure) or diabetes    A family history of high cholesterol    Age older than 65    What do I need to know about having my cholesterol levels checked?  Adults 20 to 45 years of age should have their cholesterol levels checked every 4 to 6 years. Adults 45 years or older should have their cholesterol checked every 1 to 2 years. You may need your cholesterol checked more often, or at a younger age, if you have risk factors for heart disease. You may also need to have your cholesterol checked more often if you have other health conditions, such as diabetes. Blood tests are used to check cholesterol levels. Blood tests measure your levels of triglycerides, LDL cholesterol, and HDL cholesterol.  How do healthy fats affect my cholesterol levels?  Healthy fats, also called unsaturated fats, help lower LDL cholesterol and triglyceride levels. Healthy fats include the following:  Monounsaturated fats  are found in foods such as olive oil, canola oil, avocado, nuts, and olives.    Polyunsaturated fats,  such as omega 3 fats, are found in fish, such as salmon, trout, and tuna. They can also be found in plant foods such as flaxseed, walnuts, and soybeans.    How do unhealthy fats affect my cholesterol levels?  Unhealthy fats increase LDL cholesterol and triglyceride levels. They are found in foods high in cholesterol, saturated fat, and trans fat:  Cholesterol  is found in eggs, dairy, and meat.    Saturated fat  is found in butter, cheese, ice cream, whole milk, and coconut  oil. Saturated fat is also found in meat, such as sausage, hot dogs, and bologna.    Trans fat  is found in liquid oils and is used in fried and baked foods. Foods that contain trans fats include chips, crackers, muffins, sweet rolls, microwave popcorn, and cookies.    How is high cholesterol treated?  Treatment for high cholesterol will also decrease your risk of heart disease, heart attack, and stroke. Treatment may include any of the following:  Lifestyle changes  may include food, exercise, weight loss, and quitting smoking. You may also need to decrease the amount of alcohol you drink. Your healthcare provider will want you to start with lifestyle changes. Other treatment may be added if lifestyle changes are not enough. Your healthcare provider may recommend you work with a team to manage hyperlipidemia. The team may include medical experts such as a dietitian, an exercise or physical therapist, and a behavior therapist. Your family members may be included in helping you create lifestyle changes.    Medicines  may be given to lower your LDL cholesterol, triglyceride levels, or total cholesterol level. You may need medicines to lower your cholesterol if any of the following is true:    You have a history of stroke, TIA, unstable angina, or a heart attack.    Your LDL cholesterol level is 190 mg/dL or higher.    You are age 40 to 75 years, have diabetes or heart disease risk factors, and your LDL cholesterol is 70 mg/dL or higher.    Supplements  include fish oil, red yeast rice, and garlic. Fish oil may help lower your triglyceride and LDL cholesterol levels. It may also increase your HDL cholesterol level. Red yeast rice may help decrease your total cholesterol level and LDL cholesterol level. Garlic may help lower your total cholesterol level. Do not take any supplements without talking to your healthcare provider.    What food changes can I make to lower my cholesterol levels?  A dietitian can help you  create a healthy eating plan. Your dietitian can show you how to read food labels and choose foods low in saturated fat, trans fats, and cholesterol.     Decrease the total amount of fat you eat.  Choose lean meats, fat-free or 1% fat milk, and low-fat dairy products, such as yogurt and cheese. Try to limit or avoid red meats. Limit or do not eat fried foods or baked goods, such as cookies.    Replace unhealthy fats with healthy fats.  Cook foods in olive oil or canola oil. Choose soft margarines that are low in saturated fat and trans fat. Seeds, nuts, and avocados are other examples of healthy fats.    Eat foods with omega-3 fats.  Examples include salmon, tuna, mackerel, walnuts, and flaxseed. Eat fish 2 times per week. Pregnant women should not eat fish that have high levels of mercury, such as shark, swordfish, and breezy mackerel.         Increase the amount of high-fiber foods you eat.  High-fiber foods can help lower your LDL cholesterol. Aim to get between 20 and 30 grams of fiber each day. Fruits and vegetables are high in fiber. Eat at least 5 servings each day. Other high-fiber foods are whole-grain or whole-wheat breads, pastas, or cereals, and brown rice. Eat 3 ounces of whole-grain foods each day. Increase fiber slowly. You may have abdominal discomfort, bloating, and gas if you add fiber to your diet too quickly.         Eat healthy protein foods.  Examples include low-fat dairy products, skinless chicken and turkey, fish, and nuts.    Limit foods and drinks that are high in sugar.  Your dietitian or healthcare provider can help you create daily limits for high-sugar foods and drinks. The limit may be lower if you have diabetes or another health condition. Limits can also help you lose weight if needed.  What lifestyle changes can I make to lower my cholesterol levels?   Maintain a healthy weight.  Ask your healthcare provider what a healthy weight is for you. Ask your provider to help you create a  weight loss plan if needed. Weight loss can decrease your total cholesterol and triglyceride levels. Weight loss may also help keep your blood pressure at a healthy level.    Be physically active throughout the day.  Physical activity, such as exercise, can help lower your total cholesterol level and maintain a healthy weight. Physical activity can also help increase your HDL cholesterol level. Work with your healthcare provider to create an program that is right for you. Get at least 30 to 40 minutes of moderate physical activity most days of the week. Examples of exercise include brisk walking, swimming, or biking. Also include strength training at least 2 times each week. Your healthcare providers can help you create a physical activity plan.            Do not smoke.  Nicotine and other chemicals in cigarettes and cigars can raise your cholesterol levels. Ask your healthcare provider for information if you currently smoke and need help to quit. E-cigarettes or smokeless tobacco still contain nicotine. Talk to your healthcare provider before you use these products.         Limit or do not drink alcohol.  Alcohol can increase your triglyceride levels. Ask your healthcare provider before you drink alcohol. Ask how much is okay for you to drink in 24 hours or 1 week.    CARE AGREEMENT:   You have the right to help plan your care. Discuss treatment options with your healthcare provider to decide what care you want to receive. You always have the right to refuse treatment. The above information is an  only. It is not intended as medical advice for individual conditions or treatments. Talk to your doctor, nurse or pharmacist before following any medical regimen to see if it is safe and effective for you.  © Copyright [employer] 2023 Information is for End User's use only and may not be sold, redistributed or otherwise used for commercial purposes.

## 2024-03-28 NOTE — ASSESSMENT & PLAN NOTE
Last lab from 9/25/2024 showed LDL was 65 Goal is <80 ASCVD score is 13.4  Patient to have repeat labs and continue zocor

## 2024-03-29 ENCOUNTER — APPOINTMENT (OUTPATIENT)
Dept: LAB | Facility: MEDICAL CENTER | Age: 69
End: 2024-03-29
Payer: COMMERCIAL

## 2024-03-29 DIAGNOSIS — E78.2 MIXED HYPERLIPIDEMIA: ICD-10-CM

## 2024-03-29 DIAGNOSIS — N18.31 STAGE 3A CHRONIC KIDNEY DISEASE (HCC): ICD-10-CM

## 2024-03-29 DIAGNOSIS — E55.9 VITAMIN D DEFICIENCY: ICD-10-CM

## 2024-03-29 LAB
25(OH)D3 SERPL-MCNC: 33.4 NG/ML (ref 30–100)
ALBUMIN SERPL BCP-MCNC: 4.3 G/DL (ref 3.5–5)
ALP SERPL-CCNC: 58 U/L (ref 34–104)
ALT SERPL W P-5'-P-CCNC: 32 U/L (ref 7–52)
ANION GAP SERPL CALCULATED.3IONS-SCNC: 6 MMOL/L (ref 4–13)
AST SERPL W P-5'-P-CCNC: 19 U/L (ref 13–39)
BILIRUB SERPL-MCNC: 0.57 MG/DL (ref 0.2–1)
BUN SERPL-MCNC: 21 MG/DL (ref 5–25)
CALCIUM SERPL-MCNC: 9.4 MG/DL (ref 8.4–10.2)
CHLORIDE SERPL-SCNC: 108 MMOL/L (ref 96–108)
CHOLEST SERPL-MCNC: 138 MG/DL
CO2 SERPL-SCNC: 29 MMOL/L (ref 21–32)
CREAT SERPL-MCNC: 1.42 MG/DL (ref 0.6–1.3)
GFR SERPL CREATININE-BSD FRML MDRD: 50 ML/MIN/1.73SQ M
GLUCOSE P FAST SERPL-MCNC: 98 MG/DL (ref 65–99)
HDLC SERPL-MCNC: 45 MG/DL
LDLC SERPL CALC-MCNC: 73 MG/DL (ref 0–100)
NONHDLC SERPL-MCNC: 93 MG/DL
POTASSIUM SERPL-SCNC: 4.5 MMOL/L (ref 3.5–5.3)
PROT SERPL-MCNC: 7.3 G/DL (ref 6.4–8.4)
PSA SERPL-MCNC: 0.96 NG/ML (ref 0–4)
SODIUM SERPL-SCNC: 143 MMOL/L (ref 135–147)
TRIGL SERPL-MCNC: 100 MG/DL

## 2024-03-29 PROCEDURE — 80061 LIPID PANEL: CPT

## 2024-03-29 PROCEDURE — 82306 VITAMIN D 25 HYDROXY: CPT

## 2024-03-29 PROCEDURE — 80053 COMPREHEN METABOLIC PANEL: CPT

## 2024-04-18 ENCOUNTER — TELEPHONE (OUTPATIENT)
Age: 69
End: 2024-04-18

## 2024-04-18 NOTE — TELEPHONE ENCOUNTER
Patient called to confirm that he scheduled the right thing when he scheduled the u/s and asked if he needed anything else before his next appointment. He did need a PSA but he already had it done in March and it was normal. I told him if Dr. Chirinos wanted him to have another one he would send him after his appointment.

## 2024-04-22 ENCOUNTER — HOSPITAL ENCOUNTER (OUTPATIENT)
Dept: ULTRASOUND IMAGING | Facility: HOSPITAL | Age: 69
Discharge: HOME | End: 2024-04-22
Payer: COMMERCIAL

## 2024-04-22 DIAGNOSIS — N28.1 COMPLEX RENAL CYST: ICD-10-CM

## 2024-04-22 PROCEDURE — 76775 US EXAM ABDO BACK WALL LIM: CPT

## 2024-06-10 DIAGNOSIS — E78.2 MIXED HYPERLIPIDEMIA: ICD-10-CM

## 2024-06-10 RX ORDER — SIMVASTATIN 20 MG
20 TABLET ORAL DAILY
Qty: 90 TABLET | Refills: 1 | Status: SHIPPED | OUTPATIENT
Start: 2024-06-10

## 2024-07-13 NOTE — PROGRESS NOTES
Daily Note     Today's date: 2024  Patient name: Matt Carreno  : 1955  MRN: [id number]  Referring provider: Andres Duke MD  Dx:   Encounter Diagnosis     ICD-10-CM    1. Stiffness of left hand, not elsewhere classified  M25.642                      Subjective: pt notes increase in MF swelling and soreness today.      Objective: See treatment diary below    AROM left wrist E/F- 60/  Left SF MP- 0/90; PIP- 065; DIP- 0/35 (isolated)   Sensation- no tingling noted    Level II- 55/86; Level III- 55/85; 43 EULA- 10/16; Wise-   Circumference at wrist- 17.5 cm; MPs- 22.5cm     Assessment: Tolerated treatment well. Patient would benefit from continued PT      Plan: Continue per plan of care.      Precautions: wear orthosis as directed.  No aggressive activity with left hand  Manuals 24             STM MF/SF 10 10 10 10 15 10 10    10  10                           PROM 2/10 2/10 2/10 2/10 2/10 2/10 2/10 2/10  2/10  2/10   FDS 2/10 2/10 2/10 2/10 2/10 2/10 2/10 2/10  2/10  2/10   FDP 2/10 2/10 2/10 2/10 2/10 2/10 2/10 2/10  2/10  2/10                           IASTM  8  8 8 8  8  8 8 15  8 8   Neuro Re-Ed                       HP/Liber[place] F w/ball 15 15 15 15 15 15 15 15  15 15                                                   Ther Ex                       TGE                            TP   Y 2'  Y 2'  Y 2' Y 2'  Y 2' Y 2'  Y 2'  Y 2'  Y 2' Y 2'   TP scrape  T 1'  T 1'  T 1;'  T 1'  T 1' T 1'  T 2' T 2'  T 1 T1'   TP FDS  T 2/10  T 2/10  T 2/10  T 2/10  T 2/10  T 2/10  T 2/10  T 2'  T 2/10 T2/10   Glen  25 2/10  25 2/10  25 2/10  25 2/10  25 2/10  25 2/10  25 2/10  25 2/10  25 2/10 25 2/10   Blue  L III 2/10  L III 2/10  L III 2/10  L III 2/10  L III 2/10  L III 2/10  L III 2/10 L III  2/10  L III 2/10 LIII 2/10   Flexbar   G 20/20  G 20/20  G 20/20  G 20/20  G 20/20  G 20/20  G 20/20 G 20/20   G 20/20 G 2/10   DB E/F w/buildup  6 2/10  6 2/10  6  Please take loperamide as prescribed, please take 1 pill after every episode of diarrhea up to 8 pills a day.  Please take Bentyl twice a day as prescribed.  You may also take Maalox or Pepto-Bismol to help with your symptoms.  Please continue your Zofran as discussed.  Return to the ER for any concerning reason.   2/10  6 2/10  6 2/10 6 2/10  6 2/10  6 2/10  6 2/10 6 2/10         S/P  w/buildup  2 2/10  2 2/10  2 2/10  2 2/10  2 2/10  2 2/10  2 2/10  2 2/10  2 2/10 2 2/10    FF/Scapt  6 2/10  6 2/10  6 2/10  6 2/10  6 2/10  6 2/10  6 2/10  6 2/10  6 2/10 6 2/10   OH tricep  6 2/10  6 2/10  6 2/10  6 2/10  6 2/10  6 2/10  6 2/10  6 2/10  6 2/10 6 2/10                                                   Comp & donuts Y 3' Y3' Y 3'  Y 3'  Y3'  Y 3  Y 3'  Y 3'  Y 3'  Y 3'                           Modalities                        12 12 12 12 12 12 12 12 12  12

## 2024-08-07 ENCOUNTER — TELEPHONE (OUTPATIENT)
Age: 69
End: 2024-08-07

## 2024-08-07 ENCOUNTER — OFFICE VISIT (OUTPATIENT)
Dept: UROLOGY | Facility: MEDICAL CENTER | Age: 69
End: 2024-08-07
Payer: COMMERCIAL

## 2024-08-07 VITALS
BODY MASS INDEX: 33.12 KG/M2 | HEIGHT: 67 IN | SYSTOLIC BLOOD PRESSURE: 130 MMHG | HEART RATE: 78 BPM | OXYGEN SATURATION: 97 % | WEIGHT: 211 LBS | DIASTOLIC BLOOD PRESSURE: 80 MMHG

## 2024-08-07 DIAGNOSIS — R82.993 HYPERURICOSURIA: ICD-10-CM

## 2024-08-07 DIAGNOSIS — N40.0 BPH WITHOUT OBSTRUCTION/LOWER URINARY TRACT SYMPTOMS: Primary | ICD-10-CM

## 2024-08-07 DIAGNOSIS — N52.03 COMBINED ARTERIAL INSUFFICIENCY AND CORPORO-VENOUS OCCLUSIVE ERECTILE DYSFUNCTION: ICD-10-CM

## 2024-08-07 DIAGNOSIS — Z12.5 SCREENING FOR PROSTATE CANCER: ICD-10-CM

## 2024-08-07 DIAGNOSIS — N20.0 CALCULUS OF KIDNEY: ICD-10-CM

## 2024-08-07 DIAGNOSIS — N28.1 COMPLEX RENAL CYST: ICD-10-CM

## 2024-08-07 LAB
SL AMB  POCT GLUCOSE, UA: ABNORMAL
SL AMB LEUKOCYTE ESTERASE,UA: ABNORMAL
SL AMB POCT BILIRUBIN,UA: ABNORMAL
SL AMB POCT BLOOD,UA: ABNORMAL
SL AMB POCT CLARITY,UA: CLEAR
SL AMB POCT COLOR,UA: YELLOW
SL AMB POCT KETONES,UA: ABNORMAL
SL AMB POCT NITRITE,UA: ABNORMAL
SL AMB POCT PH,UA: 7
SL AMB POCT SPECIFIC GRAVITY,UA: 1.02
SL AMB POCT URINE PROTEIN: ABNORMAL
SL AMB POCT UROBILINOGEN: 0.2

## 2024-08-07 PROCEDURE — 81003 URINALYSIS AUTO W/O SCOPE: CPT | Performed by: UROLOGY

## 2024-08-07 PROCEDURE — 99214 OFFICE O/P EST MOD 30 MIN: CPT | Performed by: UROLOGY

## 2024-08-07 RX ORDER — TADALAFIL 5 MG/1
5 TABLET ORAL DAILY
Qty: 90 TABLET | Refills: 3 | Status: SHIPPED | OUTPATIENT
Start: 2024-08-07 | End: 2024-08-08 | Stop reason: SDUPTHER

## 2024-08-07 RX ORDER — LATANOPROST 50 UG/ML
1 SOLUTION/ DROPS OPHTHALMIC DAILY
COMMUNITY
Start: 2024-07-29

## 2024-08-07 NOTE — LETTER
2024     Danae Darnell,   [address]  [address]  [place] [address]    Patient: Matt Carreno   YOB: 1955   Date of Visit: 2024       Dear Dr. Darnell:    Thank you for referring Matt Carreno to me for evaluation. Below are my notes for this consultation.    If you have questions, please do not hesitate to call me. I look forward to following your patient along with you.         Sincerely,        Eulalio Chirinos MD        CC: No Recipients    Eulalio Chirinos MD  2024  1:47 PM  Sign when Signing Visit  Ambulatory Visit  Name: Matt Carreno      : 1955      MRN: [id number]  Encounter Provider: Eulalio Chirinos MD  Encounter Date: 2024   Encounter department: [facility] UROLOGY [place]    Assessment & Plan  1. BPH without obstruction/lower urinary tract symptoms  Comments:  Symptom score only 2, nocturia markedly improved otherwise good urinary stream  Orders:  -     POCT urine dip auto non-scope  -     PSA Total, Diagnostic; Future; Expected date: 2025  -     Comprehensive metabolic panel; Future; Expected date: 2025  2. Complex renal cyst  Comments:  Thin septations, stable, no further imaging necessary  3. Calculus of kidney  Comments:  History of renal stones  Orders:  -     Comprehensive metabolic panel; Future; Expected date: 2025  4. Hyperuricosuria  Comments:  On allopurinol-renewed  Orders:  -     Comprehensive metabolic panel; Future; Expected date: 2025  5. Screening for prostate cancer  Comments:  ARELI and PSA not suspicious-repeat yearly  Orders:  -     PSA Total, Diagnostic; Future; Expected date: 2025  6. Combined arterial insufficiency and corporo-venous occlusive erectile dysfunction  Comments:  Response to sildenafil with some variability.  Trial of Cialis 5 mg daily  Orders:  -     Comprehensive metabolic panel; Future; Expected date: 2025  -     tadalafil (CIALIS) 5 MG tablet; Take 1  tablet (5 mg total) by mouth in the morning      History of Present Illness    aMtt Carreno is a 69 y.o. male who presents with a history of prostatic enlargement radiographically without evidence of symptomatic obstruction.  The patient did have problems with nocturia many times nightly however this resolved with the use of CPAP for sleep apnea.  In addition he has complex renal cyst which on CT imaging with and without contrast as well as renal ultrasound represent benign cysts with some thin septations.  No further imaging is recommended as there is stability.  History of renal stones is noted with no recent episodes of colic.  He has ongoing elevated urinary uric acid levels for which he takes allopurinol.  He also suffers from gout occasionally.  With regard to prostate cancer screening is ARELI today was within acceptable limits and PSA was not suspicious for malignancy.    The patient does have recent problems with erectile dysfunction noting that he takes sildenafil on an as-needed basis.  His response is variable sometimes giving him an adequate erection otherwise not.  He presents for further discussion    Review of Systems   All other systems reviewed and are negative.    Pertinent Medical History         Medical History Reviewed by provider this encounter:  Tobacco  Allergies  Meds  Problems  Med Hx  Surg Hx  Fam Hx  Soc   Hx      Past Medical History  Past Medical History:   Diagnosis Date   • Acid reflux    • Arthritis    • Cancer (HCC)     skin   • Colon polyp    • Essential hypertriglyceridemia     last assessed: 5/23/2013   • H/O renal calculi    • History of hypogonadism     last assessed: 12/15/2014   • Hyperlipidemia    • Kidney stone    • Nasal lesion     left side   • Neck mass    • Tinnitus    • Wears contact lenses      Past Surgical History:   Procedure Laterality Date   • COLONOSCOPY      polyp repeat in 5 yrs   • ESOPHAGOGASTRODUODENOSCOPY      gastritis and hiatal hernia   • FL  RETROGRADE PYELOGRAM  5/26/2021   • FLAP LOCAL HEAD / NECK Left 8/7/2020    Procedure: EXCISION OF NECK SCC W/LOCAL FLAP;  Surgeon: Delmis Barragan MD;  Location:  MAIN OR;  Service: Plastics   • MASS EXCISION Left 5/9/2016    Procedure: EXCISION BASAL CELL SKIN CANCER LEFT NOSE, FROZEN SECTION, LOCAL FLAP;  Surgeon: Delmis Barragan MD;  Location: [facility];  Service:    • MOHS SURGERY      mohs mirographic surgery nose   • SC ADJT TIS TRNS/REARGMT F/C/C/M/N/A/G/H/F 10SQCM/< N/A 12/7/2018    Procedure: EXCISION BCC OF NECK W/LOCAL FLAP, FROZEN SECTION;  Surgeon: Delmis Barragan MD;  Location:  MAIN OR;  Service: Plastics   • SC CYSTO/URETERO W/LITHOTRIPSY &INDWELL STENT INSRT Right 5/26/2021    Procedure: CYSTOSCOPY URETEROSCOPY WITH BASKET STONE EXTRACTION, RETROGRADE PYELOGRAM AND INSERTION STENT URETERAL;  Surgeon: Swapnil Stiles MD;  Location:  MAIN OR;  Service: Urology   • SC FTH/GFT FREE W/DIRECT CLOSURE N/E/E/L 20 SQ CM/< N/A 5/9/2016    Procedure:  LOCAL FLAP SKIN GRAFT ;  Surgeon: Delmis Barragan MD;  [facility];  Service: Plastics   • SCALP EXCISION N/A 7/14/2022    Procedure: EXCISION SCALP LESION, FROZEN SECTION;  Surgeon: Ivonne Anderson DO;  Location:  MAIN OR;  Service: Plastics   • VASECTOMY     • WISDOM TOOTH EXTRACTION       Family History   Problem Relation Age of Onset   • Diabetes Mother         mellitus   • Dementia Mother    • Other Father    • No Known Problems Half-Sister      Current Outpatient Medications on File Prior to Visit   Medication Sig Dispense Refill   • allopurinol (ZYLOPRIM) 300 mg tablet Take 445 mg by mouth daily     • predniSONE 2.5 mg tablet Take 5 mg by mouth daily     • sildenafil (Viagra) 50 MG tablet Take 1 tablet (50 mg total) by mouth as needed for erectile dysfunction 10 tablet 1   • simvastatin (ZOCOR) 20 mg tablet Take 1 tablet (20 mg total) by mouth daily 90 tablet 1   • Flowflex COVID-19 Ag Home Test KIT        No current facility-administered medications on  "file prior to visit.   No Known Allergies   Current Outpatient Medications on File Prior to Visit   Medication Sig Dispense Refill   • allopurinol (ZYLOPRIM) 300 mg tablet Take 445 mg by mouth daily     • predniSONE 2.5 mg tablet Take 5 mg by mouth daily     • sildenafil (Viagra) 50 MG tablet Take 1 tablet (50 mg total) by mouth as needed for erectile dysfunction 10 tablet 1   • simvastatin (ZOCOR) 20 mg tablet Take 1 tablet (20 mg total) by mouth daily 90 tablet 1   • Flowflex COVID-19 Ag Home Test KIT        No current facility-administered medications on file prior to visit.      Social History     Tobacco Use   • Smoking status: Never   • Smokeless tobacco: Never   • Tobacco comments:     nonsmoker   Vaping Use   • Vaping status: Never Used   Substance and Sexual Activity   • Alcohol use: Not Currently     Comment: few x yea   • Drug use: No   • Sexual activity: Yes     Partners: Female     AUA SYMPTOM SCORE      Flowsheet Row Most Recent Value   AUA SYMPTOM SCORE    How often have you had a sensation of not emptying your bladder completely after you finished urinating? 0 (P)     How often have you had to urinate again less than two hours after you finished urinating? 1 (P)     How often have you found you stopped and started again several times when you urinate? 1 (P)     How often have you found it difficult to postpone urination? 0 (P)     How often have you had a weak urinary stream? 0 (P)     How often have you had to push or strain to begin urination? 0 (P)     How many times did you most typically get up to urinate from the time you went to bed at night until the time you got up in the morning? 0 (P)     Quality of Life: If you were to spend the rest of your life with your urinary condition just the way it is now, how would you feel about that? 0 (P)     AUA SYMPTOM SCORE 2 (P)            Objective    /80 (BP Location: Left arm, Patient Position: Sitting, Cuff Size: Standard)   Pulse 78   Ht 5' 7\" " (1.702 m)   Wt 95.7 kg (211 lb)   SpO2 97%   BMI 33.05 kg/m²   Physical Exam  Vitals reviewed.   Constitutional:       General: He is not in acute distress.     Appearance: Normal appearance. He is normal weight. He is not ill-appearing, toxic-appearing or diaphoretic.   HENT:      Head: Normocephalic and atraumatic.      Nose: Nose normal.      Mouth/Throat:      Mouth: Mucous membranes are moist.   Eyes:      Extraocular Movements: Extraocular movements intact.   Pulmonary:      Effort: Pulmonary effort is normal. No respiratory distress.   Abdominal:      General: There is no distension.      Palpations: Abdomen is soft.      Tenderness: There is no abdominal tenderness. There is no guarding or rebound.   Genitourinary:     Penis: Normal.       Testes: Normal.      Prostate: Normal.      Rectum: Normal.      Comments: Prostate 40 g and nodular nontender  Musculoskeletal:         General: Normal range of motion.      Cervical back: Neck supple.   Skin:     General: Skin is dry.   Neurological:      Mental Status: He is alert and oriented to person, place, and time.   Psychiatric:         Mood and Affect: Mood normal.         Behavior: Behavior normal.         Thought Content: Thought content normal.         Judgment: Judgment normal.       Results  Lab Results   Component Value Date    PSA 0.96 03/29/2024    PSA 1.17 07/13/2023    PSA 0.8 01/03/2023     Lab Results   Component Value Date    CALCIUM 9.4 03/29/2024     08/23/2017    K 4.5 03/29/2024    CO2 29 03/29/2024     03/29/2024    BUN 21 03/29/2024    CREATININE 1.42 (H) 03/29/2024     Lab Results   Component Value Date    WBC 6.43 12/13/2023    HGB 14.8 12/13/2023    HCT 43.8 12/13/2023    MCV 93 12/13/2023     12/13/2023       Office Urine Dip  Recent Results (from the past 1 hour(s))   POCT urine dip auto non-scope    Collection Time: 08/07/24  1:24 PM   Result Value Ref Range     COLOR,UA yellow     CLARITY,UA clear     SPECIFIC  GRAVITY,UA 1.020      PH,UA 7.0     LEUKOCYTE ESTERASE,UA neg     NITRITE,UA neg     GLUCOSE, UA neg     KETONES,UA neg     BILIRUBIN,UA neg     BLOOD,UA trace     POCT URINE PROTEIN 30mg     SL AMB POCT UROBILINOGEN 0.2    ]    Administrative Statements

## 2024-08-07 NOTE — PROGRESS NOTES
Ambulatory Visit  Name: Matt Carreno      : 1955      MRN: [id number]  Encounter Provider: Eulalio Chirinos MD  Encounter Date: 2024   Encounter department: [facility] UROLOGY [place]    Assessment & Plan   1. BPH without obstruction/lower urinary tract symptoms  Comments:  Symptom score only 2, nocturia markedly improved otherwise good urinary stream  Orders:  -     POCT urine dip auto non-scope  -     PSA Total, Diagnostic; Future; Expected date: 2025  -     Comprehensive metabolic panel; Future; Expected date: 2025  2. Complex renal cyst  Comments:  Thin septations, stable, no further imaging necessary  3. Calculus of kidney  Comments:  History of renal stones  Orders:  -     Comprehensive metabolic panel; Future; Expected date: 2025  4. Hyperuricosuria  Comments:  On allopurinol-renewed  Orders:  -     Comprehensive metabolic panel; Future; Expected date: 2025  5. Screening for prostate cancer  Comments:  ARELI and PSA not suspicious-repeat yearly  Orders:  -     PSA Total, Diagnostic; Future; Expected date: 2025  6. Combined arterial insufficiency and corporo-venous occlusive erectile dysfunction  Comments:  Response to sildenafil with some variability.  Trial of Cialis 5 mg daily  Orders:  -     Comprehensive metabolic panel; Future; Expected date: 2025  -     tadalafil (CIALIS) 5 MG tablet; Take 1 tablet (5 mg total) by mouth in the morning      History of Present Illness     Matt Carreno is a 69 y.o. male who presents with a history of prostatic enlargement radiographically without evidence of symptomatic obstruction.  The patient did have problems with nocturia many times nightly however this resolved with the use of CPAP for sleep apnea.  In addition he has complex renal cyst which on CT imaging with and without contrast as well as renal ultrasound represent benign cysts with some thin septations.  No further imaging is recommended as there is  stability.  History of renal stones is noted with no recent episodes of colic.  He has ongoing elevated urinary uric acid levels for which he takes allopurinol.  He also suffers from gout occasionally.  With regard to prostate cancer screening is ARELI today was within acceptable limits and PSA was not suspicious for malignancy.    The patient does have recent problems with erectile dysfunction noting that he takes sildenafil on an as-needed basis.  His response is variable sometimes giving him an adequate erection otherwise not.  He presents for further discussion    Review of Systems   All other systems reviewed and are negative.    Pertinent Medical History          Medical History Reviewed by provider this encounter:  Tobacco  Allergies  Meds  Problems  Med Hx  Surg Hx  Fam Hx  Soc   Hx      Past Medical History   Past Medical History:   Diagnosis Date    Acid reflux     Arthritis     Cancer (HCC)     skin    Colon polyp     Essential hypertriglyceridemia     last assessed: 5/23/2013    H/O renal calculi     History of hypogonadism     last assessed: 12/15/2014    Hyperlipidemia     Kidney stone     Nasal lesion     left side    Neck mass     Tinnitus     Wears contact lenses      Past Surgical History:   Procedure Laterality Date    COLONOSCOPY      polyp repeat in 5 yrs    ESOPHAGOGASTRODUODENOSCOPY      gastritis and hiatal hernia    FL RETROGRADE PYELOGRAM  5/26/2021    FLAP LOCAL HEAD / NECK Left 8/7/2020    Procedure: EXCISION OF NECK SCC W/LOCAL FLAP;  Surgeon: Delmis Barragan MD;  Location:  MAIN OR;  Service: Plastics    MASS EXCISION Left 5/9/2016    Procedure: EXCISION BASAL CELL SKIN CANCER LEFT NOSE, FROZEN SECTION, LOCAL FLAP;  Surgeon: Delmis Barragan MD;  Location: [facility];  Service:     MOHS SURGERY      mohs mirographic surgery nose    UT ADJT TIS TRNS/REARGMT F/C/C/M/N/A/G/H/F 10SQCM/< N/A 12/7/2018    Procedure: EXCISION BCC OF NECK W/LOCAL FLAP, FROZEN SECTION;  Surgeon: Delmis Barragan MD;   Location:  MAIN OR;  Service: Plastics    NC CYSTO/URETERO W/LITHOTRIPSY &INDWELL STENT INSRT Right 5/26/2021    Procedure: CYSTOSCOPY URETEROSCOPY WITH BASKET STONE EXTRACTION, RETROGRADE PYELOGRAM AND INSERTION STENT URETERAL;  Surgeon: Swapnil Stiles MD;  Location:  MAIN OR;  Service: Urology    NC FTH/GFT FREE W/DIRECT CLOSURE N/E/E/L 20 SQ CM/< N/A 5/9/2016    Procedure:  LOCAL FLAP SKIN GRAFT ;  Surgeon: Delmis Barragan MD;  Location: [facility];  Service: Plastics    SCALP EXCISION N/A 7/14/2022    Procedure: EXCISION SCALP LESION, FROZEN SECTION;  Surgeon: Ivonne Anderson DO;  Location:  MAIN OR;  Service: Plastics    VASECTOMY      WISDOM TOOTH EXTRACTION       Family History   Problem Relation Age of Onset    Diabetes Mother         mellitus    Dementia Mother     Other Father     No Known Problems Half-Sister      Current Outpatient Medications on File Prior to Visit   Medication Sig Dispense Refill    allopurinol (ZYLOPRIM) 300 mg tablet Take 445 mg by mouth daily      predniSONE 2.5 mg tablet Take 5 mg by mouth daily      sildenafil (Viagra) 50 MG tablet Take 1 tablet (50 mg total) by mouth as needed for erectile dysfunction 10 tablet 1    simvastatin (ZOCOR) 20 mg tablet Take 1 tablet (20 mg total) by mouth daily 90 tablet 1    Flowflex COVID-19 Ag Home Test KIT        No current facility-administered medications on file prior to visit.   No Known Allergies   Current Outpatient Medications on File Prior to Visit   Medication Sig Dispense Refill    allopurinol (ZYLOPRIM) 300 mg tablet Take 445 mg by mouth daily      predniSONE 2.5 mg tablet Take 5 mg by mouth daily      sildenafil (Viagra) 50 MG tablet Take 1 tablet (50 mg total) by mouth as needed for erectile dysfunction 10 tablet 1    simvastatin (ZOCOR) 20 mg tablet Take 1 tablet (20 mg total) by mouth daily 90 tablet 1    Flowflex COVID-19 Ag Home Test KIT        No current facility-administered medications on file prior to visit.      Social  "History     Tobacco Use    Smoking status: Never    Smokeless tobacco: Never    Tobacco comments:     nonsmoker   Vaping Use    Vaping status: Never Used   Substance and Sexual Activity    Alcohol use: Not Currently     Comment: few x yea    Drug use: No    Sexual activity: Yes     Partners: Female     AUA SYMPTOM SCORE      Flowsheet Row Most Recent Value   AUA SYMPTOM SCORE    How often have you had a sensation of not emptying your bladder completely after you finished urinating? 0 (P)     How often have you had to urinate again less than two hours after you finished urinating? 1 (P)     How often have you found you stopped and started again several times when you urinate? 1 (P)     How often have you found it difficult to postpone urination? 0 (P)     How often have you had a weak urinary stream? 0 (P)     How often have you had to push or strain to begin urination? 0 (P)     How many times did you most typically get up to urinate from the time you went to bed at night until the time you got up in the morning? 0 (P)     Quality of Life: If you were to spend the rest of your life with your urinary condition just the way it is now, how would you feel about that? 0 (P)     AUA SYMPTOM SCORE 2 (P)            Objective     /80 (BP Location: Left arm, Patient Position: Sitting, Cuff Size: Standard)   Pulse 78   Ht 5' 7\" (1.702 m)   Wt 95.7 kg (211 lb)   SpO2 97%   BMI 33.05 kg/m²   Physical Exam  Vitals reviewed.   Constitutional:       General: He is not in acute distress.     Appearance: Normal appearance. He is normal weight. He is not ill-appearing, toxic-appearing or diaphoretic.   HENT:      Head: Normocephalic and atraumatic.      Nose: Nose normal.      Mouth/Throat:      Mouth: Mucous membranes are moist.   Eyes:      Extraocular Movements: Extraocular movements intact.   Pulmonary:      Effort: Pulmonary effort is normal. No respiratory distress.   Abdominal:      General: There is no distension. "      Palpations: Abdomen is soft.      Tenderness: There is no abdominal tenderness. There is no guarding or rebound.   Genitourinary:     Penis: Normal.       Testes: Normal.      Prostate: Normal.      Rectum: Normal.      Comments: Prostate 40 g and nodular nontender  Musculoskeletal:         General: Normal range of motion.      Cervical back: Neck supple.   Skin:     General: Skin is dry.   Neurological:      Mental Status: He is alert and oriented to person, place, and time.   Psychiatric:         Mood and Affect: Mood normal.         Behavior: Behavior normal.         Thought Content: Thought content normal.         Judgment: Judgment normal.       Results  Lab Results   Component Value Date    PSA 0.96 03/29/2024    PSA 1.17 07/13/2023    PSA 0.8 01/03/2023     Lab Results   Component Value Date    CALCIUM 9.4 03/29/2024     08/23/2017    K 4.5 03/29/2024    CO2 29 03/29/2024     03/29/2024    BUN 21 03/29/2024    CREATININE 1.42 (H) 03/29/2024     Lab Results   Component Value Date    WBC 6.43 12/13/2023    HGB 14.8 12/13/2023    HCT 43.8 12/13/2023    MCV 93 12/13/2023     12/13/2023       Office Urine Dip  Recent Results (from the past 1 hour(s))   POCT urine dip auto non-scope    Collection Time: 08/07/24  1:24 PM   Result Value Ref Range     COLOR,UA yellow     CLARITY,UA clear     SPECIFIC GRAVITY,UA 1.020      PH,UA 7.0     LEUKOCYTE ESTERASE,UA neg     NITRITE,UA neg     GLUCOSE, UA neg     KETONES,UA neg     BILIRUBIN,UA neg     BLOOD,UA trace     POCT URINE PROTEIN 30mg     SL AMB POCT UROBILINOGEN 0.2    ]    Administrative Statements

## 2024-08-07 NOTE — TELEPHONE ENCOUNTER
PA for tadalafil (CIALIS) 5 MG tablet SUBMITTED     via    [x]CMM-KEY [id number]  []Surescripts-Case ID #   []Faxed to plan   []Other website   []Phone call Case ID #     Office notes sent, clinical questions answered. Awaiting determination    Turnaround time for your insurance to make a decision on your Prior Authorization can take 7-21 business days.

## 2024-08-07 NOTE — TELEPHONE ENCOUNTER
PA for tadalafil (CIALIS) 5 MG tablet  Denied    Reason:(Screenshot if applicable)        Message sent to office clinical pool Yes    Denial letter scanned into Media Yes    Appeal started No ( Provider will need to decide if appeal is warranted and send clinical documentation to Prior Authorization Team for initiation.)    **Please follow up with your patient regarding denial and next steps**

## 2024-08-08 DIAGNOSIS — N52.03 COMBINED ARTERIAL INSUFFICIENCY AND CORPORO-VENOUS OCCLUSIVE ERECTILE DYSFUNCTION: ICD-10-CM

## 2024-08-08 NOTE — TELEPHONE ENCOUNTER
Please tell patient to use [facility] and not to run the medication through his insurance. He will be able to receive it that way. Should not be a lot through the [facility].

## 2024-08-08 NOTE — TELEPHONE ENCOUNTER
Call placed- Spoke to patient to let him know insurance denied his Cialis.  I told him providers suggestion on using [facility].  Patient said he will give it try first and he will call us back if there is any issues.

## 2024-08-08 NOTE — TELEPHONE ENCOUNTER
Reason for call:     Patient requesting pharmacy change to [facility]  [x] Refill   [] Prior Auth  [] Other:     Office:   [] PCP/Provider -    Specialty/Provider - urology, Dr Chirinos    Medication:   Tadalafil 5 mg, 1 qd, 90        Pharmacy: [facility]     Does the patient have enough for 3 days?   [] Yes   [] No - Send as HP to POD

## 2024-08-09 RX ORDER — TADALAFIL 5 MG/1
5 TABLET ORAL DAILY
Qty: 90 TABLET | Refills: 1 | Status: SHIPPED | OUTPATIENT
Start: 2024-08-09

## 2024-09-25 DIAGNOSIS — Z00.6 ENCOUNTER FOR EXAMINATION FOR NORMAL COMPARISON OR CONTROL IN CLINICAL RESEARCH PROGRAM: ICD-10-CM

## 2024-10-11 ENCOUNTER — APPOINTMENT (OUTPATIENT)
Dept: LAB | Facility: MEDICAL CENTER | Age: 69
End: 2024-10-11

## 2024-10-11 DIAGNOSIS — Z00.6 ENCOUNTER FOR EXAMINATION FOR NORMAL COMPARISON OR CONTROL IN CLINICAL RESEARCH PROGRAM: ICD-10-CM

## 2024-10-11 PROCEDURE — 36415 COLL VENOUS BLD VENIPUNCTURE: CPT

## 2024-10-25 LAB
APOB+LDLR+PCSK9 GENE MUT ANL BLD/T: NOT DETECTED
BRCA1+BRCA2 DEL+DUP + FULL MUT ANL BLD/T: NOT DETECTED
MLH1+MSH2+MSH6+PMS2 GN DEL+DUP+FUL M: NOT DETECTED

## 2024-11-06 ENCOUNTER — OFFICE VISIT (OUTPATIENT)
Dept: FAMILY MEDICINE CLINIC | Facility: CLINIC | Age: 69
End: 2024-11-06
Payer: COMMERCIAL

## 2024-11-06 VITALS
BODY MASS INDEX: 33.24 KG/M2 | SYSTOLIC BLOOD PRESSURE: 120 MMHG | HEART RATE: 61 BPM | TEMPERATURE: 97.4 F | WEIGHT: 211.8 LBS | OXYGEN SATURATION: 99 % | DIASTOLIC BLOOD PRESSURE: 82 MMHG | HEIGHT: 67 IN

## 2024-11-06 DIAGNOSIS — M1A.09X0 IDIOPATHIC CHRONIC GOUT OF MULTIPLE SITES WITHOUT TOPHUS: ICD-10-CM

## 2024-11-06 DIAGNOSIS — N13.8 BENIGN PROSTATIC HYPERPLASIA WITH URINARY OBSTRUCTION: ICD-10-CM

## 2024-11-06 DIAGNOSIS — E78.2 MIXED HYPERLIPIDEMIA: ICD-10-CM

## 2024-11-06 DIAGNOSIS — N18.31 STAGE 3A CHRONIC KIDNEY DISEASE (HCC): ICD-10-CM

## 2024-11-06 DIAGNOSIS — Z11.1 SCREENING EXAMINATION FOR PULMONARY TUBERCULOSIS: ICD-10-CM

## 2024-11-06 DIAGNOSIS — G47.33 OSA (OBSTRUCTIVE SLEEP APNEA): ICD-10-CM

## 2024-11-06 DIAGNOSIS — Z23 NEED FOR COVID-19 VACCINE: ICD-10-CM

## 2024-11-06 DIAGNOSIS — E66.9 OBESITY (BMI 30-39.9): ICD-10-CM

## 2024-11-06 DIAGNOSIS — N40.1 BENIGN PROSTATIC HYPERPLASIA WITH URINARY OBSTRUCTION: ICD-10-CM

## 2024-11-06 DIAGNOSIS — Z00.00 MEDICARE ANNUAL WELLNESS VISIT, SUBSEQUENT: Primary | ICD-10-CM

## 2024-11-06 DIAGNOSIS — Z23 ENCOUNTER FOR IMMUNIZATION: ICD-10-CM

## 2024-11-06 PROBLEM — N20.0 CALCULUS OF KIDNEY: Status: RESOLVED | Noted: 2021-07-23 | Resolved: 2024-11-06

## 2024-11-06 PROCEDURE — 90662 IIV NO PRSV INCREASED AG IM: CPT | Performed by: FAMILY MEDICINE

## 2024-11-06 PROCEDURE — G0008 ADMIN INFLUENZA VIRUS VAC: HCPCS | Performed by: FAMILY MEDICINE

## 2024-11-06 PROCEDURE — 90480 ADMN SARSCOV2 VAC 1/ONLY CMP: CPT | Performed by: FAMILY MEDICINE

## 2024-11-06 PROCEDURE — 86580 TB INTRADERMAL TEST: CPT | Performed by: FAMILY MEDICINE

## 2024-11-06 PROCEDURE — 91320 SARSCV2 VAC 30MCG TRS-SUC IM: CPT | Performed by: FAMILY MEDICINE

## 2024-11-06 PROCEDURE — G0439 PPPS, SUBSEQ VISIT: HCPCS | Performed by: FAMILY MEDICINE

## 2024-11-06 PROCEDURE — 99214 OFFICE O/P EST MOD 30 MIN: CPT | Performed by: FAMILY MEDICINE

## 2024-11-06 NOTE — PATIENT INSTRUCTIONS
Medicare Preventive Visit Patient Instructions  Thank you for completing your Welcome to Medicare Visit or Medicare Annual Wellness Visit today. Your next wellness visit will be due in one year (11/7/2025).  The screening/preventive services that you may require over the next 5-10 years are detailed below. Some tests may not apply to you based off risk factors and/or age. Screening tests ordered at today's visit but not completed yet may show as past due. Also, please note that scanned in results may not display below.  Preventive Screenings:  Service Recommendations Previous Testing/Comments   Colorectal Cancer Screening  Colonoscopy    Fecal Occult Blood Test (FOBT)/Fecal Immunochemical Test (FIT)  Fecal DNA/Cologuard Test  Flexible Sigmoidoscopy Age: 45-75 years old   Colonoscopy: every 10 years (May be performed more frequently if at higher risk)  OR  FOBT/FIT: every 1 year  OR  Cologuard: every 3 years  OR  Sigmoidoscopy: every 5 years  Screening may be recommended earlier than age 45 if at higher risk for colorectal cancer. Also, an individualized decision between you and your healthcare provider will decide whether screening between the ages of 76-85 would be appropriate. Colonoscopy: 06/09/2017  FOBT/FIT: Not on file  Cologuard: Not on file  Sigmoidoscopy: Not on file    Screening Current     Prostate Cancer Screening Individualized decision between patient and health care provider in men between ages of 55-69   Medicare will cover every 12 months beginning on the day after your 50th birthday PSA: 0.96 ng/mL     Screening Current     Hepatitis C Screening Once for adults born between 1945 and 1965  More frequently in patients at high risk for Hepatitis C Hep C Antibody: 05/07/2020    Screening Current   Diabetes Screening 1-2 times per year if you're at risk for diabetes or have pre-diabetes Fasting glucose: 98 mg/dL (3/29/2024)  A1C: No results in last 5 years (No results in last 5 years)  Screening Current    Cholesterol Screening Once every 5 years if you don't have a lipid disorder. May order more often based on risk factors. Lipid panel: 03/29/2024  Screening Not Indicated  History Lipid Disorder      Other Preventive Screenings Covered by Medicare:  Abdominal Aortic Aneurysm (AAA) Screening: covered once if your at risk. You're considered to be at risk if you have a family history of AAA or a male between the age of 65-75 who smoking at least 100 cigarettes in your lifetime.  Lung Cancer Screening: covers low dose CT scan once per year if you meet all of the following conditions: (1) Age 55-77; (2) No signs or symptoms of lung cancer; (3) Current smoker or have quit smoking within the last 15 years; (4) You have a tobacco smoking history of at least 20 pack years (packs per day x number of years you smoked); (5) You get a written order from a healthcare provider.  Glaucoma Screening: covered annually if you're considered high risk: (1) You have diabetes OR (2) Family history of glaucoma OR (3)  aged 50 and older OR (4)  American aged 65 and older  Osteoporosis Screening: covered every 2 years if you meet one of the following conditions: (1) Have a vertebral abnormality; (2) On glucocorticoid therapy for more than 3 months; (3) Have primary hyperparathyroidism; (4) On osteoporosis medications and need to assess response to drug therapy.  HIV Screening: covered annually if you're between the age of 15-65. Also covered annually if you are younger than 15 and older than 65 with risk factors for HIV infection. For pregnant patients, it is covered up to 3 times per pregnancy.    Immunizations:  Immunization Recommendations   Influenza Vaccine Annual influenza vaccination during flu season is recommended for all persons aged >= 6 months who do not have contraindications   Pneumococcal Vaccine   * Pneumococcal conjugate vaccine = PCV13 (Prevnar 13), PCV15 (Vaxneuvance), PCV20 (Prevnar 20)  *  Pneumococcal polysaccharide vaccine = PPSV23 (Pneumovax) Adults 19-63 yo with certain risk factors or if 65+ yo  If never received any pneumonia vaccine: recommend Prevnar 20 (PCV20)  Give PCV20 if previously received 1 dose of PCV13 or PPSV23   Hepatitis B Vaccine 3 dose series if at intermediate or high risk (ex: diabetes, end stage renal disease, liver disease)   Respiratory syncytial virus (RSV) Vaccine - COVERED BY MEDICARE PART D  * RSVPreF3 (Arexvy) CDC recommends that adults 60 years of age and older may receive a single dose of RSV vaccine using shared clinical decision-making (SCDM)   Tetanus (Td) Vaccine - COST NOT COVERED BY MEDICARE PART B Following completion of primary series, a booster dose should be given every 10 years to maintain immunity against tetanus. Td may also be given as tetanus wound prophylaxis.   Tdap Vaccine - COST NOT COVERED BY MEDICARE PART B Recommended at least once for all adults. For pregnant patients, recommended with each pregnancy.   Shingles Vaccine (Shingrix) - COST NOT COVERED BY MEDICARE PART B  2 shot series recommended in those 19 years and older who have or will have weakened immune systems or those 50 years and older     Health Maintenance Due:      Topic Date Due   • Colorectal Cancer Screening  06/09/2027   • Hepatitis C Screening  Completed     Immunizations Due:      Topic Date Due   • Influenza Vaccine (1) 09/01/2024     Advance Directives   What are advance directives?  Advance directives are legal documents that state your wishes and plans for medical care. These plans are made ahead of time in case you lose your ability to make decisions for yourself. Advance directives can apply to any medical decision, such as the treatments you want, and if you want to donate organs.   What are the types of advance directives?  There are many types of advance directives, and each state has rules about how to use them. You may choose a combination of any of the  following:  Living will:  This is a written record of the treatment you want. You can also choose which treatments you do not want, which to limit, and which to stop at a certain time. This includes surgery, medicine, IV fluid, and tube feedings.   Durable power of  for healthcare (DPAHC):  This is a written record that states who you want to make healthcare choices for you when you are unable to make them for yourself. This person, called a proxy, is usually a family member or a friend. You may choose more than 1 proxy.  Do not resuscitate (DNR) order:  A DNR order is used in case your heart stops beating or you stop breathing. It is a request not to have certain forms of treatment, such as CPR. A DNR order may be included in other types of advance directives.  Medical directive:  This covers the care that you want if you are in a coma, near death, or unable to make decisions for yourself. You can list the treatments you want for each condition. Treatment may include pain medicine, surgery, blood transfusions, dialysis, IV or tube feedings, and a ventilator (breathing machine).  Values history:  This document has questions about your views, beliefs, and how you feel and think about life. This information can help others choose the care that you would choose.  Why are advance directives important?  An advance directive helps you control your care. Although spoken wishes may be used, it is better to have your wishes written down. Spoken wishes can be misunderstood, or not followed. Treatments may be given even if you do not want them. An advance directive may make it easier for your family to make difficult choices about your care.   Weight Management   Why it is important to manage your weight:  Being overweight increases your risk of health conditions such as heart disease, high blood pressure, type 2 diabetes, and certain types of cancer. It can also increase your risk for osteoarthritis, sleep apnea, and  other respiratory problems. Aim for a slow, steady weight loss. Even a small amount of weight loss can lower your risk of health problems.  How to lose weight safely:  A safe and healthy way to lose weight is to eat fewer calories and get regular exercise. You can lose up about 1 pound a week by decreasing the number of calories you eat by 500 calories each day.   Healthy meal plan for weight management:  A healthy meal plan includes a variety of foods, contains fewer calories, and helps you stay healthy. A healthy meal plan includes the following:  Eat whole-grain foods more often.  A healthy meal plan should contain fiber. Fiber is the part of grains, fruits, and vegetables that is not broken down by your body. Whole-grain foods are healthy and provide extra fiber in your diet. Some examples of whole-grain foods are whole-wheat breads and pastas, oatmeal, brown rice, and bulgur.  Eat a variety of vegetables every day.  Include dark, leafy greens such as spinach, kale, fadumo greens, and mustard greens. Eat yellow and orange vegetables such as carrots, sweet potatoes, and winter squash.   Eat a variety of fruits every day.  Choose fresh or canned fruit (canned in its own juice or light syrup) instead of juice. Fruit juice has very little or no fiber.  Eat low-fat dairy foods.  Drink fat-free (skim) milk or 1% milk. Eat fat-free yogurt and low-fat cottage cheese. Try low-fat cheeses such as mozzarella and other reduced-fat cheeses.  Choose meat and other protein foods that are low in fat.  Choose beans or other legumes such as split peas or lentils. Choose fish, skinless poultry (chicken or turkey), or lean cuts of red meat (beef or pork). Before you cook meat or poultry, cut off any visible fat.   Use less fat and oil.  Try baking foods instead of frying them. Add less fat, such as margarine, sour cream, regular salad dressing and mayonnaise to foods. Eat fewer high-fat foods. Some examples of high-fat foods  include french fries, doughnuts, ice cream, and cakes.  Eat fewer sweets.  Limit foods and drinks that are high in sugar. This includes candy, cookies, regular soda, and sweetened drinks.  Exercise:  Exercise at least 30 minutes per day on most days of the week. Some examples of exercise include walking, biking, dancing, and swimming. You can also fit in more physical activity by taking the stairs instead of the elevator or parking farther away from stores. Ask your healthcare provider about the best exercise plan for you.      © Copyright "[employer] 2018 Information is for End User's use only and may not be sold, redistributed or otherwise used for commercial purposes. All illustrations and images included in CareNotes® are the copyrighted property of [employer] or [employer]

## 2024-11-06 NOTE — ASSESSMENT & PLAN NOTE
Lachelle keller updated Patient to get me a copy of living will Patient is up to date with screenings Patient to continue very 3 months followup with dermatology due to skin cancer history Patient to also see eye doctor and dentist as scheduled   Orders:    US abdominal aorta screening aaa; Future

## 2024-11-06 NOTE — ASSESSMENT & PLAN NOTE
LDL goal is < 100 Reviewed 3/29/2024 labs continue zocor followup labs   Orders:    Comprehensive metabolic panel; Future    Lipid panel; Future

## 2024-11-06 NOTE — PROGRESS NOTES
Ambulatory Visit  Name: Matt Carreno      : 1955      MRN: [id number]  Encounter Provider: Danae Darnell DO  Encounter Date: 2024   Encounter department: [facility] PRIMARY CARE    Assessment & Plan  Medicare annual wellness visit, subsequent  Patietn shots updated Patient to get me a copy of living will Patient is up to date with screenings Patient to continue very 3 months followup with dermatology due to skin cancer history Patient to also see eye doctor and dentist as scheduled   Orders:    US abdominal aorta screening aaa; Future    Stage 3a chronic kidney disease (HCC)  Lab Results   Component Value Date    EGFR 50 2024    EGFR 46 2023    EGFR 50 2023    CREATININE 1.42 (H) 2024    CREATININE 1.52 (H) 2023    CREATININE 1.40 (H) 2023     GFR is stable continue current care followup labs Patient to avoid NSAID's and keep good BP control  Orders:    Comprehensive metabolic panel; Future    CBC and differential; Future    Vitamin D 25 hydroxy; Future    Mixed hyperlipidemia  LDL goal is < 100 Reviewed 3/29/2024 labs continue zocor followup labs   Orders:    Comprehensive metabolic panel; Future    Lipid panel; Future    Obesity (BMI 30-39.9)           Idiopathic chronic gout of multiple sites without tophus         Benign prostatic hyperplasia with urinary obstruction  Followup with urology PSA in one year       FARA (obstructive sleep apnea)  Continue CPAP and sleep medicine followup       Encounter for immunization    Orders:    influenza vaccine, high-dose, PF 0.5 mL (Fluzone High Dose)    Need for COVID-19 vaccine    Orders:    COVID-19 Pfizer mRNA vaccine 12 yr and older (Comirnaty pre-filled syringe)    Screening examination for pulmonary tuberculosis  Check PPD for job   Orders:    TB Skin Test       Preventive health issues were discussed with patient, and age appropriate screening tests were ordered as noted in patient's After Visit  Summary. Personalized health advice and appropriate referrals for health education or preventive services given if needed, as noted in patient's After Visit Summary.    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   HENT:  Negative for congestion, sinus pain and trouble swallowing.    Eyes:  Negative for discharge and visual disturbance.   Respiratory:  Negative for cough, chest tightness, shortness of breath and wheezing.    Cardiovascular:  Negative for chest pain, palpitations and leg swelling.   Gastrointestinal:  Negative for abdominal pain, blood in stool, constipation, diarrhea, nausea and vomiting.   Genitourinary:  Negative for difficulty urinating, dysuria, frequency and hematuria.   Musculoskeletal:  Negative for arthralgias, gait problem and joint swelling.   Skin:  Negative for rash and wound.   Allergic/Immunologic: Negative for environmental allergies and food allergies.   Neurological:  Negative for dizziness, syncope, weakness, numbness and headaches.   Hematological:  Negative for adenopathy. Does not bruise/bleed easily.   Psychiatric/Behavioral:  Negative for confusion, decreased concentration and sleep disturbance. The patient is not nervous/anxious.      Medical History Reviewed by provider this encounter:       Annual Wellness Visit Questionnaire       Health Risk Assessment:   Patient rates overall health as very good. Patient feels that their physical health rating is slightly  better. Patient is very satisfied with their life. Eyesight was rated as same. Hearing was rated as same. Patient feels that their emotional and mental health rating is same. Patients states they are never, rarely angry. Patient states they are never, rarely unusually tired/fatigued. Pain experienced in the last 7 days has been some. Patient's pain rating has been 1/10. Patient states that he has experienced no weight loss or gain in last 6 months.     Depression Screening:   PHQ-2 Score: 0      Fall Risk Screening:   In the past year, patient has experienced: no history of falling in past year      Home Safety:  Patient does not have trouble with stairs inside or outside of their home. Patient has working smoke alarms and has working carbon monoxide detector. Home safety hazards include: none.     Nutrition:   Current diet is Regular.     Medications:   Patient is currently taking over-the-counter supplements. OTC medications include: see medication list. Patient is able to manage medications.     Activities of Daily Living (ADLs)/Instrumental Activities of Daily Living (IADLs):   Walk and transfer into and out of bed and chair?: Yes  Dress and groom yourself?: Yes    Bathe or shower yourself?: Yes    Feed yourself? Yes  Do your laundry/housekeeping?: Yes  Manage your money, pay your bills and track your expenses?: Yes  Make your own meals?: Yes    Do your own shopping?: Yes    Previous Hospitalizations:   Any hospitalizations or ED visits within the last 12 months?: No      Advance Care Planning:   Living will: Yes    Durable POA for healthcare: Yes    Advanced directive: Yes      Cognitive Screening:   Provider or family/friend/caregiver concerned regarding cognition?: No    PREVENTIVE SCREENINGS      Cardiovascular Screening:    General: Screening Not Indicated and History Lipid Disorder      Diabetes Screening:     General: Screening Current      Colorectal Cancer Screening:     General: Screening Current       Prostate Cancer Screening:    General: Screening Current      Abdominal Aortic Aneurysm (AAA) Screening:    Risk factors include: age between 65-74 yo      Due for: Screening AAA Ultrasound      Lung Cancer Screening:     General: Screening Not Indicated      Hepatitis C Screening:    General: Screening Current    Screening, Brief Intervention, and Referral to Treatment (SBIRT)    Screening  Typical number of drinks in a day: 0  Typical number of drinks in a week: 1  Interpretation: Low risk drinking behavior.    AUDIT-C Screenin) How often did you have a drink containing alcohol in the past year? monthly or less  2) How many drinks did you have on a typical day when you were drinking in the past year? 1 to 2  3) How often did you have 6 or more drinks on one occasion in the past year? never    AUDIT-C Score: 1  Interpretation: Score 0-3 (male): Negative screen for alcohol misuse    Single Item Drug Screening:  How often have you used an illegal drug (including marijuana) or a prescription medication for non-medical reasons in the past year? never    Single Item Drug Screen Score: 0  Interpretation: Negative screen for possible drug use disorder    Social Determinants of Health     Food Insecurity: No Food Insecurity (2024)    Hunger Vital Sign     Worried About Running Out of Food in the Last Year: Never true     Ran Out of Food in the Last Year: Never true   Transportation Needs: No Transportation Needs (2024)    PRAPARE - Transportation     Lack of Transportation (Medical): No     Lack of Transportation (Non-Medical): No   Housing Stability: Low Risk  (2024)    Housing Stability Vital Sign     Unable to Pay for Housing in the Last Year: No     Number of Times Moved in the Last Year: 0     Homeless in the Last Year: No   Utilities: Not At Risk (2024)    [facility] Utilities     Threatened with loss of utilities: No     No results found.    Objective     /82   Pulse 61   Temp (!) 97.4 °F  "(36.3 °C)   Ht 5' 7\" (1.702 m)   Wt 96.1 kg (211 lb 12.8 oz)   SpO2 99%   BMI 33.17 kg/m²     Physical Exam  Vitals and nursing note reviewed.   Constitutional:       Appearance: He is well-developed. He is obese.   HENT:      Head: Normocephalic and atraumatic.      Right Ear: Hearing, tympanic membrane and external ear normal.      Left Ear: Hearing, tympanic membrane and external ear normal.   Eyes:      Extraocular Movements: Extraocular movements intact.      Conjunctiva/sclera: Conjunctivae normal.      Pupils: Pupils are equal, round, and reactive to light.   Neck:      Thyroid: No thyromegaly.   Cardiovascular:      Rate and Rhythm: Normal rate and regular rhythm.      Heart sounds: Normal heart sounds.   Pulmonary:      Effort: Pulmonary effort is normal.      Breath sounds: Normal breath sounds. No wheezing or rales.   Abdominal:      General: Bowel sounds are normal. There is no distension.      Palpations: Abdomen is soft.      Tenderness: There is no abdominal tenderness.   Musculoskeletal:         General: No tenderness.      Cervical back: Neck supple.   Lymphadenopathy:      Cervical: No cervical adenopathy.   Skin:     General: Skin is warm and dry.      Findings: No rash.   Neurological:      General: No focal deficit present.      Mental Status: He is alert and oriented to person, place, and time.      Cranial Nerves: No cranial nerve deficit.      Coordination: Coordination normal.   Psychiatric:         Mood and Affect: Mood normal.         Behavior: Behavior normal.         Thought Content: Thought content normal.         Judgment: Judgment normal.         "

## 2024-11-06 NOTE — ASSESSMENT & PLAN NOTE
Lab Results   Component Value Date    EGFR 50 03/29/2024    EGFR 46 12/13/2023    EGFR 50 07/11/2023    CREATININE 1.42 (H) 03/29/2024    CREATININE 1.52 (H) 12/13/2023    CREATININE 1.40 (H) 07/11/2023     GFR is stable continue current care followup labs Patient to avoid NSAID's and keep good BP control  Orders:    Comprehensive metabolic panel; Future    CBC and differential; Future    Vitamin D 25 hydroxy; Future

## 2024-11-07 ENCOUNTER — APPOINTMENT (OUTPATIENT)
Dept: LAB | Facility: MEDICAL CENTER | Age: 69
End: 2024-11-07
Payer: COMMERCIAL

## 2024-11-07 ENCOUNTER — OFFICE VISIT (OUTPATIENT)
Dept: SLEEP CENTER | Facility: CLINIC | Age: 69
End: 2024-11-07
Payer: COMMERCIAL

## 2024-11-07 VITALS
HEIGHT: 67 IN | DIASTOLIC BLOOD PRESSURE: 70 MMHG | OXYGEN SATURATION: 99 % | SYSTOLIC BLOOD PRESSURE: 108 MMHG | TEMPERATURE: 97.4 F | HEART RATE: 73 BPM | RESPIRATION RATE: 16 BRPM | BODY MASS INDEX: 33.56 KG/M2 | WEIGHT: 213.8 LBS

## 2024-11-07 DIAGNOSIS — G47.31 CENTRAL SLEEP APNEA: ICD-10-CM

## 2024-11-07 DIAGNOSIS — N18.31 STAGE 3A CHRONIC KIDNEY DISEASE (HCC): ICD-10-CM

## 2024-11-07 DIAGNOSIS — G47.33 OSA (OBSTRUCTIVE SLEEP APNEA): Primary | ICD-10-CM

## 2024-11-07 DIAGNOSIS — E66.9 OBESITY (BMI 30-39.9): ICD-10-CM

## 2024-11-07 DIAGNOSIS — E78.2 MIXED HYPERLIPIDEMIA: ICD-10-CM

## 2024-11-07 LAB
25(OH)D3 SERPL-MCNC: 35.7 NG/ML (ref 30–100)
ALBUMIN SERPL BCG-MCNC: 4.4 G/DL (ref 3.5–5)
ALP SERPL-CCNC: 56 U/L (ref 34–104)
ALT SERPL W P-5'-P-CCNC: 27 U/L (ref 7–52)
ANION GAP SERPL CALCULATED.3IONS-SCNC: 7 MMOL/L (ref 4–13)
AST SERPL W P-5'-P-CCNC: 23 U/L (ref 13–39)
BASOPHILS # BLD AUTO: 0.04 THOUSANDS/ÂΜL (ref 0–0.1)
BASOPHILS NFR BLD AUTO: 1 % (ref 0–1)
BILIRUB SERPL-MCNC: 0.94 MG/DL (ref 0.2–1)
BUN SERPL-MCNC: 21 MG/DL (ref 5–25)
CALCIUM SERPL-MCNC: 9.6 MG/DL (ref 8.4–10.2)
CHLORIDE SERPL-SCNC: 108 MMOL/L (ref 96–108)
CHOLEST SERPL-MCNC: 127 MG/DL
CO2 SERPL-SCNC: 27 MMOL/L (ref 21–32)
CREAT SERPL-MCNC: 1.45 MG/DL (ref 0.6–1.3)
EOSINOPHIL # BLD AUTO: 0.12 THOUSAND/ÂΜL (ref 0–0.61)
EOSINOPHIL NFR BLD AUTO: 2 % (ref 0–6)
ERYTHROCYTE [DISTWIDTH] IN BLOOD BY AUTOMATED COUNT: 13.9 % (ref 11.6–15.1)
GFR SERPL CREATININE-BSD FRML MDRD: 48 ML/MIN/1.73SQ M
GLUCOSE P FAST SERPL-MCNC: 97 MG/DL (ref 65–99)
HCT VFR BLD AUTO: 42.7 % (ref 36.5–49.3)
HDLC SERPL-MCNC: 44 MG/DL
HGB BLD-MCNC: 14.3 G/DL (ref 12–17)
IMM GRANULOCYTES # BLD AUTO: 0.03 THOUSAND/UL (ref 0–0.2)
IMM GRANULOCYTES NFR BLD AUTO: 1 % (ref 0–2)
LDLC SERPL CALC-MCNC: 69 MG/DL (ref 0–100)
LYMPHOCYTES # BLD AUTO: 0.57 THOUSANDS/ÂΜL (ref 0.6–4.47)
LYMPHOCYTES NFR BLD AUTO: 10 % (ref 14–44)
MCH RBC QN AUTO: 31.6 PG (ref 26.8–34.3)
MCHC RBC AUTO-ENTMCNC: 33.5 G/DL (ref 31.4–37.4)
MCV RBC AUTO: 95 FL (ref 82–98)
MONOCYTES # BLD AUTO: 0.56 THOUSAND/ÂΜL (ref 0.17–1.22)
MONOCYTES NFR BLD AUTO: 9 % (ref 4–12)
NEUTROPHILS # BLD AUTO: 4.71 THOUSANDS/ÂΜL (ref 1.85–7.62)
NEUTS SEG NFR BLD AUTO: 77 % (ref 43–75)
NONHDLC SERPL-MCNC: 83 MG/DL
NRBC BLD AUTO-RTO: 0 /100 WBCS
PLATELET # BLD AUTO: 181 THOUSANDS/UL (ref 149–390)
PMV BLD AUTO: 10.1 FL (ref 8.9–12.7)
POTASSIUM SERPL-SCNC: 4.9 MMOL/L (ref 3.5–5.3)
PROT SERPL-MCNC: 7.3 G/DL (ref 6.4–8.4)
RBC # BLD AUTO: 4.52 MILLION/UL (ref 3.88–5.62)
SODIUM SERPL-SCNC: 142 MMOL/L (ref 135–147)
TRIGL SERPL-MCNC: 71 MG/DL
WBC # BLD AUTO: 6.03 THOUSAND/UL (ref 4.31–10.16)

## 2024-11-07 PROCEDURE — 85025 COMPLETE CBC W/AUTO DIFF WBC: CPT

## 2024-11-07 PROCEDURE — G2211 COMPLEX E/M VISIT ADD ON: HCPCS | Performed by: STUDENT IN AN ORGANIZED HEALTH CARE EDUCATION/TRAINING PROGRAM

## 2024-11-07 PROCEDURE — 80061 LIPID PANEL: CPT

## 2024-11-07 PROCEDURE — 36415 COLL VENOUS BLD VENIPUNCTURE: CPT

## 2024-11-07 PROCEDURE — 82306 VITAMIN D 25 HYDROXY: CPT

## 2024-11-07 PROCEDURE — 99214 OFFICE O/P EST MOD 30 MIN: CPT | Performed by: STUDENT IN AN ORGANIZED HEALTH CARE EDUCATION/TRAINING PROGRAM

## 2024-11-07 PROCEDURE — 80053 COMPREHEN METABOLIC PANEL: CPT

## 2024-11-07 NOTE — PROGRESS NOTES
[facility]  Sleep Medicine Follow up/ Established Patient Visit      Assessment/Plan:  1. FARA (obstructive sleep apnea)  PAP DME Resupply/Reorder    PAP DME Pressure Change      2. Central sleep apnea  PAP DME Resupply/Reorder    PAP DME Pressure Change      3. Obesity (BMI 30-39.9)  PAP DME Resupply/Reorder    PAP DME Pressure Change            Matt is a pleasant 68-year-old gentleman with a PMHx of osteoarthritis, history of basal cell carcinoma, melanoma in situ, stage III chronic kidney disease, HLD who presents in follow up for FARA with a central component (SHIMA 58, O2 ling 78% 6/2023).  He does endorse ongoing benefit from PAP therapy, and the resolution of snoring and overall believing that he is sleeping better.  Upon review of his compliance download, he does continue to have a slightly supratherapeutic AHI, with a higher central than obstructive component, although it is worth noting that the estimated SERA is less than 5.    Continue AutoBiPAP at adjusted settings of minimum EPAP 76bbI5D, maximum IPAP 96wmT9A, PS 4cmH2O  PS reduced slightly to see if this eliminates the few residual central events  Prescription for new supplies ordered today  Reviewed CMS/insurance requirements and resupply guidelines  Information provided on the above as well as general maintenance steps  Recommended maintaining good Sleep Hygiene  Recommended that he obtain the echocardiogram ordered at his last appointment  For now, I do not believe that aggressive treatment of these central events are necessary, particularly given the drastic reduction in his SHIMA/AHI utilizing his device compared to baseline.  Did review the importance of healthy weight management on sleep disordered breathing treatment, particularly FARA; let him know that a formal referral to weight management could be placed if he wished it.  He will Return in about 6 months (around  5/7/2025).        ________________________________________________________________________________________________    Per Last Visit Note (Date: 3/20/2024):  Matt is a pleasant 68-year-old gentleman with a PMHx of osteoarthritis, history of basal cell carcinoma, melanoma in situ, stage III chronic kidney disease, HLD who presents in follow up for FARA with a central component.  Overall, since his last visit, his compliance report shows an improvement in his AHI, although this is still somewhat supratherapeutic at 6.5, with more central and obstructive (4.1 versus 2.0).  Reassuringly, both of these are less than 5, thus it is questionable as to whether they are clinically relevant or not.  Upon review of his data over a month, it looks as though he has a good number of nights that are between 5 and 10 AHI.  Again, it is not clear at present just how clinically significant this is, as the only potential symptom of this is nighttime awakening, although he endorses that this could be due to other factors..     For now, I want him to continue auto BiPAP at settings of max IPAP 25 cm H2O, minimum EPAP 10 cm H2O, PS 5 cm H2O.  Prescription for new supplies ordered today  Reviewed CMS/insurance requirements and resupply guidelines  Information provided on the above as well as general maintenance steps  At his next visit, we will review his compliance download.  If he has a significant number of central events at that time, we may pursue an ASV or BiPAP ST study.  I have placed an order for an echocardiogram in case an ASV may be indicated in the future.  I have also placed a referral for a formal mask fitting, to help ensure that he has the proper mask fit for his face to avoid irritating the mole on the right side of his face (he is seeing dermatology this afternoon)  He will Return in about 6 months (around 9/20/2024).      Sleep Studies:  -HSAT 6/21/2023: .9 minutes.  SHIMA 58.0, O2 ling 78%.     -PAP titration  2023: Bilevel PAP pressure of 15/10 cm H2O utilizing ResMed AirTouch F20 resulted in  reduction of SHIMA to 9.7, with more than 50% central events.  Minimum O2 saturation maintained above 90%.  Patient was observed during REM sleep but not supine at this final pressure.    ________________________________________________________________________________________________      Interval History: Matt Carreno is a 69 y.o. male with a PMHx of osteoarthritis, history of basal cell carcinoma, melanoma in situ, stage III chronic kidney disease, HLD who presents in follow up for FARA.       SDB:  -Current experience with PAP Therapy: Still beneficial in that it eliminates snoring, thinks might be sleeping better.   -Mask type: FFM  -Difficulties with mask: Sometimes the leaking is bothersome. Did have mole removed by dermatology.  -Device: ResMed AirCurve 10 Auto; received ~2023  -Difficulties with device: Denies  -DME: [facility]  -Compliance:              [place] Sleepiness Scale:  What are your chances of dozing?   0= no chance  1= slight chance  2= moderate chance  3= high chance    Sitting and readin  Watching TV: 1  Sitting, inactive in a public place (e.g. a theatre or a meeting): 1  As a passenger in a car for an hour without a break: 1  Lying down to rest in the afternoon when circumstances permit: 1  Sitting and talking to someone: 0  Sitting quietly after a lunch without alcohol: 1  In a car, while stopped for a few minutes in the traffic: 0      TOTAL  6/24  Greater or equal to 10 is positive for excessive daytime sleepiness          SLEEP HYGIENE QUESTIONS:  Bedtime: 2300             Time it takes to fall sleep: 15 minutes  Wake up Time: 8181-1581  Number of times patient wakes up per night: 0 (better than previous)  Reason (s) why patient wakes up during the night:    Estimated total sleep time ( in a 24 hour period of time): ~7  hours  Naps: 0     Changes to PMH, PSH, SH: None       SLEEP RELATED  "ROS    No Known Allergies    CURRENT MEDICATIONS:  Current Outpatient Medications   Medication Instructions    allopurinol (ZYLOPRIM) 445 mg, Oral, Daily    Flowflex COVID-19 Ag Home Test KIT     latanoprost (XALATAN) 0.005 % ophthalmic solution 1 drop, Both Eyes, Daily    sildenafil (VIAGRA) 50 mg, Oral, As needed    simvastatin (ZOCOR) 20 mg, Oral, Daily    tadalafil (CIALIS) 5 mg, Oral, Daily           PHYSICAL EXAMINATION:  Vital Signs: /70 (BP Location: Right arm, Patient Position: Sitting, Cuff Size: Large)   Pulse 73   Temp (!) 97.4 °F (36.3 °C) (Temporal)   Resp 16   Ht 5' 7\" (1.702 m)   Wt 97 kg (213 lb 12.8 oz)   SpO2 99%   BMI 33.49 kg/m²     Constitutional: NAD, well appearing   Mental Status: AAOx3  Skin: Warm, dry, no rashes noted.  Mole noted between eye and superior aspect of nose on right side.  Eyes: PERRL, normal conjunctiva  ENT: Nasal congestion absent, nasal valve incompetence absent.  Posterior Airspace:   M[place]ough Tongue Position: 4  Retrognathia: absent  Overbite: absent  High Arched Palate: absent  Tongue Scalloping/Ridging: absent  Uvula: normal  Chest: No evidence of respiratory distress, no accessory muscle use; no evidence of peripheral cyanosis   Abdomen: Soft, NT/ND  Extremities: No digital clubbing or pedal edema  Neuro: Strength 5/5 throughout, sensation grossly intact          Electronically signed by:    Hans Peña DO  Board-Certified Neurology and Sleep Medicine  [facility]  11/07/24      "

## 2024-11-07 NOTE — PATIENT INSTRUCTIONS
Continue PAP Therapy  Continue AutoBiPAP at slightly adjusted settings of minimum EPAP 05bhZ4W, maximum IPAP 76ccE2H, PS 4cmH2O  Remember to clean your mask and equipment regularly, as directed.  Please call Central Scheduling to inquire about scheduling the echocardiogram ordered at your last visit:  Central Schedulin1 [phone]  You should be eligible for new supplies approximately every 3-6 months, depending on your insurance coverage. Contact your Durable Medical Equipment (DME) company for new supplies as needed.  Practice good Sleep Hygiene, as outlined below.  Follow up in 6 months.      Care and Maintenance  Headgear should be washed as needed. Daily inspection and weekly washings are recommended. Do not disassemble the straps. Machine wash in warm water, making sure to attach Velcro hooks and tabs before washing. Line dry or machine dry on a low setting.  Masks should be washed every day. Daily inspection is recommended. Leave the mask and tubing attached. Gently wash the mask with a soft cloth using warm water and mild detergent, concentrating on the mask cushion flaps. DO NOT use alcohol or bleach. Rinse thoroughly and air dry.  Tubing and headgear should be washed weekly. Daily inspection is recommended. Wash in warm water and mild detergent and rinse thoroughly. Hook the tubing to the machine and blow until dry.  Humidifier should be washed daily and filled with DISTILLED water before use. Wash with warm water and mild detergent. Disinfect weekly by soaking with a solution of 1 part white vinegar and 3 parts water for 30 minutes. Rinse thoroughly and air dry.  Disposable filters should be replaced once a month. Wash reusable foam filters with warm water and mild detergent at least once a month. Rinse thoroughly and dry with paper towels.  Avoid  that contain fragrance or conditioners, as these will leave a residue.  NEVER iron any soft goods.      CMS Requirements    Your insurance  requires a face-to-face follow up visit within a 31-90 day period after starting CPAP.  Your insurance requires compliance with CPAP, which is at least 4 hours per night for 70% of the time. This must be done over a 30 day period and must occur within the initial 31-90 day period after starting CPAP.  Your insurance also requires at least yearly follow ups to continue to pay for CPAP supplies.       PAP Supply Guidelines    Below are the guidelines for reordering your supplies. You will be responsible for your deductible, co payments, and out of pocket expenses.    Item Frequency   Nasal Mask (no headgear) 1 every 3 months   Nasal Mask Cushion 1 every 2 weeks   Full Face Mask (no headgear) 1 every 3 months   Full Face Mask Cushion 1 every month   Nasal Pillows 1 every 2 weeks   Headgear 1 every 6 months   hin Strap 1 every 6 months   steve 1 every 3 months   Filters: Reusable 1 every 6 months   Filters: Disposable 1 every 2 weeks   Humidifier Chamber(disposable) 1 every 6 months         Good Sleep Hygiene    Wake up at the same time every day, even on the weekends.  Use your bed for sleep and intimacy only.  If you have been in bed awake for 30 minutes, get up and leave the bedroom. Choose a dull activity not involving a blue screen (TV, computer, handheld devices). Go back to bed when you feel sleepy.  Avoid caffeine, nicotine and alcohol before you go to bed.  Avoid large meals before you go to bed.  Avoid using screens (computers, tablets, smartphones, etc.) for at least 1 hour before bedtime  Exercise regularly, but do not exercise right before you go to bed.  Avoid daytime naps. If you do take a nap, sleep for 20-40 minutes, and not after dinner.

## 2024-11-08 ENCOUNTER — TELEPHONE (OUTPATIENT)
Dept: SLEEP CENTER | Facility: CLINIC | Age: 69
End: 2024-11-08

## 2024-11-08 LAB
DME PARACHUTE DELIVERY DATE REQUESTED: NORMAL
DME PARACHUTE ITEM DESCRIPTION: NORMAL
DME PARACHUTE ORDER STATUS: NORMAL
DME PARACHUTE SUPPLIER NAME: NORMAL
DME PARACHUTE SUPPLIER PHONE: NORMAL
INDURATION: 0 MM
TB SKIN TEST: NEGATIVE

## 2024-11-15 ENCOUNTER — HOSPITAL ENCOUNTER (OUTPATIENT)
Dept: ULTRASOUND IMAGING | Facility: HOSPITAL | Age: 69
Discharge: HOME | End: 2024-11-15
Payer: COMMERCIAL

## 2024-11-15 DIAGNOSIS — Z00.00 MEDICARE ANNUAL WELLNESS VISIT, SUBSEQUENT: ICD-10-CM

## 2024-11-15 PROCEDURE — 76706 US ABDL AORTA SCREEN AAA: CPT

## 2024-11-20 ENCOUNTER — TELEPHONE (OUTPATIENT)
Age: 69
End: 2024-11-20

## 2024-11-20 ENCOUNTER — APPOINTMENT (OUTPATIENT)
Dept: LAB | Facility: MEDICAL CENTER | Age: 69
End: 2024-11-20
Payer: COMMERCIAL

## 2024-11-20 DIAGNOSIS — M10.09 IDIOPATHIC GOUT OF MULTIPLE SITES, UNSPECIFIED CHRONICITY: ICD-10-CM

## 2024-11-20 LAB — URATE SERPL-MCNC: 5.8 MG/DL (ref 3.5–8.5)

## 2024-11-20 PROCEDURE — 84550 ASSAY OF BLOOD/URIC ACID: CPT

## 2024-11-20 PROCEDURE — 36415 COLL VENOUS BLD VENIPUNCTURE: CPT

## 2024-11-20 NOTE — TELEPHONE ENCOUNTER
[facility]'s radiology called to report results are visible. There is significant findings on the  US abdominal aorta screening

## 2024-11-21 ENCOUNTER — RESULTS FOLLOW-UP (OUTPATIENT)
Dept: FAMILY MEDICINE CLINIC | Facility: CLINIC | Age: 69
End: 2024-11-21

## 2024-11-21 NOTE — TELEPHONE ENCOUNTER
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  Recommend 5-year follow-up scan

## 2024-11-26 ENCOUNTER — HOSPITAL ENCOUNTER (OUTPATIENT)
Dept: NON INVASIVE DIAGNOSTICS | Facility: HOSPITAL | Age: 69
Discharge: HOME | End: 2024-11-26
Payer: COMMERCIAL

## 2024-11-26 VITALS
BODY MASS INDEX: 33.43 KG/M2 | SYSTOLIC BLOOD PRESSURE: 108 MMHG | DIASTOLIC BLOOD PRESSURE: 70 MMHG | WEIGHT: 213 LBS | HEART RATE: 73 BPM | HEIGHT: 67 IN

## 2024-11-26 DIAGNOSIS — G47.31 CENTRAL SLEEP APNEA: ICD-10-CM

## 2024-11-26 LAB
AORTIC ROOT: 3.6 CM
AORTIC VALVE MEAN VELOCITY: 9.5 M/S
APICAL FOUR CHAMBER EJECTION FRACTION: 57 %
ASCENDING AORTA: 4.1 CM
AV LVOT MEAN GRADIENT: 4 MMHG
AV LVOT PEAK GRADIENT: 6 MMHG
AV MEAN GRADIENT: 4 MMHG
AV PEAK GRADIENT: 8 MMHG
AV VELOCITY RATIO: 0.89
BSA FOR ECHO PROCEDURE: 2.08 M2
DOP CALC AO PEAK VEL: 1.42 M/S
DOP CALC AO VTI: 34.81 CM
DOP CALC LVOT PEAK VEL VTI: 28.55 CM
DOP CALC LVOT PEAK VEL: 1.26 M/S
E WAVE DECELERATION TIME: 192 MS
E/A RATIO: 0.88
FRACTIONAL SHORTENING: 33 (ref 28–44)
INTERVENTRICULAR SEPTUM IN DIASTOLE (PARASTERNAL SHORT AXIS VIEW): 1.5 CM
INTERVENTRICULAR SEPTUM: 1.5 CM (ref 0.6–1.1)
LAAS-AP2: 29.1 CM2
LAAS-AP4: 20.7 CM2
LEFT ATRIUM SIZE: 4.7 CM
LEFT ATRIUM VOLUME (MOD BIPLANE): 85 ML
LEFT ATRIUM VOLUME INDEX (MOD BIPLANE): 40.9 ML/M2
LEFT INTERNAL DIMENSION IN SYSTOLE: 3.1 CM (ref 2.1–4)
LEFT VENTRICULAR INTERNAL DIMENSION IN DIASTOLE: 4.6 CM (ref 3.5–6)
LEFT VENTRICULAR POSTERIOR WALL IN END DIASTOLE: 1.2 CM
LEFT VENTRICULAR STROKE VOLUME: 57 ML
LVSV (TEICH): 57 ML
MV E'TISSUE VEL-LAT: 10 CM/S
MV E'TISSUE VEL-SEP: 8 CM/S
MV PEAK A VEL: 0.9 M/S
MV PEAK E VEL: 79 CM/S
MV STENOSIS PRESSURE HALF TIME: 56 MS
MV VALVE AREA P 1/2 METHOD: 3.93
RA PRESSURE ESTIMATED: 3 MMHG
RIGHT ATRIUM AREA SYSTOLE A4C: 13.3 CM2
RIGHT VENTRICLE ID DIMENSION: 2.9 CM
RV PSP: 25 MMHG
SINOTUBULAR JUNCTION: 3.2 CM
SINUS: 4 CM
SL CV LEFT ATRIUM LENGTH A2C: 6.5 CM
SL CV LV EF: 65
SL CV PED ECHO LEFT VENTRICLE DIASTOLIC VOLUME (MOD BIPLANE) 2D: 96 ML
SL CV PED ECHO LEFT VENTRICLE SYSTOLIC VOLUME (MOD BIPLANE) 2D: 39 ML
SL CV SINUS OF VALSALVA 2D: 4 CM
STJ: 3.2 CM
TR MAX PG: 22 MMHG
TR PEAK VELOCITY: 2.3 M/S
TRICUSPID ANNULAR PLANE SYSTOLIC EXCURSION: 2.4 CM
TRICUSPID VALVE PEAK REGURGITATION VELOCITY: 2.33 M/S

## 2024-11-26 PROCEDURE — 93306 TTE W/DOPPLER COMPLETE: CPT | Performed by: INTERNAL MEDICINE

## 2024-11-26 PROCEDURE — 93306 TTE W/DOPPLER COMPLETE: CPT

## 2024-12-06 PROBLEM — Z00.00 MEDICARE ANNUAL WELLNESS VISIT, SUBSEQUENT: Status: RESOLVED | Noted: 2024-11-06 | Resolved: 2024-12-06

## 2024-12-06 PROBLEM — Z11.1 SCREENING EXAMINATION FOR PULMONARY TUBERCULOSIS: Status: RESOLVED | Noted: 2024-11-06 | Resolved: 2024-12-06

## 2024-12-23 DIAGNOSIS — E78.2 MIXED HYPERLIPIDEMIA: ICD-10-CM

## 2024-12-24 RX ORDER — SIMVASTATIN 20 MG
20 TABLET ORAL DAILY
Qty: 90 TABLET | Refills: 1 | Status: SHIPPED | OUTPATIENT
Start: 2024-12-24

## 2025-02-06 ENCOUNTER — TELEPHONE (OUTPATIENT)
Age: 70
End: 2025-02-06

## 2025-02-06 DIAGNOSIS — H91.90 HEARING LOSS, UNSPECIFIED HEARING LOSS TYPE, UNSPECIFIED LATERALITY: Primary | ICD-10-CM

## 2025-02-06 NOTE — TELEPHONE ENCOUNTER
Please place an order for an audiology referral so that the patient m,ay have a hearing assessment.

## 2025-02-11 DIAGNOSIS — N52.03 COMBINED ARTERIAL INSUFFICIENCY AND CORPORO-VENOUS OCCLUSIVE ERECTILE DYSFUNCTION: ICD-10-CM

## 2025-02-11 RX ORDER — TADALAFIL 5 MG/1
5 TABLET ORAL EVERY MORNING
Qty: 90 TABLET | Refills: 1 | Status: SHIPPED | OUTPATIENT
Start: 2025-02-11

## 2025-02-12 ENCOUNTER — TELEPHONE (OUTPATIENT)
Age: 70
End: 2025-02-12

## 2025-02-12 NOTE — TELEPHONE ENCOUNTER
PA for TADALAFIL 5 MG SUBMITTED to [employer]    via    [x]CMM-KEY: [id number]    [x]PA sent as URGENT    All office notes, labs and other pertaining documents and studies sent. Clinical questions answered. Awaiting determination from insurance company.     Turnaround time for your insurance to make a decision on your Prior Authorization can take 7-21 business days.

## 2025-02-12 NOTE — TELEPHONE ENCOUNTER
PA for TADALAFIL 5 MG  APPROVED     Date(s) approved UNTIL 02/10/2026        Patient advised by          [x][facility] Message  []Phone call   []LMOM  []L/M to call office as no active Communication consent on file  []Unable to leave detailed message as VM not approved on Communication consent       Pharmacy advised by    [x]Fax  []Phone call    Approval letter scanned into [employer] Yes

## 2025-02-13 ENCOUNTER — OFFICE VISIT (OUTPATIENT)
Dept: AUDIOLOGY | Facility: CLINIC | Age: 70
End: 2025-02-13
Payer: COMMERCIAL

## 2025-02-13 DIAGNOSIS — H90.3 SENSORY HEARING LOSS, BILATERAL: Primary | ICD-10-CM

## 2025-02-13 DIAGNOSIS — H91.90 HEARING LOSS, UNSPECIFIED HEARING LOSS TYPE, UNSPECIFIED LATERALITY: ICD-10-CM

## 2025-02-13 PROCEDURE — 92567 TYMPANOMETRY: CPT | Performed by: AUDIOLOGIST-HEARING AID FITTER

## 2025-02-13 PROCEDURE — 92557 COMPREHENSIVE HEARING TEST: CPT | Performed by: AUDIOLOGIST-HEARING AID FITTER

## 2025-02-13 NOTE — PROGRESS NOTES
Diagnostic Hearing Evaluation    Name:  Matt Carreno  :  1955  Age:  69 y.o.   MRN:  [id number]  Date of Evaluation: 25     HISTORY:     Reason for visit: Tinnitus, Difficulty Understanding    Matt Carreno is being seen today at the request of Dr. Darnell for an initial  evaluation of hearing. The patient reports  some trouble hearing and bilateral tinnitus . The patient  denies otalgia, otorrhea, dizziness, and fullness.     EVALUATION:    Otoscopic Evaluation:   Right Ear: Moderate cerumen    Left Ear: Moderate cerumen    Tympanometry:   Right Ear: Type A; normal middle ear pressure and static compliance    Left Ear: Could not seal     Speech Audiometry:  Speech Reception (SRT)    Right Ear: 20 dB HL    Left Ear: 15 dB HL    Word Recognition Scores (WRS):  Right Ear: excellent ( 100 % correct)     Left Ear: excellent (100 % correct)    Stimuli: [id number]    Pure Tone Audiometry:  Conventional pure tone audiometry from 250 - 8000 Hz  was obtained with good reliability and revealed the following:     Right Ear: Mild to moderately-severe sensorineural hearing loss (SNHL)   Left Ear: Mild to moderately-severe sensorineural hearing loss (SNHL)    *see attached audiogram      RECOMMENDATIONS:  Annual hearing eval and Return to [facility]. for F/U    PATIENT EDUCATION:   The results of today's results and recommendations were reviewed with the patient and his hearing thresholds were explained at length. The patient voiced understanding of his test results. Questions were addressed and the patient was encouraged to contact our department should concerns arise.      Ashlyn Whitt, CCC-A  Clinical Audiologist  [facility] AUDIOLOGY [address] [address]

## 2025-02-25 ENCOUNTER — TELEPHONE (OUTPATIENT)
Age: 70
End: 2025-02-25

## 2025-02-25 NOTE — TELEPHONE ENCOUNTER
APPOINTMENTS MUST BE SCHEDULED A MINIMUM OF 14 DAYS PRIOR TO LEAVING FOR THEIR TRIP:??????   When are you traveling? 5/31/2025  Where specifically are you traveling to and for how long? (Must include entering country to leaving country) 2 weeks- [place]   Are you scheduling this appointment for just you or a group of people?? If scheduling for a group, how many?? (Maximum of 6 individuals, more than 6 send message to office for approval) 2 of 3   Have you ever been seen in our office before??No  If yes, record must be found within patients’ chart  For our records: What pharmacy do you use? [id number] - [place] [facility] - [address] [place] [address]   General Information:  Please be advised that we do not accept insurance for these visits. It will be out of pocket.  Payment is collected after your appointment with the physician and is based on his individual recommendations and what you receive here in the office.  Our provider will only offer what is required/highly recommended for your trip.  Previous patients who were already seen and only require malaria prophylaxis do not need scheduled visit. Send the medication request to the office for refill.  We do not carry many of the immunizations within our office due to how expensive they can be. Please note: immunizations will most likely need to be ordered and take up to two to three days to come in. We will call you when they arrive.  If you are being seen in a group: Please arrive 15 minutes prior to the earliest appointment time as you will be seen together.  Groups with 3 or more people:  Schedule 15-minute appointment for each patient at the end of the day.  If patient is 12 years old or younger:  Always book patient at the end of the day. Use more than one time slot as necessary.    - Please bring your itinerary with you and your yellow CDC card, if you have one. If you do not have a card, we will provide you with one.    - Please be aware that when you  receive your vaccinations, you will be asked to remain in the office for 15 minutes prior to getting your vaccine.

## 2025-02-26 NOTE — TELEPHONE ENCOUNTER
Contacted patient to follow up on their spouse's previous call to us regarding their interest in scheduling travel consults at our office.    Spoke with patient. I informed them that I reviewed their request for a travel consult and saw they had a preference to be seen on the week of March 9th. However, I informed them that we unfortunately have no availability on that week but that we'd be able to schedule them on March 4th.    Patient states him and his spouse will accept the Travel Consult appointments for 3/4 at 2:15pm and 2:30pm respectively. They stated their son wouldn't be able to attend on that date. Upon offering to schedule their son for a later date, Matt stated that their son will instead receive the vaccinations through their college.    Following scheduling patient and patient's spouse, I informed them that our travel clinic is strictly self-pay, that they will be paying a fee for their consults, and will be recommended any vaccinations necessary by the provider during their consult which will each have an individual price tied to them. I did include that we'd provide them with documentation that they may submit to their insurance for possible reimbursement of their funds spent on the vaccines.    Lastly, I instructed patient and patient's spouse to please bring their travel itinerary, yellow cards or any documentation detailing previous vaccinations and if needed, a yellow card will be provided to them both at their visit.

## 2025-03-03 ENCOUNTER — TELEPHONE (OUTPATIENT)
Dept: INFECTIOUS DISEASES | Facility: CLINIC | Age: 70
End: 2025-03-03

## 2025-03-03 ENCOUNTER — APPOINTMENT (OUTPATIENT)
Dept: LAB | Facility: MEDICAL CENTER | Age: 70
End: 2025-03-03
Payer: COMMERCIAL

## 2025-03-03 DIAGNOSIS — N18.31 STAGE 3A CHRONIC KIDNEY DISEASE (HCC): ICD-10-CM

## 2025-03-03 DIAGNOSIS — M1A.09X1 IDIOPATHIC CHRONIC GOUT OF MULTIPLE SITES WITH TOPHUS: ICD-10-CM

## 2025-03-03 LAB
ALBUMIN SERPL BCG-MCNC: 4.1 G/DL (ref 3.5–5)
ALP SERPL-CCNC: 61 U/L (ref 34–104)
ALT SERPL W P-5'-P-CCNC: 25 U/L (ref 7–52)
ANION GAP SERPL CALCULATED.3IONS-SCNC: 10 MMOL/L (ref 4–13)
AST SERPL W P-5'-P-CCNC: 19 U/L (ref 13–39)
BASOPHILS # BLD AUTO: 0.02 THOUSANDS/ÂΜL (ref 0–0.1)
BASOPHILS NFR BLD AUTO: 0 % (ref 0–1)
BILIRUB SERPL-MCNC: 0.71 MG/DL (ref 0.2–1)
BUN SERPL-MCNC: 22 MG/DL (ref 5–25)
CALCIUM SERPL-MCNC: 9.6 MG/DL (ref 8.4–10.2)
CHLORIDE SERPL-SCNC: 110 MMOL/L (ref 96–108)
CO2 SERPL-SCNC: 22 MMOL/L (ref 21–32)
CREAT SERPL-MCNC: 1.44 MG/DL (ref 0.6–1.3)
EOSINOPHIL # BLD AUTO: 0.14 THOUSAND/ÂΜL (ref 0–0.61)
EOSINOPHIL NFR BLD AUTO: 2 % (ref 0–6)
ERYTHROCYTE [DISTWIDTH] IN BLOOD BY AUTOMATED COUNT: 13.1 % (ref 11.6–15.1)
GFR SERPL CREATININE-BSD FRML MDRD: 48 ML/MIN/1.73SQ M
GLUCOSE P FAST SERPL-MCNC: 100 MG/DL (ref 65–99)
HCT VFR BLD AUTO: 41.8 % (ref 36.5–49.3)
HGB BLD-MCNC: 14.5 G/DL (ref 12–17)
IMM GRANULOCYTES # BLD AUTO: 0.04 THOUSAND/UL (ref 0–0.2)
IMM GRANULOCYTES NFR BLD AUTO: 1 % (ref 0–2)
LYMPHOCYTES # BLD AUTO: 1.28 THOUSANDS/ÂΜL (ref 0.6–4.47)
LYMPHOCYTES NFR BLD AUTO: 15 % (ref 14–44)
MCH RBC QN AUTO: 32 PG (ref 26.8–34.3)
MCHC RBC AUTO-ENTMCNC: 34.7 G/DL (ref 31.4–37.4)
MCV RBC AUTO: 92 FL (ref 82–98)
MONOCYTES # BLD AUTO: 0.56 THOUSAND/ÂΜL (ref 0.17–1.22)
MONOCYTES NFR BLD AUTO: 7 % (ref 4–12)
NEUTROPHILS # BLD AUTO: 6.31 THOUSANDS/ÂΜL (ref 1.85–7.62)
NEUTS SEG NFR BLD AUTO: 75 % (ref 43–75)
NRBC BLD AUTO-RTO: 0 /100 WBCS
PLATELET # BLD AUTO: 199 THOUSANDS/UL (ref 149–390)
PMV BLD AUTO: 10.1 FL (ref 8.9–12.7)
POTASSIUM SERPL-SCNC: 4.4 MMOL/L (ref 3.5–5.3)
PROT SERPL-MCNC: 6.9 G/DL (ref 6.4–8.4)
RBC # BLD AUTO: 4.53 MILLION/UL (ref 3.88–5.62)
SODIUM SERPL-SCNC: 142 MMOL/L (ref 135–147)
URATE SERPL-MCNC: 5.9 MG/DL (ref 3.5–8.5)
WBC # BLD AUTO: 8.35 THOUSAND/UL (ref 4.31–10.16)

## 2025-03-03 PROCEDURE — 85025 COMPLETE CBC W/AUTO DIFF WBC: CPT

## 2025-03-03 PROCEDURE — 84550 ASSAY OF BLOOD/URIC ACID: CPT

## 2025-03-03 PROCEDURE — 80053 COMPREHEN METABOLIC PANEL: CPT

## 2025-03-03 PROCEDURE — 36415 COLL VENOUS BLD VENIPUNCTURE: CPT

## 2025-03-03 NOTE — TELEPHONE ENCOUNTER
Called and LM with patient today.   Reminded patient of upcoming Travel Clinic appointment.     Also reminded patient to bring:  - Itinerary  - Yellow card if patient has one (one will be provided if patient does not have one)  - Updated vaccine records if not already in patients chart  - Reminder that this is a self pay clinic    Lm with out phone number if any questions.

## 2025-03-04 ENCOUNTER — OFFICE VISIT (OUTPATIENT)
Dept: INFECTIOUS DISEASES | Facility: CLINIC | Age: 70
End: 2025-03-04

## 2025-03-04 VITALS
RESPIRATION RATE: 18 BRPM | OXYGEN SATURATION: 96 % | BODY MASS INDEX: 31.99 KG/M2 | HEART RATE: 68 BPM | SYSTOLIC BLOOD PRESSURE: 126 MMHG | WEIGHT: 216 LBS | HEIGHT: 69 IN | DIASTOLIC BLOOD PRESSURE: 76 MMHG | TEMPERATURE: 97.3 F

## 2025-03-04 DIAGNOSIS — Z71.84 TRAVEL ADVICE ENCOUNTER: Primary | ICD-10-CM

## 2025-03-04 PROCEDURE — 90474 IMMUNE ADMIN ORAL/NASAL ADDL: CPT

## 2025-03-04 PROCEDURE — 90690 TYPHOID VACCINE ORAL: CPT

## 2025-03-04 PROCEDURE — 99411 PREVENTIVE COUNSELING GROUP: CPT | Performed by: INTERNAL MEDICINE

## 2025-03-04 RX ORDER — ATOVAQUONE AND PROGUANIL HYDROCHLORIDE 250; 100 MG/1; MG/1
1 TABLET, FILM COATED ORAL
Qty: 12 TABLET | Refills: 1 | Status: SHIPPED | OUTPATIENT
Start: 2025-06-07 | End: 2025-06-19

## 2025-03-04 NOTE — PROGRESS NOTES
Travel Clinic    Patient is traveling to countries or areas within countries where immunizations are required or recommended:   [place]    Patient states they will visit underdeveloped areas with poor sanitation Yes/No: Yes   Patient requires malaria prophylaxis Yes/No: Yes    No orders of the defined types were placed in this encounter.  Will get Tdap and Hep A at family physician    Patient instructed how to take medications Yes/No: Yes  Patient warned about side effects Yes/No: Yes  Patient declined Yes/No: No

## 2025-03-10 ENCOUNTER — TELEPHONE (OUTPATIENT)
Age: 70
End: 2025-03-10

## 2025-03-10 NOTE — TELEPHONE ENCOUNTER
Called patients pharmacy and spoke with Dayne, pharmacist today.   Informed Dayne I was calling regarding patients Malarone. Gave Dayne verbal order to fill medication. Dayne states pharmacy has in stock and will fill.     Called and spoke with patient today.   Informed patient of conversation above with pharmacy.   Informed patient if there are any further questions to call the office.   Patient verbalizes understanding at this time.

## 2025-03-10 NOTE — TELEPHONE ENCOUNTER
Pt calls and states him and his wife were seen at travel clinic and wanted to  malaria medication before the end of may but they will not fill until 6/7 but they will be on vacation by then. They are asking if we can call to give verbal of them picking up scripts sooner.

## 2025-04-17 ENCOUNTER — PATIENT MESSAGE (OUTPATIENT)
Dept: SLEEP CENTER | Facility: CLINIC | Age: 70
End: 2025-04-17

## 2025-04-18 ENCOUNTER — TELEPHONE (OUTPATIENT)
Dept: SLEEP CENTER | Facility: CLINIC | Age: 70
End: 2025-04-18

## 2025-04-18 NOTE — TELEPHONE ENCOUNTER
Per Hanna message:  Hi Dr. Peña,  I will be doing a fair amount of traveling and think that an Air Mini CPAP machine would be a good solution for me.   Do you know the steps that are needed in order for me to get one, and is there a particular model that you recommend?     Thank you,  [facility]  [phone]  ------------------------------------------------------    Provider notified.

## 2025-04-25 NOTE — TELEPHONE ENCOUNTER
Hello!  Apologies for the delay.  I am not aware of any travel BiPAP devices; to my knowledge, they are all travel CPAP.  In addition, I have never had insurance pay for 1 of these devices; this is unfortunately something that he would have to look up and purchase himself out-of-pocket.

## 2025-05-01 ENCOUNTER — TELEPHONE (OUTPATIENT)
Dept: SLEEP CENTER | Facility: CLINIC | Age: 70
End: 2025-05-01

## 2025-05-01 NOTE — TELEPHONE ENCOUNTER
Left voicemail for the patient that his May 8th appointment with Dr. Peña needs to be rescheduled as the doctor will not be here that day. Asked patient to call us back to reschedule.

## 2025-05-12 ENCOUNTER — OFFICE VISIT (OUTPATIENT)
Dept: SLEEP CENTER | Facility: CLINIC | Age: 70
End: 2025-05-12
Payer: COMMERCIAL

## 2025-05-12 VITALS
HEART RATE: 65 BPM | HEIGHT: 69 IN | SYSTOLIC BLOOD PRESSURE: 139 MMHG | BODY MASS INDEX: 31.25 KG/M2 | WEIGHT: 211 LBS | OXYGEN SATURATION: 98 % | DIASTOLIC BLOOD PRESSURE: 83 MMHG

## 2025-05-12 DIAGNOSIS — G47.33 OSA (OBSTRUCTIVE SLEEP APNEA): Primary | ICD-10-CM

## 2025-05-12 DIAGNOSIS — E66.9 OBESITY (BMI 30-39.9): ICD-10-CM

## 2025-05-12 PROCEDURE — 99214 OFFICE O/P EST MOD 30 MIN: CPT | Performed by: STUDENT IN AN ORGANIZED HEALTH CARE EDUCATION/TRAINING PROGRAM

## 2025-05-12 NOTE — PATIENT INSTRUCTIONS
Plan:  Continue AutoBiPAP at adjusted settings of minimum EPAP 04ieB3T, maximum IPAP 12bnG3E, PS 3cmH2O  Remember to clean your mask and equipment regularly, as directed.  You should be eligible for new supplies approximately every 3-6 months, depending on your insurance coverage. Contact your Durable Medical Equipment (DME) company for new supplies as needed.  Practice good Sleep Hygiene, as outlined below.  Follow up in 3-6 months.      General PAP Information:  Care and Maintenance  Headgear should be washed as needed. Daily inspection and weekly washings are recommended. Do not disassemble the straps. Machine wash in warm water, making sure to attach Velcro hooks and tabs before washing. Line dry or machine dry on a low setting.  Masks should be washed every day. Daily inspection is recommended. Leave the mask and tubing attached. Gently wash the mask with a soft cloth using warm water and mild detergent, concentrating on the mask cushion flaps. DO NOT use alcohol or bleach. Rinse thoroughly and air dry.  Tubing and headgear should be washed weekly. Daily inspection is recommended. Wash in warm water and mild detergent and rinse thoroughly. Hook the tubing to the machine and blow until dry.  Humidifier should be washed daily and filled with DISTILLED water before use. Wash with warm water and mild detergent. Rinse thoroughly and air dry.  Disposable filters should be replaced once a month. Wash reusable foam filters with warm water and mild detergent at least once a month. Rinse thoroughly and dry with paper towels.  Avoid  that contain fragrance or conditioners, as these will leave a residue.  NEVER iron any soft goods.      CMS Requirements    Your insurance requires a face-to-face follow up visit within a 31-90 day period after starting CPAP.  Your insurance requires compliance with CPAP, which is at least 4 hours per night for 70% of the time. This must be done over a 30 day period and must occur  within the initial 31-90 day period after starting CPAP.  Your insurance also requires at least yearly follow ups to continue to pay for CPAP supplies.       PAP Supply Guidelines    Below are the guidelines for reordering your supplies. You will be responsible for your deductible, co payments, and out of pocket expenses.    Item Frequency   Nasal Mask (no headgear) 1 every 3 months   Nasal Mask Cushion 1 every 2 weeks   Full Face Mask (no headgear) 1 every 3 months   Full Face Mask Cushion 1 every month   Nasal Pillows 1 every 2 weeks   Headgear 1 every 6 months   hin Strap 1 every 6 months   steve 1 every 3 months   Filters: Reusable 1 every 6 months   Filters: Disposable 1 every 2 weeks   Humidifier Chamber(disposable) 1 every 6 months           Good Sleep Hygiene  Wake up at the same time every day, even on the weekends.  Use your bed for sleep and intimacy only.  If you have been in bed awake for 30 minutes, get up and leave the bedroom. Choose a dull activity not involving a blue screen (TV, computer, handheld devices). Go back to bed when you feel sleepy.  Avoid caffeine, nicotine and alcohol before you go to bed.  Avoid large meals before you go to bed.  Avoid using screens (computers, tablets, smartphones, etc.) for at least 1 hour before bedtime  Exercise regularly, but do not exercise right before you go to bed.  Avoid daytime naps. If you do take a nap, sleep for 20-40 minutes, and not after 2pm.

## 2025-05-12 NOTE — ASSESSMENT & PLAN NOTE
Discussed the importance of maintaining a healthy weight on SDB control/management, and vice versa.  Encouraged lifestyle practices to maintain a healthy weight (including diet, exercise).  Reviewed that if patient ever wishes for a formal referral to Weight Management, they can let myself or their PCP know    Orders:    PAP DME Pressure Change    PAP DME Resupply/Reorder

## 2025-05-12 NOTE — PROGRESS NOTES
Name: Matt Francorp      : 1955      MRN: [id number]  Encounter Provider: Hans Peña DO  Encounter Date: 2025   Encounter department: [facility] SLEEP MEDICINE [facility]  :  Assessment & Plan  FARA (obstructive sleep apnea)  Matt is a very pleasant 69-year-old gentleman with a PMHx of osteoarthritis, history of basal cell carcinoma, melanoma in situ, stage III chronic kidney disease, HLD who presents in follow up for FARA with a central component (SHIMA 58, O2 ling 78% 2023).  He is overall doing well with PAP therapy, with no major concerns for me.  He is interested in a potential travel CPAP.    Compliance report reviewed and analyzed  Continue AutoBiPAP at adjusted settings of minimum EPAP 21wsY4Z, maximum IPAP 97gmD4T, PS 3cmH2O  Will then determine if he can essentially get by with a travel CPAP at 13-20 cmH2O with EPR 3.  Granted, he may have occasional difficulties as his BiPAP machine does appear to go to an IPAP over 20 cm H2O at times.  Prescription for new supplies ordered today  Reviewed CMS/insurance requirements and resupply guidelines  Information provided on the above as well as general maintenance steps  Recommended maintaining good Sleep Hygiene    Orders:    PAP DME Pressure Change    PAP DME Resupply/Reorder    Obesity (BMI 30-39.9)    Discussed the importance of maintaining a healthy weight on SDB control/management, and vice versa.  Encouraged lifestyle practices to maintain a healthy weight (including diet, exercise).  Reviewed that if patient ever wishes for a formal referral to Weight Management, they can let myself or their PCP know    Orders:    PAP DME Pressure Change    PAP DME Resupply/Reorder      Follow-up:  He will Return for Follow up in 3-6 months..      ________________________________________________________________________________________________    Per Last Visit Note (Date: 2024):  Matt is a pleasant 68-year-old gentleman with a PMHx of osteoarthritis,  history of basal cell carcinoma, melanoma in situ, stage III chronic kidney disease, HLD who presents in follow up for FARA with a central component (SHIMA 58, O2 ling 78% 6/2023).  He does endorse ongoing benefit from PAP therapy, and the resolution of snoring and overall believing that he is sleeping better.  Upon review of his compliance download, he does continue to have a slightly supratherapeutic AHI, with a higher central than obstructive component, although it is worth noting that the estimated SERA is less than 5.     Continue AutoBiPAP at adjusted settings of minimum EPAP 74ogD2M, maximum IPAP 94ggE2I, PS 4cmH2O  PS reduced slightly to see if this eliminates the few residual central events  Prescription for new supplies ordered today  Reviewed CMS/insurance requirements and resupply guidelines  Information provided on the above as well as general maintenance steps  Recommended maintaining good Sleep Hygiene  Recommended that he obtain the echocardiogram ordered at his last appointment  For now, I do not believe that aggressive treatment of these central events are necessary, particularly given the drastic reduction in his SHIMA/AHI utilizing his device compared to baseline.  Did review the importance of healthy weight management on sleep disordered breathing treatment, particularly FARA; let him know that a formal referral to weight management could be placed if he wished it.  He will Return in about 6 months (around 5/7/2025).      Sleep Studies:  -HSAT 6/21/2023: .9 minutes.  SHIMA 58.0, O2 ling 78%.     -PAP titration 7/26/2023: Bilevel PAP pressure of 15/10 cm H2O utilizing ResMed AirTouch F20 resulted in  reduction of SHIMA to 9.7, with more than 50% central events.  Minimum O2 saturation maintained above 90%.  Patient was observed during REM sleep but not supine at this final pressure.     ________________________________________________________________________________________________      Interval  "History: Matt Carreno is a 69 y.o. male with a PMHx of osteoarthritis, history of basal cell carcinoma, melanoma in situ, stage III chronic kidney disease, HLD who presents in follow up for FARA with a central component (SHIMA 58, O2 ling 78% 6/2023).    SDB:  -Current experience with PAP Therapy: He does endorse benefit, particularly in resolution of snoring.   -He is still interested in a possible travel CPAP  -Mask type: Full-face mask  -Difficulties with mask: Air leak \"every now and then\"  -Device: ResMed AirCurve 10 BiPAP; received ~8/2023.  -Difficulties with device: Denies  -DME: [facility]  -Compliance:                SLEEP SCHEDULE:  Bedtime: 2300   Time it takes to fall sleep: <15 minutes  Wake up Time: 0600/0700   Estimated total sleep time ( in a 24 hour period of time): ~7-8 hours       Changes to PMH, PSH, SH: Denies         Sitting and reading: (Patient-Rptd) (P) Slight chance of dozing  Watching TV: (Patient-Rptd) (P) Slight chance of dozing  Sitting, inactive in a public place (e.g. a theatre or a meeting): (Patient-Rptd) (P) Slight chance of dozing  As a passenger in a car for an hour without a break: (Patient-Rptd) (P) Slight chance of dozing  Lying down to rest in the afternoon when circumstances permit: (Patient-Rptd) (P) Slight chance of dozing  Sitting and talking to someone: (Patient-Rptd) (P) Would never doze  Sitting quietly after a lunch without alcohol: (Patient-Rptd) (P) Would never doze  In a car, while stopped for a few minutes in traffic: (Patient-Rptd) (P) Would never doze  Total score: (Patient-Rptd) (P) 5     Review of Systems  Pertinent positives/negatives included in HPI and also as noted:     Medications Ordered Prior to Encounter[1]   Objective   /83 (BP Location: Left arm, Patient Position: Sitting, Cuff Size: Large)   Pulse 65   Ht 5' 9\" (1.753 m)   Wt 95.7 kg (211 lb)   SpO2 98%   BMI 31.16 kg/m²        Physical Exam  Visit Vitals  /83 (BP Location: Left " "arm, Patient Position: Sitting, Cuff Size: Large)   Pulse 65   Ht 5' 9\" (1.753 m)   Wt 95.7 kg (211 lb)   SpO2 98%   BMI 31.16 kg/m²   Smoking Status Never   BSA 2.11 m²     PHYSICAL EXAMINATION:  Vital Signs: /83 (BP Location: Left arm, Patient Position: Sitting, Cuff Size: Large)   Pulse 65   Ht 5' 9\" (1.753 m)   Wt 95.7 kg (211 lb)   SpO2 98%   BMI 31.16 kg/m²     Constitutional: NAD, well appearing   Mental Status: AAOx3  Skin: Warm, dry, no rashes noted   Eyes: PERRL, normal conjunctiva  Chest: No evidence of respiratory distress, no accessory muscle use; no evidence of peripheral cyanosis  Abdomen: Soft, NT/ND  Extremities: No digital clubbing or pedal edema  Neuro: Strength 5/5 throughout, sensation grossly intact      Data  Lab Results   Component Value Date    HGB 14.5 03/03/2025    HCT 41.8 03/03/2025    MCV 92 03/03/2025      Lab Results   Component Value Date    CALCIUM 9.6 03/03/2025     08/23/2017    K 4.4 03/03/2025    CO2 22 03/03/2025     (H) 03/03/2025    BUN 22 03/03/2025    CREATININE 1.44 (H) 03/03/2025     No results found for: \"IRON\", \"TIBC\", \"FERRITIN\"  Lab Results   Component Value Date    AST 19 03/03/2025    ALT 25 03/03/2025         Electronically signed by:    Hans Peña DO  Board-Certified Neurology and Sleep Medicine  [facility]  05/12/25         [1]   Current Outpatient Medications on File Prior to Visit   Medication Sig Dispense Refill    allopurinol (ZYLOPRIM) 300 mg tablet Take 445 mg by mouth daily      [START ON 6/7/2025] atovaquone-proguanil (MALARONE) 250-100 mg Take 1 tablet by mouth daily with breakfast for 12 days Begin 1 day before entering malaria area and continue daily while in malaria area and for 1 week after leaving area Do not start before June 7, 2025. 12 tablet 1    latanoprost (XALATAN) 0.005 % ophthalmic solution Administer 1 drop to both eyes daily      sildenafil (Viagra) 50 MG tablet Take 1 tablet (50 mg " total) by mouth as needed for erectile dysfunction 10 tablet 1    tadalafil (CIALIS) 5 MG tablet take 1 tablet by mouth every morning 90 tablet 1     No current facility-administered medications on file prior to visit.

## 2025-05-13 ENCOUNTER — TELEPHONE (OUTPATIENT)
Dept: SLEEP CENTER | Facility: CLINIC | Age: 70
End: 2025-05-13

## 2025-05-13 ENCOUNTER — OFFICE VISIT (OUTPATIENT)
Dept: FAMILY MEDICINE CLINIC | Facility: CLINIC | Age: 70
End: 2025-05-13
Payer: COMMERCIAL

## 2025-05-13 VITALS
HEART RATE: 61 BPM | SYSTOLIC BLOOD PRESSURE: 130 MMHG | OXYGEN SATURATION: 97 % | WEIGHT: 209 LBS | RESPIRATION RATE: 16 BRPM | HEIGHT: 69 IN | TEMPERATURE: 98.6 F | DIASTOLIC BLOOD PRESSURE: 82 MMHG | BODY MASS INDEX: 30.96 KG/M2

## 2025-05-13 DIAGNOSIS — E66.09 CLASS 1 OBESITY DUE TO EXCESS CALORIES WITH SERIOUS COMORBIDITY AND BODY MASS INDEX (BMI) OF 30.0 TO 30.9 IN ADULT: ICD-10-CM

## 2025-05-13 DIAGNOSIS — Z85.828 HISTORY OF BASAL CELL CARCINOMA (BCC): ICD-10-CM

## 2025-05-13 DIAGNOSIS — G47.33 OSA (OBSTRUCTIVE SLEEP APNEA): ICD-10-CM

## 2025-05-13 DIAGNOSIS — M85.859 OSTEOPENIA OF HIP, UNSPECIFIED LATERALITY: ICD-10-CM

## 2025-05-13 DIAGNOSIS — D03.30 MELANOMA IN SITU OF FACE EXCLUDING EYELID, NOSE, LIP, AND EAR (HCC): ICD-10-CM

## 2025-05-13 DIAGNOSIS — E66.811 CLASS 1 OBESITY DUE TO EXCESS CALORIES WITH SERIOUS COMORBIDITY AND BODY MASS INDEX (BMI) OF 30.0 TO 30.9 IN ADULT: ICD-10-CM

## 2025-05-13 DIAGNOSIS — E78.2 MIXED HYPERLIPIDEMIA: Primary | ICD-10-CM

## 2025-05-13 DIAGNOSIS — M1A.09X0 IDIOPATHIC CHRONIC GOUT OF MULTIPLE SITES WITHOUT TOPHUS: ICD-10-CM

## 2025-05-13 DIAGNOSIS — N18.31 STAGE 3A CHRONIC KIDNEY DISEASE (HCC): ICD-10-CM

## 2025-05-13 PROCEDURE — G2211 COMPLEX E/M VISIT ADD ON: HCPCS | Performed by: FAMILY MEDICINE

## 2025-05-13 PROCEDURE — 99214 OFFICE O/P EST MOD 30 MIN: CPT | Performed by: FAMILY MEDICINE

## 2025-05-13 RX ORDER — MUPIROCIN 20 MG/G
OINTMENT TOPICAL
COMMUNITY
Start: 2025-03-20

## 2025-05-13 NOTE — ASSESSMENT & PLAN NOTE
BMI Counseling: Body mass index is 30.86 kg/m². The BMI is above normal. Nutrition recommendations include reducing portion sizes, 3-5 servings of fruits/vegetables daily, moderation in carbohydrate intake, and increasing intake of lean protein. Exercise recommendations include exercising 3-5 times per week.

## 2025-05-13 NOTE — ASSESSMENT & PLAN NOTE
Patient has nodule on the chin He has appt with dermatology on Thursday this week and will show them continue with sunscreen

## 2025-05-13 NOTE — PATIENT INSTRUCTIONS
Patient Education     Heart Healthy Diet   General   With a heart healthy food plan, you will learn to make better food choices. This diet may help you lower your blood cholesterol level, manage your blood pressure, and lower your risk for heart problems. Smaller portions may also be helpful.  Sodium is a type of mineral found in many foods. It helps keep the balance of fluids in your body. Too much sodium can raise your blood pressure. It can also make you take on extra water. This is called edema. Pay careful attention to how much salt or sodium is in your food. You may need to avoid salt or eat foods with less sodium.  Cholesterol is a fat-like, waxy substance in your blood. It is normal to have some cholesterol in your blood because your body makes it. You also get extra cholesterol from all animal products. These are foods like meats, eggs, and dairy products. Too much cholesterol in your blood can block or damage your blood vessels. This can lead to a heart attack or stroke.  Fats in your food have calories which give energy. Not all fats are bad. Some fats are healthy, like the fat found in fish, nuts, and olive oil. These are called unsaturated fats. They help manage body functions and lower cholesterol levels. Learn about the best fats to use in your diet and where to use them. Eating too much fat may make you more likely to weigh more than is healthy. This raises your risk of many heart problems.  Fiber is found in plants. Meat and dairy products do not have fiber in them. Fiber can help you lower your unhealthy cholesterol level. You may need more water as you eat more fiber so you do not get hard stools.  What lifestyle changes are needed?   Eat a healthy diet and workout often. Try to use as many calories as you take in each day.  What changes to diet are needed?   Eat oily fish at least 2 times a week. These are fish like tuna, salmon, and mackerel.  Limit sodium to no more than 2,300 mg of sodium per  day. This is about 1 teaspoon (5 grams) of table salt. Use little or no salt when making food. Try other spices or seasoning instead.  Limit how much cholesterol you eat to less than 300 mg per day. You can do this by having lean meats. Also eat lots of fruits, vegetables, and fat-free and low-fat dairy products.  Limit how much trans fats you eat. Trans fats are found in many processed foods like stick margarine, shortening, and some fried foods. Also, lower how much hydrogenated fats you eat. They are used to make pastries, biscuits, cookies, crackers, chips, and many snack foods.  Have no more than 1 drink per day of beer, wine, and mixed drinks (alcohol).  Who should use this diet?   A heart healthy diet is good for everyone.  What foods are good to eat?   Grains: Try to eat 6 to 8 servings of whole grain, high fiber foods each day. These are whole grain bread, cereals, brown rice, or pasta.  Fruits and vegetables: Eat 4 to 5 servings each day. Try to pick many kinds and colors. Try to eat more that are fresh or frozen. Look for low sodium or salt-free if you choose canned. Rinse canned items before cooking or eating. Dried peas, beans, and lentils are also good.  Dairy: Choose low fat (1%) or fat-free milk. Eat nonfat or low-fat products.  Protein: Try to eat more low fat or lean meats like chicken and turkey. Eat less red meat and eat more fish, eggs, egg whites, and beans instead.  Fats: Use good fats found in fish, nuts, and avocados. Try using olive oil, canola oil, and low-sodium and low-fat salad dressing and mayonnaise. Use corn, safflower, sunflower, and soybean oils.  Condiments: Use low-sodium or salt-free broths, soups, soy sauce, and condiments. Pepper, herbs, spices, vinegar, lemon or lime juices are great for seasoning. Sugar, cocoa powder, honey, syrup, and jams may be eaten in small amounts.  Sweets: Low-fat, trans fat-free cookies, cakes, and pies; morenita crackers; animal crackers; low-fat fig  bars; and senthil snaps.     What foods should be limited or avoided?   Grains: Salted breads, rolls, crackers, quick breads, self-rising flours, biscuit mixes, regular bread crumbs, instant hot cereals, commercially-prepared rice, pasta, stuffing mixes  Fruits and vegetables: Commercially-prepared potatoes and vegetable mixes, regular canned vegetables and juices, vegetables frozen with sauce or pickled vegetables, processed fruits with added sugar or salt  Dairy: Whole milk, malted milk, chocolate milk, buttermilk  Protein: Smoked, cured, salted, or canned meat, fish, or poultry such as abarca and sausages  Fats: Cut back on solid fats like butter, lard, and margarine.  Condiments and snacks: Salted and canned peas, beans, and olives; salted snack foods; fried foods; soda, juices, or other sweetened drinks; commercially-softened water. Miso, salsa, ketchup, barbecue sauce, Worcestershire sauce, soy sauce, and teriyaki sauce are also high in salt.  Sweets: High-fat baked goods such as muffins, donuts, pastries, commercial baked goods  Helpful tips   When you go to a grocery store, have a list or a meal plan. Do not shop when you are hungry to avoid cravings for foods.  You need to know about the sodium and fat content of the food you eat. Read food labels with care. They will show you how much of each is in a serving. This amount is given as a percentage of the total amount you need each day. Reading the labels will help you make healthy food choices.     Avoid fast foods.  Watch your portions when eating out. Split an order or bring home half for another meal.     Talk to a dietitian for help.  Last Reviewed Date   2020-03-27  Consumer Information Use and Disclaimer   This generalized information is a limited summary of diagnosis, treatment, and/or medication information. It is not meant to be comprehensive and should be used as a tool to help the user understand and/or assess potential diagnostic and treatment  options. It does NOT include all information about conditions, treatments, medications, side effects, or risks that may apply to a specific patient. It is not intended to be medical advice or a substitute for the medical advice, diagnosis, or treatment of a health care provider based on the health care provider's examination and assessment of a patient’s specific and unique circumstances. Patients must speak with a health care provider for complete information about their health, medical questions, and treatment options, including any risks or benefits regarding use of medications. This information does not endorse any treatments or medications as safe, effective, or approved for treating a specific patient. Up[employer], [employer] and its affiliates disclaim any warranty or liability relating to this information or the use thereof. The use of this information is governed by the Terms of Use, available at https://www.wol[employer]Expert Planetuwer.com/en/know/clinical-effectiveness-terms   Copyright   Copyright © 2024 [employer], [employer] and its affiliates and/or licensors. All rights reserved.

## 2025-05-13 NOTE — ASSESSMENT & PLAN NOTE
Lab Results   Component Value Date    EGFR 48 03/03/2025    EGFR 48 11/07/2024    EGFR 50 03/29/2024    CREATININE 1.44 (H) 03/03/2025    CREATININE 1.45 (H) 11/07/2024    CREATININE 1.42 (H) 03/29/2024   Euvolemic Patient to continue with hydrattion and avoid NSAID's reepat labs and followup in 6 months

## 2025-05-13 NOTE — ASSESSMENT & PLAN NOTE
Lipids stable on zocor check labs and followup in 6 months   Orders:    Comprehensive metabolic panel; Future    Lipid panel; Future

## 2025-05-13 NOTE — ASSESSMENT & PLAN NOTE
No acute flare up his uric acid level is stable continue to see rheumatology and stay on anti inflammatory diet

## 2025-05-13 NOTE — PROGRESS NOTES
Name: Matt Carreno      : 1955      MRN: [id number]  Encounter Provider: Danae Darnell DO  Encounter Date: 2025   Encounter department: [facility]  :  Assessment & Plan  Mixed hyperlipidemia  Lipids stable on zocor check labs and followup in 6 months   Orders:    Comprehensive metabolic panel; Future    Lipid panel; Future    FARA (obstructive sleep apnea)  Continue with CPAP reveiwed the sleep medicine note with him       Class 1 obesity due to excess calories with serious comorbidity and body mass index (BMI) of 30.0 to 30.9 in adult  BMI Counseling: Body mass index is 30.86 kg/m². The BMI is above normal. Nutrition recommendations include reducing portion sizes, 3-5 servings of fruits/vegetables daily, moderation in carbohydrate intake, and increasing intake of lean protein. Exercise recommendations include exercising 3-5 times per week.           Melanoma in situ of face excluding eyelid, nose, lip, and ear (HCC)  Patient has nodule on the chin He has appt with dermatology on Thursday this week and will show them continue with sunscreen       Stage 3a chronic kidney disease (HCC)  Lab Results   Component Value Date    EGFR 48 2025    EGFR 48 2024    EGFR 50 2024    CREATININE 1.44 (H) 2025    CREATININE 1.45 (H) 2024    CREATININE 1.42 (H) 2024   Euvolemic Patient to continue with hydrattion and avoid NSAID's reepat labs and followup in 6 months          Osteopenia of hip, unspecified laterality  Reviewed last dexa and rheumatology note will have followup with them and dexa       History of basal cell carcinoma (BCC)  Followup with derm and continue sunscreen       Idiopathic chronic gout of multiple sites without tophus  No acute flare up his uric acid level is stable continue to see rheumatology and stay on anti inflammatory diet            BMI Counseling: Body mass index is 30.86 kg/m². The BMI is above normal. Exercise  "recommendations include exercising 3-5 times per week.       Chief Complaint   Patient presents with    Follow-up     6 month recheck hyperlipidemia        History of Present Illness   Patient is here for followup for sleep apnea chronic renl disease stage 3 melanoma in situ hyperlipidemia and osteopenia Patient is seeing derm on Thursday He has a lesion on the  right side of chin that has been there for over 3-4 months patient is using sunscreen He is using CPAP Patient is due for labs Patient last consult iw rheumatology was reviewed Will need dexa soon Patient is doing well with gout since he decreased his red meat      Review of Systems   Constitutional:  Negative for fatigue, fever and unexpected weight change.   HENT:  Negative for congestion, sinus pain and trouble swallowing.    Eyes:  Negative for discharge and visual disturbance.   Respiratory:  Negative for cough, chest tightness, shortness of breath and wheezing.    Cardiovascular:  Negative for chest pain, palpitations and leg swelling.   Gastrointestinal:  Negative for abdominal pain, blood in stool, constipation, diarrhea, nausea and vomiting.   Genitourinary:  Negative for difficulty urinating, dysuria, frequency and hematuria.   Musculoskeletal:  Negative for arthralgias, gait problem and joint swelling.   Skin:  Negative for rash and wound.   Allergic/Immunologic: Negative for environmental allergies and food allergies.   Neurological:  Negative for dizziness, syncope, weakness, numbness and headaches.   Hematological:  Negative for adenopathy. Does not bruise/bleed easily.   Psychiatric/Behavioral:  Negative for confusion, decreased concentration and sleep disturbance. The patient is not nervous/anxious.        Objective   /82   Pulse 61   Temp 98.6 °F (37 °C) (Temporal)   Resp 16   Ht 5' 9\" (1.753 m)   Wt 94.8 kg (209 lb)   SpO2 97%   BMI 30.86 kg/m²      Physical Exam  Vitals and nursing note reviewed.   Constitutional:       " Appearance: He is well-developed. He is obese.   HENT:      Head: Normocephalic and atraumatic.      Right Ear: Hearing, tympanic membrane and external ear normal.      Left Ear: Hearing, tympanic membrane and external ear normal.   Eyes:      Extraocular Movements: Extraocular movements intact.      Conjunctiva/sclera: Conjunctivae normal.      Pupils: Pupils are equal, round, and reactive to light.   Neck:      Thyroid: No thyromegaly.   Cardiovascular:      Rate and Rhythm: Normal rate and regular rhythm.      Heart sounds: Normal heart sounds.   Pulmonary:      Effort: Pulmonary effort is normal.      Breath sounds: Normal breath sounds. No wheezing or rales.   Abdominal:      General: Bowel sounds are normal. There is no distension.      Palpations: Abdomen is soft.      Tenderness: There is no abdominal tenderness.   Musculoskeletal:         General: No tenderness.      Cervical back: Neck supple.   Lymphadenopathy:      Cervical: No cervical adenopathy.   Skin:     General: Skin is warm and dry.      Findings: Lesion present. No rash.      Comments: 3 mm nodular lesion on the right chin   Neurological:      General: No focal deficit present.      Mental Status: He is alert and oriented to person, place, and time.      Cranial Nerves: No cranial nerve deficit.      Coordination: Coordination normal.   Psychiatric:         Mood and Affect: Mood normal.         Behavior: Behavior normal.         Thought Content: Thought content normal.         Judgment: Judgment normal.

## 2025-05-16 ENCOUNTER — APPOINTMENT (OUTPATIENT)
Dept: LAB | Facility: MEDICAL CENTER | Age: 70
End: 2025-05-16
Attending: FAMILY MEDICINE
Payer: COMMERCIAL

## 2025-05-16 DIAGNOSIS — E78.2 MIXED HYPERLIPIDEMIA: ICD-10-CM

## 2025-05-16 LAB
ALBUMIN SERPL BCG-MCNC: 4.2 G/DL (ref 3.5–5)
ALP SERPL-CCNC: 58 U/L (ref 34–104)
ALT SERPL W P-5'-P-CCNC: 25 U/L (ref 7–52)
ANION GAP SERPL CALCULATED.3IONS-SCNC: 10 MMOL/L (ref 4–13)
AST SERPL W P-5'-P-CCNC: 19 U/L (ref 13–39)
BILIRUB SERPL-MCNC: 0.72 MG/DL (ref 0.2–1)
BUN SERPL-MCNC: 18 MG/DL (ref 5–25)
CALCIUM SERPL-MCNC: 9.6 MG/DL (ref 8.4–10.2)
CHLORIDE SERPL-SCNC: 105 MMOL/L (ref 96–108)
CHOLEST SERPL-MCNC: 138 MG/DL (ref ?–200)
CO2 SERPL-SCNC: 24 MMOL/L (ref 21–32)
CREAT SERPL-MCNC: 1.33 MG/DL (ref 0.6–1.3)
DME PARACHUTE DELIVERY DATE REQUESTED: NORMAL
DME PARACHUTE ITEM DESCRIPTION: NORMAL
DME PARACHUTE ORDER STATUS: NORMAL
DME PARACHUTE SUPPLIER NAME: NORMAL
DME PARACHUTE SUPPLIER PHONE: NORMAL
GFR SERPL CREATININE-BSD FRML MDRD: 53 ML/MIN/1.73SQ M
GLUCOSE P FAST SERPL-MCNC: 87 MG/DL (ref 65–99)
HDLC SERPL-MCNC: 46 MG/DL
LDLC SERPL CALC-MCNC: 63 MG/DL (ref 0–100)
NONHDLC SERPL-MCNC: 92 MG/DL
POTASSIUM SERPL-SCNC: 4.5 MMOL/L (ref 3.5–5.3)
PROT SERPL-MCNC: 7.1 G/DL (ref 6.4–8.4)
SODIUM SERPL-SCNC: 139 MMOL/L (ref 135–147)
TRIGL SERPL-MCNC: 145 MG/DL (ref ?–150)

## 2025-05-16 PROCEDURE — 36415 COLL VENOUS BLD VENIPUNCTURE: CPT

## 2025-05-16 PROCEDURE — 80053 COMPREHEN METABOLIC PANEL: CPT

## 2025-05-16 PROCEDURE — 80061 LIPID PANEL: CPT

## 2025-05-19 ENCOUNTER — RESULTS FOLLOW-UP (OUTPATIENT)
Dept: FAMILY MEDICINE CLINIC | Facility: CLINIC | Age: 70
End: 2025-05-19

## 2025-05-19 DIAGNOSIS — E78.2 MIXED HYPERLIPIDEMIA: ICD-10-CM

## 2025-05-20 RX ORDER — ALLOPURINOL 300 MG/1
445 TABLET ORAL DAILY
Refills: 0 | OUTPATIENT
Start: 2025-05-20

## 2025-05-20 RX ORDER — SIMVASTATIN 20 MG
20 TABLET ORAL DAILY
Qty: 90 TABLET | Refills: 1 | Status: SHIPPED | OUTPATIENT
Start: 2025-05-20

## 2025-06-12 ENCOUNTER — TELEPHONE (OUTPATIENT)
Age: 70
End: 2025-06-12

## 2025-06-12 NOTE — TELEPHONE ENCOUNTER
Patient called with complains of Lt side ear issue. Pt states he can't hear anything in his LT ear and it seems to be getting worst. Pt would like an referral to be evaluated by ENT If possible. Please review and advise.

## 2025-06-13 ENCOUNTER — OFFICE VISIT (OUTPATIENT)
Dept: URGENT CARE | Facility: MEDICAL CENTER | Age: 70
End: 2025-06-13
Payer: COMMERCIAL

## 2025-06-13 ENCOUNTER — TELEPHONE (OUTPATIENT)
Age: 70
End: 2025-06-13

## 2025-06-13 VITALS
OXYGEN SATURATION: 99 % | RESPIRATION RATE: 16 BRPM | WEIGHT: 212 LBS | HEART RATE: 66 BPM | HEIGHT: 69 IN | BODY MASS INDEX: 31.4 KG/M2 | TEMPERATURE: 98.4 F | DIASTOLIC BLOOD PRESSURE: 70 MMHG | SYSTOLIC BLOOD PRESSURE: 140 MMHG

## 2025-06-13 DIAGNOSIS — H91.90 HEARING LOSS, UNSPECIFIED HEARING LOSS TYPE, UNSPECIFIED LATERALITY: Primary | ICD-10-CM

## 2025-06-13 DIAGNOSIS — M1A.09X0 IDIOPATHIC CHRONIC GOUT OF MULTIPLE SITES WITHOUT TOPHUS: Primary | ICD-10-CM

## 2025-06-13 DIAGNOSIS — H61.23 BILATERAL HEARING LOSS DUE TO CERUMEN IMPACTION: Primary | ICD-10-CM

## 2025-06-13 PROCEDURE — 69210 REMOVE IMPACTED EAR WAX UNI: CPT | Performed by: PHYSICIAN ASSISTANT

## 2025-06-13 PROCEDURE — 99213 OFFICE O/P EST LOW 20 MIN: CPT | Performed by: PHYSICIAN ASSISTANT

## 2025-06-13 RX ORDER — ALLOPURINOL 300 MG/1
TABLET ORAL
Qty: 145 TABLET | Refills: 1 | Status: SHIPPED | OUTPATIENT
Start: 2025-06-13

## 2025-06-13 NOTE — TELEPHONE ENCOUNTER
Requested medication(s) are due for refill today: If no, explain: medication prescribed by patient rheumatology  **If antibiotic or given during sick visit, contact patient to discuss current symptoms.   **Confirm prescribing provider    LOV:  5/13/25  **If longer then 1 year, contact patient to schedule annual PRIOR to refilling. Once scheduled, adjust refill for 30 days, no refills.  **Update CareEverywhere to confirm not being seen elsewhere    NOV:  11/13/25    Is patient due for annual visit: No  **If future appointment, adjust to annual/follow up.  ** No appointment call to schedule annual/follow up.    Route to PCP, unless PCP no longer here, then physician they are seeing next.

## 2025-06-13 NOTE — PATIENT INSTRUCTIONS
B/l cerumen impactions removed with irrigation only and the patient tolerated this well. Hearing returned to baseline after removal.  Recommend debrox drops 3 consecutive days per month to help soften/loosen impaction(s).  Encouraged to have hearing tested again now that wax has been removed.

## 2025-06-13 NOTE — TELEPHONE ENCOUNTER
Patient called to schedule appointment for possibly today.  ENT is scheduled out until next July 2026. Provided number for him to call for sooner appt.  Provided [facility], Dr. Coulter, RADHA George.

## 2025-06-13 NOTE — PROGRESS NOTES
"  [facility]        NAME: Matt Carreno is a 69 y.o. male  : 1955    MRN: [id number]  DATE: 2025  TIME: 8:49 AM    Assessment and Plan   Bilateral hearing loss due to cerumen impaction [H61.23]  1. Bilateral hearing loss due to cerumen impaction          B/l cerumen impactions removed with irrigation only and the patient tolerated this well. Hearing returned to baseline after removal.  Recommend debrox drops 3 consecutive days per month to help soften/loosen impaction(s).  Encouraged to have hearing tested again now that wax has been removed.     Patient Instructions       Follow up with PCP in 3-5 days.  Proceed to  ER if symptoms worsen.    If tests have been performed at [facility], our office will contact you with results if changes need to be made to the care plan discussed with you at the visit.  You can review your full results on [facility].    Chief Complaint     Chief Complaint   Patient presents with    Hearing Problem     Started end of May  Bilateral hearing difficulty  Recently returned from trip from [facility] 1 day ago  OTC debrox          History of Present Illness       Matt presents for evaluation of his ears. He was evaluated by ENT and was found to have a mild SNHL, causing his baseline tinnitus. However, at the end of May he noticed worsening of hearing. He was experiencing echoing in the left ear.   He flew out to [facility] on  and returned home yesterday. Change in pressure on flights did not change or worsen his problems.   Patient feels that at times he has loss of hearing, but it will come back. He experiences pressure in both ears. He feels like something is wrong.   He has been using debrox since yesterday without improvement. Made issues worse.   Feels equilibrium is off \"a little bit\", but no dizziness.   Normal activities, appetite, bowel habits.   Continues taking medication for malaria.   Denies fever, recent sickness.         Review of Systems " "  Review of Systems   Constitutional:  Negative for activity change, appetite change, fatigue and fever.   HENT:  Positive for hearing loss. Negative for congestion, ear discharge and ear pain.         Clogged ears   Respiratory:  Negative for cough.          Current Medications     Current Medications[1]    Current Allergies     Allergies as of 06/13/2025    (No Known Allergies)            The following portions of the patient's history were reviewed and updated as appropriate: allergies, current medications, past family history, past medical history, past social history, past surgical history and problem list.     Past Medical History[2]    Past Surgical History[3]    Family History[4]      Medications have been verified.        Objective   /70   Pulse 66   Temp 98.4 °F (36.9 °C)   Resp 16   Ht 5' 9\" (1.753 m)   Wt 96.2 kg (212 lb)   SpO2 99%   BMI 31.31 kg/m²   No LMP for male patient.       Physical Exam     Physical Exam  Vitals and nursing note reviewed.   Constitutional:       General: He is awake.      Appearance: Normal appearance. He is well-developed, well-groomed and normal weight. He is not ill-appearing.   HENT:      Head: Normocephalic.      Right Ear: Tympanic membrane, ear canal and external ear normal.      Left Ear: Tympanic membrane, ear canal and external ear normal.      Ears:      Comments: B/l cerumen impactions removed, TMS and EACs WNL after removal, hearing restored     Nose: Nose normal. No nasal deformity.      Mouth/Throat:      Lips: Pink. No lesions.      Mouth: Mucous membranes are moist.     Eyes:      General: Lids are normal.      Conjunctiva/sclera: Conjunctivae normal.      Pupils: Pupils are equal, round, and reactive to light.       Cardiovascular:      Rate and Rhythm: Normal rate and regular rhythm.      Heart sounds: Normal heart sounds. No murmur heard.  Pulmonary:      Effort: Pulmonary effort is normal.      Breath sounds: Normal breath sounds. No decreased " breath sounds, wheezing, rhonchi or rales.   Abdominal:      General: Bowel sounds are normal.      Palpations: Abdomen is soft.      Tenderness: There is no abdominal tenderness.      Hernia: No hernia is present.     Musculoskeletal:      Cervical back: Normal range of motion and neck supple.   Lymphadenopathy:      Head:      Right side of head: No submandibular, tonsillar, preauricular or posterior auricular adenopathy.      Left side of head: No submandibular, tonsillar, preauricular or posterior auricular adenopathy.     Skin:     General: Skin is warm and dry.      Capillary Refill: Capillary refill takes less than 2 seconds.      Coloration: Skin is not pale.      Findings: No rash.     Neurological:      Mental Status: He is alert and oriented to person, place, and time.      Comments: CN II-X grossly intact.   Psychiatric:         Speech: Speech normal.         Behavior: Behavior normal. Behavior is cooperative.         Ear cerumen removal    Date/Time: 6/13/2025 8:30 AM    Performed by: Danielle Lee Seiple, PA-C  Authorized by: Danielle Lee Seiple, PA-C    Universal Protocol:  procedure performed by consultantConsent: Verbal consent obtained  Risks and benefits: risks, benefits and alternatives were discussed  Consent given by: patient  Patient understanding: patient states understanding of the procedure being performed  Patient consent: the patient's understanding of the procedure matches consent given  Procedure consent: procedure consent matches procedure scheduled  Patient identity confirmed: verbally with patient    Patient location:  Clinic  Procedure details:     Local anesthetic:  None    Location:  Both ears    Procedure type: curette      Approach:  External  Post-procedure details:     Complication:  None    Hearing quality:  Improved    Patient tolerance of procedure:  Tolerated well, no immediate complications                       [1]   Current Outpatient Medications:     allopurinol  (ZYLOPRIM) 300 mg tablet, Take 445 mg by mouth in the morning., Disp: , Rfl:     atovaquone-proguanil (MALARONE) 250-100 mg, Take 1 tablet by mouth daily with breakfast for 12 days Begin 1 day before entering malaria area and continue daily while in malaria area and for 1 week after leaving area Do not start before June 7, 2025., Disp: 12 tablet, Rfl: 1    latanoprost (XALATAN) 0.005 % ophthalmic solution, Administer 1 drop to both eyes in the morning., Disp: , Rfl:     sildenafil (Viagra) 50 MG tablet, Take 1 tablet (50 mg total) by mouth as needed for erectile dysfunction, Disp: 10 tablet, Rfl: 1    simvastatin (ZOCOR) 20 mg tablet, Take 1 tablet (20 mg total) by mouth daily, Disp: 90 tablet, Rfl: 1    tadalafil (CIALIS) 5 MG tablet, take 1 tablet by mouth every morning, Disp: 90 tablet, Rfl: 1    mupirocin (BACTROBAN) 2 % ointment, apply to SURGICAL SITE WITH EACH BANDAGE CHANGE (Patient not taking: Reported on 6/13/2025), Disp: , Rfl:   [2]   Past Medical History:  Diagnosis Date    Acid reflux     Arthritis     Calculus of kidney 07/23/2021    Cancer (HCC)     skin    Colon polyp     Essential hypertriglyceridemia     last assessed: 5/23/2013    H/O renal calculi     History of hypogonadism     last assessed: 12/15/2014    Hyperlipidemia     Kidney stone     Nasal lesion     left side    Neck mass     Tinnitus     Wears contact lenses    [3]   Past Surgical History:  Procedure Laterality Date    COLONOSCOPY      polyp repeat in 5 yrs    ESOPHAGOGASTRODUODENOSCOPY      gastritis and hiatal hernia    FL RETROGRADE PYELOGRAM  5/26/2021    FLAP LOCAL HEAD / NECK Left 8/7/2020    Procedure: EXCISION OF NECK SCC W/LOCAL FLAP;  Surgeon: Delmis Barragan MD;  Location:  MAIN OR;  Service: Plastics    MASS EXCISION Left 5/9/2016    Procedure: EXCISION BASAL CELL SKIN CANCER LEFT NOSE, FROZEN SECTION, LOCAL FLAP;  Surgeon: Delmis Barragan MD;  Location: [facility];  Service:     MOHS SURGERY      mohs mirographic surgery nose     AL ADJT TIS TRNS/REARGMT F/C/C/M/N/A/G/H/F 10SQCM/< N/A 12/7/2018    Procedure: EXCISION BCC OF NECK W/LOCAL FLAP, FROZEN SECTION;  Surgeon: Delmis Barragan MD;  Location:  MAIN OR;  Service: Plastics    AL CYSTO/URETERO W/LITHOTRIPSY &INDWELL STENT INSRT Right 5/26/2021    Procedure: CYSTOSCOPY URETEROSCOPY WITH BASKET STONE EXTRACTION, RETROGRADE PYELOGRAM AND INSERTION STENT URETERAL;  Surgeon: Swapnil Stiles MD;  Location:  MAIN OR;  Service: Urology    AL FTH/GFT FREE W/DIRECT CLOSURE N/E/E/L 20 SQ CM/< N/A 5/9/2016    Procedure:  LOCAL FLAP SKIN GRAFT ;  Surgeon: Delmis Barragan MD;  Location: [facility];  Service: Plastics    SCALP EXCISION N/A 7/14/2022    Procedure: EXCISION SCALP LESION, FROZEN SECTION;  Surgeon: Ivonne Anderson DO;  Location:  MAIN OR;  Service: Plastics    VASECTOMY      WISDOM TOOTH EXTRACTION     [4]   Family History  Problem Relation Name Age of Onset    Diabetes Mother          mellitus    Dementia Mother      Other Father      No Known Problems Half-Sister

## 2025-08-11 ENCOUNTER — TELEPHONE (OUTPATIENT)
Age: 70
End: 2025-08-11

## 2025-08-12 ENCOUNTER — OFFICE VISIT (OUTPATIENT)
Dept: SLEEP CENTER | Facility: CLINIC | Age: 70
End: 2025-08-12
Payer: COMMERCIAL

## 2025-08-12 PROBLEM — E66.9 OBESITY (BMI 30-39.9): Status: ACTIVE | Noted: 2025-08-12

## 2025-08-13 ENCOUNTER — TELEPHONE (OUTPATIENT)
Dept: SLEEP CENTER | Facility: CLINIC | Age: 70
End: 2025-08-13

## (undated) DEVICE — SKIN MARKER DUAL TIP WITH RULER CAP, FLEXIBLE RULER AND LABELS: Brand: DEVON

## (undated) DEVICE — BASIC PACK: Brand: CONVERTORS

## (undated) DEVICE — PAD GROUNDING ADULT

## (undated) DEVICE — NEEDLE BLUNT 18 G X 1 1/2IN

## (undated) DEVICE — PENCIL ELECTROSURG E-Z CLEAN -0035H

## (undated) DEVICE — NEEDLE 25G X 1 1/2

## (undated) DEVICE — SCD SEQUENTIAL COMPRESSION COMFORT SLEEVE MEDIUM KNEE LENGTH: Brand: KENDALL SCD

## (undated) DEVICE — ELECTRODE NEEDLE MOD E-Z CLEAN 2.75IN 7CM -0013M

## (undated) DEVICE — STERILE POLYISOPRENE POWDER-FREE SURGICAL GLOVES WITH EMOLLIENT COATING: Brand: PROTEXIS

## (undated) DEVICE — INTENDED FOR TISSUE SEPARATION, AND OTHER PROCEDURES THAT REQUIRE A SHARP SURGICAL BLADE TO PUNCTURE OR CUT.: Brand: BARD-PARKER ® SAFETYLOCK CARBON RIB-BACK BLADES

## (undated) DEVICE — 3M™ TEGADERM™ TRANSPARENT FILM DRESSING FRAME STYLE, 1624W, 2-3/8 IN X 2-3/4 IN (6 CM X 7 CM), 100/CT 4CT/CASE: Brand: 3M™ TEGADERM™

## (undated) DEVICE — SUT STRATAFIX SPIRAL 3-0 PGA/PCL 30 X 30 CM SXMD2B408

## (undated) DEVICE — 1820 FOAM BLOCK NEEDLE COUNTER: Brand: DEVON

## (undated) DEVICE — SYRINGE 10ML LL

## (undated) DEVICE — SPONGE 4 X 4 XRAY 16 PLY STRL LF RFD

## (undated) DEVICE — SYRINGE 10ML LL CONTROL TOP

## (undated) DEVICE — BASKET SPECIMEN RETRIVAL 1.9FR 120CM

## (undated) DEVICE — UNDYED MONOFILAMENT (POLYDIOXANONE), ABSORBABLE SYNTHETIC SURGICAL SUTURE: Brand: PDS

## (undated) DEVICE — GLOVE SRG BIOGEL 7

## (undated) DEVICE — INTENDED FOR TISSUE SEPARATION, AND OTHER PROCEDURES THAT REQUIRE A SHARP SURGICAL BLADE TO PUNCTURE OR CUT.: Brand: BARD-PARKER SAFETY BLADES SIZE 15, STERILE

## (undated) DEVICE — STERILE POLYISOPRENE POWDER-FREE SURGICAL GLOVES: Brand: PROTEXIS

## (undated) DEVICE — SURGICAL CLIPPER BLADE GENERAL USE

## (undated) DEVICE — LIGHT GLOVE GREEN

## (undated) DEVICE — SYRINGE 30ML LL

## (undated) DEVICE — TIBURON SPLIT SHEET: Brand: CONVERTORS

## (undated) DEVICE — SUT MONOCRYL 5-0 P-3 18 IN Y493G

## (undated) DEVICE — SUT SILK 4-0 G-3 789G

## (undated) DEVICE — SPONGE STICK WITH PVP-I: Brand: KENDALL

## (undated) DEVICE — SPECIMEN CONTAINER STERILE PEEL PACK

## (undated) DEVICE — ADHESIVE SKIN HIGH VISCOSITY EXOFIN 1ML

## (undated) DEVICE — NDL CNTR 20CT FM MAG: Brand: MEDLINE INDUSTRIES, INC.

## (undated) DEVICE — CATH URETERAL 5FR X 70 CM FLEX TIP POLYUR BARD

## (undated) DEVICE — LAMINECTOMY ARM CRADLE FOAM POSITIONER: Brand: CARDINAL HEALTH

## (undated) DEVICE — GUIDEWIRE STRGHT TIP 0.035 IN  SOLO PLUS

## (undated) DEVICE — SUT SILK 3-0 FS-1 684G

## (undated) DEVICE — DISPOSABLE OR TOWEL: Brand: CARDINAL HEALTH

## (undated) DEVICE — GARMENT,MEDLINE,DVT,INT,CALF,FOAM,MED: Brand: MEDLINE

## (undated) DEVICE — PACK TUR

## (undated) DEVICE — SUT PDS II 4-0 SH 27 IN Z315H

## (undated) DEVICE — ADHESIVE SKN CLSR HISTOACRYL FLEX 0.5ML LF